# Patient Record
Sex: FEMALE | Race: WHITE | Employment: UNEMPLOYED | URBAN - METROPOLITAN AREA
[De-identification: names, ages, dates, MRNs, and addresses within clinical notes are randomized per-mention and may not be internally consistent; named-entity substitution may affect disease eponyms.]

---

## 2017-01-09 ENCOUNTER — TELEPHONE (OUTPATIENT)
Dept: PULMONOLOGY | Age: 52
End: 2017-01-09

## 2017-04-17 ENCOUNTER — HOSPITAL ENCOUNTER (OUTPATIENT)
Dept: OTHER | Age: 52
Discharge: HOME | End: 2017-04-17
Attending: FAMILY MEDICINE | Admitting: FAMILY MEDICINE

## 2017-04-17 DIAGNOSIS — R07.1 PAINFUL BREATHING: ICD-10-CM

## 2017-08-14 ENCOUNTER — HOSPITAL ENCOUNTER (OUTPATIENT)
Dept: MAMMOGRAPHY | Age: 52
Discharge: HOME | End: 2017-08-14
Attending: NURSE PRACTITIONER | Admitting: NURSE PRACTITIONER

## 2017-08-14 DIAGNOSIS — Z12.31 VISIT FOR SCREENING MAMMOGRAM: ICD-10-CM

## 2017-09-29 ENCOUNTER — OFFICE VISIT (OUTPATIENT)
Dept: PULMONOLOGY | Age: 52
End: 2017-09-29

## 2017-09-29 ENCOUNTER — TELEPHONE (OUTPATIENT)
Dept: PULMONOLOGY | Age: 52
End: 2017-09-29

## 2017-09-29 VITALS
HEIGHT: 62 IN | SYSTOLIC BLOOD PRESSURE: 154 MMHG | TEMPERATURE: 97.9 F | WEIGHT: 177 LBS | HEART RATE: 88 BPM | DIASTOLIC BLOOD PRESSURE: 97 MMHG | OXYGEN SATURATION: 96 % | BODY MASS INDEX: 32.57 KG/M2

## 2017-09-29 DIAGNOSIS — R06.02 SOB (SHORTNESS OF BREATH): Primary | ICD-10-CM

## 2017-09-29 DIAGNOSIS — R94.2 ABNORMAL DIFFUSION CAPACITY DETERMINED BY PULMONARY FUNCTION TEST: ICD-10-CM

## 2017-09-29 PROCEDURE — 99214 OFFICE O/P EST MOD 30 MIN: CPT | Performed by: INTERNAL MEDICINE

## 2017-09-29 RX ORDER — ALBUTEROL SULFATE 90 UG/1
2 AEROSOL, METERED RESPIRATORY (INHALATION) EVERY 6 HOURS PRN
Qty: 1 INHALER | Refills: 2 | Status: ON HOLD | OUTPATIENT
Start: 2017-09-29 | End: 2018-06-17

## 2017-09-29 RX ORDER — BUPROPION HYDROCHLORIDE 150 MG/1
TABLET, EXTENDED RELEASE ORAL
Qty: 60 TABLET | Refills: 3 | Status: SHIPPED | OUTPATIENT
Start: 2017-09-29 | End: 2018-06-05

## 2017-09-29 NOTE — TELEPHONE ENCOUNTER
PA submitted through [employer] for Spiriva Handihaler. Awaiting determination. Last OV 9/29/17           CC: shortness of breath   HPI  Interval history: recent emergency department visit for facial numbness with abnormal imaging, recommended urgent neurosurgical evaluation, inpatient evaluation, but patient declined     Shortness of breath with exertion including ambulation and vacuuming; better with Spiriva. Using Albuterol twice weekly on average.      Tobacco abuse - using patches to cut down, now at 5 cigarettes daily     PRIOR HX: COPD exacerbation with chest pressure, dyspnea and cough -- given nitroglycerine; Medical management of Coronary Artery Disease - 3 vessel, non critical; Had chemical stress test in 2011 that showed normal response     Review of Systems  Objective:   Physical Exam  Blood pressure (!) 154/97, pulse 88, temperature 97.9 °F (36.6 °C), temperature source Oral, height 5' 1.5\" (1.562 m), weight 177 lb (80.3 kg), SpO2 96 %, not currently breastfeeding. Constitutional:  No acute distress. HENT:  Oropharynx is clear and moist. Mallampati 1  Eyes: Pupils equal, round, and reactive to light. No scleral icterus. Neck: No tracheal deviation present. Ca 15 inches  Cardiovascular: Normal heart sounds. No lower extremity edema. Pulmonary/Chest: No wheezes. No rhonchi. No rales. No decreased breath sounds. No accessory muscle usage or stridor. Musculoskeletal: No cyanosis. No clubbing. Skin: Skin is warm and dry. Psychiatric: Normal mood and affect. Neurologic: speech fluent, alert and oriented, strength symmetric       CTA Neck 9/26/17  SOFT TISSUES: The lung apices are clear.  No cervical or superior mediastinal   lymphadenopathy.  The visualized portion of the larynx and pharynx appear   unremarkable.       CT CHEST Feb 2013  The airways are patent. No pleural fluid is present. There is no   evidence of interstitial lung disease on HRCT imaging.  Mild   dependent atelectasis is observed in the posterior lungs   bilaterally. No additional airspace abnormalities, and no evidence   of pulmonary nodule. Soft tissues at the base of the neck are normal. Heart and   mediastinum are unremarkable. No lymph node enlargement throughout   the chest. Adrenal glands are normal. The patient is status post   cholecystectomy. There are no skeletal abnormalities throughout. Impression-   1. No evidence of interstitial lung disease. 2. There is mild dependent atelectasis in the lungs, but no   evidence of acute airspace disease.      CXR 9-2-15 without airspace disease      PFT May 10, 2011                FEV1 2.39 L 102%, ratio . 76            TLC 77% with low ERV                    DLCO 83%  PFT Feb 8, 2012                   FEV1 2.35 L 101%                                      TLC 77%                                                               DLCO 70%  PFT Sept 2012                      FEV1 2.26 L 88%                                        TLC 77%                                                               DLCO 61%  PFT Feb 2013                                 FEv1 2.33 L 91%                                        TLC 3.13 65%                                                       DLCO 14.11 63%  PFT 9-22-15                                    FEV1 2.43 L 97%                                        TLC 3.97 L 83%                                          DLCO 14.73 66%  6MWT 1-19-16 no desaturation 1260 feet     Myoscan June 2011 normal     Alpha 1 - 144     Assessment:      1. Chronic Bronchitis  2. Abnormal diffusing capacity  3. Coronary Artery Disease now with recurrent chest pressure  4. Tobacco abuse, 1 ppd  5. Hypoxemia, resolved  6. Excessive daytime sleepiness - epworth is 10  7. Recent emergency department visit (9/26/17) with physician concern for vertebrobasilar insufficiency; patient left AMA and was to f/u with Mr. Anderson Button:      1.   No further imaging is required  2. Tobacco cessation is advised, working on cutting down, down from 2 to 1 ppd  3. Spiriva q day, albuterol PRN (give inhaler)   4. On propranolol (no difference with shortness of breath while off)   5. Does not require daytime oxygen, ONPO on room air  6. See me in about 3 months with full PFT, evaluate shortness of breath and abnormal diffusing capacity  7. Wellbutrin 150 mg bid. I discussed with this patient today the risks and benefits of various tobacco cessation strategies, including, but not limited to \"cold turkey,\" nicotine replacement, Chantix and wellbutrin. We specifically discussed the risks of Chantix and Wellbutrin, with focus on side effects to include nausea, intense dreams, seizures and spent considerable time focusing on the black box warning regarding depression. The patient specifically denies suicidal ideation/homicidal ideation and was counseled to stop the product and immediately seek medical assistance for any worsening depression/mood disturbance, agitation, hostility or changes in behavior or thinking. Specific risk of completed suicide was discussed. The patient is encouraged to read enclosed literature for any prescribed medications.

## 2017-10-09 ENCOUNTER — OFFICE VISIT (OUTPATIENT)
Dept: CARDIOLOGY CLINIC | Age: 52
End: 2017-10-09

## 2017-10-09 VITALS
BODY MASS INDEX: 32.65 KG/M2 | OXYGEN SATURATION: 94 % | DIASTOLIC BLOOD PRESSURE: 80 MMHG | HEIGHT: 62 IN | WEIGHT: 177.4 LBS | HEART RATE: 91 BPM | SYSTOLIC BLOOD PRESSURE: 128 MMHG

## 2017-10-09 DIAGNOSIS — I10 ESSENTIAL HYPERTENSION: ICD-10-CM

## 2017-10-09 DIAGNOSIS — I25.119 CORONARY ARTERY DISEASE INVOLVING NATIVE CORONARY ARTERY OF NATIVE HEART WITH ANGINA PECTORIS (HCC): Primary | ICD-10-CM

## 2017-10-09 DIAGNOSIS — E78.2 MIXED HYPERLIPIDEMIA: ICD-10-CM

## 2017-10-09 PROCEDURE — 99215 OFFICE O/P EST HI 40 MIN: CPT | Performed by: INTERNAL MEDICINE

## 2017-10-09 RX ORDER — RANOLAZINE 500 MG/1
500 TABLET, EXTENDED RELEASE ORAL 2 TIMES DAILY
Qty: 14 TABLET | Refills: 3 | Status: SHIPPED | OUTPATIENT
Start: 2017-10-09 | End: 2017-10-09 | Stop reason: DRUGHIGH

## 2017-10-09 RX ORDER — NITROGLYCERIN 0.4 MG/1
0.4 TABLET SUBLINGUAL EVERY 5 MIN PRN
Qty: 25 TABLET | Refills: 3 | Status: ON HOLD | OUTPATIENT
Start: 2017-10-09 | End: 2018-06-17 | Stop reason: HOSPADM

## 2017-10-09 RX ORDER — LISINOPRIL 2.5 MG/1
2.5 TABLET ORAL DAILY
COMMUNITY
End: 2017-10-09 | Stop reason: SDUPTHER

## 2017-10-09 RX ORDER — LISINOPRIL 2.5 MG/1
2.5 TABLET ORAL DAILY
Qty: 30 TABLET | Refills: 11 | Status: ON HOLD | OUTPATIENT
Start: 2017-10-09 | End: 2018-05-18

## 2017-10-09 RX ORDER — RANOLAZINE 1000 MG/1
1000 TABLET, EXTENDED RELEASE ORAL 2 TIMES DAILY
Qty: 30 TABLET | Refills: 11 | Status: ON HOLD | OUTPATIENT
Start: 2017-10-09 | End: 2018-06-17 | Stop reason: HOSPADM

## 2017-10-09 NOTE — PROGRESS NOTES
[address]   Cardiac follow up    Referring Provider:  Lennox Shown, NP     Chief Complaint   Patient presents with    Follow-Up from [facility]     9/26/2017    Coronary Artery Disease    Hypertension    Hyperlipidemia    Discuss Labs     cmp and troponin 9/26/2017    Results     ekg 9/26/2017    Back Pain     started Saturday    Dizziness     at times    Shortness of Breath     with activity    Palpitations     flutters at times.  Fatigue     started 9/25/2017        History of Present Illness:   Mrs. Galina Avila is a 51yo female here today for recent ER f/u. My last OV with her was April 2016. She has PMH of HTN, COPD, DM, and non-obstructive CAD. Cardiac catheterization August 2012 showed diffuse nonobstructive CAD which was described as about 30% to 40% stenosis. Note stress test June 2011 was normal with EF 72%. Her most recent ECHO 5/1/13 showed normal LVEF=55%,mild LVH, and no intracardiac mass. EKG 5/19/13 SR, mild T wave inversion consider ischemia. Note CATH on 6/6/13 which showed essentially normal epicardial coronary arteries  Circumflex <20%, LAD <20%. Normal LV systolic function GX=58%. No significant valvular disease. Normal-appearing aorta. Normal LVEDP. She was seen by Dr. Wei Reyna for exertional chest pain and GXT without myoview (insurance would not cover nuclear imaging) ordered 3/18/16 which revealed  normal low level treadmill exam. She did not reach 85% target heart rate. She was able to reach 81%. The patient did experience exertional symptoms   In March 2016 she continued to complain of exertional midsternal CP. I felt we needed to recheck her coronary arteries. She underwent subsequent most recent CATH by Dr. Keo Browne on 4/1/16 LM-normal LAD-30%; mid Septal  #1--70%; ostial CCx-20%; prox OM1- normal RCA-normal, dominant RPDA- normal LVEF- 60%  EKG 4/1/16 showed NSR with nonspecific ST changes.   No need for PCI and med mgt only recommended with addition of smokeless tobacco. She reports that she does not drink alcohol or use drugs. Family History:  family history includes Cancer in her brother and another family member; Diabetes in an other family member; Emphysema in her mother; Heart Disease in her father and mother; Heart Failure in her father, maternal aunt, maternal uncle, mother, and another family member; High Blood Pressure in her brother, father, maternal grandfather, maternal grandmother, mother, paternal grandfather, paternal grandmother, sister, sister, sister, and sister; High Cholesterol in her father and mother; Hypertension in her brother, father, maternal aunt, maternal grandfather, maternal grandmother, maternal uncle, mother, paternal aunt, paternal grandfather, paternal grandmother, paternal uncle, and sister; Osteoarthritis in an other family member; Stroke in her mother. Home Medications:  Prior to Admission medications    Medication Sig Start Date End Date Taking? Authorizing Provider   glimepiride (AMARYL) 4 MG tablet Take 4 mg by mouth 2 times daily. Yes Historical Provider, MD   NONFORMZACHARY Home oxygen 2L per NC   Yes Historical Provider, MD   tiotropium (SPIRIVA HANDIHALER) 18 MCG inhalation capsule Inhale 1 capsule into the lungs daily. 2/27/13  Yes Opal Mckeno MD   amLODIPine (NORVASC) 5 MG tablet Take 5 mg by mouth 2 times daily. Yes Historical Provider, MD   albuterol (PROVENTIL HFA) 108 (90 BASE) MCG/ACT inhaler Inhale 2 puffs into the lungs every 6 hours as needed for Shortness of Breath for 10 days. IF YOU FEEL AS THO YOU ARE STILL NEEDING THIS MEDICATION AFTER 10 DAYS, CALL YOUR DR TO DISCUSS FURTHER TREATMENT 1/22/13  Yes Edvin Han MD   propranolol (INDERAL) 40 MG tablet Take 40 mg by mouth 2 times daily. Yes Historical Provider, MD   SUMAtriptan (IMITREX) 50 MG tablet Take 50 mg by mouth once as needed.      Yes Historical Provider, MD   nitroGLYCERIN (NITROSTAT) 0.4 MG SL tablet Place 0.4 mg under the tongue every 5 minutes as needed. Yes Historical Provider, MD   pravastatin (PRAVACHOL) 40 MG tablet Take 1 tablet by mouth daily. 8/1/12  Yes Marquis Felipe MD   insulin glargine (LANTUS) 100 UNIT/ML injection Inject 25 Units into the skin daily. Yes Historical Provider, MD   aspirin 81 MG EC tablet Take 81 mg by mouth daily. Yes Historical Provider, MD   omeprazole (PRILOSEC) 20 MG capsule Take 20 mg by mouth 2 times daily. Yes Historical Provider, MD   enalapril (VASOTEC) 20 MG tablet Take 20 mg by mouth 2 times daily. Yes Historical Provider, MD        Allergies:  Penicillins     Review of Systems:   · Constitutional: there has been no unanticipated weight loss. There's been no change in energy level, sleep pattern, or activity level. · Eyes: No visual changes or diplopia. No scleral icterus. · ENT: No Headaches, hearing loss or vertigo. No mouth sores or sore throat. · Cardiovascular: Reviewed in HPI  · Respiratory: No cough or wheezing, no sputum production. No hematemesis. · Gastrointestinal: No abdominal pain, appetite loss, blood in stools. No change in bowel or bladder habits. · Genitourinary: No dysuria, trouble voiding, or hematuria. · Musculoskeletal:  No gait disturbance, weakness or joint complaints. · Integumentary: No rash or pruritis. · Neurological: No headache, diplopia, change in muscle strength, numbness or tingling. No change in gait, balance, coordination, mood, affect, memory, mentation, behavior. · Psychiatric: No anxiety, no depression. · Endocrine: No malaise, fatigue or temperature intolerance. No excessive thirst, fluid intake, or urination. No tremor. · Hematologic/Lymphatic: No abnormal bruising or bleeding, blood clots or swollen lymph nodes. · Allergic/Immunologic: No nasal congestion or hives.     Physical Examination:    Vitals:    10/09/17 0931   BP: 128/80   Pulse: 91   SpO2: 94%        Constitutional and General Appearance: NAD Respiratory:  · Normal excursion and expansion without use of accessory muscles  · Resp Auscultation: Normal soft breath sounds without dullness  Cardiovascular:  · The apical impulses not displaced  · Heart tones are crisp and normal  · Cervical veins are not engorged  · The carotid upstroke is normal in amplitude and contour without delay or bruit  · Normal S1S2, No S3, No Murmur  · Peripheral pulses are symmetrical and full  · There is no clubbing, cyanosis of the extremities. · No edema  · Femoral Arteries: 2+ and equal  · Pedal Pulses: 2+ and equal   Abdomen:  · No masses or tenderness  · Liver/Spleen: No Abnormalities Noted  Neurological/Psychiatric:  · Alert and oriented in all spheres  · Moves all extremities well  · Exhibits normal gait balance and coordination  · No abnormalities of mood, affect, memory, mentation, or behavior are noted    Lab Results   Component Value Date    CHOL 161 04/01/2016    CHOL 174 03/12/2016    CHOL 206 (H) 06/06/2013     Lab Results   Component Value Date    TRIG 139 04/01/2016    TRIG 112 03/12/2016    TRIG 215 (H) 06/06/2013     Lab Results   Component Value Date    HDL 40 04/01/2016    HDL 38 (L) 03/12/2016    HDL 36 (L) 06/06/2013     Lab Results   Component Value Date    LDLCALC 93 04/01/2016    LDLCALC 114 (H) 03/12/2016    LDLCALC 127 (H) 06/06/2013     Lab Results   Component Value Date    LABVLDL 28 04/01/2016    LABVLDL 22 03/12/2016    LABVLDL 43 06/06/2013     No results found for: CHOLHDLRATIO    Assessment:     1. CAD:  Most recent CATH by Dr. Lamond Sacks on 4/1/16 LM-normal LAD-30%; mid Septal  #1--70%; ostial CCx-20%; prox OM1- normal RCA-normal, dominant RPDA- normal LVEF- 60%  EKG 4/1/16 showed NSR with nonspecific ST changes. No need for PCI and med mgt only recommended with addition of ranexa at the time. Now with c/o chest pain with typical and atypical features.  Most recent EKG 9/26/17 showed mild T wave inversion (new T wave change from April 2016 study). Given worsening CP and known hx of CAD with new changes on EKG c/w possible ischemia she merits nuclear stress testing to assess myocardial perfusion. 2. Hyperlipidemia LIPIDS  4/1/16:  cholesterol, Total 161  Triglycerides 139  HDL 40  LDL Calculated 93    She remains on  pravastatin 40mg qhs for mild coronary disease prior and LDL >100. No testing in >1year and need lab check now. Plan:  1. Lexiscan Myoview to risk stratify. 2. Increase Ranxea to 1000 mg twice daily for possible angina. 3. Continue all other medications as prescribed. 4. Check LFT and Lipids the same day you have your tests done. 5. Follow up with me in 3 months. Thank you for allowing me to participate in the care of this individual.    Cost of prescription medications and patient compliance have been reviewed with patient. All questions answered. Josephine Cartwright.  Marta Hardy M.D., [facility] [place]

## 2017-10-09 NOTE — LETTER
[address] [place] [address] Cardiology - [address] [place] [id number]  Phone: [phone]  Fax: [phone]    Bib Oglesby MD        October 9, 2017     Lennox Shown, NP  [address]    Patient: Lara Marquis  MR Number: [id number]  YOB: 1965  Date of Visit: 10/9/2017    Dear Dr. Lennox Shown: Thank you for the request for consultation for Sweta Lopez to me for the evaluation. Below are the relevant portions of my assessment and plan of care. [address]   Cardiac follow up    Referring Provider:  Lennox Shown, NP     Chief Complaint   Patient presents with    Follow-Up from [facility]     9/26/2017    Coronary Artery Disease    Hypertension    Hyperlipidemia    Discuss Labs     cmp and troponin 9/26/2017    Results     ekg 9/26/2017    Back Pain     started Saturday    Dizziness     at times    Shortness of Breath     with activity    Palpitations     flutters at times.  Fatigue     started 9/25/2017        History of Present Illness:   Mrs. Galina Avila is a 49yo female here today for recent ER f/u. My last OV with her was April 2016. She has PMH of HTN, COPD, DM, and non-obstructive CAD. Cardiac catheterization August 2012 showed diffuse nonobstructive CAD which was described as about 30% to 40% stenosis. Note stress test June 2011 was normal with EF 72%. Her most recent ECHO 5/1/13 showed normal LVEF=55%,mild LVH, and no intracardiac mass. EKG 5/19/13 SR, mild T wave inversion consider ischemia. Note CATH on 6/6/13 which showed essentially normal epicardial coronary arteries  Circumflex <20%, LAD <20%. Normal LV systolic function CU=68%. No significant valvular disease. Normal-appearing aorta. Normal LVEDP.    She was seen by Dr. Wei Reyna for exertional chest pain and GXT without myoview (insurance would not cover nuclear imaging) ordered 3/18/16 which revealed  normal low level treadmill exam. She did not reach 85% target heart rate. She was able to reach 81%. The patient did experience exertional symptoms   In March 2016 she continued to complain of exertional midsternal CP. I felt we needed to recheck her coronary arteries. She underwent subsequent most recent CATH by Dr. Carlos Guardado on 4/1/16 LM-normal LAD-30%; mid Septal  #1--70%; ostial CCx-20%; prox OM1- normal RCA-normal, dominant RPDA- normal LVEF- 60%  EKG 4/1/16 showed NSR with nonspecific ST changes. No need for PCI and med mgt only recommended with addition of ranexa. She presented to the ER on 9/26/17 for chest mike, right sided facial numbness and tingling. She was advised be admitted for neurological for a clinic impression of a TIA. She signed out AMA. Her CT of her head showed no acute abnormality. Her CTA of her head showed high grade stenosis of right vertebral artery and 2 small 1-2mm outpouchings of LICA. Her Brain MRI showed no acute intracranial abnormality. Chronic small vessel ischemic changes. She currently follows up with Dr. Francisco Dao for her known aneurysms. She is currently taking Wellbutrin to attempt to quit smoking. She continues to occasionally smoke. Most recent EKG 9/26/17 showed mild T wave inversion (new T wave change from April 2016 study). She does complain of center back pain. She describes this as an ache with no specific aggravating factors. She also complains of mid sternal chest pain. This occurs both at rest and with exertion. She describes this as a sharp pain that comes and goes, lasting a few second to minutes and associated with palpiations. She currently denies syncope, palpitations, dizziness, shortness of breath or edema. Past Medical History:   has a past medical history of Asthma; Chest pain; COPD (chronic obstructive pulmonary disease) ([address]  Coronary artery disease; Depression; Diabetes mellitus ([address] Enlarged heart; Fatigue; Generalized headaches; Hyperlipidemia; Hypertension; Joint pain; Joint swelling; Migraine; Morning stiffness of joints; Muscle tenderness; Muscle weakness; Numbness or tingling; OA (osteoarthritis) of knee; Osteoporosis; Pneumonia; Pulmonary nodule; Shortness of breath; and Weakness. Surgical History:   has a past surgical history that includes Cholecystectomy; Hysterectomy; Diagnostic Cardiac Cath Lab Procedure (6/6/2013); and Upper gastrointestinal endoscopy. Social History:   reports that she has been smoking Cigarettes. She has a 10.00 pack-year smoking history. She has never used smokeless tobacco. She reports that she does not drink alcohol or use drugs. Family History:  family history includes Cancer in her brother and another family member; Diabetes in an other family member; Emphysema in her mother; Heart Disease in her father and mother; Heart Failure in her father, maternal aunt, maternal uncle, mother, and another family member; High Blood Pressure in her brother, father, maternal grandfather, maternal grandmother, mother, paternal grandfather, paternal grandmother, sister, sister, sister, and sister; High Cholesterol in her father and mother; Hypertension in her brother, father, maternal aunt, maternal grandfather, maternal grandmother, maternal uncle, mother, paternal aunt, paternal grandfather, paternal grandmother, paternal uncle, and sister; Osteoarthritis in an other family member; Stroke in her mother. Home Medications:  Prior to Admission medications    Medication Sig Start Date End Date Taking? Authorizing Provider   glimepiride (AMARYL) 4 MG tablet Take 4 mg by mouth 2 times daily. Yes Historical Provider, MD   NONFORMULARY Home oxygen 2L per [place]   Yes Historical Provider, MD   tiotropium (SPIRIVA HANDIHALER) 18 MCG inhalation capsule Inhale 1 capsule into the lungs daily.  2/27/13  Yes Soniya Wolf MD amLODIPine (NORVASC) 5 MG tablet Take 5 mg by mouth 2 times daily. Yes Historical Provider, MD   albuterol (PROVENTIL HFA) 108 (90 BASE) MCG/ACT inhaler Inhale 2 puffs into the lungs every 6 hours as needed for Shortness of Breath for 10 days. IF YOU FEEL AS THO YOU ARE STILL NEEDING THIS MEDICATION AFTER 10 DAYS, CALL YOUR DR TO DISCUSS FURTHER TREATMENT 1/22/13  Yes Jeffrey Dennison MD   propranolol (INDERAL) 40 MG tablet Take 40 mg by mouth 2 times daily. Yes Historical Provider, MD   SUMAtriptan (IMITREX) 50 MG tablet Take 50 mg by mouth once as needed. Yes Historical Provider, MD   nitroGLYCERIN (NITROSTAT) 0.4 MG SL tablet Place 0.4 mg under the tongue every 5 minutes as needed. Yes Historical Provider, MD   pravastatin (PRAVACHOL) 40 MG tablet Take 1 tablet by mouth daily. 8/1/12  Yes Yoselyn Hamilton MD   insulin glargine (LANTUS) 100 UNIT/ML injection Inject 25 Units into the skin daily. Yes Historical Provider, MD   aspirin 81 MG EC tablet Take 81 mg by mouth daily. Yes Historical Provider, MD   omeprazole (PRILOSEC) 20 MG capsule Take 20 mg by mouth 2 times daily. Yes Historical Provider, MD   enalapril (VASOTEC) 20 MG tablet Take 20 mg by mouth 2 times daily. Yes Historical Provider, MD        Allergies:  Penicillins     Review of Systems:   · Constitutional: there has been no unanticipated weight loss. There's been no change in energy level, sleep pattern, or activity level. · Eyes: No visual changes or diplopia. No scleral icterus. · ENT: No Headaches, hearing loss or vertigo. No mouth sores or sore throat. · Cardiovascular: Reviewed in HPI  · Respiratory: No cough or wheezing, no sputum production. No hematemesis. · Gastrointestinal: No abdominal pain, appetite loss, blood in stools. No change in bowel or bladder habits. · Genitourinary: No dysuria, trouble voiding, or hematuria. · Musculoskeletal:  No gait disturbance, weakness or joint complaints. · Integumentary: No rash or pruritis. · Neurological: No headache, diplopia, change in muscle strength, numbness or tingling. No change in gait, balance, coordination, mood, affect, memory, mentation, behavior. · Psychiatric: No anxiety, no depression. · Endocrine: No malaise, fatigue or temperature intolerance. No excessive thirst, fluid intake, or urination. No tremor. · Hematologic/Lymphatic: No abnormal bruising or bleeding, blood clots or swollen lymph nodes. · Allergic/Immunologic: No nasal congestion or hives. Physical Examination:    Vitals:    10/09/17 0931   BP: 128/80   Pulse: 91   SpO2: 94%        Constitutional and General Appearance: NAD   Respiratory:  · Normal excursion and expansion without use of accessory muscles  · Resp Auscultation: Normal soft breath sounds without dullness  Cardiovascular:  · The apical impulses not displaced  · Heart tones are crisp and normal  · Cervical veins are not engorged  · The carotid upstroke is normal in amplitude and contour without delay or bruit  · Normal S1S2, No S3, No Murmur  · Peripheral pulses are symmetrical and full  · There is no clubbing, cyanosis of the extremities.   · No edema  · Femoral Arteries: 2+ and equal  · Pedal Pulses: 2+ and equal   Abdomen:  · No masses or tenderness  · Liver/Spleen: No Abnormalities Noted  Neurological/Psychiatric:  · Alert and oriented in all spheres  · Moves all extremities well  · Exhibits normal gait balance and coordination  · No abnormalities of mood, affect, memory, mentation, or behavior are noted    Lab Results   Component Value Date    CHOL 161 04/01/2016    CHOL 174 03/12/2016    CHOL 206 (H) 06/06/2013     Lab Results   Component Value Date    TRIG 139 04/01/2016    TRIG 112 03/12/2016    TRIG 215 (H) 06/06/2013     Lab Results   Component Value Date    HDL 40 04/01/2016    HDL 38 (L) 03/12/2016    HDL 36 (L) 06/06/2013     Lab Results   Component Value Date    LDLCALC 93 04/01/2016 LDLCALC 114 (H) 03/12/2016    LDLCALC 127 (H) 06/06/2013     Lab Results   Component Value Date    LABVLDL 28 04/01/2016    LABVLDL 22 03/12/2016    LABVLDL 43 06/06/2013     No results found for: CHOLHDLRATIO    Assessment:     1. CAD:  Most recent CATH by Dr. Xi Enriquez on 4/1/16 LM-normal LAD-30%; mid Septal  #1--70%; ostial CCx-20%; prox OM1- normal RCA-normal, dominant RPDA- normal LVEF- 60%  EKG 4/1/16 showed NSR with nonspecific ST changes. No need for PCI and med mgt only recommended with addition of ranexa at the time. Now with c/o chest pain with typical and atypical features. Most recent EKG 9/26/17 showed mild T wave inversion (new T wave change from April 2016 study). Given worsening CP and known hx of CAD with new changes on EKG c/w possible ischemia she merits nuclear stress testing to assess myocardial perfusion. 2. Hyperlipidemia LIPIDS  4/1/16:  cholesterol, Total 161  Triglycerides 139  HDL 40  LDL Calculated 93    She remains on  pravastatin 40mg qhs for mild coronary disease prior and LDL >100. No testing in >1year and need lab check now. Plan:  1. Lexiscan Myoview to risk stratify. 2. Increase Ranxea to 1000 mg twice daily for possible angina. 3. Continue all other medications as prescribed. 4. Check LFT and Lipids the same day you have your tests done. 5. Follow up with me in 3 months. Thank you for allowing me to participate in the care of this individual.    Cost of prescription medications and patient compliance have been reviewed with patient. All questions answered. Josephine Cartwright. Marta Hardy M.D., [facility] [place]          If you have questions, please do not hesitate to call me. I look forward to following Yobani Dewitt along with you.     Sincerely,        Sabra Oppenheim, MD

## 2017-10-09 NOTE — PATIENT INSTRUCTIONS
1. Lexiscan Myoview to risk stratify. 2. Increase Ranxea to 1000 mg twice daily. 3. Continue all other medications as prescribed. 4. Check LFT and Lipids the same day you have your tests done. 5. Follow up with me in 3 months.

## 2017-10-09 NOTE — COMMUNICATION BODY
[address]   Cardiac follow up    Referring Provider:  Kailee Spears NP     Chief Complaint   Patient presents with    Follow-Up from [facility]     9/26/2017    Coronary Artery Disease    Hypertension    Hyperlipidemia    Discuss Labs     cmp and troponin 9/26/2017    Results     ekg 9/26/2017    Back Pain     started Saturday    Dizziness     at times    Shortness of Breath     with activity    Palpitations     flutters at times.  Fatigue     started 9/25/2017        History of Present Illness:   Mrs. Ester Swann is a 51yo female here today for recent ER f/u. My last OV with her was April 2016. She has PMH of HTN, COPD, DM, and non-obstructive CAD. Cardiac catheterization August 2012 showed diffuse nonobstructive CAD which was described as about 30% to 40% stenosis. Note stress test June 2011 was normal with EF 72%. Her most recent ECHO 5/1/13 showed normal LVEF=55%,mild LVH, and no intracardiac mass. EKG 5/19/13 SR, mild T wave inversion consider ischemia. Note CATH on 6/6/13 which showed essentially normal epicardial coronary arteries  Circumflex <20%, LAD <20%. Normal LV systolic function ZD=82%. No significant valvular disease. Normal-appearing aorta. Normal LVEDP. She was seen by Dr. Paula Acuna for exertional chest pain and GXT without myoview (insurance would not cover nuclear imaging) ordered 3/18/16 which revealed  normal low level treadmill exam. She did not reach 85% target heart rate. She was able to reach 81%. The patient did experience exertional symptoms   In March 2016 she continued to complain of exertional midsternal CP. I felt we needed to recheck her coronary arteries. She underwent subsequent most recent CATH by Dr. Abhijit Barton on 4/1/16 LM-normal LAD-30%; mid Septal  #1--70%; ostial CCx-20%; prox OM1- normal RCA-normal, dominant RPDA- normal LVEF- 60%  EKG 4/1/16 showed NSR with nonspecific ST changes.   No need for PCI and med mgt only recommended with addition of ranexa. She presented to the ER on 9/26/17 for chest mike, right sided facial numbness and tingling. She was advised be admitted for neurological for a clinic impression of a TIA. She signed out AMA. Her CT of her head showed no acute abnormality. Her CTA of her head showed high grade stenosis of right vertebral artery and 2 small 1-2mm outpouchings of LICA. Her Brain MRI showed no acute intracranial abnormality. Chronic small vessel ischemic changes. She currently follows up with Dr. Nayely Merida for her known aneurysms. She is currently taking Wellbutrin to attempt to quit smoking. She continues to occasionally smoke. Most recent EKG 9/26/17 showed mild T wave inversion (new T wave change from April 2016 study). She does complain of center back pain. She describes this as an ache with no specific aggravating factors. She also complains of mid sternal chest pain. This occurs both at rest and with exertion. She describes this as a sharp pain that comes and goes, lasting a few second to minutes and associated with palpiations. She currently denies syncope, palpitations, dizziness, shortness of breath or edema. Past Medical History:   has a past medical history of Asthma; Chest pain; COPD (chronic obstructive pulmonary disease) ([facility] [address] Coronary artery disease; Depression; Diabetes mellitus ([address] Enlarged heart; Fatigue; Generalized headaches; Hyperlipidemia; Hypertension; Joint pain; Joint swelling; Migraine; Morning stiffness of joints; Muscle tenderness; Muscle weakness; Numbness or tingling; OA (osteoarthritis) of knee; Osteoporosis; Pneumonia; Pulmonary nodule; Shortness of breath; and Weakness. Surgical History:   has a past surgical history that includes Cholecystectomy; Hysterectomy; Diagnostic Cardiac Cath Lab Procedure (6/6/2013); and Upper gastrointestinal endoscopy. Social History:   reports that she has been smoking Cigarettes. She has a 10.00 pack-year smoking history.  She has never used

## 2017-10-10 ENCOUNTER — INITIAL CONSULT (OUTPATIENT)
Dept: NEUROLOGY | Age: 52
End: 2017-10-10

## 2017-10-10 VITALS
BODY MASS INDEX: 33.23 KG/M2 | DIASTOLIC BLOOD PRESSURE: 97 MMHG | OXYGEN SATURATION: 94 % | HEIGHT: 61 IN | SYSTOLIC BLOOD PRESSURE: 145 MMHG | HEART RATE: 80 BPM | WEIGHT: 176 LBS

## 2017-10-10 DIAGNOSIS — G45.1 TIA INVOLVING LEFT INTERNAL CAROTID ARTERY: Primary | ICD-10-CM

## 2017-10-10 DIAGNOSIS — F17.200 SMOKING: ICD-10-CM

## 2017-10-10 DIAGNOSIS — I10 HTN (HYPERTENSION), BENIGN: ICD-10-CM

## 2017-10-10 DIAGNOSIS — I67.1 CEREBRAL ANEURYSM, NONRUPTURED: ICD-10-CM

## 2017-10-10 DIAGNOSIS — E78.5 DYSLIPIDEMIA: ICD-10-CM

## 2017-10-10 PROCEDURE — 99204 OFFICE O/P NEW MOD 45 MIN: CPT | Performed by: PSYCHIATRY & NEUROLOGY

## 2017-10-10 ASSESSMENT — ENCOUNTER SYMPTOMS
EYES NEGATIVE: 1
GASTROINTESTINAL NEGATIVE: 1
RESPIRATORY NEGATIVE: 1

## 2017-10-10 NOTE — LETTER
Natalie Epley, MD    [facility]  [address] [place], [address] [place] [address]. [address] [place] [address]    [phone] (Phone)  [phone] (Fax)    Dear Dr Chad Galvan NP    I had the pleasure of seeing your patient Hanna FRITZ.O.B. 1965 today. I have attached a detailed summary below:    The patient is a 47y.o. years old female who  was referred by Chad Galvan NP  for consultation regarding recent TIA. HPI:  The patient was seen last month at  [facility] ED with acute Right-sided numbness, tingling and paresthesia affecting her right face, arm and leg. Symptoms started suddenly and  were severe. Duration was  minutes to an hour after which she was back to her baseline. No speech or swallowing issues or significant weakness. She few work up in the ED. This workup included MRI of the brain, CTA head and neck and CT of the head. I reviewed these results with the patient and her family today. MRI of the brain showed no acute stroke and nonspecific small vessel disease. She had a CTA of the head and neck which showed possible left small ICA aneurysm and  right severe vertebral stenosis. The patient was started on the Plavix in addition to aspirin. .    She is diabetic and her sugar has been poorly controlled. Last A1c was 12. She does smoke. Denies use of drugs. She hasn't been taking care of her diet or medications at home. Blood pressure has been waxing and waning. She is on statin. She denies any sleep disturbance, insomnia or symptoms sleep apnea. She has been feeling some headaches and confused over the last few weeks. Was very concerned over her test results. Patient was seen by neurosurgery regarding her small aneurysm and recommended follow-up CT in six month period she has been seen by cardiology and she will be scheduled for an echocardiogram.  No history of recurrent DVT or PE or family history of blood clotting.   No other new within normal limits. Sensation: normal to all modalities. Gait/Posture: steady    I personally reviewed social history, past medical history, medications, allergy, surgical history, and family history as documented in the patient's electronic health records. Labs and/or neuroimaging and test results: reviewed and discussed with the patient. Assessment:  Recent left hemispheric TIA. Could be thromboembolic versus cardioembolic. Resolved. Diabetes, poorly controlled  Hypertension, poorly controlled  Hyperlipidemia  Smoking  Poor compliance  Small possible incidental left ICA aneurysm        Plan:  I had a long discussion with the patient and her family regarding secondary stroke prevention, test results and MRI results. I reviewed the MRI and CTA with the patient and her family. Continue with Plavix  No need for aspirin  Statin      Follow and monitor BS  Diet restrictions and adjustment  Continue the same diabetic medication for now. Secondary neurological complication from diabetes was discussed  Follow A1c    Monitor BP and continue with the same BP medications. Hold BP medications if BP below 120/80  Echo    Smoking cessation and counseling  Risk of metabolic syndrome was discussed with the patient  Weight reduction program  Repeat CTA in six month for her small aneurysm. RTC PRN For now.   --------------------------------    Patient's instructions:  Secondary stroke prevention was discussed with the patient including: Blood pressure and sugar monitor and control, lifestyle adjustments and modification, use of AP therapy, Statin and possible side effect, dietary restriction, risk of recurrence and the importance of frequent walking and exercise. Please do not hesitate to contact me, should you have any questions or concerns regarding the care of Annie Whalen     Sincerely,    Sofia Amanda MD    This dictation was generated by voice recognition computer software. Although all attempts are made to edit the dictation for accuracy, there may be errors in the transcription that are not intended.

## 2017-10-10 NOTE — PROGRESS NOTES
 Hypertension     Joint pain     Joint swelling     Migraine     Morning stiffness of joints     Muscle tenderness     Muscle weakness     Numbness or tingling hands and feet    OA (osteoarthritis) of knee 1/27/2015    Osteoporosis     Pneumonia     Pulmonary nodule     Shortness of breath     Weakness      Prior to Visit Medications    Medication Sig Taking? Authorizing Provider   lisinopril (PRINIVIL;ZESTRIL) 2.5 MG tablet Take 1 tablet by mouth daily Yes Keira Mccallum MD   nitroGLYCERIN (NITROSTAT) 0.4 MG SL tablet Place 1 tablet under the tongue every 5 minutes as needed for Chest pain Yes Keira Mccallum MD   ranolazine (RANEXA) 1000 MG extended release tablet Take 1 tablet by mouth 2 times daily Yes Keira Mccallum MD   Umeclidinium Bromide (INCRUSE ELLIPTA) 62.5 MCG/INH AEPB Inhale 1 puff into the lungs daily Yes Carlos Canada MD   buPROPion [facility] SR) 150 MG extended release tablet I tab PO daily for 3 days then 1 Tab PO BID Yes Carlos Canada MD   albuterol sulfate HFA (PROVENTIL HFA) 108 (90 Base) MCG/ACT inhaler Inhale 2 puffs into the lungs every 6 hours as needed for Wheezing or Shortness of Breath Yes Carlos Canada MD   clopidogrel (PLAVIX) 75 MG tablet Take 1 tablet by mouth daily Yes Yesenia Cartwright MD   metFORMIN (GLUCOPHAGE) 1000 MG tablet Take 1,000 mg by mouth 2 times daily (with meals) Yes Historical Provider, MD   pravastatin (PRAVACHOL) 40 MG tablet Take 1 tablet by mouth daily Yes Keira Mccallum MD   omeprazole (PRILOSEC) 20 MG capsule as needed (pt takes prn)  Yes Historical Provider, MD   aspirin 81 MG tablet Take 81 mg by mouth daily.  Yes Historical Provider, MD   pravastatin (PRAVACHOL) 40 MG tablet Take 1 tablet by mouth daily  Yesenia Cartwright MD   ibuprofen (ADVIL;MOTRIN) 600 MG tablet Take 1 tablet by mouth every 6 hours as needed for Pain  Darren Seo MD     Allergies   Allergen Reactions    Penicillins Itching, Swelling and Rash     Social History   Substance Use Topics    Smoking status: Current Some Day Smoker     Packs/day: 0.50     Years: 20.00     Types: Cigarettes    Smokeless tobacco: Never Used    Alcohol use No     Family History   Problem Relation Age of Onset    Emphysema Mother     Heart Failure Mother     Hypertension Mother     Heart Disease Mother     High Blood Pressure Mother     High Cholesterol Mother     Stroke Mother     Heart Failure Father     Hypertension Father     Heart Disease Father     High Blood Pressure Father     High Cholesterol Father     Hypertension Sister     High Blood Pressure Sister     Hypertension Brother     High Blood Pressure Brother     Cancer Brother     Hypertension Maternal Grandmother     High Blood Pressure Maternal Grandmother     Hypertension Maternal Grandfather     High Blood Pressure Maternal Grandfather     Hypertension Paternal Grandmother     High Blood Pressure Paternal Grandmother     Hypertension Paternal Grandfather     High Blood Pressure Paternal Grandfather     High Blood Pressure Sister     High Blood Pressure Sister     High Blood Pressure Sister     Heart Failure Maternal Aunt     Hypertension Maternal Aunt     Heart Failure Maternal Uncle     Hypertension Maternal Uncle     Cancer Other      nephew-colon, liver, lung    Heart Failure Other     Diabetes Other     Osteoarthritis Other     Hypertension Paternal Aunt     Hypertension Paternal Uncle     Asthma Neg Hx      Past Surgical History:   Procedure Laterality Date    CHOLECYSTECTOMY      DIAGNOSTIC CARDIAC CATH LAB PROCEDURE  6/6/2013    Non Obs CAD    HYSTERECTOMY      UPPER GASTROINTESTINAL ENDOSCOPY           Review of Systems   Constitutional: Negative for chills, fever and weight loss. HENT: Negative. Eyes: Negative. Respiratory: Negative. Cardiovascular: Negative. Gastrointestinal: Negative. Genitourinary: Negative. Musculoskeletal: Negative.     Skin: Negative. Neurological: Positive for dizziness, sensory change, focal weakness and headaches. Negative for seizures and loss of consciousness. Endo/Heme/Allergies: Negative. Psychiatric/Behavioral: Positive for depression. Negative for memory loss and suicidal ideas. The patient is nervous/anxious and has insomnia. Exam:   Constitutional:   Vitals:    10/10/17 0951   BP: (!) 155/94   Pulse: 80   SpO2: 94%   Weight: 176 lb (79.8 kg)   Height: 5' 1\" (1.549 m)       General appearance: well-nourished. Eye: No icterus. No blurring of optic disc. Neck: supple  Cardiovascular: No carotid bruit. No lower leg edema with good pulsation. Mental Status: Oriented to person, place, problem, and time. Fluent speech. Good fund of knowledge. Normal attention span and concentration. Cranial Nerves:   II: Visual fields: Full to confrontation  III: Pupils: equal, round, reactive to light  III,IV,VI: Extra Ocular Movements are intact. No nystagmus  V: Facial sensation is intact to pin prick and light touch  VII: Facial strength and movements: intact and symmetric smile,cheek puffing and eyebrow elevation  VIII: Hearing: Intact to finger rub bilaterally  IX: Palate elevation is symmetric  XI: Shoulder shrug is intact  XII: Tongue movements are normal  Musculoskeletal: 5/5 in all 4 extremities. Normal tone. Reflexes: Bilateral biceps 2/4, triceps 2/4, brachial radialis 2/4, knee 2/4 and ankle 2/4. Planters: flexor bilaterally. Coordination: no pronator drift, no dysmetria. Finger nose finger testing within normal limits. Sensation: normal to all modalities. Gait/Posture: steady    I personally reviewed social history, past medical history, medications, allergy, surgical history, and family history as documented in the patient's electronic health records. Labs and/or neuroimaging and test results: reviewed and discussed with the patient. Assessment:  Recent left hemispheric TIA.   Could be thromboembolic versus cardioembolic. Resolved. Diabetes, poorly controlled  Hypertension, poorly controlled  Hyperlipidemia  Smoking  Poor compliance  Small possible incidental left ICA aneurysm        Plan:  I had a long discussion with the patient and her family regarding secondary stroke prevention, test results and MRI results. I reviewed the MRI and CTA with the patient and her family. Continue with Plavix  No need for aspirin  Statin      Follow and monitor BS  Diet restrictions and adjustment  Continue the same diabetic medication for now. Secondary neurological complication from diabetes was discussed  Follow A1c    Monitor BP and continue with the same BP medications. Hold BP medications if BP below 120/80  Echo    Smoking cessation and counseling  Risk of metabolic syndrome was discussed with the patient  Weight reduction program  Repeat CTA in six month for her small aneurysm. RTC PRN For now.   --------------------------------    Patient's instructions:  Secondary stroke prevention was discussed with the patient including: Blood pressure and sugar monitor and control, lifestyle adjustments and modification, use of AP therapy, Statin and possible side effect, dietary restriction, risk of recurrence and the importance of frequent walking and exercise.

## 2017-10-16 ENCOUNTER — TELEPHONE (OUTPATIENT)
Dept: PULMONOLOGY | Age: 52
End: 2017-10-16

## 2017-10-16 NOTE — TELEPHONE ENCOUNTER
I called and d/w patient including possible need for f/u imaging.   Please add to reason for f/u visit \"abnormal CT CHEST\"    Close encounter
read enclosed literature for any prescribed medications.

## 2017-10-30 ENCOUNTER — TELEPHONE (OUTPATIENT)
Dept: NEUROLOGY | Age: 52
End: 2017-10-30

## 2017-10-31 NOTE — TELEPHONE ENCOUNTER
Patient called yesterday stating she was seen in the ED and was advised they wanted to transfer her to  for a increase in the size of her aneurysm. Patient states she did not want to do that and per [address], patient left ED AMA. Patient asking to have Dr. Malachi Mota look at her CT scan and give his opinion as to what she should do. Please advise. Last seen 10.10.17    Assessment:  Recent left hemispheric TIA. Could be thromboembolic versus cardioembolic. Resolved. Diabetes, poorly controlled  Hypertension, poorly controlled  Hyperlipidemia  Smoking  Poor compliance  Small possible incidental left ICA aneurysm           Plan:  I had a long discussion with the patient and her family regarding secondary stroke prevention, test results and MRI results. I reviewed the MRI and CTA with the patient and her family. Continue with Plavix  No need for aspirin  Statin       Follow and monitor BS  Diet restrictions and adjustment  Continue the same diabetic medication for now. Secondary neurological complication from diabetes was discussed  Follow A1c     Monitor BP and continue with the same BP medications. Hold BP medications if BP below 120/80  Echo     Smoking cessation and counseling  Risk of metabolic syndrome was discussed with the patient  Weight reduction program  Repeat CTA in six month for her small aneurysm.     RTC PRN For now.

## 2017-10-31 NOTE — TELEPHONE ENCOUNTER
No major differences in her small aneurysm. It will be better for the patient to be seen at [address] [place] [address] in outpatient visit to review her  CTA and MRI results for assurance.   Please schedule the patient for [facility] consultation

## 2017-11-01 NOTE — TELEPHONE ENCOUNTER
Called home number and lmom for patient to call us back. I also sent patient a [employer] message. Referral faxed to [facility].

## 2017-11-02 ENCOUNTER — HOSPITAL ENCOUNTER (OUTPATIENT)
Dept: CARDIOLOGY | Facility: CLINIC | Age: 52
Discharge: HOME | End: 2017-11-02
Attending: INTERNAL MEDICINE | Admitting: INTERNAL MEDICINE

## 2017-11-02 LAB
LV EF: 69 %
LVEF MODALITY: NORMAL

## 2017-12-06 ENCOUNTER — TELEPHONE (OUTPATIENT)
Dept: CARDIOLOGY CLINIC | Age: 52
End: 2017-12-06

## 2017-12-06 NOTE — TELEPHONE ENCOUNTER
Pt called requesting samples of Ranexa 1000 mg. Patient would like to pick medication up at the [facility] office.

## 2018-01-03 ENCOUNTER — TELEPHONE (OUTPATIENT)
Dept: PULMONOLOGY | Age: 53
End: 2018-01-03

## 2018-01-03 NOTE — TELEPHONE ENCOUNTER
Patient cancelled appointment on 1/5/18 with  for3 month follow and PFT same day ABNORMAL CT CHEST PER DR LUZ  . Reason: Did not give one    Patient did reschedule appointment. Appointment rescheduled for 2/14/18 and will reschedule PFT prior to visit    Last OV     Assessment:9/29/17      1. Chronic Bronchitis with shortness of breath  2. Abnormal diffusing capacity  3. Coronary Artery Disease   4. Tobacco abuse, 1 ppd  5. Hypoxemia, resolved  6. Excessive daytime sleepiness   7. Recent emergency department visit (9/26/17) with physician concern for vertebrobasilar insufficiency; patient left AMA and was to f/u with Mr. David Hunter:      1. Spiriva q day, albuterol PRN   2. Tobacco cessation is advised, working on cutting down, down from 2 to less than 1 ppd. Requesting wellbutrin. 3.  See me in about 3 months with full PFT, evaluate shortness of breath and abnormal diffusing capacity  4. On propranolol (no difference with shortness of breath while off)   5. Wellbutrin 150 mg bid. I discussed with this patient today the risks and benefits of various tobacco cessation strategies, including, but not limited to \"cold turkey,\" nicotine replacement, Chantix and wellbutrin. We specifically discussed the risks of Chantix and Wellbutrin, with focus on side effects to include nausea, intense dreams, seizures and spent considerable time focusing on the black box warning regarding depression. The patient specifically denies suicidal ideation/homicidal ideation and was counseled to stop the product and immediately seek medical assistance for any worsening depression/mood disturbance, agitation, hostility or changes in behavior or thinking. Specific risk of completed suicide was discussed.   The patient is encouraged to read enclosed literature for any prescribed medications.

## 2018-02-12 ENCOUNTER — HOSPITAL ENCOUNTER (OUTPATIENT)
Dept: PULMONOLOGY | Age: 53
Discharge: HOME | End: 2018-02-12
Attending: INTERNAL MEDICINE | Admitting: INTERNAL MEDICINE

## 2018-02-12 VITALS — OXYGEN SATURATION: 97 %

## 2018-02-12 DIAGNOSIS — R06.02 SHORTNESS OF BREATH: ICD-10-CM

## 2018-02-12 RX ORDER — ALBUTEROL SULFATE 2.5 MG/3ML
2.5 SOLUTION RESPIRATORY (INHALATION) ONCE
Status: COMPLETED | OUTPATIENT
Start: 2018-02-12 | End: 2018-02-12

## 2018-02-12 RX ADMIN — ALBUTEROL SULFATE 2.5 MG: 2.5 SOLUTION RESPIRATORY (INHALATION) at 14:12

## 2018-02-14 ENCOUNTER — TELEPHONE (OUTPATIENT)
Dept: PULMONOLOGY | Age: 53
End: 2018-02-14

## 2018-02-14 NOTE — PROCEDURES
[address]                  [address] [address]                                PULMONARY FUNCTION    PATIENT NAME: Bill Ansari                     :        1965  MED REC NO:   [id number]                          ROOM:  ACCOUNT NO:   [de-identified]                          ADMIT DATE: 2018  PROVIDER:     Dunia Marx MD    DATE OF PROCEDURE:  2018    INDICATION:  Shortness of breath. 1.  Spirometry: The FEV1 is 2.15 L, which is 87% of predicted. The  FEV1/FVC ratio is normal at 0.81. Inhaled bronchodilators are given. There is no significant improvement. 2.  Lung volumes: Total lung capacity is normal at 4.31 L or 90% of  predicted. 3.  Diffusing capacity:  DLCO is 15.72 mL/min/mmHg, which is 72% of  predicted. 4.  Flow volume loop is relatively normal.    IMPRESSION:  1. No obstructive lung defect. 2.  No restrictive lung defect. 3.  Mild reduction in diffusing capacity. 4. In comparison to the pulmonary function testing performed since the  2011 shows no significant change in the forced vital capacity or in  the diffusing capacity.         Albert Newberry MD    D: 2018 17:13:41       T: 2018 2:33:26     [email or web address]  Job#: [id number]     Doc#: [id number]    CC:

## 2018-03-13 ENCOUNTER — HOSPITAL ENCOUNTER (OUTPATIENT)
Dept: OTHER | Age: 53
Discharge: HOME | End: 2018-03-13
Attending: NURSE PRACTITIONER | Admitting: NURSE PRACTITIONER

## 2018-03-13 DIAGNOSIS — R07.89 ATYPICAL CHEST PAIN: ICD-10-CM

## 2018-03-21 ENCOUNTER — TELEPHONE (OUTPATIENT)
Dept: PULMONOLOGY | Age: 53
End: 2018-03-21

## 2018-04-18 ENCOUNTER — OFFICE VISIT (OUTPATIENT)
Dept: NEUROLOGY | Age: 53
End: 2018-04-18

## 2018-04-18 VITALS
SYSTOLIC BLOOD PRESSURE: 150 MMHG | OXYGEN SATURATION: 93 % | DIASTOLIC BLOOD PRESSURE: 95 MMHG | WEIGHT: 180 LBS | BODY MASS INDEX: 33.99 KG/M2 | HEART RATE: 87 BPM | HEIGHT: 61 IN

## 2018-04-18 DIAGNOSIS — G44.52 NEW PERSISTENT DAILY HEADACHE: Primary | ICD-10-CM

## 2018-04-18 DIAGNOSIS — G44.86 CERVICOGENIC HEADACHE: ICD-10-CM

## 2018-04-18 DIAGNOSIS — G44.221 CHRONIC TENSION-TYPE HEADACHE, INTRACTABLE: ICD-10-CM

## 2018-04-18 DIAGNOSIS — E78.5 DYSLIPIDEMIA: ICD-10-CM

## 2018-04-18 DIAGNOSIS — I10 HTN (HYPERTENSION), BENIGN: ICD-10-CM

## 2018-04-18 PROCEDURE — 99214 OFFICE O/P EST MOD 30 MIN: CPT | Performed by: PSYCHIATRY & NEUROLOGY

## 2018-04-18 RX ORDER — AMITRIPTYLINE HYDROCHLORIDE 25 MG/1
25 TABLET, FILM COATED ORAL NIGHTLY
Qty: 30 TABLET | Refills: 2 | Status: ON HOLD | OUTPATIENT
Start: 2018-04-18 | End: 2018-06-08

## 2018-04-18 RX ORDER — CYCLOBENZAPRINE HCL 10 MG
10 TABLET ORAL 3 TIMES DAILY PRN
Qty: 30 TABLET | Refills: 0 | Status: SHIPPED | OUTPATIENT
Start: 2018-04-18 | End: 2018-04-28

## 2018-04-27 ENCOUNTER — TELEPHONE (OUTPATIENT)
Dept: NEUROLOGY | Age: 53
End: 2018-04-27

## 2018-04-27 DIAGNOSIS — G45.9 TRANSIENT CEREBRAL ISCHEMIA, UNSPECIFIED TYPE: Primary | ICD-10-CM

## 2018-04-30 ENCOUNTER — TELEPHONE (OUTPATIENT)
Dept: CARDIOLOGY CLINIC | Age: 53
End: 2018-04-30

## 2018-05-14 PROBLEM — J96.01 ACUTE RESPIRATORY FAILURE WITH HYPOXEMIA (HCC): Status: ACTIVE | Noted: 2018-05-14

## 2018-05-14 PROBLEM — I21.3 STEMI (ST ELEVATION MYOCARDIAL INFARCTION) (HCC): Status: ACTIVE | Noted: 2018-05-14

## 2018-05-14 PROBLEM — I21.19 ACUTE MI, INFEROPOSTERIOR WALL (HCC): Status: ACTIVE | Noted: 2018-05-14

## 2018-05-21 ENCOUNTER — TELEPHONE (OUTPATIENT)
Dept: NEUROLOGY | Age: 53
End: 2018-05-21

## 2018-05-22 ENCOUNTER — TELEPHONE (OUTPATIENT)
Dept: CARDIOLOGY CLINIC | Age: 53
End: 2018-05-22

## 2018-05-30 ENCOUNTER — OFFICE VISIT (OUTPATIENT)
Dept: CARDIOLOGY CLINIC | Age: 53
End: 2018-05-30

## 2018-05-30 VITALS
WEIGHT: 170 LBS | BODY MASS INDEX: 32.1 KG/M2 | OXYGEN SATURATION: 95 % | HEIGHT: 61 IN | SYSTOLIC BLOOD PRESSURE: 124 MMHG | HEART RATE: 82 BPM | DIASTOLIC BLOOD PRESSURE: 78 MMHG

## 2018-05-30 DIAGNOSIS — I25.10 CORONARY ARTERY DISEASE INVOLVING NATIVE CORONARY ARTERY OF NATIVE HEART WITHOUT ANGINA PECTORIS: Primary | ICD-10-CM

## 2018-05-30 DIAGNOSIS — I10 ESSENTIAL HYPERTENSION: ICD-10-CM

## 2018-05-30 DIAGNOSIS — E78.5 DYSLIPIDEMIA: ICD-10-CM

## 2018-05-30 DIAGNOSIS — I21.29: ICD-10-CM

## 2018-05-30 PROCEDURE — 99214 OFFICE O/P EST MOD 30 MIN: CPT | Performed by: NURSE PRACTITIONER

## 2018-06-01 ENCOUNTER — HOSPITAL ENCOUNTER (OUTPATIENT)
Dept: OTHER | Age: 53
Discharge: HOME | End: 2018-06-01
Attending: INTERNAL MEDICINE | Admitting: INTERNAL MEDICINE

## 2018-06-01 ENCOUNTER — TELEPHONE (OUTPATIENT)
Dept: CARDIOLOGY CLINIC | Age: 53
End: 2018-06-01

## 2018-06-01 ENCOUNTER — OFFICE VISIT (OUTPATIENT)
Dept: CARDIOLOGY CLINIC | Age: 53
End: 2018-06-01

## 2018-06-01 VITALS
WEIGHT: 169 LBS | OXYGEN SATURATION: 96 % | SYSTOLIC BLOOD PRESSURE: 130 MMHG | HEART RATE: 82 BPM | DIASTOLIC BLOOD PRESSURE: 82 MMHG | HEIGHT: 61 IN | BODY MASS INDEX: 31.91 KG/M2

## 2018-06-01 DIAGNOSIS — I10 HTN (HYPERTENSION), BENIGN: ICD-10-CM

## 2018-06-01 DIAGNOSIS — I25.10 CORONARY ARTERY DISEASE INVOLVING NATIVE CORONARY ARTERY OF NATIVE HEART WITHOUT ANGINA PECTORIS: ICD-10-CM

## 2018-06-01 DIAGNOSIS — Z98.890 S/P CARDIAC CATH: ICD-10-CM

## 2018-06-01 DIAGNOSIS — I21.29: ICD-10-CM

## 2018-06-01 DIAGNOSIS — E78.2 MIXED HYPERLIPIDEMIA: ICD-10-CM

## 2018-06-01 DIAGNOSIS — R00.2 PALPITATION: Primary | ICD-10-CM

## 2018-06-01 PROCEDURE — 99214 OFFICE O/P EST MOD 30 MIN: CPT | Performed by: INTERNAL MEDICINE

## 2018-06-01 PROCEDURE — 93000 ELECTROCARDIOGRAM COMPLETE: CPT | Performed by: INTERNAL MEDICINE

## 2018-06-05 ENCOUNTER — OFFICE VISIT (OUTPATIENT)
Dept: CARDIOTHORACIC SURGERY | Age: 53
End: 2018-06-05

## 2018-06-05 VITALS
WEIGHT: 170 LBS | TEMPERATURE: 97.8 F | DIASTOLIC BLOOD PRESSURE: 74 MMHG | BODY MASS INDEX: 32.1 KG/M2 | HEART RATE: 81 BPM | HEIGHT: 61 IN | SYSTOLIC BLOOD PRESSURE: 130 MMHG | OXYGEN SATURATION: 96 %

## 2018-06-05 DIAGNOSIS — Z87.891 HISTORY OF TOBACCO USE: ICD-10-CM

## 2018-06-05 DIAGNOSIS — I10 ESSENTIAL HYPERTENSION: ICD-10-CM

## 2018-06-05 DIAGNOSIS — E78.5 DYSLIPIDEMIA: ICD-10-CM

## 2018-06-05 DIAGNOSIS — E11.59 TYPE 2 DIABETES MELLITUS WITH OTHER CIRCULATORY COMPLICATION, WITHOUT LONG-TERM CURRENT USE OF INSULIN (HCC): ICD-10-CM

## 2018-06-05 DIAGNOSIS — I25.110 CORONARY ARTERY DISEASE INVOLVING NATIVE CORONARY ARTERY OF NATIVE HEART WITH UNSTABLE ANGINA PECTORIS (HCC): Primary | ICD-10-CM

## 2018-06-05 PROCEDURE — 99212 OFFICE O/P EST SF 10 MIN: CPT | Performed by: THORACIC SURGERY (CARDIOTHORACIC VASCULAR SURGERY)

## 2018-06-06 ENCOUNTER — TELEPHONE (OUTPATIENT)
Dept: CARDIOLOGY CLINIC | Age: 53
End: 2018-06-06

## 2018-06-06 ENCOUNTER — TELEPHONE (OUTPATIENT)
Dept: CARDIOTHORACIC SURGERY | Age: 53
End: 2018-06-06

## 2018-06-06 LAB
ACQUISITION DURATION: NORMAL S
AVERAGE HEART RATE: 78 BPM
EKG DIAGNOSIS: NORMAL
HOLTER MAX HEART RATE: 124 BPM
HOOKUP DATE: NORMAL
HOOKUP TIME: NORMAL
Lab: NORMAL
MAX HEART RATE TIME/DATE: NORMAL
MIN HEART RATE TIME/DATE: NORMAL
MIN HEART RATE: 60 BPM
NUMBER OF QRS COMPLEXES: NORMAL
NUMBER OF SUPRAVENTRICULAR BEATS IN RUNS: 0
NUMBER OF SUPRAVENTRICULAR COUPLETS: 0
NUMBER OF SUPRAVENTRICULAR ECTOPICS: 3
NUMBER OF SUPRAVENTRICULAR ISOLATED BEATS: 3
NUMBER OF SUPRAVENTRICULAR RUNS: 0
NUMBER OF VENTRICULAR BEATS IN RUNS: 0
NUMBER OF VENTRICULAR BIGEMINAL CYCLES: 173
NUMBER OF VENTRICULAR COUPLETS: 1
NUMBER OF VENTRICULAR ECTOPICS: 644
NUMBER OF VENTRICULAR ISOLATED BEATS: 641
NUMBER OF VENTRICULAR RUNS: 0

## 2018-06-07 ENCOUNTER — TELEPHONE (OUTPATIENT)
Dept: CARDIOLOGY CLINIC | Age: 53
End: 2018-06-07

## 2018-06-08 PROBLEM — K21.9 GASTRO-ESOPHAGEAL REFLUX DISEASE WITHOUT ESOPHAGITIS: Status: ACTIVE | Noted: 2018-06-08

## 2018-06-08 PROBLEM — Z86.73 PERSONAL HISTORY OF TRANSIENT ISCHEMIC ATTACK (TIA), AND CEREBRAL INFARCTION WITHOUT RESIDUAL DEFICITS: Status: ACTIVE | Noted: 2018-06-08

## 2018-06-08 PROBLEM — F32.89 OTHER DEPRESSIVE DISORDER: Status: ACTIVE | Noted: 2018-06-08

## 2018-06-08 PROBLEM — I25.10 CAD, MULTIPLE VESSEL: Status: ACTIVE | Noted: 2018-06-08

## 2018-06-13 PROBLEM — E11.9 DIABETES MELLITUS (HCC): Status: ACTIVE | Noted: 2017-10-10

## 2018-06-13 PROBLEM — Z72.0 TOBACCO ABUSE DISORDER: Status: ACTIVE | Noted: 2017-10-10

## 2018-06-18 ENCOUNTER — TELEPHONE (OUTPATIENT)
Dept: CARDIOTHORACIC SURGERY | Age: 53
End: 2018-06-18

## 2018-06-18 DIAGNOSIS — Z98.890 POST-OPERATIVE NAUSEA AND VOMITING: Primary | ICD-10-CM

## 2018-06-18 DIAGNOSIS — R11.2 POST-OPERATIVE NAUSEA AND VOMITING: Primary | ICD-10-CM

## 2018-06-18 RX ORDER — ONDANSETRON 4 MG/1
4 TABLET, FILM COATED ORAL EVERY 8 HOURS PRN
Qty: 30 TABLET | Refills: 0 | Status: SHIPPED | OUTPATIENT
Start: 2018-06-18 | End: 2018-07-24 | Stop reason: ALTCHOICE

## 2018-06-19 ENCOUNTER — TELEPHONE (OUTPATIENT)
Dept: CARDIOLOGY CLINIC | Age: 53
End: 2018-06-19

## 2018-06-19 ENCOUNTER — TELEPHONE (OUTPATIENT)
Dept: CARDIOTHORACIC SURGERY | Age: 53
End: 2018-06-19

## 2018-06-19 DIAGNOSIS — E83.42 HYPOMAGNESEMIA: Primary | ICD-10-CM

## 2018-06-19 RX ORDER — CALCIUM CARBONATE/VITAMIN D3 500-10/5ML
1 LIQUID (ML) ORAL DAILY
Qty: 10 CAPSULE | Refills: 0 | Status: SHIPPED | OUTPATIENT
Start: 2018-06-19 | End: 2018-07-13 | Stop reason: ALTCHOICE

## 2018-06-20 ENCOUNTER — TELEPHONE (OUTPATIENT)
Dept: CARDIOTHORACIC SURGERY | Age: 53
End: 2018-06-20

## 2018-06-22 ENCOUNTER — TELEPHONE (OUTPATIENT)
Dept: CARDIOTHORACIC SURGERY | Age: 53
End: 2018-06-22

## 2018-06-26 ENCOUNTER — OFFICE VISIT (OUTPATIENT)
Dept: CARDIOTHORACIC SURGERY | Age: 53
End: 2018-06-26

## 2018-06-26 VITALS
OXYGEN SATURATION: 99 % | SYSTOLIC BLOOD PRESSURE: 110 MMHG | HEIGHT: 61 IN | WEIGHT: 162 LBS | TEMPERATURE: 98.2 F | BODY MASS INDEX: 30.58 KG/M2 | HEART RATE: 92 BPM | DIASTOLIC BLOOD PRESSURE: 70 MMHG

## 2018-06-26 DIAGNOSIS — E78.5 DYSLIPIDEMIA: ICD-10-CM

## 2018-06-26 DIAGNOSIS — Z95.1 S/P CABG X 3: ICD-10-CM

## 2018-06-26 DIAGNOSIS — I25.10 CORONARY ARTERY DISEASE INVOLVING NATIVE CORONARY ARTERY OF NATIVE HEART WITHOUT ANGINA PECTORIS: ICD-10-CM

## 2018-06-26 DIAGNOSIS — Z48.812 AFTERCARE FOLLOWING SURGERY OF THE CIRCULATORY SYSTEM: Primary | ICD-10-CM

## 2018-06-26 DIAGNOSIS — E11.59 TYPE 2 DIABETES MELLITUS WITH OTHER CIRCULATORY COMPLICATION, WITHOUT LONG-TERM CURRENT USE OF INSULIN (HCC): ICD-10-CM

## 2018-06-26 DIAGNOSIS — Z87.891 HISTORY OF TOBACCO USE: ICD-10-CM

## 2018-06-26 DIAGNOSIS — I10 ESSENTIAL HYPERTENSION: ICD-10-CM

## 2018-06-26 PROCEDURE — 99024 POSTOP FOLLOW-UP VISIT: CPT | Performed by: THORACIC SURGERY (CARDIOTHORACIC VASCULAR SURGERY)

## 2018-06-29 ENCOUNTER — TELEPHONE (OUTPATIENT)
Dept: CARDIOTHORACIC SURGERY | Age: 53
End: 2018-06-29

## 2018-07-13 ENCOUNTER — OFFICE VISIT (OUTPATIENT)
Dept: CARDIOLOGY CLINIC | Age: 53
End: 2018-07-13

## 2018-07-13 VITALS
DIASTOLIC BLOOD PRESSURE: 66 MMHG | WEIGHT: 161 LBS | OXYGEN SATURATION: 94 % | HEART RATE: 94 BPM | SYSTOLIC BLOOD PRESSURE: 118 MMHG | BODY MASS INDEX: 30.4 KG/M2 | HEIGHT: 61 IN

## 2018-07-13 DIAGNOSIS — I10 ESSENTIAL HYPERTENSION: ICD-10-CM

## 2018-07-13 DIAGNOSIS — E78.2 MIXED HYPERLIPIDEMIA: ICD-10-CM

## 2018-07-13 DIAGNOSIS — Z98.890 S/P CARDIAC CATH: Primary | ICD-10-CM

## 2018-07-13 DIAGNOSIS — I25.10 CORONARY ARTERY DISEASE INVOLVING NATIVE CORONARY ARTERY OF NATIVE HEART WITHOUT ANGINA PECTORIS: ICD-10-CM

## 2018-07-13 DIAGNOSIS — I21.29: ICD-10-CM

## 2018-07-13 PROCEDURE — 99214 OFFICE O/P EST MOD 30 MIN: CPT | Performed by: INTERNAL MEDICINE

## 2018-07-13 RX ORDER — ATORVASTATIN CALCIUM 40 MG/1
40 TABLET, FILM COATED ORAL NIGHTLY
Qty: 30 TABLET | Refills: 11 | Status: SHIPPED
Start: 2018-07-13 | End: 2018-09-24 | Stop reason: SDUPTHER

## 2018-07-13 RX ORDER — LISINOPRIL 2.5 MG/1
2.5 TABLET ORAL DAILY
Qty: 30 TABLET | Refills: 11 | Status: SHIPPED | OUTPATIENT
Start: 2018-07-13 | End: 2019-08-14 | Stop reason: SDUPTHER

## 2018-07-13 NOTE — LETTER
[address] Card[facility] - [place] [address] [address] [facility] [address]  Phone: [phone]  Fax: [phone]    Amarilys Ospina MD        July 16, 2018     GERMAN Cooper CNP  [address] [facility] [address] [facility]    Patient: Ethan Zabala  MR Number: [id number]  YOB: 1965  Date of Visit: 7/13/2018    Dear Dr. Madisyn Elias: Thank you for the request for consultation for Ethan Zabala to me for the evaluation. Below are the relevant portions of my assessment and plan of care. [address]   Cardiac follow up    Referring Provider:  GERMAN Cooper CNP     Chief Complaint   Patient presents with    1 Month Follow-Up    Hypertension    Discuss Labs     cbc, bmp 6/17/2018    Coronary Artery Disease    Results     ekg 6/16/18    Palpitations     when she lays down she can feel her heart pound    Fatigue     since he triple bypass she has been tired      Subjective: Ms Roula Edward presents for f/u s/p recent CABG; PMH of CAD s/p stent placement, STEMI, HTN; c/o fatigue, nausea,     History of Present Illness:   Mrs. Roula Edward is a 51yo female here today for f/u CABG visit. My last OV with her was 6/1/18. She has PMH of HTN, COPD, DM, and CAD. Note LHC by Dr. Carloz Baptiste on 4/1/16 LM-normal LAD-30%; mid Septal  #1--70%; ostial CCx-20%; prox OM1- normal RCA-normal, dominant RPDA- normal LVEF- 60% (no significant change from 2013 [facility]). No need for PCI and med mgt only. Note 9/17 brain MRI showed no acute intracranial abnormality. Chronic small vessel ischemic changes. She follows with Dr. Ciaran Pinzon for her known aneurysms. Most recent Christinaliclaudia Moser 11/2/17 was normal, no sign of ischemia. Hospitalized mid-May 2018 for STEMI. She underwent LHC 5/13/18 with severe multi-vessel CAD. Underwent PCI mid CCx MARCOS and POBA LAD, high grade mid RCA & ostial R. PDA, required IABP support.   Hospitalization was tobacco. She reports that she does not drink alcohol or use drugs. Family History:  family history includes Cancer in her brother and another family member; Diabetes in an other family member; Emphysema in her mother; Heart Disease in her father and mother; Heart Failure in her father, maternal aunt, maternal uncle, mother, and another family member; High Blood Pressure in her brother, father, maternal grandfather, maternal grandmother, mother, paternal grandfather, paternal grandmother, sister, sister, sister, and sister; High Cholesterol in her father and mother; Hypertension in her brother, father, maternal aunt, maternal grandfather, maternal grandmother, maternal uncle, mother, paternal aunt, paternal grandfather, paternal grandmother, paternal uncle, and sister; Osteoarthritis in an other family member; Stroke in her mother. Home Medications:  Prior to Admission medications    Medication Sig Start Date End Date Taking? Authorizing Provider   glimepiride (AMARYL) 4 MG tablet Take 4 mg by mouth 2 times daily. Yes Historical Provider, MD MUHAMMAD Home oxygen 2L per [place]   Yes Historical Provider, MD   tiotropium (SPIRIVA HANDIHALER) 18 MCG inhalation capsule Inhale 1 capsule into the lungs daily. 2/27/13  Yes Keshawn Gabrile MD   amLODIPine (NORVASC) 5 MG tablet Take 5 mg by mouth 2 times daily. Yes Historical Provider, MD   albuterol (PROVENTIL HFA) 108 (90 BASE) MCG/ACT inhaler Inhale 2 puffs into the lungs every 6 hours as needed for Shortness of Breath for 10 days. IF YOU FEEL AS THO YOU ARE STILL NEEDING THIS MEDICATION AFTER 10 DAYS, CALL YOUR DR TO DISCUSS FURTHER TREATMENT 1/22/13  Yes Ainsley Jacob MD   propranolol (INDERAL) 40 MG tablet Take 40 mg by mouth 2 times daily. Yes Historical Provider, MD   SUMAtriptan (IMITREX) 50 MG tablet Take 50 mg by mouth once as needed.      Yes Historical Provider, MD   nitroGLYCERIN (NITROSTAT) 0.4 MG SL tablet Place 0.4 mg under the tongue every 5 minutes as needed. Yes Historical Provider, MD   pravastatin (PRAVACHOL) 40 MG tablet Take 1 tablet by mouth daily. 8/1/12  Yes Mike Gamez MD   insulin glargine (LANTUS) 100 UNIT/ML injection Inject 25 Units into the skin daily. Yes Historical Provider, MD   aspirin 81 MG EC tablet Take 81 mg by mouth daily. Yes Historical Provider, MD   omeprazole (PRILOSEC) 20 MG capsule Take 20 mg by mouth 2 times daily. Yes Historical Provider, MD   enalapril (VASOTEC) 20 MG tablet Take 20 mg by mouth 2 times daily. Yes Historical Provider, MD      Allergies:  Penicillins and Cephalexin     Review of Systems:   · Constitutional: there has been no unanticipated weight loss. There's been no change in energy level, sleep pattern, or activity level. · Eyes: No visual changes or diplopia. No scleral icterus. · ENT: No Headaches, hearing loss or vertigo. No mouth sores or sore throat. · Cardiovascular: Reviewed in HPI  · Respiratory: No cough or wheezing, no sputum production. No hematemesis. · Gastrointestinal: No abdominal pain, appetite loss, blood in stools. No change in bowel or bladder habits. · Genitourinary: No dysuria, trouble voiding, or hematuria. · Musculoskeletal:  No gait disturbance, weakness or joint complaints. · Integumentary: No rash or pruritis. · Neurological: No headache, diplopia, change in muscle strength, numbness or tingling. No change in gait, balance, coordination, mood, affect, memory, mentation, behavior. · Psychiatric: No anxiety, no depression. · Endocrine: No malaise, fatigue or temperature intolerance. No excessive thirst, fluid intake, or urination. No tremor. · Hematologic/Lymphatic: No abnormal bruising or bleeding, blood clots or swollen lymph nodes. · Allergic/Immunologic: No nasal congestion or hives.     Physical Examination:    Vitals:    07/13/18 1508   BP: 118/66   Pulse: 94   SpO2: 94%        Constitutional and General Appearance: NAD \"skips beats. \" No CP like she had prior to MI. Most recent EKG 6/1/18 showed SR with inferior infarct, low voltage. 2. Hyperlipidemia I personally reviewed most recent lipids from 6/13/18:   cholesterol, Total 106  [id number]  HDL 45  LDL Calculated 44. Switched from pravastatin 40mg qhs to lipitor 80mg qd. Higher dose lipitor could be reason for nausea and anorexia. I will d/c statin for 2 weeks and then restart 1/2 dose 40mg lipitor to see if tolerates. 3.  Palpitations: No complaints today. Most recent 24 hour holter 6/5/18 showed SR, daily HR 78 (). Rare PAC's, frequent PVC's. Plan:  1. Continue current medications  (Plavix and Asprin for 1 year given STEMI)  2. Stop your lipitor. After 2 weeks if you are feeling better then you will start back on 40 mg nightly. Call our office to let us know if your nausea improves. 3. Restart your Lisinopril 2.5 mg daily for LV dysfunction. Taken off metoprolol by CT surgeon due to marked bradycardia. 4. Labs BMP in 7 to 10 days  5. Referral to Cardiac Rehab  6. Follow up with me in 3 months    Thank you for allowing me to participate in the care of this individual.    Cost of prescription medications and patient compliance have been reviewed with patient. All questions answered. Saul Lim. Disha Gunn M.D., [facility] [place]             If you have questions, please do not hesitate to call me. I look forward to following Catracho Saxena along with you.     Sincerely,        Chau Duggan MD

## 2018-07-13 NOTE — PATIENT INSTRUCTIONS
Plan:  1. Continue current medications  (Plavix and Asprin for 1 year)  2. Stop your lipitor. After 2 weeks if you are feeling better then you will start back on 40 mg nightly. Call our office to let us know if your nausea improves. 3. Restart your Lisinopril 2.5 mg daily  4. Labs BMP in 7 to 10 days  5. Referral to Cardiac Rehab  6.  Follow up with me in 3 months

## 2018-07-16 NOTE — COMMUNICATION BODY
Systems:   · Constitutional: there has been no unanticipated weight loss. There's been no change in energy level, sleep pattern, or activity level. · Eyes: No visual changes or diplopia. No scleral icterus. · ENT: No Headaches, hearing loss or vertigo. No mouth sores or sore throat. · Cardiovascular: Reviewed in HPI  · Respiratory: No cough or wheezing, no sputum production. No hematemesis. · Gastrointestinal: No abdominal pain, appetite loss, blood in stools. No change in bowel or bladder habits. · Genitourinary: No dysuria, trouble voiding, or hematuria. · Musculoskeletal:  No gait disturbance, weakness or joint complaints. · Integumentary: No rash or pruritis. · Neurological: No headache, diplopia, change in muscle strength, numbness or tingling. No change in gait, balance, coordination, mood, affect, memory, mentation, behavior. · Psychiatric: No anxiety, no depression. · Endocrine: No malaise, fatigue or temperature intolerance. No excessive thirst, fluid intake, or urination. No tremor. · Hematologic/Lymphatic: No abnormal bruising or bleeding, blood clots or swollen lymph nodes. · Allergic/Immunologic: No nasal congestion or hives. Physical Examination:    Vitals:    07/13/18 1508   BP: 118/66   Pulse: 94   SpO2: 94%        Constitutional and General Appearance: NAD   Respiratory:  · Normal excursion and expansion without use of accessory muscles  · Resp Auscultation: Normal soft breath sounds at the bases without dullness  Cardiovascular:  · The apical impulses not displaced  · Heart tones are crisp and normal  · Cervical veins are not engorged  · The carotid upstroke is normal in amplitude and contour without delay or bruit  · Normal S1S2, No S3, No Murmur  · Peripheral pulses are symmetrical and full  · There is no clubbing, cyanosis of the extremities.   · No edema  · Femoral Arteries: 2+ and equal  · Pedal Pulses: 2+ and equal   Abdomen:  · No masses or tenderness  · Liver/Spleen: No STEMI)  2. Stop your lipitor. After 2 weeks if you are feeling better then you will start back on 40 mg nightly. Call our office to let us know if your nausea improves. 3. Restart your Lisinopril 2.5 mg daily for LV dysfunction. Taken off metoprolol by CT surgeon due to marked bradycardia. 4. Labs BMP in 7 to 10 days  5. Referral to Cardiac Rehab  6. Follow up with me in 3 months    Thank you for allowing me to participate in the care of this individual.    Cost of prescription medications and patient compliance have been reviewed with patient. All questions answered. Jaycee Vega.  Christiana Root M.D., [facility] [place]

## 2018-07-24 ENCOUNTER — OFFICE VISIT (OUTPATIENT)
Dept: CARDIOTHORACIC SURGERY | Age: 53
End: 2018-07-24

## 2018-07-24 VITALS
DIASTOLIC BLOOD PRESSURE: 84 MMHG | HEART RATE: 94 BPM | HEIGHT: 61 IN | WEIGHT: 165 LBS | SYSTOLIC BLOOD PRESSURE: 138 MMHG | OXYGEN SATURATION: 98 % | TEMPERATURE: 98 F | BODY MASS INDEX: 31.15 KG/M2

## 2018-07-24 DIAGNOSIS — E11.59 TYPE 2 DIABETES MELLITUS WITH OTHER CIRCULATORY COMPLICATION, WITHOUT LONG-TERM CURRENT USE OF INSULIN (HCC): ICD-10-CM

## 2018-07-24 DIAGNOSIS — E78.5 DYSLIPIDEMIA: ICD-10-CM

## 2018-07-24 DIAGNOSIS — Z48.812 AFTERCARE FOLLOWING SURGERY OF THE CIRCULATORY SYSTEM: Primary | ICD-10-CM

## 2018-07-24 DIAGNOSIS — I10 ESSENTIAL HYPERTENSION: ICD-10-CM

## 2018-07-24 DIAGNOSIS — Z95.1 S/P CABG X 3: ICD-10-CM

## 2018-07-24 DIAGNOSIS — I25.10 CORONARY ARTERY DISEASE INVOLVING NATIVE CORONARY ARTERY OF NATIVE HEART WITHOUT ANGINA PECTORIS: ICD-10-CM

## 2018-07-24 DIAGNOSIS — Z87.891 HISTORY OF TOBACCO USE: ICD-10-CM

## 2018-07-24 PROCEDURE — 99024 POSTOP FOLLOW-UP VISIT: CPT | Performed by: THORACIC SURGERY (CARDIOTHORACIC VASCULAR SURGERY)

## 2018-07-24 NOTE — LETTER
07/24/18      Zeb Gaspar M.D., [facility] [place], [address] Cardiovascular and Thoracic Surgeons  [address] [address]        RE:    Emilio Pace,   1965    Dear Jackie Sheehan MD  [address] [place] [address], [address] [place] [address],    Emilio Pace was seen today for routine follow-up after her recent CABG surgery. Attached is the postop note.         Sincerely,     Moise Jones MD    CC: Octavio Anne, APRN - CNP

## 2018-07-24 NOTE — LETTER
[address]  [address] [address]  Phone: [phone]  Fax: [phone]    Alejandra Gomez MD        July 24, 2018     Patient: Nancy Goldsmith   YOB: 1965   Date of Visit: 7/24/2018       To Whom It May Concern:    Nancy Goldsmith was evaluated in my office today. It is my medical opinion that Nancy Goldsmith may return to work on August 20, 2018 with no restrictions. If you have any questions or concerns, please don't hesitate to call.       Sincerely,            Alejandra Gomez MD

## 2018-07-24 NOTE — PROGRESS NOTES
Progress Note    CC:  Postoperative follow-up    S/P  CABG surgery. Subjective:  She still tires easily. She has no shortness of breath or dyspnea on exertion. No chest pain. She tells me she has not smoked since her heart attack. Her eating and sleeping habits are slowly improving though she has had a fair amount of nausea that seems to be improving since her Lipitor was stopped per Dr. Luis Montgomery. Vital Signs:                                                 /84 (Site: Left Arm, Position: Sitting, Cuff Size: Medium Adult)   Pulse 94   Temp 98 °F (36.7 °C) (Oral)   Ht 5' 1\" (1.549 m)   Wt 165 lb (74.8 kg)   SpO2 98%   BMI 31.18 kg/m²        CV:   Regular rate and rhythm with no rubs or murmurs. Pulm: Clear lung fields with no rales or rhonchi. Incisions:    Sternal incision is clean, dry and intact. Sternum is stable. Abd:  Soft  Ext: Leg incision is clean, dry and intact. No peripheral edema. Assessment/Plan:  Overall doing very well post CABG surgery. We discussed secondary risk prevention for cardiovascular disease. I gave the patient a copy of our protocol for the secondary risk prevention of cardiovascular disease. The patient may increase activities as she feels comfortable doing so. Follow-up with Dr. Luis Montgomery and her PCP as prescribed. Follow-up with me as needed.     Carisa Cruz MD  7/24/2018  10:10 AM

## 2018-08-20 ENCOUNTER — TELEPHONE (OUTPATIENT)
Dept: CARDIOLOGY CLINIC | Age: 53
End: 2018-08-20

## 2018-08-20 DIAGNOSIS — I25.10 CORONARY ARTERY DISEASE INVOLVING NATIVE CORONARY ARTERY OF NATIVE HEART WITHOUT ANGINA PECTORIS: ICD-10-CM

## 2018-08-20 DIAGNOSIS — E78.2 MIXED HYPERLIPIDEMIA: Primary | ICD-10-CM

## 2018-08-20 NOTE — TELEPHONE ENCOUNTER
Per pt since she stopped the lipitor, she has taste back. She she start back on a lower dose? Last seen 7.13.18      Assessment:      1. CAD: Hospitalized 5/13-5/18/18 for STEMI. She underwent LHC 5/13/18 with severe multi-vessel CAD. Underwent PCI mid CCx MARCOS and POBA LAD, high grade mid RCA & ostial Rt PDA, required IABP support. She will require CABG with Dr. Kimi Longo. She has appt with him on 6/5/17. Most recent Echo 5/15/18 showed EF 40-45%, akinetic anterolateral and inferolateral wall. Today she complains of palpitations making her feel fatigued. Says her \"skips beats. \" No CP like she had prior to MI. Most recent EKG 6/1/18 showed SR with inferior infarct, low voltage.      2. Hyperlipidemia I personally reviewed most recent lipids from 6/13/18:   cholesterol, Total 106  [id number]  HDL 45  LDL Calculated 44. Switched from pravastatin 40mg qhs to lipitor 80mg qd. Higher dose lipitor could be reason for nausea and anorexia. I will d/c statin for 2 weeks and then restart 1/2 dose 40mg lipitor to see if tolerates.      3.  Palpitations: No complaints today. Most recent 24 hour holter 6/5/18 showed SR, daily HR 78 (). Rare PAC's, frequent PVC's.      Plan:  1. Continue current medications  (Plavix and Asprin for 1 year given STEMI)  2. Stop your lipitor. After 2 weeks if you are feeling better then you will start back on 40 mg nightly. Call our office to let us know if your nausea improves. 3. Restart your Lisinopril 2.5 mg daily for LV dysfunction. Taken off metoprolol by CT surgeon due to marked bradycardia. 4. Labs BMP in 7 to 10 days  5. Referral to Cardiac Rehab  6.  Follow up with me in 3 months

## 2018-08-30 ENCOUNTER — HOSPITAL ENCOUNTER (OUTPATIENT)
Age: 53
Discharge: HOME | End: 2018-08-30
Payer: COMMERCIAL

## 2018-08-30 LAB
A/G RATIO: 1.3 (ref 1.1–2.2)
ALBUMIN SERPL-MCNC: 4.1 G/DL (ref 3.4–5)
ALP BLD-CCNC: 90 U/L (ref 40–129)
ALT SERPL-CCNC: 14 U/L (ref 10–40)
ANION GAP SERPL CALCULATED.3IONS-SCNC: 16 MMOL/L (ref 3–16)
AST SERPL-CCNC: 14 U/L (ref 15–37)
BILIRUB SERPL-MCNC: 0.3 MG/DL (ref 0–1)
BUN BLDV-MCNC: 9 MG/DL (ref 7–20)
CALCIUM SERPL-MCNC: 9.5 MG/DL (ref 8.3–10.6)
CHLORIDE BLD-SCNC: 98 MMOL/L (ref 99–110)
CO2: 27 MMOL/L (ref 21–32)
CREAT SERPL-MCNC: <0.5 MG/DL (ref 0.6–1.1)
GFR AFRICAN AMERICAN: >60
GFR NON-AFRICAN AMERICAN: >60
GLOBULIN: 3.1 G/DL
GLUCOSE BLD-MCNC: 159 MG/DL (ref 70–99)
POTASSIUM SERPL-SCNC: 4.3 MMOL/L (ref 3.5–5.1)
SODIUM BLD-SCNC: 141 MMOL/L (ref 136–145)
TOTAL PROTEIN: 7.2 G/DL (ref 6.4–8.2)

## 2018-08-30 PROCEDURE — 80053 COMPREHEN METABOLIC PANEL: CPT

## 2018-08-30 PROCEDURE — 36415 COLL VENOUS BLD VENIPUNCTURE: CPT

## 2018-08-30 PROCEDURE — 83036 HEMOGLOBIN GLYCOSYLATED A1C: CPT

## 2018-08-31 LAB
ESTIMATED AVERAGE GLUCOSE: 148.5 MG/DL
HBA1C MFR BLD: 6.8 %

## 2018-09-09 ENCOUNTER — APPOINTMENT (OUTPATIENT)
Dept: GENERAL RADIOLOGY | Age: 53
End: 2018-09-09
Payer: COMMERCIAL

## 2018-09-09 ENCOUNTER — HOSPITAL ENCOUNTER (EMERGENCY)
Age: 53
Discharge: LEFT BEFORE DISCHARGE | End: 2018-09-10
Attending: EMERGENCY MEDICINE
Payer: COMMERCIAL

## 2018-09-09 DIAGNOSIS — R07.9 CHEST PAIN, UNSPECIFIED TYPE: Primary | ICD-10-CM

## 2018-09-09 LAB
A/G RATIO: 1.2 (ref 1.1–2.2)
ALBUMIN SERPL-MCNC: 4.1 G/DL (ref 3.4–5)
ALP BLD-CCNC: 89 U/L (ref 40–129)
ALT SERPL-CCNC: 13 U/L (ref 10–40)
ANION GAP SERPL CALCULATED.3IONS-SCNC: 12 MMOL/L (ref 3–16)
AST SERPL-CCNC: 14 U/L (ref 15–37)
BASOPHILS ABSOLUTE: 0.1 K/UL (ref 0–0.2)
BASOPHILS RELATIVE PERCENT: 0.6 %
BILIRUB SERPL-MCNC: 0.4 MG/DL (ref 0–1)
BUN BLDV-MCNC: 12 MG/DL (ref 7–20)
CALCIUM SERPL-MCNC: 9.7 MG/DL (ref 8.3–10.6)
CHLORIDE BLD-SCNC: 100 MMOL/L (ref 99–110)
CO2: 28 MMOL/L (ref 21–32)
CREAT SERPL-MCNC: 1 MG/DL (ref 0.6–1.1)
EOSINOPHILS ABSOLUTE: 0.1 K/UL (ref 0–0.6)
EOSINOPHILS RELATIVE PERCENT: 0.7 %
GFR AFRICAN AMERICAN: >60
GFR NON-AFRICAN AMERICAN: 58
GLOBULIN: 3.3 G/DL
GLUCOSE BLD-MCNC: 186 MG/DL (ref 70–99)
HCT VFR BLD CALC: 42.4 % (ref 36–48)
HEMOGLOBIN: 14.3 G/DL (ref 12–16)
LYMPHOCYTES ABSOLUTE: 1.8 K/UL (ref 1–5.1)
LYMPHOCYTES RELATIVE PERCENT: 18.6 %
MCH RBC QN AUTO: 29.7 PG (ref 26–34)
MCHC RBC AUTO-ENTMCNC: 33.6 G/DL (ref 31–36)
MCV RBC AUTO: 88.4 FL (ref 80–100)
MONOCYTES ABSOLUTE: 0.5 K/UL (ref 0–1.3)
MONOCYTES RELATIVE PERCENT: 5.2 %
NEUTROPHILS ABSOLUTE: 7.4 K/UL (ref 1.7–7.7)
NEUTROPHILS RELATIVE PERCENT: 74.9 %
PDW BLD-RTO: 13.3 % (ref 12.4–15.4)
PLATELET # BLD: 188 K/UL (ref 135–450)
PMV BLD AUTO: 9.1 FL (ref 5–10.5)
POTASSIUM SERPL-SCNC: 4.1 MMOL/L (ref 3.5–5.1)
RBC # BLD: 4.8 M/UL (ref 4–5.2)
SODIUM BLD-SCNC: 140 MMOL/L (ref 136–145)
TOTAL PROTEIN: 7.4 G/DL (ref 6.4–8.2)
TROPONIN: <0.01 NG/ML
WBC # BLD: 9.9 K/UL (ref 4–11)

## 2018-09-09 PROCEDURE — 96374 THER/PROPH/DIAG INJ IV PUSH: CPT

## 2018-09-09 PROCEDURE — 6370000000 HC RX 637 (ALT 250 FOR IP): Performed by: EMERGENCY MEDICINE

## 2018-09-09 PROCEDURE — 85025 COMPLETE CBC W/AUTO DIFF WBC: CPT

## 2018-09-09 PROCEDURE — 84484 ASSAY OF TROPONIN QUANT: CPT

## 2018-09-09 PROCEDURE — 36415 COLL VENOUS BLD VENIPUNCTURE: CPT

## 2018-09-09 PROCEDURE — 71045 X-RAY EXAM CHEST 1 VIEW: CPT

## 2018-09-09 PROCEDURE — 80053 COMPREHEN METABOLIC PANEL: CPT

## 2018-09-09 PROCEDURE — 99285 EMERGENCY DEPT VISIT HI MDM: CPT

## 2018-09-09 PROCEDURE — 93005 ELECTROCARDIOGRAM TRACING: CPT | Performed by: EMERGENCY MEDICINE

## 2018-09-09 PROCEDURE — 6360000002 HC RX W HCPCS: Performed by: EMERGENCY MEDICINE

## 2018-09-09 RX ADMIN — NITROGLYCERIN 1 INCH: 20 OINTMENT TOPICAL at 22:13

## 2018-09-09 RX ADMIN — HYDROMORPHONE HYDROCHLORIDE 0.5 MG: 1 INJECTION, SOLUTION INTRAMUSCULAR; INTRAVENOUS; SUBCUTANEOUS at 22:17

## 2018-09-09 ASSESSMENT — PAIN SCALES - GENERAL: PAINLEVEL_OUTOF10: 8

## 2018-09-09 ASSESSMENT — PAIN DESCRIPTION - LOCATION: LOCATION: CHEST

## 2018-09-09 ASSESSMENT — PAIN DESCRIPTION - PAIN TYPE: TYPE: ACUTE PAIN

## 2018-09-09 ASSESSMENT — PAIN DESCRIPTION - DESCRIPTORS: DESCRIPTORS: SQUEEZING;STABBING

## 2018-09-09 ASSESSMENT — HEART SCORE: ECG: 1

## 2018-09-10 ENCOUNTER — TELEPHONE (OUTPATIENT)
Dept: CARDIOLOGY CLINIC | Age: 53
End: 2018-09-10

## 2018-09-10 VITALS
BODY MASS INDEX: 31.28 KG/M2 | TEMPERATURE: 98.6 F | SYSTOLIC BLOOD PRESSURE: 130 MMHG | HEIGHT: 62 IN | OXYGEN SATURATION: 94 % | WEIGHT: 170 LBS | HEART RATE: 88 BPM | RESPIRATION RATE: 16 BRPM | DIASTOLIC BLOOD PRESSURE: 83 MMHG

## 2018-09-10 PROBLEM — N17.9 AKI (ACUTE KIDNEY INJURY) (HCC): Status: ACTIVE | Noted: 2018-09-10

## 2018-09-10 PROBLEM — E78.5 DYSLIPIDEMIA: Status: RESOLVED | Noted: 2017-10-10 | Resolved: 2018-09-10

## 2018-09-10 PROBLEM — G44.229 CHRONIC TENSION TYPE HEADACHE: Chronic | Status: ACTIVE | Noted: 2018-04-18

## 2018-09-10 PROBLEM — I21.3 STEMI (ST ELEVATION MYOCARDIAL INFARCTION) (HCC): Status: RESOLVED | Noted: 2018-05-14 | Resolved: 2018-09-10

## 2018-09-10 PROBLEM — E11.9 DM2 (DIABETES MELLITUS, TYPE 2) (HCC): Chronic | Status: ACTIVE | Noted: 2017-10-10

## 2018-09-10 PROBLEM — I67.1 CEREBRAL ANEURYSM, NONRUPTURED: Chronic | Status: ACTIVE | Noted: 2017-10-10

## 2018-09-10 PROBLEM — R07.9 CHEST PAIN: Status: ACTIVE | Noted: 2018-09-10

## 2018-09-10 PROBLEM — I50.42 CHRONIC COMBINED SYSTOLIC AND DIASTOLIC CHF (CONGESTIVE HEART FAILURE) (HCC): Chronic | Status: ACTIVE | Noted: 2018-09-10

## 2018-09-10 PROBLEM — G45.1 TIA INVOLVING LEFT INTERNAL CAROTID ARTERY: Chronic | Status: ACTIVE | Noted: 2017-10-10

## 2018-09-10 PROBLEM — Z86.73 PERSONAL HISTORY OF TRANSIENT ISCHEMIC ATTACK (TIA), AND CEREBRAL INFARCTION WITHOUT RESIDUAL DEFICITS: Status: RESOLVED | Noted: 2018-06-08 | Resolved: 2018-09-10

## 2018-09-10 PROBLEM — F32.89 OTHER DEPRESSIVE DISORDER: Status: RESOLVED | Noted: 2018-06-08 | Resolved: 2018-09-10

## 2018-09-10 PROBLEM — I21.19 ACUTE MI, INFEROPOSTERIOR WALL (HCC): Status: RESOLVED | Noted: 2018-05-14 | Resolved: 2018-09-10

## 2018-09-10 PROBLEM — G44.52 NEW PERSISTENT DAILY HEADACHE: Status: RESOLVED | Noted: 2018-04-18 | Resolved: 2018-09-10

## 2018-09-10 PROBLEM — K21.9 GERD (GASTROESOPHAGEAL REFLUX DISEASE): Chronic | Status: ACTIVE | Noted: 2018-06-08

## 2018-09-10 PROBLEM — I25.10 CAD, MULTIPLE VESSEL: Status: RESOLVED | Noted: 2018-06-08 | Resolved: 2018-09-10

## 2018-09-10 PROBLEM — J96.01 ACUTE RESPIRATORY FAILURE WITH HYPOXEMIA (HCC): Status: RESOLVED | Noted: 2018-05-14 | Resolved: 2018-09-10

## 2018-09-10 PROBLEM — R09.89 PULMONARY VASCULAR CONGESTION: Status: ACTIVE | Noted: 2018-09-10

## 2018-09-10 PROBLEM — G44.86 CERVICOGENIC HEADACHE: Status: RESOLVED | Noted: 2018-04-18 | Resolved: 2018-09-10

## 2018-09-10 PROBLEM — Z87.891 FORMER SMOKER: Chronic | Status: ACTIVE | Noted: 2017-10-10

## 2018-09-10 PROBLEM — I50.42 CHRONIC COMBINED SYSTOLIC AND DIASTOLIC CHF (CONGESTIVE HEART FAILURE) (HCC): Status: ACTIVE | Noted: 2018-09-10

## 2018-09-10 PROCEDURE — 93010 ELECTROCARDIOGRAM REPORT: CPT | Performed by: INTERNAL MEDICINE

## 2018-09-10 PROCEDURE — 6360000002 HC RX W HCPCS

## 2018-09-10 PROCEDURE — 96376 TX/PRO/DX INJ SAME DRUG ADON: CPT

## 2018-09-10 PROCEDURE — 96375 TX/PRO/DX INJ NEW DRUG ADDON: CPT

## 2018-09-10 RX ORDER — METOCLOPRAMIDE HYDROCHLORIDE 5 MG/ML
INJECTION INTRAMUSCULAR; INTRAVENOUS
Status: COMPLETED
Start: 2018-09-10 | End: 2018-09-10

## 2018-09-10 RX ORDER — METOCLOPRAMIDE HYDROCHLORIDE 5 MG/ML
10 INJECTION INTRAMUSCULAR; INTRAVENOUS ONCE
Status: COMPLETED | OUTPATIENT
Start: 2018-09-10 | End: 2018-09-10

## 2018-09-10 RX ADMIN — METOCLOPRAMIDE HYDROCHLORIDE 10 MG: 5 INJECTION INTRAMUSCULAR; INTRAVENOUS at 01:05

## 2018-09-10 RX ADMIN — METOCLOPRAMIDE 10 MG: 5 INJECTION, SOLUTION INTRAMUSCULAR; INTRAVENOUS at 01:05

## 2018-09-10 RX ADMIN — HYDROMORPHONE HYDROCHLORIDE 0.5 MG: 1 INJECTION, SOLUTION INTRAMUSCULAR; INTRAVENOUS; SUBCUTANEOUS at 01:05

## 2018-09-10 RX ADMIN — Medication 0.5 MG: at 01:05

## 2018-09-10 NOTE — ED PROVIDER NOTES
Triage Chief Complaint:   Chest Pain (started at 1700 radiates to left arm and left jaw. Pain comes and goes. Was given 3 nitro per EMS and 4 baby asa. states MI on monthers day with stents placed and triple bypass june 13th)      [place]:  Lucy Hallman is a 46 y.o. female that presents with substernal chest pain that radiated into her arm and made her short of breath. Patient also had pain in her jaw. She has a history of coronary artery disease and had bypass graft done in May of this year. Patient's pain is much like that of her acute MI in May. Patient has used nitroglycerin several times today with initial relief of pain. She is diabetic and hypertensive and hypercholesterolemic and has COPD. She was in moderate distress still having pain after nitroglycerin in the emergency department. ROS:  Review of systems was reviewed for 10 systems and is otherwise negative except as in the [address]    Past Medical History:   Diagnosis Date    Acute blood loss anemia     Asthma     Chest pain     COPD (chronic obstructive pulmonary disease) (HCC)     Coronary artery disease 9/4/2012    Depression     Diabetes mellitus (HCC)     Enlarged heart     Fatigue     Generalized headaches     Hyperlipidemia     Hypertension     Migraine     Morning stiffness of joints     Numbness or tingling hands and feet    OA (osteoarthritis) of knee 1/27/2015    Obesity, Class I, BMI 30.0-34.9 (see actual BMI)     Osteoporosis     Pulmonary nodule     Shortness of breath      Past Surgical History:   Procedure Laterality Date    CARDIAC CATHETERIZATION  05/13/2018    Dr. Lona Walter - w/placement of IABP    CARDIAC CATHETERIZATION  04/01/2016    Dr. Jesse Salamanca. Gladis Zelaya CARDIAC CATHETERIZATION  08/01/2012    Dr. Carlita Pandey GRAFT  06/13/2018     Elective CABG X 3-Dr. Ty Her DIAGNOSTIC CARDIAC CATH LAB PROCEDURE  06/06/2013    Dr. Jessee Mejía - Non Obs CAD    HYSTERECTOMY      UPPER GASTROINTESTINAL ENDOSCOPY       Family History   Problem Relation Age of Onset    Emphysema Mother     Heart Failure Mother     Hypertension Mother     Heart Disease Mother     High Blood Pressure Mother     High Cholesterol Mother     Stroke Mother     Heart Failure Father     Hypertension Father     Heart Disease Father     High Blood Pressure Father     High Cholesterol Father     Hypertension Sister     High Blood Pressure Sister     Hypertension Brother     High Blood Pressure Brother     Cancer Brother     Hypertension Maternal Grandmother     High Blood Pressure Maternal Grandmother     Hypertension Maternal Grandfather     High Blood Pressure Maternal Grandfather     Hypertension Paternal Grandmother     High Blood Pressure Paternal Grandmother     Hypertension Paternal Grandfather     High Blood Pressure Paternal Grandfather     High Blood Pressure Sister     High Blood Pressure Sister     High Blood Pressure Sister     Heart Failure Maternal Aunt     Hypertension Maternal Aunt     Heart Failure Maternal Uncle     Hypertension Maternal Uncle     Cancer Other         nephew-colon, liver, lung    Heart Failure Other     Diabetes Other     Osteoarthritis Other     Hypertension Paternal Aunt     Hypertension Paternal Uncle     Asthma Neg Hx      Social History     Social History    Marital status:      Spouse name: N/A    Number of children: N/A    Years of education: N/A     Occupational History    computer work      Social History Main Topics    Smoking status: Former Smoker     Packs/day: 1.00     Years: 20.00     Types: Cigarettes    Smokeless tobacco: Never Used      Comment: 17 days     Alcohol use No    Drug use: No    Sexual activity: No     Other Topics Concern    Not on file     Social History Narrative    No narrative on file     No current facility-administered medications for this encounter.       Current Outpatient Prescriptions   Medication Sig Dispense Refill    aspirin 81 MG chewable tablet Take 1 tablet by mouth daily 30 tablet 11    albuterol sulfate HFA (PROVENTIL HFA) 108 (90 Base) MCG/ACT inhaler Inhale 2 puffs into the lungs every 6 hours as needed for Wheezing or Shortness of Breath 1 Inhaler 2    tiotropium (SPIRIVA) 18 MCG inhalation capsule Inhale 1 capsule into the lungs daily 30 capsule 3    metFORMIN (GLUCOPHAGE) 1000 MG tablet Take 1 tablet by mouth 2 times daily (with meals) 60 tablet 0    atorvastatin (LIPITOR) 40 MG tablet Take 1 tablet by mouth nightly 30 tablet 11    lisinopril (PRINIVIL;ZESTRIL) 2.5 MG tablet Take 1 tablet by mouth daily 30 tablet 11    clopidogrel (PLAVIX) 75 MG tablet Take 1 tablet by mouth daily 30 tablet 5     Allergies   Allergen Reactions    Penicillins Itching, Swelling and Rash    Cephalexin Itching and Rash     Nursing Notes Reviewed    Physical Exam:  ED Triage Vitals [09/09/18 2148]   Enc Vitals Group      BP (!) 127/113      Pulse 85      Resp 16      Temp 98.6 °F (37 °C)      Temp src       SpO2 95 %      Weight 170 lb (77.1 kg)      Height 5' 1.5\" (1.562 m)      Head Circumference       Peak Flow       Pain Score       Pain Loc       Pain Edu? Excl. in [address]? GENERAL APPEARANCE: A well-developed well-nourished pleasant very uncomfortable 65-year-old female in moderate distress  HEAD: Normocephalic, atraumatic  EYES: Sclera anicteric.no conjunctival injection,  ENT: Mucous membranes moist,  NECK: Supple, no meningismus, no adenopathy, trachea midline, thyroid normal  HEART: RRR without rubs murmurs or gallops  PULSES: 2+ and equal carotid, radial, dorsalis pedis pulses  CHEST: No chest wall pain to palpation  LUNGS:  Clear good air movement no wheezing no retraction or accessory muscle use,  ABDOMEN: Soft, non-tender to palpation, no guarding or rebound. , no mass or distention and no hepatosplenomegaly.  Clearly no evidence of an acute abdomen or peritoneal Atrial Rate 81 BPM    P-R Interval 150 ms    QRS Duration 92 ms    Q-T Interval 386 ms    QTc Calculation (Bazett) 448 ms    P Axis 49 degrees    R Axis -63 degrees    T Axis 47 degrees    Diagnosis       Normal sinus rhythmLeft anterior fascicular blockPossible Lateral infarct , age undeterminedInferior-posterior infarct (cited on or before 13-MAY-2018)Abnormal ECGWhen compared with ECG of 16-JUN-2018 08:08,Borderline criteria for Lateral infarct are now PresentT wave inversion no longer evident in Anterior leads        Radiographs (if obtained):  [] Radiologist's Report Reviewed:  XR CHEST PORTABLE   Final Result   Stable mild cardiomegaly with vascular congestion and borderline edema. [] Discussed with Radiologist:     [] The following radiograph was interpreted by myself in the absence of a radiologist:     EKG (if obtained): (All EKG's are interpreted by myself in the absence of a cardiologist)  EKG demonstrates a normal sinus rhythm with a 2 year fascicular block and nonspecific ST-T wave changes with inferior Q waves consistent with her previous MI. Rate of 81 and much like her previous EKG    MDM:   Patient with chest pain presents to the ER for evaluation. Her labs were not diagnostically abnormal her EKG did not show any acute changes and her pain improved in the emergency department with medications. I will arrange to get her admitted for further evaluation. Her pain improved in the emergency department after nitroglycerin and she has had aspirin as well. Later: Patient decided to leave against medical advice stating that she is no longer having chest pain and she will follow-up with her family physician. I had a long discussion with her about the importance of unstable angina and the potential for death or severe disability in going home.   She is in control of her faculties and understands the risks of going home and the benefits of staying in the hospital that being evaluation by

## 2018-09-10 NOTE — ED NOTES
Patient states she does not want to stay. Wants nitropaste off chest at this time. Nitropaste removed. MD made aware. Explained to patient need to wait at least one hour d/t dilaudid given. States understanding. Calling boyfriend at this time.       Javad Hernandez RN  09/10/18 [id number]

## 2018-09-10 NOTE — ED NOTES
Patient states palpations started a few weeks ago and she was sitting on the couch watching TV and chest pain started.       Michael Gomez RN  09/09/18 3538

## 2018-09-11 ENCOUNTER — TELEPHONE (OUTPATIENT)
Dept: CARDIOLOGY CLINIC | Age: 53
End: 2018-09-11

## 2018-09-11 ENCOUNTER — APPOINTMENT (OUTPATIENT)
Dept: GENERAL RADIOLOGY | Age: 53
End: 2018-09-11
Payer: COMMERCIAL

## 2018-09-11 ENCOUNTER — HOSPITAL ENCOUNTER (OUTPATIENT)
Age: 53
Setting detail: OBSERVATION
Discharge: HOME | End: 2018-09-12
Attending: EMERGENCY MEDICINE | Admitting: INTERNAL MEDICINE
Payer: COMMERCIAL

## 2018-09-11 DIAGNOSIS — R07.9 ACUTE CHEST PAIN: Primary | ICD-10-CM

## 2018-09-11 LAB
ANION GAP SERPL CALCULATED.3IONS-SCNC: 12 MMOL/L (ref 3–16)
BASOPHILS ABSOLUTE: 0.1 K/UL (ref 0–0.2)
BASOPHILS RELATIVE PERCENT: 0.6 %
BUN BLDV-MCNC: 8 MG/DL (ref 7–20)
CALCIUM SERPL-MCNC: 9.5 MG/DL (ref 8.3–10.6)
CHLORIDE BLD-SCNC: 94 MMOL/L (ref 99–110)
CHOLESTEROL, TOTAL: 122 MG/DL (ref 0–199)
CO2: 27 MMOL/L (ref 21–32)
CREAT SERPL-MCNC: 0.6 MG/DL (ref 0.6–1.1)
EKG ATRIAL RATE: 81 BPM
EKG DIAGNOSIS: NORMAL
EKG P AXIS: 49 DEGREES
EKG P-R INTERVAL: 150 MS
EKG Q-T INTERVAL: 386 MS
EKG QRS DURATION: 92 MS
EKG QTC CALCULATION (BAZETT): 448 MS
EKG R AXIS: -63 DEGREES
EKG T AXIS: 47 DEGREES
EKG VENTRICULAR RATE: 81 BPM
EOSINOPHILS ABSOLUTE: 0 K/UL (ref 0–0.6)
EOSINOPHILS RELATIVE PERCENT: 0.4 %
GFR AFRICAN AMERICAN: >60
GFR NON-AFRICAN AMERICAN: >60
GLUCOSE BLD-MCNC: 139 MG/DL (ref 70–99)
GLUCOSE BLD-MCNC: 213 MG/DL (ref 70–99)
GLUCOSE BLD-MCNC: 224 MG/DL (ref 70–99)
HCT VFR BLD CALC: 43.9 % (ref 36–48)
HDLC SERPL-MCNC: 53 MG/DL (ref 40–60)
HEMOGLOBIN: 14.8 G/DL (ref 12–16)
LDL CHOLESTEROL CALCULATED: 51 MG/DL
LYMPHOCYTES ABSOLUTE: 1.1 K/UL (ref 1–5.1)
LYMPHOCYTES RELATIVE PERCENT: 11.7 %
MCH RBC QN AUTO: 29.2 PG (ref 26–34)
MCHC RBC AUTO-ENTMCNC: 33.6 G/DL (ref 31–36)
MCV RBC AUTO: 86.9 FL (ref 80–100)
MONOCYTES ABSOLUTE: 0.7 K/UL (ref 0–1.3)
MONOCYTES RELATIVE PERCENT: 7.3 %
NEUTROPHILS ABSOLUTE: 7.6 K/UL (ref 1.7–7.7)
NEUTROPHILS RELATIVE PERCENT: 80 %
PDW BLD-RTO: 13.6 % (ref 12.4–15.4)
PERFORMED ON: ABNORMAL
PERFORMED ON: ABNORMAL
PLATELET # BLD: 183 K/UL (ref 135–450)
PMV BLD AUTO: 8.7 FL (ref 5–10.5)
POTASSIUM REFLEX MAGNESIUM: 3.6 MMOL/L (ref 3.5–5.1)
RBC # BLD: 5.06 M/UL (ref 4–5.2)
SODIUM BLD-SCNC: 133 MMOL/L (ref 136–145)
TRIGL SERPL-MCNC: 90 MG/DL (ref 0–150)
TROPONIN: <0.01 NG/ML
VLDLC SERPL CALC-MCNC: 18 MG/DL
WBC # BLD: 9.6 K/UL (ref 4–11)

## 2018-09-11 PROCEDURE — 6370000000 HC RX 637 (ALT 250 FOR IP): Performed by: INTERNAL MEDICINE

## 2018-09-11 PROCEDURE — 6360000002 HC RX W HCPCS: Performed by: INTERNAL MEDICINE

## 2018-09-11 PROCEDURE — 93010 ELECTROCARDIOGRAM REPORT: CPT | Performed by: INTERNAL MEDICINE

## 2018-09-11 PROCEDURE — 85025 COMPLETE CBC W/AUTO DIFF WBC: CPT

## 2018-09-11 PROCEDURE — 6370000000 HC RX 637 (ALT 250 FOR IP)

## 2018-09-11 PROCEDURE — 2580000003 HC RX 258: Performed by: INTERNAL MEDICINE

## 2018-09-11 PROCEDURE — G0378 HOSPITAL OBSERVATION PER HR: HCPCS

## 2018-09-11 PROCEDURE — 93005 ELECTROCARDIOGRAM TRACING: CPT | Performed by: EMERGENCY MEDICINE

## 2018-09-11 PROCEDURE — 36415 COLL VENOUS BLD VENIPUNCTURE: CPT

## 2018-09-11 PROCEDURE — 80061 LIPID PANEL: CPT

## 2018-09-11 PROCEDURE — 96374 THER/PROPH/DIAG INJ IV PUSH: CPT

## 2018-09-11 PROCEDURE — 80048 BASIC METABOLIC PNL TOTAL CA: CPT

## 2018-09-11 PROCEDURE — 84484 ASSAY OF TROPONIN QUANT: CPT

## 2018-09-11 PROCEDURE — 99285 EMERGENCY DEPT VISIT HI MDM: CPT

## 2018-09-11 PROCEDURE — 71046 X-RAY EXAM CHEST 2 VIEWS: CPT

## 2018-09-11 PROCEDURE — 83036 HEMOGLOBIN GLYCOSYLATED A1C: CPT

## 2018-09-11 RX ORDER — IBUPROFEN 400 MG/1
600 TABLET ORAL EVERY 6 HOURS PRN
Status: DISCONTINUED | OUTPATIENT
Start: 2018-09-11 | End: 2018-09-12 | Stop reason: HOSPADM

## 2018-09-11 RX ORDER — DEXTROSE MONOHYDRATE 25 G/50ML
12.5 INJECTION, SOLUTION INTRAVENOUS PRN
Status: DISCONTINUED | OUTPATIENT
Start: 2018-09-11 | End: 2018-09-12 | Stop reason: HOSPADM

## 2018-09-11 RX ORDER — ASPIRIN 81 MG/1
TABLET, CHEWABLE ORAL
Status: COMPLETED
Start: 2018-09-11 | End: 2018-09-11

## 2018-09-11 RX ORDER — ASPIRIN 81 MG/1
324 TABLET, CHEWABLE ORAL ONCE
Status: COMPLETED | OUTPATIENT
Start: 2018-09-11 | End: 2018-09-11

## 2018-09-11 RX ORDER — FAMOTIDINE 20 MG/1
20 TABLET, FILM COATED ORAL 2 TIMES DAILY
Status: DISCONTINUED | OUTPATIENT
Start: 2018-09-11 | End: 2018-09-12 | Stop reason: HOSPADM

## 2018-09-11 RX ORDER — SODIUM CHLORIDE 0.9 % (FLUSH) 0.9 %
10 SYRINGE (ML) INJECTION PRN
Status: DISCONTINUED | OUTPATIENT
Start: 2018-09-11 | End: 2018-09-12 | Stop reason: HOSPADM

## 2018-09-11 RX ORDER — NICOTINE POLACRILEX 4 MG
15 LOZENGE BUCCAL PRN
Status: DISCONTINUED | OUTPATIENT
Start: 2018-09-11 | End: 2018-09-12 | Stop reason: HOSPADM

## 2018-09-11 RX ORDER — CLOPIDOGREL BISULFATE 75 MG/1
75 TABLET ORAL DAILY
Status: DISCONTINUED | OUTPATIENT
Start: 2018-09-11 | End: 2018-09-12 | Stop reason: HOSPADM

## 2018-09-11 RX ORDER — ACETAMINOPHEN 325 MG/1
650 TABLET ORAL EVERY 4 HOURS PRN
Status: DISCONTINUED | OUTPATIENT
Start: 2018-09-11 | End: 2018-09-12 | Stop reason: HOSPADM

## 2018-09-11 RX ORDER — SODIUM CHLORIDE 0.9 % (FLUSH) 0.9 %
10 SYRINGE (ML) INJECTION EVERY 12 HOURS SCHEDULED
Status: DISCONTINUED | OUTPATIENT
Start: 2018-09-11 | End: 2018-09-12 | Stop reason: HOSPADM

## 2018-09-11 RX ORDER — OXYCODONE HYDROCHLORIDE 5 MG/1
10 TABLET ORAL EVERY 4 HOURS PRN
Status: DISCONTINUED | OUTPATIENT
Start: 2018-09-11 | End: 2018-09-12 | Stop reason: HOSPADM

## 2018-09-11 RX ORDER — LISINOPRIL 2.5 MG/1
2.5 TABLET ORAL DAILY
Status: DISCONTINUED | OUTPATIENT
Start: 2018-09-11 | End: 2018-09-12 | Stop reason: HOSPADM

## 2018-09-11 RX ORDER — ATORVASTATIN CALCIUM 40 MG/1
40 TABLET, FILM COATED ORAL NIGHTLY
Status: DISCONTINUED | OUTPATIENT
Start: 2018-09-11 | End: 2018-09-11

## 2018-09-11 RX ORDER — DEXTROSE MONOHYDRATE 50 MG/ML
100 INJECTION, SOLUTION INTRAVENOUS PRN
Status: DISCONTINUED | OUTPATIENT
Start: 2018-09-11 | End: 2018-09-12 | Stop reason: HOSPADM

## 2018-09-11 RX ORDER — OXYCODONE HYDROCHLORIDE 5 MG/1
5 TABLET ORAL EVERY 4 HOURS PRN
Status: DISCONTINUED | OUTPATIENT
Start: 2018-09-11 | End: 2018-09-12 | Stop reason: HOSPADM

## 2018-09-11 RX ORDER — ASPIRIN 81 MG/1
81 TABLET, CHEWABLE ORAL DAILY
Status: DISCONTINUED | OUTPATIENT
Start: 2018-09-12 | End: 2018-09-12 | Stop reason: HOSPADM

## 2018-09-11 RX ORDER — ONDANSETRON 2 MG/ML
4 INJECTION INTRAMUSCULAR; INTRAVENOUS EVERY 6 HOURS PRN
Status: DISCONTINUED | OUTPATIENT
Start: 2018-09-11 | End: 2018-09-12 | Stop reason: HOSPADM

## 2018-09-11 RX ORDER — ATORVASTATIN CALCIUM 40 MG/1
40 TABLET, FILM COATED ORAL DAILY
Status: DISCONTINUED | OUTPATIENT
Start: 2018-09-12 | End: 2018-09-12 | Stop reason: HOSPADM

## 2018-09-11 RX ORDER — ALBUTEROL SULFATE 90 UG/1
2 AEROSOL, METERED RESPIRATORY (INHALATION) EVERY 6 HOURS PRN
Status: DISCONTINUED | OUTPATIENT
Start: 2018-09-11 | End: 2018-09-12 | Stop reason: HOSPADM

## 2018-09-11 RX ORDER — NITROGLYCERIN 0.4 MG/1
0.4 TABLET SUBLINGUAL EVERY 5 MIN PRN
Status: DISCONTINUED | OUTPATIENT
Start: 2018-09-11 | End: 2018-09-12 | Stop reason: HOSPADM

## 2018-09-11 RX ADMIN — OXYCODONE HYDROCHLORIDE 10 MG: 5 TABLET ORAL at 18:34

## 2018-09-11 RX ADMIN — INSULIN LISPRO 2 UNITS: 100 INJECTION, SOLUTION INTRAVENOUS; SUBCUTANEOUS at 21:16

## 2018-09-11 RX ADMIN — FAMOTIDINE 20 MG: 20 TABLET ORAL at 21:00

## 2018-09-11 RX ADMIN — SODIUM CHLORIDE, PRESERVATIVE FREE 10 ML: 5 INJECTION INTRAVENOUS at 21:01

## 2018-09-11 RX ADMIN — OXYCODONE HYDROCHLORIDE 10 MG: 5 TABLET ORAL at 14:12

## 2018-09-11 RX ADMIN — ASPIRIN 81 MG 324 MG: 81 TABLET ORAL at 12:57

## 2018-09-11 RX ADMIN — ASPIRIN 324 MG: 81 TABLET, CHEWABLE ORAL at 12:57

## 2018-09-11 RX ADMIN — ONDANSETRON 4 MG: 2 INJECTION INTRAMUSCULAR; INTRAVENOUS at 13:08

## 2018-09-11 RX ADMIN — NITROGLYCERIN 0.4 MG: 0.4 TABLET, ORALLY DISINTEGRATING SUBLINGUAL at 13:08

## 2018-09-11 ASSESSMENT — PAIN DESCRIPTION - PAIN TYPE: TYPE: ACUTE PAIN

## 2018-09-11 ASSESSMENT — PAIN DESCRIPTION - LOCATION
LOCATION: CHEST
LOCATION: CHEST

## 2018-09-11 ASSESSMENT — PAIN SCALES - GENERAL
PAINLEVEL_OUTOF10: 8
PAINLEVEL_OUTOF10: 8
PAINLEVEL_OUTOF10: 2
PAINLEVEL_OUTOF10: 8
PAINLEVEL_OUTOF10: 8

## 2018-09-11 ASSESSMENT — PAIN DESCRIPTION - DIRECTION: RADIATING_TOWARDS: NECK AND LEFT SHOULDER

## 2018-09-11 NOTE — H&P
Hospital Medicine History & Physical      PCP: Libia Uribe APRN - CNP    Date of Admission: 9/11/2018    Date of Service: Pt seen/examined on 09/11/18 placed in observation. Chief Complaint:  Chest pain      History Of Present Illness:     46 y.o. female who presented to [facility] with chest pain. Patient has had chest pain on and off for the past week. No clear connection with exertion or meals. Yesterday went to [facility] emergency room with chest pain. Admission was offered and patient initially agreed, but later sign out AMA. Started having more and severe chest pain earlier today and came to the emergency room at [facility]. The pain is central to the chest, radiating to the neck and the left arm. Patient reports symptoms similar to angina except that, previously she had sweating and now she does not. Patient also feels somewhat short of breath, not related to exertion  At the time of the interview, patient was feeling comfortable with no chest pain. Chest pain resolved in response to pain medication in the emergency room. She is not sure if nitroglycerin made a difference. Past Medical History:          Diagnosis Date    Acute blood loss anemia     Asthma     Chest pain     COPD (chronic obstructive pulmonary disease) (HCC)     Coronary artery disease 9/4/2012    Depression     Diabetes mellitus (HCC)     Enlarged heart     Fatigue     Generalized headaches     Hyperlipidemia     Hypertension     Migraine     Morning stiffness of joints     Myocardial infarction ([address] 05/13/2018    Numbness or tingling hands and feet    OA (osteoarthritis) of knee 1/27/2015    Obesity, Class I, BMI 30.0-34.9 (see actual BMI)     Osteoporosis     Pulmonary nodule     Shortness of breath        Past Surgical History:          Procedure Laterality Date    CARDIAC CATHETERIZATION  04/01/2016    Dr. Radha Moser. [address]  08/01/2012    Dr. Ryan Inman

## 2018-09-11 NOTE — ED PROVIDER NOTES
tobacco: Never Used      Comment: 17 days     Alcohol use No    Drug use: No    Sexual activity: No     Other Topics Concern    None     Social History Narrative    None       SCREENINGS             PHYSICAL EXAM    (up to 7 for level 4, 8 or more for level 5)     ED Triage Vitals [09/11/18 1122]   BP Temp Temp Source Pulse Resp SpO2 Height Weight   (!) 172/107 98.5 °F (36.9 °C) Oral 111 18 98 % 5' 1\" (1.549 m) 170 lb (77.1 kg)           PHYSICAL EXAM    VITAL SIGNS: BP (!) 160/86   Pulse 103   Temp 98.5 °F (36.9 °C) (Oral)   Resp 18   Ht 5' 1\" (1.549 m)   Wt 170 lb (77.1 kg)   SpO2 97%   BMI 32.12 kg/m²    Constitutional:  Well developed, Well nourished, No acute distress, Non-toxic appearance. HENT:  Normocephalic, Atraumatic, Bilateral external ears normal, Oropharynx moist, No oral exudates, Nose normal.   Neck: Normal range of motion, No tenderness, Supple, No stridor. Eyes:   Conjunctiva normal, No discharge. Respiratory:  Normal breath sounds, No respiratory distress, No wheezing, No chest tenderness. Cardiovascular:  Normal heart rate, Normal rhythm, No murmurs, No rubs, No gallops. GI:  Bowel sounds normal, Soft, No tenderness, No masses, No pulsatile masses. Musculoskeletal:  Intact distal pulses, No edema, No tenderness, No cyanosis, No clubbing. Good range of motion in all major joints. No tenderness to palpation or major deformities noted. Back: No tenderness. Integument:  Warm, Dry, No erythema, No rash. Lymphatic:  No lymphadenopathy noted. Neurologic:  Alert & oriented x 3, Normal motor function, Normal sensory function, No focal deficits noted.    Psychiatric:  Affect normal, Judgment normal, Mood normal.       DIAGNOSTIC RESULTS   LABS:    Results for orders placed or performed during the hospital encounter of 09/11/18   CBC auto differential   Result Value Ref Range    WBC 9.6 4.0 - 11.0 K/uL    RBC 5.06 4.00 - 5.20 M/uL    Hemoglobin 14.8 12.0 - 16.0 g/dL Hematocrit 43.9 36.0 - 48.0 %    MCV 86.9 80.0 - 100.0 fL    MCH 29.2 26.0 - 34.0 pg    MCHC 33.6 31.0 - 36.0 g/dL    RDW 13.6 12.4 - 15.4 %    Platelets 117 782 - 283 K/uL    MPV 8.7 5.0 - 10.5 fL    Neutrophils % 80.0 %    Lymphocytes % 11.7 %    Monocytes % 7.3 %    Eosinophils % 0.4 %    Basophils % 0.6 %    Neutrophils # 7.6 1.7 - 7.7 K/uL    Lymphocytes # 1.1 1.0 - 5.1 K/uL    Monocytes # 0.7 0.0 - 1.3 K/uL    Eosinophils # 0.0 0.0 - 0.6 K/uL    Basophils # 0.1 0.0 - 0.2 K/uL   Basic Metabolic Panel w/ Reflex to MG   Result Value Ref Range    Sodium 133 (L) 136 - 145 mmol/L    Potassium reflex Magnesium 3.6 3.5 - 5.1 mmol/L    Chloride 94 (L) 99 - 110 mmol/L    CO2 27 21 - 32 mmol/L    Anion Gap 12 3 - 16    Glucose 224 (H) 70 - 99 mg/dL    BUN 8 7 - 20 mg/dL    CREATININE 0.6 0.6 - 1.1 mg/dL    GFR Non-African American >60 >60    GFR African American >60 >60    Calcium 9.5 8.3 - 10.6 mg/dL   Troponin   Result Value Ref Range    Troponin <0.01 <0.01 ng/mL   EKG 12 lead   Result Value Ref Range    Ventricular Rate 105 BPM    Atrial Rate 105 BPM    P-R Interval 140 ms    QRS Duration 94 ms    Q-T Interval 360 ms    QTc Calculation (Bazett) 475 ms    P Axis 43 degrees    R Axis -78 degrees    T Axis 29 degrees    Diagnosis       Sinus tachycardiaLeft axis deviationPossible Lateral infarct (cited on or before 13-MAY-2018)Inferior-posterior infarctNonspecific ST abnormalityAbnormal ECGWhen compared with ECG of 09-SEP-2018 21:53,No significant change was foundConfirmed by ABBIE FRANCIS MD ([id number]) on 9/11/2018 12:13:26 PM       All other labs were within normal range or not returned as of this dictation. EKG:  All EKG's are interpreted by the Emergency Department Physician who either signs or Co-signs this chart in the absence of a cardiologist.    My interpretation: Normal sinus rhythm rate 105, left axis deviation, ST depressions noted in V1 and V2, no significant concomitant ST elevations appreciated despite nitro and aspirin in the emergency department. Patient had improvement in symptoms. Given the patient's history, patient warrants admission for further evaluation. Hospitalist service was contacted and accepted patient for admission and continued care. Patient was given the following medications:  Medications   ondansetron (ZOFRAN) injection 4 mg (4 mg Intravenous Given 9/11/18 1308)   nitroGLYCERIN (NITROSTAT) SL tablet 0.4 mg (0.4 mg Sublingual Given 9/11/18 1308)   aspirin chewable tablet 324 mg (324 mg Oral Given 9/11/18 1257)       The patient tolerated their visit well. The patient and / or the family were informed of the results of any tests, a time was given to answer questions. FINAL IMPRESSION      1.  Acute chest pain          DISPOSITION/PLAN   DISPOSITION Admitted 09/11/2018 12:45:39 PM           (Please note that portions of this note were completed with a voice recognition program.  Efforts were made to edit the dictations but occasionally words are mis-transcribed.)    Edwina Camarena MD (electronically signed)              Shaquille Lopez MD  09/11/18 4540

## 2018-09-11 NOTE — ED NOTES
Pt Presents to ER with CP non radiating and sternal. s1 s2 noted. ekg obtained and iv established. Dr. Dwayne Cam at bedside assessing pt at this time.       Amy Oviedo RN  09/11/18 0152

## 2018-09-12 ENCOUNTER — APPOINTMENT (OUTPATIENT)
Dept: NUCLEAR MEDICINE | Age: 53
End: 2018-09-12
Payer: COMMERCIAL

## 2018-09-12 VITALS
HEIGHT: 61 IN | OXYGEN SATURATION: 94 % | DIASTOLIC BLOOD PRESSURE: 90 MMHG | HEART RATE: 90 BPM | TEMPERATURE: 98.3 F | BODY MASS INDEX: 31.08 KG/M2 | SYSTOLIC BLOOD PRESSURE: 113 MMHG | WEIGHT: 164.6 LBS | RESPIRATION RATE: 16 BRPM

## 2018-09-12 LAB
BASOPHILS ABSOLUTE: 0 K/UL (ref 0–0.2)
BASOPHILS RELATIVE PERCENT: 0.4 %
EOSINOPHILS ABSOLUTE: 0 K/UL (ref 0–0.6)
EOSINOPHILS RELATIVE PERCENT: 0.5 %
ESTIMATED AVERAGE GLUCOSE: 157.1 MG/DL
GLUCOSE BLD-MCNC: 165 MG/DL (ref 70–99)
GLUCOSE BLD-MCNC: 228 MG/DL (ref 70–99)
HBA1C MFR BLD: 7.1 %
HCT VFR BLD CALC: 41.2 % (ref 36–48)
HEMOGLOBIN: 14.1 G/DL (ref 12–16)
LV EF: 55 %
LV EF: 62 %
LVEF MODALITY: NORMAL
LVEF MODALITY: NORMAL
LYMPHOCYTES ABSOLUTE: 1.1 K/UL (ref 1–5.1)
LYMPHOCYTES RELATIVE PERCENT: 12.1 %
MCH RBC QN AUTO: 29.6 PG (ref 26–34)
MCHC RBC AUTO-ENTMCNC: 34.3 G/DL (ref 31–36)
MCV RBC AUTO: 86.3 FL (ref 80–100)
MONOCYTES ABSOLUTE: 0.6 K/UL (ref 0–1.3)
MONOCYTES RELATIVE PERCENT: 7.3 %
NEUTROPHILS ABSOLUTE: 7 K/UL (ref 1.7–7.7)
NEUTROPHILS RELATIVE PERCENT: 79.7 %
PDW BLD-RTO: 13.3 % (ref 12.4–15.4)
PERFORMED ON: ABNORMAL
PERFORMED ON: ABNORMAL
PLATELET # BLD: 178 K/UL (ref 135–450)
PMV BLD AUTO: 8.7 FL (ref 5–10.5)
RBC # BLD: 4.77 M/UL (ref 4–5.2)
WBC # BLD: 8.8 K/UL (ref 4–11)

## 2018-09-12 PROCEDURE — A9502 TC99M TETROFOSMIN: HCPCS | Performed by: INTERNAL MEDICINE

## 2018-09-12 PROCEDURE — G0378 HOSPITAL OBSERVATION PER HR: HCPCS

## 2018-09-12 PROCEDURE — 6370000000 HC RX 637 (ALT 250 FOR IP): Performed by: INTERNAL MEDICINE

## 2018-09-12 PROCEDURE — 3430000000 HC RX DIAGNOSTIC RADIOPHARMACEUTICAL: Performed by: INTERNAL MEDICINE

## 2018-09-12 PROCEDURE — 93017 CV STRESS TEST TRACING ONLY: CPT

## 2018-09-12 PROCEDURE — 96376 TX/PRO/DX INJ SAME DRUG ADON: CPT

## 2018-09-12 PROCEDURE — 6360000002 HC RX W HCPCS: Performed by: INTERNAL MEDICINE

## 2018-09-12 PROCEDURE — 99214 OFFICE O/P EST MOD 30 MIN: CPT | Performed by: INTERNAL MEDICINE

## 2018-09-12 PROCEDURE — 85025 COMPLETE CBC W/AUTO DIFF WBC: CPT

## 2018-09-12 PROCEDURE — 2580000003 HC RX 258: Performed by: INTERNAL MEDICINE

## 2018-09-12 PROCEDURE — 78452 HT MUSCLE IMAGE SPECT MULT: CPT

## 2018-09-12 PROCEDURE — 93306 TTE W/DOPPLER COMPLETE: CPT

## 2018-09-12 PROCEDURE — 36415 COLL VENOUS BLD VENIPUNCTURE: CPT

## 2018-09-12 RX ORDER — ISOSORBIDE MONONITRATE 30 MG/1
30 TABLET, EXTENDED RELEASE ORAL DAILY
Status: DISCONTINUED | OUTPATIENT
Start: 2018-09-12 | End: 2018-09-12 | Stop reason: HOSPADM

## 2018-09-12 RX ORDER — ISOSORBIDE MONONITRATE 30 MG/1
30 TABLET, EXTENDED RELEASE ORAL DAILY
Qty: 30 TABLET | Refills: 3 | Status: SHIPPED | OUTPATIENT
Start: 2018-09-12 | End: 2018-09-14 | Stop reason: SINTOL

## 2018-09-12 RX ORDER — ONDANSETRON 4 MG/1
4 TABLET, ORALLY DISINTEGRATING ORAL EVERY 8 HOURS PRN
Qty: 20 TABLET | Refills: 1 | Status: SHIPPED | OUTPATIENT
Start: 2018-09-12 | End: 2020-10-26 | Stop reason: SDUPTHER

## 2018-09-12 RX ORDER — NITROGLYCERIN 0.4 MG/1
TABLET SUBLINGUAL
Qty: 25 TABLET | Refills: 3 | Status: SHIPPED | OUTPATIENT
Start: 2018-09-12 | End: 2020-04-14 | Stop reason: SDUPTHER

## 2018-09-12 RX ADMIN — FAMOTIDINE 20 MG: 20 TABLET ORAL at 15:26

## 2018-09-12 RX ADMIN — CLOPIDOGREL BISULFATE 75 MG: 75 TABLET ORAL at 15:26

## 2018-09-12 RX ADMIN — ISOSORBIDE MONONITRATE 30 MG: 30 TABLET, EXTENDED RELEASE ORAL at 16:01

## 2018-09-12 RX ADMIN — ACETAMINOPHEN 650 MG: 325 TABLET ORAL at 15:19

## 2018-09-12 RX ADMIN — ASPIRIN 81 MG 81 MG: 81 TABLET ORAL at 15:26

## 2018-09-12 RX ADMIN — ATORVASTATIN CALCIUM 40 MG: 40 TABLET, FILM COATED ORAL at 15:26

## 2018-09-12 RX ADMIN — TETROFOSMIN 11.1 MILLICURIE: 0.23 INJECTION, POWDER, LYOPHILIZED, FOR SOLUTION INTRAVENOUS at 10:20

## 2018-09-12 RX ADMIN — ONDANSETRON 4 MG: 2 INJECTION INTRAMUSCULAR; INTRAVENOUS at 09:50

## 2018-09-12 RX ADMIN — TETROFOSMIN 31.3 MILLICURIE: 0.23 INJECTION, POWDER, LYOPHILIZED, FOR SOLUTION INTRAVENOUS at 12:24

## 2018-09-12 RX ADMIN — ONDANSETRON 4 MG: 2 INJECTION INTRAMUSCULAR; INTRAVENOUS at 15:18

## 2018-09-12 RX ADMIN — SODIUM CHLORIDE, PRESERVATIVE FREE 10 ML: 5 INJECTION INTRAVENOUS at 09:47

## 2018-09-12 RX ADMIN — LISINOPRIL 2.5 MG: 2.5 TABLET ORAL at 15:26

## 2018-09-12 RX ADMIN — REGADENOSON 0.4 MG: 0.08 INJECTION, SOLUTION INTRAVENOUS at 12:25

## 2018-09-12 RX ADMIN — INSULIN LISPRO 4 UNITS: 100 INJECTION, SOLUTION INTRAVENOUS; SUBCUTANEOUS at 15:27

## 2018-09-12 ASSESSMENT — PAIN SCALES - GENERAL
PAINLEVEL_OUTOF10: 0
PAINLEVEL_OUTOF10: 3
PAINLEVEL_OUTOF10: 6

## 2018-09-12 NOTE — PROGRESS NOTES
[phone] called back
Consult called to [facility] at [phone] on 9/11.  Irvin Stiles RN
Patient admitted to room 362 from the ED. Patient oriented to room, call light, bed rails, phone, lights and bathroom. Patient instructed about the schedule of the day including: vital sign frequency, lab draws, possible tests, frequency of MD and staff rounds, including RN/MD rounding together at bedside, daily weights, and I &O's. Patient instructed about prescribed diet, how to use 8MENU, and television. Telemetry box  in place, patient aware of placement and reason. Bed locked, in lowest position, side rails up 2/4, call light within reach. Will continue to monitor.
Patient to nuclear medicine for stress test at this time.
OM2, SVG to [facility]    DIAGNOSTIC CARDIAC CATH LAB PROCEDURE  06/06/2013    Dr. Cam Prakash - Non Obs CAD    HYSTERECTOMY      UPPER GASTROINTESTINAL ENDOSCOPY         Level of Consciousness: {LEVEL OF CONSCIOUSNESS:20078}    Level of Activity: {LEVEL OF ACTIVITY:20079}    Respiratory Pattern: {RESPIRATORY PATTERN:20081}    Breath Sounds: {BREATH SOUNDS:20082}    Sputum   ,  ,    Cough: {COUGH:20083}    Vital Signs   BP (!) 160/86   Pulse 103   Temp 98.5 °F (36.9 °C) (Oral)   Resp 18   Ht 5' 1\" (1.549 m)   Wt 170 lb (77.1 kg)   SpO2 97%   BMI 32.12 kg/m²   SPO2 (COPD values may differ): {SPO2:20084}    Peak Flow (asthma only): {PEAK FLOW:20085}    RSI: {RSI:20086}        Plan       Goals: {Blank multiple:32195::\"medication delivery\",\"mobilize retained secretions\",\"volume expansion\",\"improve oxygenation\"}    Patient/caregiver was educated on the proper method of use for Respiratory Care Devices:  {yes no:974729::\"Yes\"}      Level of patient/caregiver understanding able to:   [] Verbalize understanding   [] Demonstrate understanding       [] Teach back        [] Needs reinforcement       []  No available caregiver               []  Other:     Response to education:  {Responses; Excellent - Poor:154253320}     Is patient being placed on Home Treatment Regimen? {YES/NO:19732}     Does the patient have everything they need prior to discharge? {YES/NO:19732}     Comments: ***    Plan of Care: ***    Electronically signed by Jacques Willingham RCP on 9/11/2018 at 1:51 PM    Respiratory Protocol Guidelines     1. Assessment and treatment by Respiratory Therapy will be initiated for medication and therapeutic interventions upon initiation of aerosolized medication. 2. Physician will be contacted for respiratory rate (RR) greater than 35 breaths per minute. Therapy will be held for heart rate (HR) greater than 140 beats per minute, pending direction from physician.   3. Bronchodilators will be administered via

## 2018-09-12 NOTE — CONSULTS
to left shoulder/jaw with assoc. SOB and nausea. Reports SL NTG helped. Also c/o \"fluttering\" occasionally. Admit EKG showed sinus tach 105bpm; inferoposterior and lateral infarct; nonspecific ST change (no change from prior). Note Abdelrahman negative x 3, LDL=51, K+ 3.6, and CXR negative. Note BP elevated 170-200mmHg at home and 172/107mmHg in ER. Patient with no complaints of  SOB, dizziness, edema, or orthopnea/PND. I have been asked to provide consultation regarding further management and testing. Past Medical History:   has a past medical history of Acute blood loss anemia; Asthma; Chest pain; COPD (chronic obstructive pulmonary disease) ([address] Coronary artery disease; Depression; Diabetes mellitus ([address] Enlarged heart; Fatigue; Generalized headaches; Hyperlipidemia; Hypertension; Migraine; Morning stiffness of joints; Myocardial infarction ([address] Numbness or tingling; OA (osteoarthritis) of knee; Obesity, Class I, BMI 30.0-34.9 (see actual BMI); Osteoporosis; Pulmonary nodule; and Shortness of breath. Surgical History:   has a past surgical history that includes Cholecystectomy; Hysterectomy; Diagnostic Cardiac Cath Lab Procedure (06/06/2013); Upper gastrointestinal endoscopy; Cardiac catheterization (04/01/2016); Cardiac catheterization (08/01/2012); Coronary artery bypass graft (06/13/2018); and Coronary angioplasty with stent (05/13/2018). Social History:   reports that she has quit smoking. Her smoking use included Cigarettes. She has a 20.00 pack-year smoking history. She has never used smokeless tobacco. She reports that she does not drink alcohol or use drugs.      Family History:  family history includes Cancer in her brother and another family member; Diabetes in an other family member; Emphysema in her mother; Heart Disease in her father and mother; Heart Failure in her father, maternal aunt, maternal uncle, mother, and another family member; High Blood Pressure in her brother, father, maternal

## 2018-09-12 NOTE — DISCHARGE SUMMARY
Hospital Medicine Discharge Summary    Patient ID: Eric Hall      Patient's PCP: Keith Bowling, APRN - CNP    Admit Date: 9/11/2018     Discharge Date:   09/12/18     Admitting Physician: Sarbjit Casillas MD     Discharge Physician: Daniel Barrios MD     Discharge Diagnoses: Active Hospital Problems    Diagnosis Date Noted    Acute chest pain [R07.9] 09/10/2018    GERD (gastroesophageal reflux disease) [K21.9] 06/08/2018    Obesity (BMI 30-39. 9) [E66.9]     DM2 (diabetes mellitus, type 2) ([facility] [address] [E11.9] 10/10/2017    Essential hypertension [I10] 03/22/2016    Mixed hyperlipidemia [E78.2] 03/22/2016       The patient was seen and examined on day of discharge and this discharge summary is in conjunction with any daily progress note from day of discharge. Hospital Course:     Admitted with chest pain  Cardiac stress test showed a fixed defect consistent with a previously known MI, but no new reversible ischemia. Was seen by cardiology. Imdur 30 mg was added to medication regimen  Patient will be discharged home with a prescription for Imdur. Should follow up with PCP and cardiology      Physical Exam Performed:     BP (!) 113/90   Pulse 90   Temp 98.3 °F (36.8 °C) (Oral)   Resp 16   Ht 5' 1\" (1.549 m)   Wt 164 lb 9.6 oz (74.7 kg)   SpO2 94%   BMI 31.10 kg/m²       General appearance:  No apparent distress, appears stated age and cooperative. HEENT:  Normal cephalic, atraumatic without obvious deformity. Pupils equal, round, and reactive to light. Extra ocular muscles intact. Conjunctivae/corneas clear. Neck: Supple, with full range of motion. No jugular venous distention. Trachea midline. Respiratory:  Normal respiratory effort. Clear to auscultation, bilaterally without Rales/Wheezes/Rhonchi. Cardiovascular:  Regular rate and rhythm with normal S1/S2 without murmurs, rubs or gallops. Abdomen: Soft, non-tender, non-distended with normal bowel sounds.   Musculoskeletal:  No

## 2018-09-13 LAB
EKG ATRIAL RATE: 105 BPM
EKG DIAGNOSIS: NORMAL
EKG P AXIS: 43 DEGREES
EKG P-R INTERVAL: 140 MS
EKG Q-T INTERVAL: 360 MS
EKG QRS DURATION: 94 MS
EKG QTC CALCULATION (BAZETT): 475 MS
EKG R AXIS: -78 DEGREES
EKG T AXIS: 29 DEGREES
EKG VENTRICULAR RATE: 105 BPM

## 2018-09-14 ENCOUNTER — TELEPHONE (OUTPATIENT)
Dept: CARDIOLOGY CLINIC | Age: 53
End: 2018-09-14

## 2018-09-14 RX ORDER — RANOLAZINE 500 MG/1
500 TABLET, EXTENDED RELEASE ORAL 2 TIMES DAILY
Qty: 60 TABLET | Refills: 3
Start: 2018-09-14 | End: 2019-02-19 | Stop reason: SDUPTHER

## 2018-09-14 NOTE — TELEPHONE ENCOUNTER
Spoke with patient. She is agreeable. She asked if we have samples she may . States she used to get samples of this medication from [place] office. Explained I would send message to that office and ask someone to call her back. Voiced understanding. Jes Kaplan

## 2018-09-24 ENCOUNTER — TELEPHONE (OUTPATIENT)
Dept: CARDIOLOGY CLINIC | Age: 53
End: 2018-09-24

## 2018-09-25 RX ORDER — ATORVASTATIN CALCIUM 40 MG/1
40 TABLET, FILM COATED ORAL NIGHTLY
Qty: 30 TABLET | Refills: 0 | Status: SHIPPED | OUTPATIENT
Start: 2018-09-25 | End: 2018-11-13 | Stop reason: SDUPTHER

## 2018-09-25 NOTE — TELEPHONE ENCOUNTER
You need to order the med the usual way and I will sign electronically. I do NOT need a separate message sent to me stating a patient needs a script filled. Please go ahead and do it the way it is usually done and then it comes into my in basket to sign. Please clarify this message at it states I \"increased\" her dose of lipitor from 80 to 40mg. Does not make sense? Thanks.

## 2018-10-05 ENCOUNTER — OFFICE VISIT (OUTPATIENT)
Dept: CARDIOLOGY CLINIC | Age: 53
End: 2018-10-05
Payer: COMMERCIAL

## 2018-10-05 VITALS
DIASTOLIC BLOOD PRESSURE: 68 MMHG | HEIGHT: 61 IN | BODY MASS INDEX: 30.78 KG/M2 | OXYGEN SATURATION: 96 % | SYSTOLIC BLOOD PRESSURE: 116 MMHG | WEIGHT: 163 LBS | HEART RATE: 116 BPM

## 2018-10-05 DIAGNOSIS — E78.2 MIXED HYPERLIPIDEMIA: ICD-10-CM

## 2018-10-05 DIAGNOSIS — I10 ESSENTIAL HYPERTENSION: ICD-10-CM

## 2018-10-05 DIAGNOSIS — I25.10 CORONARY ARTERY DISEASE INVOLVING NATIVE CORONARY ARTERY OF NATIVE HEART WITHOUT ANGINA PECTORIS: Primary | ICD-10-CM

## 2018-10-05 DIAGNOSIS — R00.2 PALPITATION: ICD-10-CM

## 2018-10-05 PROCEDURE — 99214 OFFICE O/P EST MOD 30 MIN: CPT | Performed by: INTERNAL MEDICINE

## 2018-10-05 RX ORDER — METOPROLOL SUCCINATE 25 MG/1
25 TABLET, EXTENDED RELEASE ORAL DAILY
Qty: 30 TABLET | Refills: 11 | Status: ON HOLD | OUTPATIENT
Start: 2018-10-05 | End: 2019-07-30 | Stop reason: HOSPADM

## 2018-10-05 NOTE — PROGRESS NOTES
to lipitor 80mg qd. Well controlled and will continue current medical regimen. Higher dose lipitor caused nausea and anorexia and now taking 40mg dose and tolerating well. 3.  Palpitations: She c/o palpitations which I believe are due to sinus tachycardia. Note EKG today shows sinus tachycardia; 100bpm; nonspecific ST change; infero-posterior and lateral infarct pattern (no major change from 9/11/18 EKG). Note she was taken off metoprolol by Dr. Bonnie Oneill for bradycardia HR 30's shortly after d/c home. Will restart beta blocker and see how she does. Plan:  1. Start Toprol XL 25 mg in the evening daily. 2. See EKG today #3 above  3. Continue other medications   4. Follow up with me in 3 months     Thank you for allowing me to participate in the care of this individual.    Cost of prescription medications and patient compliance have been reviewed with patient. All questions answered. Sol Boys.  Denson Callas, M.D., [facility]

## 2018-10-08 NOTE — COMMUNICATION BODY
[address]   Cardiac follow up    Referring Provider:  GERMAN Styles - CNP     Chief Complaint   Patient presents with    3 Month Follow-Up     ekg 09/11/2018    Coronary Artery Disease    Results     cris & echo 09/12/2018    Discuss Labs     09/11/2018    Chest Pain     same    Shortness of Breath     same    Dizziness     same    Fatigue     tired all the time      Subjective: Ms Dereje Corbett presents for hospital f/u chest pain. History of CABG; CAD s/p stent placement, STEMI, HTN; c/o palpitations     History of Present Illness:   Zaida Mccrary is a 46 y.o. patient  here for recent hospital f/u. She has PMH of HTN, COPD, DM, CAD s/p STEMI and in March 2016 and May 2018. Note CATH by Dr. Lauren Smart on 4/1/16 LM-normal LAD-30%; mid Septal  #1--70%; ostial CCx-20%; prox OM1- normal RCA-normal, dominant RPDA- normal LVEF- 60%. No need for PCI and med mgt only recommended with addition of ranexa. She presented to the ER on 9/26/17 for chest pain, right sided facial numbness and tingling. Note brain MRI showed no acute intracranial abnormality. Chronic small vessel ischemic changes. She currently follows up with Dr. Michelle Pierce for her known aneurysms. Most recent Fay Numbers 11/2/17 was normal, no sign of ischemia. In mid-May 2018 suffered STEMI. She underwent LHC 5/13/18 with severe multi-vessel CAD. Underwent PCI mid CCx MARCOS and POBA LAD, high grade mid RCA & ostial Rt PDA, required IABP support. Hospitalization was complicated by hypotension. Note ECHO 5/15/18 showed EF 40-45% (55% May 2013), akinetic anterolateral and inferolateral wall. She underwent 3V CABG with pedicled LIMA to LAD, SVG to OM 2, separate SVG to PDA of RCA on 6/13/18 by Dr. Brenton Anand. Most recent 24 hour holter 6/5/18 showed SR, daily HR 78 (). Rare PAC's, frequent PVC's. Note taken off metoprolol by Dr. Brenton Anand for bradycardia HR 30's shortly after d/c home.     Admitted for unspecified chest pain impulses not displaced  · Heart tones are crisp and normal tachycardia and irregular rhythm   · Cervical veins are not engorged  · The carotid upstroke is normal in amplitude and contour without delay or bruit  · Normal S1S2, No S3, No Murmur  · Peripheral pulses are symmetrical and full  · There is no clubbing, cyanosis of the extremities. · No edema  · Femoral Arteries: 2+ and equal  · Pedal Pulses: 2+ and equal   Abdomen:  · No masses or tenderness  · Liver/Spleen: No Abnormalities Noted  Neurological/Psychiatric:  · Alert and oriented in all spheres  · Moves all extremities well  · Exhibits normal gait balance and coordination  · No abnormalities of mood, affect, memory, mentation, or behavior are noted    Lab Results   Component Value Date    CHOL 122 09/11/2018    CHOL 106 06/13/2018    CHOL 197 05/14/2018     Lab Results   Component Value Date    TRIG 90 09/11/2018    TRIG 83 06/13/2018    TRIG 271 (H) 05/14/2018     Lab Results   Component Value Date    HDL 53 09/11/2018    HDL 45 06/13/2018    HDL 34 (L) 05/14/2018     Lab Results   Component Value Date    LDLCALC 51 09/11/2018    LDLCALC 44 06/13/2018    LDLCALC 109 (H) 05/14/2018     Lab Results   Component Value Date    LABVLDL 18 09/11/2018    LABVLDL 17 06/13/2018    LABVLDL 54 05/14/2018     Assessment:     1. CAD: Underwent 3V CABG with pedicled LIMA to LAD, SVG to OM 2, separate SVG to PDA of RCA on 6/13/18 by Dr. Ronaldo Maxwell. Recent admit for unspecified chest pain and r/o'd for MI. Most recent [address] from 9/12/18 showed a moderate sized inferolateral and lateral fixed defects c/w infarction in the territory of the mid and distal LCx and/or RCA; LVEF 62%. Most recent ECHO on 9/12/18 showed an EF of 55%. There are no concerning symptoms for angina currently. 2. Hyperlipidemia I personally reviewed most recent lipids from 9/11/18:  cholesterol, Total 106  [id number]  HDL 45  LDL Calculated 44.  Switched from pravastatin 40mg qhs

## 2018-11-13 RX ORDER — ATORVASTATIN CALCIUM 40 MG/1
40 TABLET, FILM COATED ORAL NIGHTLY
Qty: 30 TABLET | Refills: 5 | Status: SHIPPED | OUTPATIENT
Start: 2018-11-13 | End: 2019-08-29 | Stop reason: SDUPTHER

## 2019-01-03 ENCOUNTER — TELEPHONE (OUTPATIENT)
Dept: CARDIOLOGY CLINIC | Age: 54
End: 2019-01-03

## 2019-02-01 ENCOUNTER — OFFICE VISIT (OUTPATIENT)
Dept: CARDIOLOGY CLINIC | Age: 54
End: 2019-02-01
Payer: MEDICAID

## 2019-02-01 VITALS
OXYGEN SATURATION: 95 % | HEART RATE: 82 BPM | WEIGHT: 178 LBS | SYSTOLIC BLOOD PRESSURE: 114 MMHG | BODY MASS INDEX: 33.61 KG/M2 | DIASTOLIC BLOOD PRESSURE: 70 MMHG | HEIGHT: 61 IN

## 2019-02-01 DIAGNOSIS — R07.9 CHEST PAIN, UNSPECIFIED TYPE: ICD-10-CM

## 2019-02-01 DIAGNOSIS — R00.2 PALPITATIONS: ICD-10-CM

## 2019-02-01 DIAGNOSIS — E78.2 MIXED HYPERLIPIDEMIA: Chronic | ICD-10-CM

## 2019-02-01 DIAGNOSIS — I25.119 CORONARY ARTERY DISEASE INVOLVING NATIVE CORONARY ARTERY OF NATIVE HEART WITH ANGINA PECTORIS (HCC): Primary | ICD-10-CM

## 2019-02-01 PROCEDURE — G8427 DOCREV CUR MEDS BY ELIG CLIN: HCPCS | Performed by: INTERNAL MEDICINE

## 2019-02-01 PROCEDURE — G8484 FLU IMMUNIZE NO ADMIN: HCPCS | Performed by: INTERNAL MEDICINE

## 2019-02-01 PROCEDURE — 99215 OFFICE O/P EST HI 40 MIN: CPT | Performed by: INTERNAL MEDICINE

## 2019-02-01 PROCEDURE — G8598 ASA/ANTIPLAT THER USED: HCPCS | Performed by: INTERNAL MEDICINE

## 2019-02-01 PROCEDURE — 3017F COLORECTAL CA SCREEN DOC REV: CPT | Performed by: INTERNAL MEDICINE

## 2019-02-01 PROCEDURE — 1036F TOBACCO NON-USER: CPT | Performed by: INTERNAL MEDICINE

## 2019-02-01 PROCEDURE — 93000 ELECTROCARDIOGRAM COMPLETE: CPT | Performed by: INTERNAL MEDICINE

## 2019-02-01 PROCEDURE — G8417 CALC BMI ABV UP PARAM F/U: HCPCS | Performed by: INTERNAL MEDICINE

## 2019-02-01 RX ORDER — CLOPIDOGREL BISULFATE 75 MG/1
75 TABLET ORAL DAILY
Qty: 30 TABLET | Refills: 11 | Status: SHIPPED | OUTPATIENT
Start: 2019-02-01 | End: 2019-08-29 | Stop reason: SDUPTHER

## 2019-02-06 ENCOUNTER — HOSPITAL ENCOUNTER (OUTPATIENT)
Dept: CARDIAC CATH/INVASIVE PROCEDURES | Age: 54
Discharge: HOME | End: 2019-02-07
Attending: INTERNAL MEDICINE | Admitting: INTERNAL MEDICINE
Payer: MEDICAID

## 2019-02-06 PROBLEM — I25.10 CAD IN NATIVE ARTERY: Status: ACTIVE | Noted: 2019-02-06

## 2019-02-06 PROBLEM — Z98.61 S/P PTCA (PERCUTANEOUS TRANSLUMINAL CORONARY ANGIOPLASTY): Status: ACTIVE | Noted: 2019-02-06

## 2019-02-06 LAB
A/G RATIO: 1.4 (ref 1.1–2.2)
ALBUMIN SERPL-MCNC: 4.3 G/DL (ref 3.4–5)
ALP BLD-CCNC: 74 U/L (ref 40–129)
ALT SERPL-CCNC: 15 U/L (ref 10–40)
ANION GAP SERPL CALCULATED.3IONS-SCNC: 12 MMOL/L (ref 3–16)
AST SERPL-CCNC: 12 U/L (ref 15–37)
BILIRUB SERPL-MCNC: 1 MG/DL (ref 0–1)
BUN BLDV-MCNC: 16 MG/DL (ref 7–20)
CALCIUM SERPL-MCNC: 9.4 MG/DL (ref 8.3–10.6)
CHLORIDE BLD-SCNC: 101 MMOL/L (ref 99–110)
CHOLESTEROL, TOTAL: 123 MG/DL (ref 0–199)
CO2: 29 MMOL/L (ref 21–32)
CREAT SERPL-MCNC: 0.7 MG/DL (ref 0.6–1.1)
EKG ATRIAL RATE: 60 BPM
EKG ATRIAL RATE: 61 BPM
EKG DIAGNOSIS: NORMAL
EKG DIAGNOSIS: NORMAL
EKG P AXIS: 50 DEGREES
EKG P AXIS: 56 DEGREES
EKG P-R INTERVAL: 140 MS
EKG P-R INTERVAL: 140 MS
EKG Q-T INTERVAL: 440 MS
EKG Q-T INTERVAL: 458 MS
EKG QRS DURATION: 100 MS
EKG QRS DURATION: 98 MS
EKG QTC CALCULATION (BAZETT): 442 MS
EKG QTC CALCULATION (BAZETT): 458 MS
EKG R AXIS: -32 DEGREES
EKG R AXIS: -35 DEGREES
EKG T AXIS: 54 DEGREES
EKG T AXIS: 56 DEGREES
EKG VENTRICULAR RATE: 60 BPM
EKG VENTRICULAR RATE: 61 BPM
GFR AFRICAN AMERICAN: >60
GFR NON-AFRICAN AMERICAN: >60
GLOBULIN: 3 G/DL
GLUCOSE BLD-MCNC: 225 MG/DL (ref 70–99)
HCT VFR BLD CALC: 45 % (ref 36–48)
HDLC SERPL-MCNC: 40 MG/DL (ref 40–60)
HEMOGLOBIN: 15.3 G/DL (ref 12–16)
INR BLD: 1.05 (ref 0.86–1.14)
LDL CHOLESTEROL CALCULATED: 61 MG/DL
MCH RBC QN AUTO: 30.1 PG (ref 26–34)
MCHC RBC AUTO-ENTMCNC: 34.1 G/DL (ref 31–36)
MCV RBC AUTO: 88.4 FL (ref 80–100)
PDW BLD-RTO: 13.4 % (ref 12.4–15.4)
PLATELET # BLD: 189 K/UL (ref 135–450)
PMV BLD AUTO: 8.5 FL (ref 5–10.5)
POC ACT LR: 155 SEC
POC ACT LR: 311 SEC
POTASSIUM SERPL-SCNC: 4.8 MMOL/L (ref 3.5–5.1)
PROTHROMBIN TIME: 12 SEC (ref 9.8–13)
RBC # BLD: 5.09 M/UL (ref 4–5.2)
SODIUM BLD-SCNC: 142 MMOL/L (ref 136–145)
TOTAL PROTEIN: 7.3 G/DL (ref 6.4–8.2)
TRIGL SERPL-MCNC: 110 MG/DL (ref 0–150)
VLDLC SERPL CALC-MCNC: 22 MG/DL
WBC # BLD: 6.2 K/UL (ref 4–11)

## 2019-02-06 PROCEDURE — 99153 MOD SED SAME PHYS/QHP EA: CPT | Performed by: INTERNAL MEDICINE

## 2019-02-06 PROCEDURE — 92928 PRQ TCAT PLMT NTRAC ST 1 LES: CPT | Performed by: INTERNAL MEDICINE

## 2019-02-06 PROCEDURE — 93005 ELECTROCARDIOGRAM TRACING: CPT | Performed by: INTERNAL MEDICINE

## 2019-02-06 PROCEDURE — 85027 COMPLETE CBC AUTOMATED: CPT

## 2019-02-06 PROCEDURE — 80061 LIPID PANEL: CPT

## 2019-02-06 PROCEDURE — 2500000003 HC RX 250 WO HCPCS

## 2019-02-06 PROCEDURE — C1894 INTRO/SHEATH, NON-LASER: HCPCS

## 2019-02-06 PROCEDURE — C1874 STENT, COATED/COV W/DEL SYS: HCPCS

## 2019-02-06 PROCEDURE — 6370000000 HC RX 637 (ALT 250 FOR IP)

## 2019-02-06 PROCEDURE — 85610 PROTHROMBIN TIME: CPT

## 2019-02-06 PROCEDURE — 85347 COAGULATION TIME ACTIVATED: CPT

## 2019-02-06 PROCEDURE — 6360000004 HC RX CONTRAST MEDICATION: Performed by: INTERNAL MEDICINE

## 2019-02-06 PROCEDURE — 93571 IV DOP VEL&/PRESS C FLO 1ST: CPT | Performed by: INTERNAL MEDICINE

## 2019-02-06 PROCEDURE — 2060000000 HC ICU INTERMEDIATE R&B

## 2019-02-06 PROCEDURE — 2580000003 HC RX 258

## 2019-02-06 PROCEDURE — 93459 L HRT ART/GRFT ANGIO: CPT | Performed by: INTERNAL MEDICINE

## 2019-02-06 PROCEDURE — C1725 CATH, TRANSLUMIN NON-LASER: HCPCS

## 2019-02-06 PROCEDURE — 6370000000 HC RX 637 (ALT 250 FOR IP): Performed by: INTERNAL MEDICINE

## 2019-02-06 PROCEDURE — 2580000003 HC RX 258: Performed by: INTERNAL MEDICINE

## 2019-02-06 PROCEDURE — 2709999900 HC NON-CHARGEABLE SUPPLY

## 2019-02-06 PROCEDURE — 80053 COMPREHEN METABOLIC PANEL: CPT

## 2019-02-06 PROCEDURE — 99152 MOD SED SAME PHYS/QHP 5/>YRS: CPT | Performed by: INTERNAL MEDICINE

## 2019-02-06 PROCEDURE — C1887 CATHETER, GUIDING: HCPCS

## 2019-02-06 PROCEDURE — C1769 GUIDE WIRE: HCPCS

## 2019-02-06 PROCEDURE — 6360000002 HC RX W HCPCS

## 2019-02-06 RX ORDER — ONDANSETRON 4 MG/1
4 TABLET, ORALLY DISINTEGRATING ORAL EVERY 8 HOURS PRN
Status: DISCONTINUED | OUTPATIENT
Start: 2019-02-06 | End: 2019-02-07 | Stop reason: HOSPADM

## 2019-02-06 RX ORDER — ATROPINE SULFATE 0.1 MG/ML
1 INJECTION INTRAVENOUS ONCE
Status: COMPLETED | OUTPATIENT
Start: 2019-02-06 | End: 2019-02-06

## 2019-02-06 RX ORDER — SODIUM CHLORIDE 0.9 % (FLUSH) 0.9 %
10 SYRINGE (ML) INJECTION EVERY 12 HOURS SCHEDULED
Status: DISCONTINUED | OUTPATIENT
Start: 2019-02-06 | End: 2019-02-07 | Stop reason: HOSPADM

## 2019-02-06 RX ORDER — SODIUM CHLORIDE 9 MG/ML
INJECTION, SOLUTION INTRAVENOUS ONCE
Status: DISCONTINUED | OUTPATIENT
Start: 2019-02-06 | End: 2019-02-07 | Stop reason: HOSPADM

## 2019-02-06 RX ORDER — CLOPIDOGREL 300 MG/1
300 TABLET, FILM COATED ORAL ONCE
Status: COMPLETED | OUTPATIENT
Start: 2019-02-06 | End: 2019-02-06

## 2019-02-06 RX ORDER — ALBUTEROL SULFATE 90 UG/1
2 AEROSOL, METERED RESPIRATORY (INHALATION) EVERY 6 HOURS PRN
Status: DISCONTINUED | OUTPATIENT
Start: 2019-02-06 | End: 2019-02-07 | Stop reason: HOSPADM

## 2019-02-06 RX ORDER — ONDANSETRON 2 MG/ML
4 INJECTION INTRAMUSCULAR; INTRAVENOUS ONCE
Status: COMPLETED | OUTPATIENT
Start: 2019-02-06 | End: 2019-02-06

## 2019-02-06 RX ORDER — FENTANYL CITRATE 50 UG/ML
50 INJECTION, SOLUTION INTRAMUSCULAR; INTRAVENOUS ONCE
Status: COMPLETED | OUTPATIENT
Start: 2019-02-06 | End: 2019-02-06

## 2019-02-06 RX ORDER — CLOPIDOGREL BISULFATE 75 MG/1
75 TABLET ORAL DAILY
Status: DISCONTINUED | OUTPATIENT
Start: 2019-02-06 | End: 2019-02-07 | Stop reason: HOSPADM

## 2019-02-06 RX ORDER — ASPIRIN 81 MG/1
81 TABLET, CHEWABLE ORAL DAILY
Status: DISCONTINUED | OUTPATIENT
Start: 2019-02-06 | End: 2019-02-07 | Stop reason: HOSPADM

## 2019-02-06 RX ORDER — METOPROLOL SUCCINATE 25 MG/1
25 TABLET, EXTENDED RELEASE ORAL DAILY
Status: DISCONTINUED | OUTPATIENT
Start: 2019-02-06 | End: 2019-02-07 | Stop reason: HOSPADM

## 2019-02-06 RX ORDER — ATORVASTATIN CALCIUM 40 MG/1
40 TABLET, FILM COATED ORAL NIGHTLY
Status: DISCONTINUED | OUTPATIENT
Start: 2019-02-06 | End: 2019-02-07 | Stop reason: HOSPADM

## 2019-02-06 RX ORDER — SODIUM CHLORIDE 0.9 % (FLUSH) 0.9 %
10 SYRINGE (ML) INJECTION PRN
Status: DISCONTINUED | OUTPATIENT
Start: 2019-02-06 | End: 2019-02-07 | Stop reason: HOSPADM

## 2019-02-06 RX ORDER — LISINOPRIL 2.5 MG/1
2.5 TABLET ORAL DAILY
Status: DISCONTINUED | OUTPATIENT
Start: 2019-02-06 | End: 2019-02-07 | Stop reason: HOSPADM

## 2019-02-06 RX ADMIN — FENTANYL CITRATE 50 MCG: 50 INJECTION, SOLUTION INTRAMUSCULAR; INTRAVENOUS at 13:11

## 2019-02-06 RX ADMIN — ONDANSETRON 4 MG: 2 INJECTION INTRAMUSCULAR; INTRAVENOUS at 13:18

## 2019-02-06 RX ADMIN — CLOPIDOGREL 300 MG: 300 TABLET, FILM COATED ORAL at 11:11

## 2019-02-06 RX ADMIN — IOVERSOL 300 ML: 741 INJECTION INTRA-ARTERIAL; INTRAVENOUS at 11:18

## 2019-02-06 RX ADMIN — ATROPINE SULFATE 1 MG: 0.1 INJECTION INTRAVENOUS at 13:15

## 2019-02-06 RX ADMIN — Medication 10 ML: at 20:35

## 2019-02-06 ASSESSMENT — PAIN SCALES - GENERAL
PAINLEVEL_OUTOF10: 0
PAINLEVEL_OUTOF10: 6
PAINLEVEL_OUTOF10: 0

## 2019-02-07 VITALS
TEMPERATURE: 98.2 F | BODY MASS INDEX: 33.61 KG/M2 | HEIGHT: 61 IN | HEART RATE: 77 BPM | SYSTOLIC BLOOD PRESSURE: 115 MMHG | WEIGHT: 178 LBS | DIASTOLIC BLOOD PRESSURE: 83 MMHG | OXYGEN SATURATION: 96 % | RESPIRATION RATE: 18 BRPM

## 2019-02-07 LAB
ANION GAP SERPL CALCULATED.3IONS-SCNC: 8 MMOL/L (ref 3–16)
BUN BLDV-MCNC: 11 MG/DL (ref 7–20)
CALCIUM SERPL-MCNC: 8.5 MG/DL (ref 8.3–10.6)
CHLORIDE BLD-SCNC: 103 MMOL/L (ref 99–110)
CO2: 26 MMOL/L (ref 21–32)
CREAT SERPL-MCNC: <0.5 MG/DL (ref 0.6–1.1)
GFR AFRICAN AMERICAN: >60
GFR NON-AFRICAN AMERICAN: >60
GLUCOSE BLD-MCNC: 158 MG/DL (ref 70–99)
HCT VFR BLD CALC: 40 % (ref 36–48)
HEMOGLOBIN: 13.6 G/DL (ref 12–16)
MCH RBC QN AUTO: 30.3 PG (ref 26–34)
MCHC RBC AUTO-ENTMCNC: 34 G/DL (ref 31–36)
MCV RBC AUTO: 89.1 FL (ref 80–100)
PDW BLD-RTO: 13.3 % (ref 12.4–15.4)
PLATELET # BLD: 157 K/UL (ref 135–450)
PMV BLD AUTO: 8.3 FL (ref 5–10.5)
POTASSIUM SERPL-SCNC: 4.4 MMOL/L (ref 3.5–5.1)
RBC # BLD: 4.49 M/UL (ref 4–5.2)
SODIUM BLD-SCNC: 137 MMOL/L (ref 136–145)
WBC # BLD: 5.5 K/UL (ref 4–11)

## 2019-02-07 PROCEDURE — 80048 BASIC METABOLIC PNL TOTAL CA: CPT

## 2019-02-07 PROCEDURE — 99217 PR OBSERVATION CARE DISCHARGE MANAGEMENT: CPT | Performed by: NURSE PRACTITIONER

## 2019-02-07 PROCEDURE — 36415 COLL VENOUS BLD VENIPUNCTURE: CPT

## 2019-02-07 PROCEDURE — 6370000000 HC RX 637 (ALT 250 FOR IP): Performed by: INTERNAL MEDICINE

## 2019-02-07 PROCEDURE — 85027 COMPLETE CBC AUTOMATED: CPT

## 2019-02-07 RX ADMIN — ASPIRIN 81 MG 81 MG: 81 TABLET ORAL at 11:29

## 2019-02-07 RX ADMIN — METOPROLOL SUCCINATE 25 MG: 25 TABLET, EXTENDED RELEASE ORAL at 11:30

## 2019-02-07 RX ADMIN — LISINOPRIL 2.5 MG: 2.5 TABLET ORAL at 11:29

## 2019-02-07 RX ADMIN — CLOPIDOGREL BISULFATE 75 MG: 75 TABLET, FILM COATED ORAL at 11:30

## 2019-02-07 ASSESSMENT — PAIN SCALES - GENERAL: PAINLEVEL_OUTOF10: 0

## 2019-02-13 ENCOUNTER — TELEPHONE (OUTPATIENT)
Dept: CARDIOLOGY CLINIC | Age: 54
End: 2019-02-13

## 2019-02-14 ENCOUNTER — INITIAL CONSULT (OUTPATIENT)
Dept: SURGERY | Age: 54
End: 2019-02-14
Payer: MEDICAID

## 2019-02-14 VITALS
WEIGHT: 175 LBS | BODY MASS INDEX: 33.04 KG/M2 | HEIGHT: 61 IN | SYSTOLIC BLOOD PRESSURE: 118 MMHG | DIASTOLIC BLOOD PRESSURE: 62 MMHG

## 2019-02-14 DIAGNOSIS — M79.89 SOFT TISSUE MASS: Primary | ICD-10-CM

## 2019-02-14 PROCEDURE — 3017F COLORECTAL CA SCREEN DOC REV: CPT | Performed by: SURGERY

## 2019-02-14 PROCEDURE — 99242 OFF/OP CONSLTJ NEW/EST SF 20: CPT | Performed by: SURGERY

## 2019-02-14 PROCEDURE — G8427 DOCREV CUR MEDS BY ELIG CLIN: HCPCS | Performed by: SURGERY

## 2019-02-14 PROCEDURE — G8484 FLU IMMUNIZE NO ADMIN: HCPCS | Performed by: SURGERY

## 2019-02-14 PROCEDURE — G8417 CALC BMI ABV UP PARAM F/U: HCPCS | Performed by: SURGERY

## 2019-02-19 ENCOUNTER — OFFICE VISIT (OUTPATIENT)
Dept: CARDIOLOGY CLINIC | Age: 54
End: 2019-02-19
Payer: MEDICAID

## 2019-02-19 VITALS
DIASTOLIC BLOOD PRESSURE: 72 MMHG | HEIGHT: 61 IN | BODY MASS INDEX: 32.85 KG/M2 | SYSTOLIC BLOOD PRESSURE: 114 MMHG | OXYGEN SATURATION: 97 % | HEART RATE: 59 BPM | WEIGHT: 174 LBS

## 2019-02-19 DIAGNOSIS — I10 ESSENTIAL HYPERTENSION: Chronic | ICD-10-CM

## 2019-02-19 DIAGNOSIS — E78.2 MIXED HYPERLIPIDEMIA: Chronic | ICD-10-CM

## 2019-02-19 DIAGNOSIS — Z95.1 S/P CABG X 3: Primary | Chronic | ICD-10-CM

## 2019-02-19 PROCEDURE — G8598 ASA/ANTIPLAT THER USED: HCPCS | Performed by: INTERNAL MEDICINE

## 2019-02-19 PROCEDURE — 99214 OFFICE O/P EST MOD 30 MIN: CPT | Performed by: INTERNAL MEDICINE

## 2019-02-19 PROCEDURE — G8427 DOCREV CUR MEDS BY ELIG CLIN: HCPCS | Performed by: INTERNAL MEDICINE

## 2019-02-19 PROCEDURE — G8417 CALC BMI ABV UP PARAM F/U: HCPCS | Performed by: INTERNAL MEDICINE

## 2019-02-19 PROCEDURE — 3017F COLORECTAL CA SCREEN DOC REV: CPT | Performed by: INTERNAL MEDICINE

## 2019-02-19 PROCEDURE — G8484 FLU IMMUNIZE NO ADMIN: HCPCS | Performed by: INTERNAL MEDICINE

## 2019-02-19 PROCEDURE — 1036F TOBACCO NON-USER: CPT | Performed by: INTERNAL MEDICINE

## 2019-02-19 RX ORDER — RANOLAZINE 1000 MG/1
1000 TABLET, EXTENDED RELEASE ORAL 2 TIMES DAILY
Qty: 60 TABLET | Refills: 6 | Status: ON HOLD | OUTPATIENT
Start: 2019-02-19 | End: 2019-04-09

## 2019-02-26 ENCOUNTER — TELEPHONE (OUTPATIENT)
Dept: CARDIOLOGY CLINIC | Age: 54
End: 2019-02-26

## 2019-03-04 DIAGNOSIS — R07.2 PRECORDIAL PAIN: Primary | ICD-10-CM

## 2019-03-05 ENCOUNTER — TELEPHONE (OUTPATIENT)
Dept: CARDIOLOGY CLINIC | Age: 54
End: 2019-03-05

## 2019-03-18 ENCOUNTER — HOSPITAL ENCOUNTER (OUTPATIENT)
Dept: NON INVASIVE DIAGNOSTICS | Age: 54
Discharge: HOME | End: 2019-03-18
Payer: MEDICAID

## 2019-03-18 ENCOUNTER — HOSPITAL ENCOUNTER (OUTPATIENT)
Dept: NUCLEAR MEDICINE | Age: 54
Discharge: HOME | End: 2019-03-18
Payer: MEDICAID

## 2019-03-18 DIAGNOSIS — R07.2 PRECORDIAL PAIN: ICD-10-CM

## 2019-03-18 LAB
LV EF: 65 %
LVEF MODALITY: NORMAL

## 2019-03-18 PROCEDURE — A9502 TC99M TETROFOSMIN: HCPCS | Performed by: INTERNAL MEDICINE

## 2019-03-18 PROCEDURE — 6360000002 HC RX W HCPCS: Performed by: INTERNAL MEDICINE

## 2019-03-18 PROCEDURE — 93017 CV STRESS TEST TRACING ONLY: CPT

## 2019-03-18 PROCEDURE — 78452 HT MUSCLE IMAGE SPECT MULT: CPT

## 2019-03-18 PROCEDURE — 3430000000 HC RX DIAGNOSTIC RADIOPHARMACEUTICAL: Performed by: INTERNAL MEDICINE

## 2019-03-18 RX ADMIN — TETROFOSMIN 11.2 MILLICURIE: 0.23 INJECTION, POWDER, LYOPHILIZED, FOR SOLUTION INTRAVENOUS at 07:53

## 2019-03-18 RX ADMIN — TETROFOSMIN 32.8 MILLICURIE: 0.23 INJECTION, POWDER, LYOPHILIZED, FOR SOLUTION INTRAVENOUS at 08:39

## 2019-03-18 RX ADMIN — REGADENOSON 0.4 MG: 0.08 INJECTION, SOLUTION INTRAVENOUS at 08:39

## 2019-03-18 ASSESSMENT — PAIN - FUNCTIONAL ASSESSMENT: PAIN_FUNCTIONAL_ASSESSMENT: 0-10

## 2019-03-20 ENCOUNTER — TELEPHONE (OUTPATIENT)
Dept: CARDIOLOGY CLINIC | Age: 54
End: 2019-03-20

## 2019-03-20 NOTE — TELEPHONE ENCOUNTER
Please let patient know that Dr. Quinton Sanderson will have to review and Dr. Michaela Dillard and he will discuss and then let her know plan.

## 2019-03-20 NOTE — TELEPHONE ENCOUNTER
I called pt and went over stress and ct. Pt tired fatigued. And frustrated that is not getting better. I understand this    Essentially pt with LIMA that has closed and native LAD with severe diffuse disease, may be able to stent prox/mid LAD but worry about severe distal disease. Also d/w pt that LCx has a small branch that is not fixable, OM1 branch with moderate stenosis. This area is fed by a SVG graft but that graft is small and fills OM but may be ischemic causing. PCI to this branch is certainly doable but it would like lead to closure of the SVG. For the office:  1. Please call patient and schedule f/u with me in 2 wks  2. D/c ranexa  3. Please refer pt to EECP- with us if we still do it. If not then refer to [facility] program out of [place] as want started now

## 2019-03-21 NOTE — TELEPHONE ENCOUNTER
Karen Barriga- do we still do these? If so please schedule the patient, if not where would I send the patient?

## 2019-03-21 NOTE — TELEPHONE ENCOUNTER
Appt scheduled for 2 wks with Dr. Rupesh Durant. Informed the patient that another staff member will reach out to her regarding the EECP procedure.

## 2019-03-25 NOTE — TELEPHONE ENCOUNTER
Call Yana Mata at [phone].  She will send a paper with info she needs and the referral that needs signed by the provider

## 2019-04-02 NOTE — PROGRESS NOTES
Hyperlipidemia, Hypertension, Migraine, Morning stiffness of joints, Myocardial infarction (HCC), Numbness or tingling, OA (osteoarthritis) of knee, Obesity, Class I, BMI 30.0-34.9 (see actual BMI), Osteoporosis, Pulmonary nodule, and Shortness of breath. Surgical History:   has a past surgical history that includes Cholecystectomy; Hysterectomy; Diagnostic Cardiac Cath Lab Procedure (06/06/2013); Upper gastrointestinal endoscopy; Cardiac catheterization (04/01/2016); Cardiac catheterization (08/01/2012); Coronary artery bypass graft (06/13/2018); Coronary angioplasty with stent (05/13/2018); and Percutaneous Transluminal Coronary Angio. Social History:   reports that she has quit smoking. Her smoking use included cigarettes. She has a 20.00 pack-year smoking history. She has never used smokeless tobacco. She reports that she does not drink alcohol or use drugs. Family History:  No evidence for sudden cardiac death or premature CAD    Home Medications:  Reviewed and are listed in nursing record. and/or listed below  Current Outpatient Medications   Medication Sig Dispense Refill    clopidogrel (PLAVIX) 75 MG tablet Take 1 tablet by mouth daily 30 tablet 11    atorvastatin (LIPITOR) 40 MG tablet Take 1 tablet by mouth nightly 30 tablet 5    metoprolol succinate (TOPROL XL) 25 MG extended release tablet Take 1 tablet by mouth daily 30 tablet 11    nitroGLYCERIN (NITROSTAT) 0.4 MG SL tablet up to max of 3 total doses.  If no relief after 1 dose, call 911. 25 tablet 3    lisinopril (PRINIVIL;ZESTRIL) 2.5 MG tablet Take 1 tablet by mouth daily 30 tablet 11    aspirin 81 MG chewable tablet Take 1 tablet by mouth daily 30 tablet 11    albuterol sulfate HFA (PROVENTIL HFA) 108 (90 Base) MCG/ACT inhaler Inhale 2 puffs into the lungs every 6 hours as needed for Wheezing or Shortness of Breath 1 Inhaler 2    tiotropium (SPIRIVA) 18 MCG inhalation capsule Inhale 1 capsule into the lungs daily 30 capsule 3    metFORMIN (GLUCOPHAGE) 1000 MG tablet Take 1 tablet by mouth 2 times daily (with meals) 60 tablet 0    ranolazine (RANEXA) 1000 MG extended release tablet Take 1 tablet by mouth 2 times daily 60 tablet 6    ondansetron (ZOFRAN-ODT) 4 MG disintegrating tablet Take 1 tablet by mouth every 8 hours as needed for Nausea or Vomiting 20 tablet 1     No current facility-administered medications for this visit. Allergies:  Penicillins; Imdur [isosorbide dinitrate]; and Cephalexin     Review of Systems:   A 14 point review of symptoms completed. Pertinent positives identified in the HPI, all other review of symptoms negative as below.     Objective:   PHYSICAL EXAM:    Vitals:    04/03/19 1505   BP: 108/62   Pulse: 73   SpO2: 98%    Weight: 176 lb (79.8 kg)     Wt Readings from Last 3 Encounters:   04/03/19 176 lb (79.8 kg)   02/19/19 174 lb (78.9 kg)   02/14/19 175 lb (79.4 kg)         General Appearance:  Alert, cooperative, no distress, appears stated age   Head:  Normocephalic, atraumatic   Eyes:  PERRL, conjunctiva/corneas clear   Nose: Nares normal, no drainage or sinus tenderness   Throat: Lips, mucosa, and tongue normal   Neck: Supple, symmetrical, trachea midline, NL thyroid no carotid bruit or JVD   Lungs:   CTAB, respirations unlabored   Chest Wall:  No tenderness or deformity   Heart:  Regular rhythm and normal rate; S1, S2 are normal;   no murmur noted; no rub or gallop   Abdomen:   Soft, non-tender, +BS x 4, no masses, no organomegaly   Extremities: Extremities normal, atraumatic, no cyanosis or edema   Pulses: 2+ and symmetric   Skin: Skin color, texture, turgor normal, no rashes or lesions   Pysch: Normal mood and affect   Neurologic: Normal gross motor and sensory exam.         LABS   CBC:      Lab Results   Component Value Date    WBC 5.5 02/07/2019    RBC 4.49 02/07/2019    HGB 13.6 02/07/2019    HCT 40.0 02/07/2019    MCV 89.1 02/07/2019    RDW 13.3 02/07/2019     02/07/2019     CMP:  Lab Results   Component Value Date     2019    K 4.4 2019    K 3.6 2018     2019    CO2 26 2019    BUN 11 2019    CREATININE <0.5 2019    GFRAA >60 2019    GFRAA >60 2013    AGRATIO 1.4 2019    LABGLOM >60 2019    GLUCOSE 158 2019    PROT 7.3 2019    PROT 7.0 2013    CALCIUM 8.5 2019    BILITOT 1.0 2019    ALKPHOS 74 2019    AST 12 2019    ALT 15 2019     PT/INR:   No results found for: PTINR  Liver:  No components found for: CHLPL  Lab Results   Component Value Date    ALT 15 2019    AST 12 (L) 2019    ALKPHOS 74 2019    BILITOT 1.0 2019     Lab Results   Component Value Date    LABA1C 7.1 2018     Lipids:         Lab Results   Component Value Date    TRIG 110 2019    TRIG 90 2018    TRIG 83 2018            Lab Results   Component Value Date    HDL 40 2019    HDL 53 2018    HDL 45 2018            Lab Results   Component Value Date    LDLCALC 61 2019    LDLCALC 51 2018    LDLCALC 44 2018            Lab Results   Component Value Date    LABVLDL 22 2019    LABVLDL 18 2018    LABVLDL 17 2018         CARDIAC DATA   EK2019 NSR with NSST changes, anterior asymmetric TWI, LAD, inferior infarct    ECHO/MUGA: 0/75/80  LV systolic function is normal with EF estimated at 55%. Mild hypokinesis of the inferior and lateral walls. No regional wall motion abnormalities. Normal left ventricular diastolic filling pressure. Trivial tricuspid regurgitation. Systolic pulmonary artery pressure (SPAP) is normal estimated at 24mmHg   (Right atrial pressure of 3mmHg). STRESS TEST: 3/18/19  Summary  Small-moderate sized inferolateral partial reversibility defect consistent  with ischemia and infarction in the territory of the mid and distal LCx  and/or RCA.  Fixed defects in the mid and basal lateral walls c/w infarct. Hyperdynamic LV systolic function with TT>38% with uniform wall motion. Intermediate risk abnormal study. Cardiac cath 5/13/18 [facility]   PCI mid CCx MARCOS and POBA LAD, high grade mid RCA & ostial Rt PDA, required IABP support        CARDIAC CATH: 2/6/19  LM: luminals  LAD: mid 60%, mid/distal 60% and distal small vessel. NO significant competitive flow seen  LCX: luminals, patent mid LCx stent extending into OM1,                 OM1- superior branch with 30% long lesion and +flow up SVG graft, inferior branch small with 70% lesions  RCA: dominant,  Moderate-severe diffuse disease, long lesion of 50% in [id number]. SVG-PDA graft seen with retrograde flow     LIMA-LAD: atretic, no significantflow to native LAD  SVG-OM2: patent, small vein graft (prx and mid graft is small in size, plumps up distally, + valve seen. Good antegrade flow  SVG-RT PDA: patent  LVEDP: 25  LVEF: 45% with lateral wall hypokinesis  FFR: mid LAD 0.93-->0.83  FFR of mid and distal lesion 0.77. Unable to get post FFR as FFR machine went down  PCI of lesion 1: mid/distal LAD  [place] 2 x 26mm, post dilated to 2 mm          VASCULAR/OTHER IMAGING:      Assessment and Plan   Moni Saavedra is a 46 y.o. female who presents today for the following problems:      1. Chest pain: CCS-IV  2. CAD   - 6/13/2018 CABG (SVG-OM2, SVG-RCA/PDA, LIMA-LAD)  3. HTN: controlled  4. HLD: controlled  Hx of TIA  DM2- Controlled with A1c of 7.1      PLAN  1. Pt continues to have severe life limiting angina.   - LIMA atretic and strongly suspect LAD ischemic despite negative stress   - plan to return to Cath lab for PCI to mid LAD using CSI   - pt lateral wall with ischemia but fed by a bypass graft, ? Supply of bypass graft as SVG appers small, may need post anastamosis FFR to assess  2. Cont ASA 81mg poqday for life  3. Plavix 75mg poqday   4.  Cont lisinopril, lipitor  Case d/w Dr. Jericho Brennan         Patient Active Problem List Diagnosis    Coronary artery disease involving native heart with angina pectoris ([facility] [address]    Essential hypertension    Mixed hyperlipidemia    Cerebral aneurysm, nonruptured    DM2 (diabetes mellitus, type 2) ([facility] [address]    Hx TIA involving L-ICA    Former smoker    Chronic tension type headache    GERD (gastroesophageal reflux disease)    Obesity (BMI 30-39. 9)    CAD s/p CABGx3 (June 2018) & stents (May 2018)    Acute chest pain    TATI (acute kidney injury) ([facility] [address]    Pulmonary vascular congestion on CXR    Hx of combined systolic (EF 77-02%) & diastolic (grade 1 LVDD) CHF    Palpitations    S/P PTCA (percutaneous transluminal coronary angioplasty)    CAD in native artery         QUALITY MEASURES  1. Tobacco Cessation Counseling: NA  2. Retake of BP if >140/90:   NA  3. Documentation to PCP/referring for new patient:  Sent to PCP at close of office visit  4. CAD patient on anti-platelet: Yes  5. CAD patient on STATIN therapy:  Yes  6. Patient with CHF and aFib on anticoagulation:  NA     Patient Plan:  1. Plan for left heart cath. (staged PCI to LAD with CSI)  Hold Metformin 2 days prior to the procedure. You can take your other medications with a small sip of water the morning of the procedure   2. Follow up with me after the procedure     This note was scribed in the presence of Dr. Noa Costa M.D. By Allie Baires RN        It is a pleasure to assist in the care of [facility]. Please call with any questions. The scribes documentation has been prepared under my direction and personally reviewed by me in its entirety. I confirm that the note above accurately reflects all work, treatment, procedures, and medical decision making performed by me. I, Dr. Noa Costa MD, personally performed the services described in this documentation as scribed by lu in my presence, and it is both accurate and complete to the best of our ability.            Noa Costa MD, [facility]  Interventional Cardiologist  [address]  [phone] [facility]  [phone] [address] [place] [address]  4/3/2019  3:23 PM

## 2019-04-03 ENCOUNTER — OFFICE VISIT (OUTPATIENT)
Dept: CARDIOLOGY CLINIC | Age: 54
End: 2019-04-03
Payer: MEDICARE

## 2019-04-03 VITALS
WEIGHT: 176 LBS | HEIGHT: 61 IN | DIASTOLIC BLOOD PRESSURE: 62 MMHG | HEART RATE: 73 BPM | SYSTOLIC BLOOD PRESSURE: 108 MMHG | BODY MASS INDEX: 33.23 KG/M2 | OXYGEN SATURATION: 98 %

## 2019-04-03 DIAGNOSIS — I10 ESSENTIAL HYPERTENSION: Chronic | ICD-10-CM

## 2019-04-03 DIAGNOSIS — E78.2 MIXED HYPERLIPIDEMIA: Chronic | ICD-10-CM

## 2019-04-03 DIAGNOSIS — Z95.1 S/P CABG X 3: Primary | Chronic | ICD-10-CM

## 2019-04-03 DIAGNOSIS — R07.2 PRECORDIAL PAIN: ICD-10-CM

## 2019-04-03 DIAGNOSIS — I20.0 UNSTABLE ANGINA PECTORIS (HCC): ICD-10-CM

## 2019-04-03 PROCEDURE — 99215 OFFICE O/P EST HI 40 MIN: CPT | Performed by: INTERNAL MEDICINE

## 2019-04-03 RX ORDER — PROMETHAZINE HYDROCHLORIDE 25 MG/1
25 TABLET ORAL 4 TIMES DAILY PRN
Qty: 15 TABLET | Refills: 0 | Status: SHIPPED | OUTPATIENT
Start: 2019-04-03 | End: 2019-04-10

## 2019-04-03 NOTE — LETTER
[facility]   Cardiovascular Evaluation    PATIENT: Álvaro Hubbard  DATE: 4/3/2019  MRN: [id number]  CSN: [id number]  : 1965      Primary Care Doctor: GERMAN Cardozo CNP  Reason for evaluation:   Follow-up; Chest Pain (tightness); Nausea; and Fatigue      Subjective:   History of present illness on initial date of evaluation:   Álvaro Hubbard is a 46 y.o. patient who presents for hospital follow up, S/P cardiac cath 19, patent mid LCx stent, interval closure of LIMA graft. 2 lesions in LAD were FFR'd and distal lesion ischemic and stented using Agustin drug stent 2 x 26mm with HERO-3 flow. Today she states she is still having chest pain that has been occurring more frequently. The pain has woke her from sleep. She does not have any energy and does not want to celia out of bed. She is tolerating her medications. Patient currently denies any weight gain, edema, palpitations, dizziness, and syncope. Patient Active Problem List   Diagnosis    Coronary artery disease involving native heart with angina pectoris ([address]    Essential hypertension    Mixed hyperlipidemia    Cerebral aneurysm, nonruptured    DM2 (diabetes mellitus, type 2) ([address]    Hx TIA involving L-ICA    Former smoker    Chronic tension type headache    GERD (gastroesophageal reflux disease)    Obesity (BMI 30-39. 9)    CAD s/p CABGx3 (2018) & stents (May 2018)    Acute chest pain    TATI (acute kidney injury) ([address]    Pulmonary vascular congestion on CXR    Hx of combined systolic (EF 41-13%) & diastolic (grade 1 LVDD) CHF    Palpitations    S/P PTCA (percutaneous transluminal coronary angioplasty)    CAD in native artery         Past Medical History:   has a past medical history of Acute blood loss anemia, Asthma, Chest pain, COPD (chronic obstructive pulmonary disease) ([address] Coronary artery disease, Depression, Diabetes mellitus ([address] Enlarged heart, Fatigue, Generalized headaches, Hyperlipidemia, Hypertension, Migraine, Morning stiffness of joints, Myocardial infarction (HCC), Numbness or tingling, OA (osteoarthritis) of knee, Obesity, Class I, BMI 30.0-34.9 (see actual BMI), Osteoporosis, Pulmonary nodule, and Shortness of breath. Surgical History:   has a past surgical history that includes Cholecystectomy; Hysterectomy; Diagnostic Cardiac Cath Lab Procedure (06/06/2013); Upper gastrointestinal endoscopy; Cardiac catheterization (04/01/2016); Cardiac catheterization (08/01/2012); Coronary artery bypass graft (06/13/2018); Coronary angioplasty with stent (05/13/2018); and Percutaneous Transluminal Coronary Angio. Social History:   reports that she has quit smoking. Her smoking use included cigarettes. She has a 20.00 pack-year smoking history. She has never used smokeless tobacco. She reports that she does not drink alcohol or use drugs. Family History:  No evidence for sudden cardiac death or premature CAD    Home Medications:  Reviewed and are listed in nursing record. and/or listed below  Current Outpatient Medications   Medication Sig Dispense Refill    clopidogrel (PLAVIX) 75 MG tablet Take 1 tablet by mouth daily 30 tablet 11    atorvastatin (LIPITOR) 40 MG tablet Take 1 tablet by mouth nightly 30 tablet 5    metoprolol succinate (TOPROL XL) 25 MG extended release tablet Take 1 tablet by mouth daily 30 tablet 11    nitroGLYCERIN (NITROSTAT) 0.4 MG SL tablet up to max of 3 total doses.  If no relief after 1 dose, call 911. 25 tablet 3    lisinopril (PRINIVIL;ZESTRIL) 2.5 MG tablet Take 1 tablet by mouth daily 30 tablet 11    aspirin 81 MG chewable tablet Take 1 tablet by mouth daily 30 tablet 11    albuterol sulfate HFA (PROVENTIL HFA) 108 (90 Base) MCG/ACT inhaler Inhale 2 puffs into the lungs every 6 hours as needed for Wheezing or Shortness of Breath 1 Inhaler 2    tiotropium (SPIRIVA) 18 MCG inhalation capsule Inhale 1 capsule into the lungs daily 30 capsule 3    metFORMIN (GLUCOPHAGE) 1000 MG tablet Take 1 tablet by mouth 2 times daily (with meals) 60 tablet 0    ranolazine (RANEXA) 1000 MG extended release tablet Take 1 tablet by mouth 2 times daily 60 tablet 6    ondansetron (ZOFRAN-ODT) 4 MG disintegrating tablet Take 1 tablet by mouth every 8 hours as needed for Nausea or Vomiting 20 tablet 1     No current facility-administered medications for this visit. Allergies:  Penicillins; Imdur [isosorbide dinitrate]; and Cephalexin     Review of Systems:   A 14 point review of symptoms completed. Pertinent positives identified in the HPI, all other review of symptoms negative as below.     Objective:   PHYSICAL EXAM:    Vitals:    04/03/19 1505   BP: 108/62   Pulse: 73   SpO2: 98%    Weight: 176 lb (79.8 kg)     Wt Readings from Last 3 Encounters:   04/03/19 176 lb (79.8 kg)   02/19/19 174 lb (78.9 kg)   02/14/19 175 lb (79.4 kg)         General Appearance:  Alert, cooperative, no distress, appears stated age   Head:  Normocephalic, atraumatic   Eyes:  PERRL, conjunctiva/corneas clear   Nose: Nares normal, no drainage or sinus tenderness   Throat: Lips, mucosa, and tongue normal   Neck: Supple, symmetrical, trachea midline, NL thyroid no carotid bruit or JVD   Lungs:   CTAB, respirations unlabored   Chest Wall:  No tenderness or deformity   Heart:  Regular rhythm and normal rate; S1, S2 are normal;   no murmur noted; no rub or gallop   Abdomen:   Soft, non-tender, +BS x 4, no masses, no organomegaly   Extremities: Extremities normal, atraumatic, no cyanosis or edema   Pulses: 2+ and symmetric   Skin: Skin color, texture, turgor normal, no rashes or lesions   Pysch: Normal mood and affect   Neurologic: Normal gross motor and sensory exam.         LABS   CBC:      Lab Results   Component Value Date    WBC 5.5 02/07/2019    RBC 4.49 02/07/2019    HGB 13.6 02/07/2019    HCT 40.0 02/07/2019    MCV 89.1 02/07/2019    RDW 13.3 02/07/2019  2019     CMP:  Lab Results   Component Value Date     2019    K 4.4 2019    K 3.6 2018     2019    CO2 26 2019    BUN 11 2019    CREATININE <0.5 2019    GFRAA >60 2019    GFRAA >60 2013    AGRATIO 1.4 2019    LABGLOM >60 2019    GLUCOSE 158 2019    PROT 7.3 2019    PROT 7.0 2013    CALCIUM 8.5 2019    BILITOT 1.0 2019    ALKPHOS 74 2019    AST 12 2019    ALT 15 2019     PT/INR:   No results found for: PTINR  Liver:  No components found for: CHLPL  Lab Results   Component Value Date    ALT 15 2019    AST 12 (L) 2019    ALKPHOS 74 2019    BILITOT 1.0 2019     Lab Results   Component Value Date    LABA1C 7.1 2018     Lipids:         Lab Results   Component Value Date    TRIG 110 2019    TRIG 90 2018    TRIG 83 2018            Lab Results   Component Value Date    HDL 40 2019    HDL 53 2018    HDL 45 2018            Lab Results   Component Value Date    LDLCALC 61 2019    LDLCALC 51 2018    LDLCALC 44 2018            Lab Results   Component Value Date    LABVLDL 22 2019    LABVLDL 18 2018    LABVLDL 17 2018         CARDIAC DATA   EK2019 NSR with NSST changes, anterior asymmetric TWI, LAD, inferior infarct    ECHO/MUGA: 46  LV systolic function is normal with EF estimated at 55%. Mild hypokinesis of the inferior and lateral walls. No regional wall motion abnormalities. Normal left ventricular diastolic filling pressure. Trivial tricuspid regurgitation. Systolic pulmonary artery pressure (SPAP) is normal estimated at 24mmHg   (Right atrial pressure of 3mmHg).        STRESS TEST: 3/18/19  Summary  Small-moderate sized inferolateral partial reversibility defect consistent  with ischemia and infarction in the territory of the mid and Case d/w Dr. Silvina Liu         Patient Active Problem List   Diagnosis    Coronary artery disease involving native heart with angina pectoris [facility]    Essential hypertension    Mixed hyperlipidemia    Cerebral aneurysm, nonruptured    DM2 (diabetes mellitus, type 2) ([facility]    Hx TIA involving L-ICA    Former smoker    Chronic tension type headache    GERD (gastroesophageal reflux disease)    Obesity (BMI 30-39. 9)    CAD s/p CABGx3 (June 2018) & stents (May 2018)    Acute chest pain    TATI (acute kidney injury) ([facility]    Pulmonary vascular congestion on CXR    Hx of combined systolic (EF 48-00%) & diastolic (grade 1 LVDD) CHF    Palpitations    S/P PTCA (percutaneous transluminal coronary angioplasty)    CAD in native artery         QUALITY MEASURES  1. Tobacco Cessation Counseling: NA  2. Retake of BP if >140/90:   NA  3. Documentation to PCP/referring for new patient:  Sent to PCP at close of office visit  4. CAD patient on anti-platelet: Yes  5. CAD patient on STATIN therapy:  Yes  6. Patient with CHF and aFib on anticoagulation:  NA     Patient Plan:  1. Plan for left heart cath. (staged PCI to LAD with CSI)  Hold Metformin 2 days prior to the procedure. You can take your other medications with a small sip of water the morning of the procedure   2. Follow up with me after the procedure     This note was scribed in the presence of Dr. Rashaun Dominguez M.D. By Susan Fritz RN        It is a pleasure to assist in the care of Amalia Blake. Please call with any questions. The scribes documentation has been prepared under my direction and personally reviewed by me in its entirety. I confirm that the note above accurately reflects all work, treatment, procedures, and medical decision making performed by me. I, Dr. Rashaun Dominguez MD, personally performed the services described in this documentation as scribed by lu in my presence, and it is both accurate and complete to the best of our ability. Sen Claire MD, [facility] Cardiologist  [address]  [phone] [facility]  [phone] [address] [place]  4/3/2019  3:23 PM

## 2019-04-04 ENCOUNTER — TELEPHONE (OUTPATIENT)
Dept: CARDIOLOGY CLINIC | Age: 54
End: 2019-04-04

## 2019-04-04 NOTE — TELEPHONE ENCOUNTER
Patient was seen in office yesterday. Patient is scheduled with Dr. Beau Xavier for PCI LAD w CSI on 4/9/19 at [facility], arrival time of 7:30am to the Cath Lab. Please have patient arrive to the main entrance of [facility] at 7:15am and check in with the registration desk. Medications reviewed in office with BELEN Dumont. Remind patient to be NPO after midnight (8 hours prior). Do not apply lotions/creams on skin the day of procedure.

## 2019-04-08 ENCOUNTER — TELEPHONE (OUTPATIENT)
Dept: CARDIOTHORACIC SURGERY | Age: 54
End: 2019-04-08

## 2019-04-08 NOTE — TELEPHONE ENCOUNTER
Called Prieto Monroy with [employer] back [phone] and gave him the  Number for [phone].

## 2019-04-09 ENCOUNTER — HOSPITAL ENCOUNTER (OUTPATIENT)
Dept: CARDIAC CATH/INVASIVE PROCEDURES | Age: 54
Discharge: HOME | End: 2019-04-10
Attending: INTERNAL MEDICINE | Admitting: INTERNAL MEDICINE
Payer: MEDICAID

## 2019-04-09 DIAGNOSIS — R07.2 PRECORDIAL PAIN: ICD-10-CM

## 2019-04-09 DIAGNOSIS — Z95.1 S/P CABG X 3: Chronic | ICD-10-CM

## 2019-04-09 LAB
ANION GAP SERPL CALCULATED.3IONS-SCNC: 10 MMOL/L (ref 3–16)
BUN BLDV-MCNC: 11 MG/DL (ref 7–20)
CALCIUM SERPL-MCNC: 9.2 MG/DL (ref 8.3–10.6)
CHLORIDE BLD-SCNC: 101 MMOL/L (ref 99–110)
CHOLESTEROL, TOTAL: 116 MG/DL (ref 0–199)
CO2: 30 MMOL/L (ref 21–32)
CREAT SERPL-MCNC: 0.7 MG/DL (ref 0.6–1.1)
EKG ATRIAL RATE: 55 BPM
EKG ATRIAL RATE: 69 BPM
EKG DIAGNOSIS: NORMAL
EKG DIAGNOSIS: NORMAL
EKG P AXIS: 51 DEGREES
EKG P AXIS: 56 DEGREES
EKG P-R INTERVAL: 152 MS
EKG P-R INTERVAL: 152 MS
EKG Q-T INTERVAL: 450 MS
EKG Q-T INTERVAL: 454 MS
EKG QRS DURATION: 96 MS
EKG QRS DURATION: 98 MS
EKG QTC CALCULATION (BAZETT): 434 MS
EKG QTC CALCULATION (BAZETT): 482 MS
EKG R AXIS: -32 DEGREES
EKG R AXIS: -42 DEGREES
EKG T AXIS: 40 DEGREES
EKG T AXIS: 65 DEGREES
EKG VENTRICULAR RATE: 55 BPM
EKG VENTRICULAR RATE: 69 BPM
GFR AFRICAN AMERICAN: >60
GFR NON-AFRICAN AMERICAN: >60
GLUCOSE BLD-MCNC: 184 MG/DL (ref 70–99)
HCT VFR BLD CALC: 41.9 % (ref 36–48)
HDLC SERPL-MCNC: 44 MG/DL (ref 40–60)
HEMOGLOBIN: 14.5 G/DL (ref 12–16)
INR BLD: 0.99 (ref 0.86–1.14)
LDL CHOLESTEROL CALCULATED: 50 MG/DL
MCH RBC QN AUTO: 30.7 PG (ref 26–34)
MCHC RBC AUTO-ENTMCNC: 34.7 G/DL (ref 31–36)
MCV RBC AUTO: 88.4 FL (ref 80–100)
PDW BLD-RTO: 13.8 % (ref 12.4–15.4)
PLATELET # BLD: 172 K/UL (ref 135–450)
PMV BLD AUTO: 8.9 FL (ref 5–10.5)
POC ACT LR: 122 SEC
POC ACT LR: 182 SEC
POC ACT LR: 184 SEC
POC ACT LR: 207 SEC
POC ACT LR: 337 SEC
POC ACT LR: >400 SEC
POTASSIUM SERPL-SCNC: 4.4 MMOL/L (ref 3.5–5.1)
PROTHROMBIN TIME: 11.3 SEC (ref 9.8–13)
RBC # BLD: 4.74 M/UL (ref 4–5.2)
SODIUM BLD-SCNC: 141 MMOL/L (ref 136–145)
TRIGL SERPL-MCNC: 111 MG/DL (ref 0–150)
VLDLC SERPL CALC-MCNC: 22 MG/DL
WBC # BLD: 5.1 K/UL (ref 4–11)

## 2019-04-09 PROCEDURE — 80048 BASIC METABOLIC PNL TOTAL CA: CPT

## 2019-04-09 PROCEDURE — 93005 ELECTROCARDIOGRAM TRACING: CPT | Performed by: INTERNAL MEDICINE

## 2019-04-09 PROCEDURE — 6360000002 HC RX W HCPCS: Performed by: INTERNAL MEDICINE

## 2019-04-09 PROCEDURE — C1769 GUIDE WIRE: HCPCS

## 2019-04-09 PROCEDURE — 2709999900 HC NON-CHARGEABLE SUPPLY

## 2019-04-09 PROCEDURE — 2580000003 HC RX 258

## 2019-04-09 PROCEDURE — 92933 PRQ TRLML C ATHRC ST ANGIOP1: CPT | Performed by: INTERNAL MEDICINE

## 2019-04-09 PROCEDURE — 6360000004 HC RX CONTRAST MEDICATION

## 2019-04-09 PROCEDURE — 92978 ENDOLUMINL IVUS OCT C 1ST: CPT

## 2019-04-09 PROCEDURE — C1887 CATHETER, GUIDING: HCPCS

## 2019-04-09 PROCEDURE — C1725 CATH, TRANSLUMIN NON-LASER: HCPCS

## 2019-04-09 PROCEDURE — 6360000002 HC RX W HCPCS

## 2019-04-09 PROCEDURE — 99153 MOD SED SAME PHYS/QHP EA: CPT

## 2019-04-09 PROCEDURE — 85610 PROTHROMBIN TIME: CPT

## 2019-04-09 PROCEDURE — 80061 LIPID PANEL: CPT

## 2019-04-09 PROCEDURE — 99152 MOD SED SAME PHYS/QHP 5/>YRS: CPT

## 2019-04-09 PROCEDURE — 92978 ENDOLUMINL IVUS OCT C 1ST: CPT | Performed by: INTERNAL MEDICINE

## 2019-04-09 PROCEDURE — 93458 L HRT ARTERY/VENTRICLE ANGIO: CPT | Performed by: INTERNAL MEDICINE

## 2019-04-09 PROCEDURE — 2500000003 HC RX 250 WO HCPCS

## 2019-04-09 PROCEDURE — 99152 MOD SED SAME PHYS/QHP 5/>YRS: CPT | Performed by: INTERNAL MEDICINE

## 2019-04-09 PROCEDURE — 85027 COMPLETE CBC AUTOMATED: CPT

## 2019-04-09 PROCEDURE — 99024 POSTOP FOLLOW-UP VISIT: CPT | Performed by: INTERNAL MEDICINE

## 2019-04-09 PROCEDURE — C1874 STENT, COATED/COV W/DEL SYS: HCPCS

## 2019-04-09 PROCEDURE — 6370000000 HC RX 637 (ALT 250 FOR IP): Performed by: INTERNAL MEDICINE

## 2019-04-09 PROCEDURE — 6370000000 HC RX 637 (ALT 250 FOR IP)

## 2019-04-09 PROCEDURE — C1894 INTRO/SHEATH, NON-LASER: HCPCS

## 2019-04-09 PROCEDURE — 2580000003 HC RX 258: Performed by: INTERNAL MEDICINE

## 2019-04-09 PROCEDURE — 85347 COAGULATION TIME ACTIVATED: CPT

## 2019-04-09 PROCEDURE — 93452 LEFT HRT CATH W/VENTRCLGRPHY: CPT

## 2019-04-09 PROCEDURE — C1724 CATH, TRANS ATHEREC,ROTATION: HCPCS

## 2019-04-09 PROCEDURE — 2720000010 HC SURG SUPPLY STERILE

## 2019-04-09 PROCEDURE — 92933 PRQ TRLML C ATHRC ST ANGIOP1: CPT

## 2019-04-09 RX ORDER — HEPARIN SODIUM 1000 [USP'U]/ML
INJECTION, SOLUTION INTRAVENOUS; SUBCUTANEOUS
Status: COMPLETED | OUTPATIENT
Start: 2019-04-09 | End: 2019-04-09

## 2019-04-09 RX ORDER — ASPIRIN 81 MG/1
81 TABLET, CHEWABLE ORAL DAILY
Status: DISCONTINUED | OUTPATIENT
Start: 2019-04-09 | End: 2019-04-10 | Stop reason: HOSPADM

## 2019-04-09 RX ORDER — MIDAZOLAM HYDROCHLORIDE 5 MG/ML
INJECTION INTRAMUSCULAR; INTRAVENOUS
Status: COMPLETED | OUTPATIENT
Start: 2019-04-09 | End: 2019-04-09

## 2019-04-09 RX ORDER — FENTANYL CITRATE 50 UG/ML
INJECTION, SOLUTION INTRAMUSCULAR; INTRAVENOUS
Status: COMPLETED | OUTPATIENT
Start: 2019-04-09 | End: 2019-04-09

## 2019-04-09 RX ORDER — PROMETHAZINE HYDROCHLORIDE 25 MG/1
25 TABLET ORAL 4 TIMES DAILY PRN
Status: DISCONTINUED | OUTPATIENT
Start: 2019-04-09 | End: 2019-04-10 | Stop reason: HOSPADM

## 2019-04-09 RX ORDER — ONDANSETRON 2 MG/ML
4 INJECTION INTRAMUSCULAR; INTRAVENOUS ONCE
Status: COMPLETED | OUTPATIENT
Start: 2019-04-09 | End: 2019-04-09

## 2019-04-09 RX ORDER — METOPROLOL SUCCINATE 25 MG/1
25 TABLET, EXTENDED RELEASE ORAL DAILY
Status: DISCONTINUED | OUTPATIENT
Start: 2019-04-09 | End: 2019-04-10 | Stop reason: HOSPADM

## 2019-04-09 RX ORDER — MORPHINE SULFATE 2 MG/ML
2 INJECTION, SOLUTION INTRAMUSCULAR; INTRAVENOUS ONCE
Status: COMPLETED | OUTPATIENT
Start: 2019-04-09 | End: 2019-04-09

## 2019-04-09 RX ORDER — CLOPIDOGREL 300 MG/1
TABLET, FILM COATED ORAL
Status: COMPLETED | OUTPATIENT
Start: 2019-04-09 | End: 2019-04-09

## 2019-04-09 RX ORDER — NITROGLYCERIN 20 MG/100ML
15 INJECTION INTRAVENOUS CONTINUOUS
Status: DISCONTINUED | OUTPATIENT
Start: 2019-04-09 | End: 2019-04-10 | Stop reason: HOSPADM

## 2019-04-09 RX ORDER — SODIUM CHLORIDE 9 MG/ML
INJECTION, SOLUTION INTRAVENOUS CONTINUOUS
Status: DISCONTINUED | OUTPATIENT
Start: 2019-04-09 | End: 2019-04-09

## 2019-04-09 RX ORDER — SODIUM CHLORIDE 9 MG/ML
INJECTION, SOLUTION INTRAVENOUS CONTINUOUS
Status: DISPENSED | OUTPATIENT
Start: 2019-04-09 | End: 2019-04-09

## 2019-04-09 RX ORDER — CLOPIDOGREL BISULFATE 75 MG/1
75 TABLET ORAL DAILY
Status: DISCONTINUED | OUTPATIENT
Start: 2019-04-09 | End: 2019-04-10 | Stop reason: HOSPADM

## 2019-04-09 RX ORDER — EPTIFIBATIDE 0.75 MG/ML
2 INJECTION, SOLUTION INTRAVENOUS CONTINUOUS
Status: DISPENSED | OUTPATIENT
Start: 2019-04-09 | End: 2019-04-09

## 2019-04-09 RX ORDER — NITROGLYCERIN 0.4 MG/1
0.4 TABLET SUBLINGUAL EVERY 5 MIN PRN
Status: DISCONTINUED | OUTPATIENT
Start: 2019-04-09 | End: 2019-04-10 | Stop reason: HOSPADM

## 2019-04-09 RX ORDER — EPTIFIBATIDE 0.75 MG/ML
INJECTION, SOLUTION INTRAVENOUS CONTINUOUS PRN
Status: COMPLETED | OUTPATIENT
Start: 2019-04-09 | End: 2019-04-09

## 2019-04-09 RX ORDER — MORPHINE SULFATE 2 MG/ML
INJECTION, SOLUTION INTRAMUSCULAR; INTRAVENOUS
Status: DISPENSED
Start: 2019-04-09 | End: 2019-04-10

## 2019-04-09 RX ORDER — ONDANSETRON 4 MG/1
4 TABLET, ORALLY DISINTEGRATING ORAL EVERY 8 HOURS PRN
Status: DISCONTINUED | OUTPATIENT
Start: 2019-04-09 | End: 2019-04-10 | Stop reason: HOSPADM

## 2019-04-09 RX ORDER — LISINOPRIL 2.5 MG/1
2.5 TABLET ORAL DAILY
Status: DISCONTINUED | OUTPATIENT
Start: 2019-04-09 | End: 2019-04-10 | Stop reason: HOSPADM

## 2019-04-09 RX ORDER — ATORVASTATIN CALCIUM 40 MG/1
40 TABLET, FILM COATED ORAL NIGHTLY
Status: DISCONTINUED | OUTPATIENT
Start: 2019-04-09 | End: 2019-04-10 | Stop reason: HOSPADM

## 2019-04-09 RX ORDER — MORPHINE SULFATE 2 MG/ML
2 INJECTION, SOLUTION INTRAMUSCULAR; INTRAVENOUS EVERY 4 HOURS PRN
Status: DISCONTINUED | OUTPATIENT
Start: 2019-04-09 | End: 2019-04-10 | Stop reason: HOSPADM

## 2019-04-09 RX ADMIN — FENTANYL CITRATE 50 MCG: 50 INJECTION, SOLUTION INTRAMUSCULAR; INTRAVENOUS at 11:00

## 2019-04-09 RX ADMIN — ONDANSETRON 4 MG: 2 INJECTION INTRAMUSCULAR; INTRAVENOUS at 08:34

## 2019-04-09 RX ADMIN — MIDAZOLAM HYDROCHLORIDE 2 MG: 5 INJECTION INTRAMUSCULAR; INTRAVENOUS at 10:09

## 2019-04-09 RX ADMIN — FENTANYL CITRATE 50 MCG: 50 INJECTION, SOLUTION INTRAMUSCULAR; INTRAVENOUS at 10:36

## 2019-04-09 RX ADMIN — FENTANYL CITRATE 25 MCG: 50 INJECTION, SOLUTION INTRAMUSCULAR; INTRAVENOUS at 10:09

## 2019-04-09 RX ADMIN — SODIUM CHLORIDE: 9 INJECTION, SOLUTION INTRAVENOUS at 13:36

## 2019-04-09 RX ADMIN — FENTANYL CITRATE 25 MCG: 50 INJECTION, SOLUTION INTRAMUSCULAR; INTRAVENOUS at 09:50

## 2019-04-09 RX ADMIN — HEPARIN SODIUM 5600 UNITS: 1000 INJECTION, SOLUTION INTRAVENOUS; SUBCUTANEOUS at 09:57

## 2019-04-09 RX ADMIN — SODIUM CHLORIDE: 9 INJECTION, SOLUTION INTRAVENOUS at 08:34

## 2019-04-09 RX ADMIN — EPTIFIBATIDE 2 MCG/KG/MIN: 0.75 INJECTION, SOLUTION INTRAVENOUS at 13:34

## 2019-04-09 RX ADMIN — MORPHINE SULFATE 2 MG: 2 INJECTION, SOLUTION INTRAMUSCULAR; INTRAVENOUS at 15:25

## 2019-04-09 RX ADMIN — ONDANSETRON 4 MG: 4 TABLET, ORALLY DISINTEGRATING ORAL at 15:21

## 2019-04-09 RX ADMIN — EPTIFIBATIDE 2 MCG/KG/MIN: 0.75 INJECTION, SOLUTION INTRAVENOUS at 10:38

## 2019-04-09 RX ADMIN — MIDAZOLAM HYDROCHLORIDE 2 MG: 5 INJECTION INTRAMUSCULAR; INTRAVENOUS at 10:36

## 2019-04-09 RX ADMIN — FENTANYL CITRATE 50 MCG: 50 INJECTION, SOLUTION INTRAMUSCULAR; INTRAVENOUS at 10:31

## 2019-04-09 RX ADMIN — ONDANSETRON 4 MG: 2 INJECTION INTRAMUSCULAR; INTRAVENOUS at 11:30

## 2019-04-09 RX ADMIN — MIDAZOLAM HYDROCHLORIDE 1 MG: 5 INJECTION INTRAMUSCULAR; INTRAVENOUS at 09:55

## 2019-04-09 RX ADMIN — FENTANYL CITRATE 25 MCG: 50 INJECTION, SOLUTION INTRAMUSCULAR; INTRAVENOUS at 09:55

## 2019-04-09 RX ADMIN — MORPHINE SULFATE 2 MG: 2 INJECTION, SOLUTION INTRAMUSCULAR; INTRAVENOUS at 19:26

## 2019-04-09 RX ADMIN — MIDAZOLAM HYDROCHLORIDE 2 MG: 5 INJECTION INTRAMUSCULAR; INTRAVENOUS at 09:51

## 2019-04-09 RX ADMIN — CLOPIDOGREL 300 MG: 300 TABLET, FILM COATED ORAL at 10:55

## 2019-04-09 RX ADMIN — MIDAZOLAM HYDROCHLORIDE 1 MG: 5 INJECTION INTRAMUSCULAR; INTRAVENOUS at 10:19

## 2019-04-09 RX ADMIN — FENTANYL CITRATE 50 MCG: 50 INJECTION, SOLUTION INTRAMUSCULAR; INTRAVENOUS at 10:19

## 2019-04-09 ASSESSMENT — PAIN DESCRIPTION - PAIN TYPE
TYPE: ACUTE PAIN

## 2019-04-09 ASSESSMENT — PAIN SCALES - GENERAL
PAINLEVEL_OUTOF10: 0
PAINLEVEL_OUTOF10: 10
PAINLEVEL_OUTOF10: 2
PAINLEVEL_OUTOF10: 5
PAINLEVEL_OUTOF10: 4
PAINLEVEL_OUTOF10: 7

## 2019-04-09 ASSESSMENT — PAIN DESCRIPTION - ORIENTATION: ORIENTATION: RIGHT

## 2019-04-09 ASSESSMENT — PAIN DESCRIPTION - LOCATION
LOCATION: CHEST
LOCATION: GROIN
LOCATION: CHEST

## 2019-04-09 NOTE — BRIEF OP NOTE
Brief Postoperative Note  ______________________________________________________________    Patient: Amalia Blake  YOB: 1965  MRN: [id number]    Brief Postoperative Note  Pre-operative Diagnosis: staged PCI to LAD  Post-operative Diagnosis: Same  Procedure:   Orbital arthrectomy of LAD  POBA and PCI to mid/prox LAD  POBA to distal LAD stent for restenosis  OCT of LAD  Anesthesia: Moderate Sedation  Surgeons/Assistants: Rashaun Dominguez MD, Danelle Campos  Estimated Blood Loss: less than 50   Complications: None  Specimens: Was Not Obtained    Findings:     LEFT HEART CATH  LM: short, luminals   LAD: moderate diffuse disease, mid 80%, distal stent with 50% restenosis and HERO-2 flow  LCX: mid patent stent,  50% into OM1 just until anastomosis site. (signficant improvement with iC NTG)      LVEDP: 11  LVEF: 45% with mild ant HK    PCI of prox and mid LAD  STENT: Resolute Ame 3 x 22mm followed by 3 x 38mm. prox was post dilated to 3.5mm and mid was dilated to 3.25mm. Distal stent by OCT showed more restenosis then would expect for new stent and initially poor distal flow so POBA'd this section using 2.25 x 15mm NC trek        Assessment  1. Successful PCI to prox and mid LAD using CSI orbital arthrectomy and 2 ame drug stents. 0% residual and HERO-3 flow. OCT showed heavy calcific and fibroatheroma burdon and good stent expansion,   2. POBA of distal LAD stent for restenosis and HERO-2 flow. 0% residual and HERO-3 flow   3. Cont ASA 81mg po qday for life  4. Plavix 75mg poqday for life as tolerated  5.   Integrillin for 6 hrs              Tonny Rivera MD  Date: 4/9/2019  Time: 11:12 AM

## 2019-04-09 NOTE — PROGRESS NOTES
Pt arrived to room 430 from Cath lab. T 97.8, P 58, RR 14, BP 83/54, O2 95% on room air. Pt rates chest pain 7/10. On Nitro gtt at 20 mcg/min. 7 [place] sheath still in place. Call placed to Ranjana Terrell regarding pt's BP and current pain level. Ranjana Terrell will have Dr. Fidelia Morse call writer back. Nitro gtt decreased due to low BP. Will continue to monitor.

## 2019-04-09 NOTE — H&P
breath          Surgical History:  Past Surgical History:   Procedure Laterality Date    CARDIAC CATHETERIZATION  04/01/2016    Dr. Chris Olivares  08/01/2012    Dr. Mariann Coleman  05/13/2018    Dr. Shary Moritz - w/placement of IABP, Xience 3.25 x 18 mid Circ, POBA 2.5 x 12 to prox LAD    CORONARY ARTERY BYPASS GRAFT  06/13/2018    LIMA, SVG to OM2, SVG to rPDA    DIAGNOSTIC CARDIAC CATH LAB PROCEDURE  06/06/2013    Dr. Willard Reilly - Non Obs CAD    HYSTERECTOMY      PTCA      UPPER GASTROINTESTINAL ENDOSCOPY           Medications:  Current Facility-Administered Medications   Medication Dose Route Frequency Provider Last Rate Last Dose    0.9 % sodium chloride infusion   Intravenous Continuous Re Ruffin MD               Pre-Sedation:    Pre-Sedation Documentation and Exam:  I have assessed the patient and agree with the H&P present on the chart. Prior History of Anesthesia Complications:   none    Modified Mallampati:  II (soft palate, uvula, fauces visible)    ASA Classification:  Class 3 - A patient with severe systemic disease that limits activity but is not incapacitating    Kathrine Scale: Activity:  2 - Able to move 4 extremities voluntarily on command  Respiration:  2 - Able to breathe deeply and cough freely  Circulation:  2 - BP+/- 20mmHg of normal  Consciousness:  2 - Fully awake  Oxygen Saturation (color):  2 - Able to maintain oxygen saturation >92% on room air    Sedation/Anesthesia Plan:  Guard the patient's safety and welfare. Minimize physical discomfort and pain. Minimize negative psychological responses to treatment by providing sedation and analgesia and maximize the potential amnesia. Patient to meet pre-procedure discharge plan.     Medication Planned:  midazolam intravenously, fentanyl intravenously    Patient is an appropriate candidate for plan of sedation: yes      Electronically signed by Mikey Gamble MD on 4/9/2019 at 8:00 AM

## 2019-04-09 NOTE — PROGRESS NOTES
Right groin sheath pulled at 1505. Vitals remained stable through sheath pull. Pt c/o nausea and severe site pain while occlusive pressure was held. Pt received a one time dose of PO Zofran and IV Morphine. Pt's symptoms subsided. Site currently remains soft with no sign of hematoma noted. 2+ right pedal pulse is felt. Vitals remain stable. Will continue to monitor pt status.

## 2019-04-09 NOTE — H&P
2      Hospital Medicine History & Physical      PCP: GERMAN Mcallister - CNP    Date of Admission: 4/9/2019    Date of Service: Pt seen/examined on 4/9/2019 and placed in outpatient in a bed status. Chief Complaint:  Chest pain    History Of Present Illness:   46 y.o. female who [facility] with chest pain. PMHx significant for CAD/CABG, recent incomplete PCI. Returns for elective PCI with Dr. Gabriel Medina. Procedure successful after PCI. However, after the procedure, she developed increased chest pressure. EKG stable. Thought to be 2/2 PCI. Started on nitro drip with improvement. Currently chest pain free. No SOB. Past Medical History:          Diagnosis Date    Acute blood loss anemia     Asthma     Chest pain     COPD (chronic obstructive pulmonary disease) (HCC)     Coronary artery disease 9/4/2012    Depression     Diabetes mellitus (HCC)     Enlarged heart     Fatigue     Generalized headaches     Hyperlipidemia     Hypertension     Migraine     Morning stiffness of joints     Myocardial infarction ([address] 05/13/2018    Numbness or tingling hands and feet    OA (osteoarthritis) of knee 1/27/2015    Obesity, Class I, BMI 30.0-34.9 (see actual BMI)     Osteoporosis     Pulmonary nodule     Shortness of breath        Past Surgical History:          Procedure Laterality Date    CARDIAC CATHETERIZATION  04/01/2016    Dr. Shlomo Fournier. Zenia Prado  08/01/2012    Dr. Rosalba Trinh  05/13/2018    Dr. Bettina Vaz - w/placement of IABP, Xience 3.25 x 18 mid Circ, POBA 2.5 x 12 to prox LAD    CORONARY ARTERY BYPASS GRAFT  06/13/2018    LIMA, SVG to OM2, SVG to [address] CATH LAB PROCEDURE  06/06/2013    Dr. Nicolas Sanchez - Non Obs CAD    HYSTERECTOMY      PTCA      UPPER GASTROINTESTINAL ENDOSCOPY         Medications Prior to Admission:      Prior to Admission medications    Medication Sig Start Date End Date Taking? Authorizing Provider   promethazine (PHENERGAN) 25 MG tablet Take 1 tablet by mouth 4 times daily as needed for Nausea 4/3/19 4/10/19 Yes Taran Florez MD   clopidogrel (PLAVIX) 75 MG tablet Take 1 tablet by mouth daily 2/1/19  Yes Breann Soto MD   atorvastatin (LIPITOR) 40 MG tablet Take 1 tablet by mouth nightly 11/13/18  Yes Abhinav Cuevas MD   metoprolol succinate (TOPROL XL) 25 MG extended release tablet Take 1 tablet by mouth daily 10/5/18  Yes Breann Soto MD   nitroGLYCERIN (NITROSTAT) 0.4 MG SL tablet up to max of 3 total doses. If no relief after 1 dose, call 911. 9/12/18  Yes Korey Thomas MD   ondansetron (ZOFRAN-ODT) 4 MG disintegrating tablet Take 1 tablet by mouth every 8 hours as needed for Nausea or Vomiting 9/12/18  Yes Korey Thomas MD   lisinopril (PRINIVIL;ZESTRIL) 2.5 MG tablet Take 1 tablet by mouth daily 7/13/18  Yes Brenan Soto MD   aspirin 81 MG chewable tablet Take 1 tablet by mouth daily 6/17/18  Yes Essie Reyez MD   metFORMIN (GLUCOPHAGE) 1000 MG tablet Take 1 tablet by mouth 2 times daily (with meals) 6/17/18 4/9/19 Yes Essie Reyez MD   albuterol sulfate HFA (PROVENTIL HFA) 108 (90 Base) MCG/ACT inhaler Inhale 2 puffs into the lungs every 6 hours as needed for Wheezing or Shortness of Breath 6/17/18   Essie Reyez MD   tiotropium (SPIRIVA) 18 MCG inhalation capsule Inhale 1 capsule into the lungs daily  Patient taking differently: Inhale 18 mcg into the lungs daily as needed  6/17/18   Essie Reyez MD       Allergies:  Penicillins; Imdur [isosorbide dinitrate]; and Cephalexin    Social History:    TOBACCO:   reports that she has quit smoking. Her smoking use included cigarettes. She has a 20.00 pack-year smoking history. She has never used smokeless tobacco.  ETOH:   reports that she does not drink alcohol.       Family History:          Problem Relation Age of Onset    Emphysema Mother     Heart Failure [facility]   Ordering Physician Zandra Nina MD   The procedure was explained in detail to the patient. Risks,  complications and alternative treatments were reviewed. Written consent  was obtained. Procedure Procedure Type:   Nuclear Stress Test:Pharmacological, NM MYOCARDIAL SPECT REST EXERCISE OR  RX   Study location: [facility] - Nuclear Medicine   Indications: Chest pain. Hospital Status: Outpatient. Risk Factors   The patient risk factors include:prior PCI on 02/06/2019;prior CABG on  06/13/2018;physical activity, Current/Recent(w/in 1 year) tobacco use,  treated hypercholesterolemia, treated hypertension, family history of  premature CAD appeared at age 64, orally-treated diabetes mellitus, chronic  lung disease, prior MI and (pack years: 21). Conclusions   Summary  Small-moderate sized inferolateral partial reversibility defect consistent  with ischemia and infarction in the territory of the mid and distal LCx  and/or RCA. Fixed defects in the mid and basal lateral walls c/w infarct. Hyperdynamic LV systolic function with FH>18% with uniform wall motion. Intermediate risk abnormal study. Stress Protocols   Resting ECG  Sinus bradycardia; nonspecific ST change;  RSR' c/w RV conduction delay; low voltage  precordial leads; inferior infarct   Resting HR:58 bpm      Resting BP:122/69 mmHg   Pre-stress physical exam: lcta, heart regular  Stress Protocol:Pharmacologic - Lexiscan's  Peak HR:91 bpm                   HR response: Normal  Peak BP:154/86 mmHg              BP response: Normal  Predicted HR: 166 bpm            HR/BP product:75316  % of predicted HR: 55  Test duration: 1 min  Reason for termination:Completed   ECG Findings  <0.5mm ST segment depression. Arrhythmias  No significant arrhythmia noted during study.    Symptoms  Developed symptoms likely related to lexiscan. (sob)   Complications  Procedure complication was s.O.B..   Stress Interpretation Nondiagnostic secondary to inadequate THR. Imaging Protocols   - One Day   Rest                          Stress   Isotope:Myoview/Tetrofosmin   Isotope: Myoview/Tetrofosmin  Isotope dose:11.2 mCi         Isotope dose:32.8 mCi  Administration Route:I.V. Administration Route:I.V.  Date:03/18/2019 07:40         Date:03/18/2019 08:35                                 Technique:      Gated  Imaging Results    Stress ejection    Ejection fraction:70 %    EDV :80 ml    ESV :24 ml    Stroke volume :56 ml    LV mass :108 gr  Medical History   Additional Medical History   CAD  GERD  CABG vessel x3   Signatures   ------------------------------------------------------------------  Electronically signed by Randi Stoll MD, [facility]  (Interpreting physician) on 03/18/2019 at 11:20  ------------------------------------------------------------------        ASSESSMENT:    Active Problems:    CAD in native artery    Precordial pain  Resolved Problems:    * No resolved hospital problems. *      PLAN:    CAD s/p PCI: Continue DAPT, Statin, BB. Nitro drip as needed for post-PCI pain. Monitor overnight. Anticipate DC home in AM.     Diabetes Mellitus, Type 2: Controlled. Hold Metformin. Add SSI if needed. Monitor. DVT Prophylaxis: post-PCI  Diet: DIET CARDIAC;  Code Status: Full Code    PT/OT Eval Status: NA    Dispo - Home in AM       Helene Mccrary MD    Thank you GERMAN Forbes - CINDY for the opportunity to be involved in this patient's care. If you have any questions or concerns please feel free to contact me at [phone].

## 2019-04-10 VITALS
HEIGHT: 61 IN | RESPIRATION RATE: 16 BRPM | WEIGHT: 178.5 LBS | SYSTOLIC BLOOD PRESSURE: 100 MMHG | OXYGEN SATURATION: 94 % | BODY MASS INDEX: 33.7 KG/M2 | DIASTOLIC BLOOD PRESSURE: 67 MMHG | TEMPERATURE: 97.5 F | HEART RATE: 66 BPM

## 2019-04-10 LAB
ANION GAP SERPL CALCULATED.3IONS-SCNC: 8 MMOL/L (ref 3–16)
BUN BLDV-MCNC: 9 MG/DL (ref 7–20)
CALCIUM SERPL-MCNC: 7.8 MG/DL (ref 8.3–10.6)
CHLORIDE BLD-SCNC: 106 MMOL/L (ref 99–110)
CO2: 26 MMOL/L (ref 21–32)
CREAT SERPL-MCNC: 0.6 MG/DL (ref 0.6–1.1)
GFR AFRICAN AMERICAN: >60
GFR NON-AFRICAN AMERICAN: >60
GLUCOSE BLD-MCNC: 205 MG/DL (ref 70–99)
HCT VFR BLD CALC: 37.1 % (ref 36–48)
HEMOGLOBIN: 12.9 G/DL (ref 12–16)
MCH RBC QN AUTO: 30.7 PG (ref 26–34)
MCHC RBC AUTO-ENTMCNC: 34.7 G/DL (ref 31–36)
MCV RBC AUTO: 88.3 FL (ref 80–100)
PDW BLD-RTO: 13.7 % (ref 12.4–15.4)
PLATELET # BLD: 149 K/UL (ref 135–450)
PMV BLD AUTO: 8.8 FL (ref 5–10.5)
POTASSIUM SERPL-SCNC: 4.4 MMOL/L (ref 3.5–5.1)
RBC # BLD: 4.2 M/UL (ref 4–5.2)
SODIUM BLD-SCNC: 140 MMOL/L (ref 136–145)
WBC # BLD: 5.2 K/UL (ref 4–11)

## 2019-04-10 PROCEDURE — 6370000000 HC RX 637 (ALT 250 FOR IP): Performed by: INTERNAL MEDICINE

## 2019-04-10 PROCEDURE — 36415 COLL VENOUS BLD VENIPUNCTURE: CPT

## 2019-04-10 PROCEDURE — 85027 COMPLETE CBC AUTOMATED: CPT

## 2019-04-10 PROCEDURE — 6370000000 HC RX 637 (ALT 250 FOR IP): Performed by: NURSE PRACTITIONER

## 2019-04-10 PROCEDURE — 99225 PR SBSQ OBSERVATION CARE/DAY 25 MINUTES: CPT | Performed by: NURSE PRACTITIONER

## 2019-04-10 PROCEDURE — 80048 BASIC METABOLIC PNL TOTAL CA: CPT

## 2019-04-10 RX ORDER — ACETAMINOPHEN 325 MG/1
650 TABLET ORAL EVERY 4 HOURS PRN
Status: DISCONTINUED | OUTPATIENT
Start: 2019-04-10 | End: 2019-04-10 | Stop reason: HOSPADM

## 2019-04-10 RX ADMIN — ACETAMINOPHEN 650 MG: 325 TABLET ORAL at 01:46

## 2019-04-10 RX ADMIN — CLOPIDOGREL BISULFATE 75 MG: 75 TABLET ORAL at 09:08

## 2019-04-10 RX ADMIN — ASPIRIN 81 MG 81 MG: 81 TABLET ORAL at 09:08

## 2019-04-10 ASSESSMENT — PAIN SCALES - GENERAL
PAINLEVEL_OUTOF10: 2
PAINLEVEL_OUTOF10: 3

## 2019-04-10 ASSESSMENT — ENCOUNTER SYMPTOMS
GASTROINTESTINAL NEGATIVE: 1
RESPIRATORY NEGATIVE: 1

## 2019-04-10 NOTE — FLOWSHEET NOTE
04/10/19 0905   Assessment   Charting Type Shift assessment   Neurological   Neuro (WDL) WDL   Level of Consciousness 0   [place] Coma Scale   Eye Opening 4   Best Verbal Response 5   Best Motor Response 6   [place]an Coma Scale Score 15   NIH/MNHISS Stroke Scale   NIH/MNIHSS Stroke Scale Assessed No   HEENT   HEENT (WDL) X   Right Eye Impaired vision   Left Eye Impaired vision   Teeth Missing teeth   Respiratory   Respiratory (WDL) WDL   Respiratory Pattern Regular   Respiratory Depth Normal   Respiratory Quality/Effort Unlabored   Chest Assessment Chest expansion symmetrical;Trachea midline   L Breath Sounds Clear   R Breath Sounds Clear   Breath Sounds   Right Upper Lobe Clear   Right Middle Lobe Clear   Right Lower Lobe Clear   Left Upper Lobe Clear   Left Lower Lobe Clear   Cardiac   Cardiac (WDL) WDL   Cardiac Regularity Regular   Heart Sounds S1, S2   Cardiac Rhythm NSR   Rhythm Interpretation   Pulse 66   Cardiac Monitor   Telemetry Monitor On Yes   Telemetry Audible Yes   Telemetry Alarms Set Yes   Gastrointestinal   Abdominal (WDL) WDL   RUQ Bowel Sounds Active   LUQ Bowel Sounds Active   RLQ Bowel Sounds Active   LLQ Bowel Sounds Active   Peripheral Vascular   Peripheral Vascular (WDL) WDL   Edema None   Puncture Site Assessment 1   Dressing Applied Transparent occlusive dressing   Location Femoral - right   Site Assessment No redness, drainage, swelling or hematoma   Hemostasis Intervention Thrombin Product (D-Stat)   Skin Color/Condition   Skin Color/Condition (WDL) WDL   Skin Integrity   Skin Integrity (WDL) WDL   Musculoskeletal   Musculoskeletal (WDL) WDL   Genitourinary   Genitourinary (WDL) WDL   Flank Tenderness No   Suprapubic Tenderness No   Dysuria No   Urine Assessment   Urine Color Alexandra   Urine Appearance Clear   Incontinence No   Anus/Rectum   Anus/Rectum (WDL) WDL   Psychosocial   Psychosocial (WDL) WDL

## 2019-04-10 NOTE — PLAN OF CARE
Patient encouraged to request assistance when up to bathroom. Cath site soft, non swollen, scant bruising. BP running low. Will continue to monitor patient.

## 2019-04-10 NOTE — PROGRESS NOTES
Cardiac Rehab referral completed. Has written information on diet, exercise, medications, risk factors, PCI site precautions, when to call the doctor, etc. (given to her after previous PCI).   Thank you for this referral.

## 2019-04-10 NOTE — PROGRESS NOTES
[facility]  Cardiology  Progress Note    Admission date:  2019    Reason for follow up visit: CAD post PCI    HPI/CC: Jose Archer is a 46 y.o. female presented as an outpatient 2019 for staged PCI in the setting of angina refractory to medical therapy. Underwent PCI LAD with orbital atherectomy, MARCOS x2, and POBA pre existing distal stent. No post procedure complications, rhythm overnight has been SR 60s. Subjective: Denies chest pain, shortness of breath, palpitations and dizziness. Vitals:  Blood pressure 100/67, pulse 66, temperature 97.5 °F (36.4 °C), temperature source Oral, resp. rate 16, height 5' 1\" (1.549 m), weight 178 lb 8 oz (81 kg), SpO2 94 %, not currently breastfeeding.   Temp  Av °F (36.7 °C)  Min: 97.5 °F (36.4 °C)  Max: 98.4 °F (36.9 °C)  Pulse  Av.4  Min: 54  Max: 75  BP  Min: 81/57  Max: 148/80  SpO2  Av.1 %  Min: 94 %  Max: 99 %    24 hour I/O    Intake/Output Summary (Last 24 hours) at 4/10/2019 0933  Last data filed at 2019 2231  Gross per 24 hour   Intake 600 ml   Output 1425 ml   Net -825 ml     Current Facility-Administered Medications   Medication Dose Route Frequency Provider Last Rate Last Dose    acetaminophen (TYLENOL) tablet 650 mg  650 mg Oral Q4H PRN GERMAN Helms - CNP   650 mg at 04/10/19 0146    nitroGLYCERIN 50 mg in dextrose 5% 250 mL infusion  15 mcg/min Intravenous Continuous Sandi Kimball MD   Stopped at 19 1540    aspirin chewable tablet 81 mg  81 mg Oral Daily Sandi Kimabll MD   81 mg at 04/10/19 0908    atorvastatin (LIPITOR) tablet 40 mg  40 mg Oral Nightly Sandi Kimball MD        clopidogrel (PLAVIX) tablet 75 mg  75 mg Oral Daily Sandi Kimball MD   75 mg at 04/10/19 0908    lisinopril (PRINIVIL;ZESTRIL) tablet 2.5 mg  2.5 mg Oral Daily Sandi Kimball MD        metoprolol succinate (TOPROL XL) extended release tablet 25 mg  25 mg Oral Daily Sandi Kimball MD        nitroGLYCERIN (NITROSTAT) SL good stent expansion,   2. POBA of distal LAD stent for restenosis and HERO-2 flow. 0% residual and HERO-3 flow   3. Cont ASA 81mg po qday for life  4. Plavix 75mg poqday for life as tolerated  5. Integrillin for 6 hrs    Coronary angiogram 2/6/2019:  LM: luminals  LAD: mid 60%, mid/distal 60% and distal small vessel. NO significant competitive flow seen  LCX: luminals, patent mid LCx stent extending into OM1,                 OM1- superior branch with 30% long lesion and +flow up SVG graft, inferior branch small with 70% lesions  RCA: dominant,  Moderate-severe diffuse disease, long lesion of 50% in mtuy-byd-aqgzct. SVG-PDA graft seen with retrograde flow  LIMA-LAD: atretic, no significantflow to native LAD  SVG-OM2: patent, small vein graft (prx and mid graft is small in size, plumps up distally, + valve seen. Good antegrade flow  SVG-RT PDA: patent  LVEDP: 25  LVEF: 45% with lateral wall hypokinesis  FFR: mid LAD 0.93-->0.83  FFR of mid and distal lesion 0.77. Unable to get post FFR as FFR machine went down  PCI of lesion 1: mid/distal LAD  Resolute Agustin 2 x 26mm, post dilated to 2 mm  1. Patent mid LCx stent, interval closure of LIMA graft. 2 lesions in LAD were FFR'd and distal lesion ischemic and stented using [place] drug stent 2 x 26mm with HERO-3 flow  2. Watch mid LAD lesion for ischemic progression  3. ASA 81mg poqday for life  4. Plavix 75mg poqday for 1 yr w/o inturruption  5. BB, statin. Stress test 3/18/2019:  Small-moderate sized inferolateral partial reversibility defect consistent    with ischemia and infarction in the territory of the mid and distal LCx    and/or RCA. Fixed defects in the mid and basal lateral walls c/w infarct.    Hyperdynamic LV systolic function with VB>19% with uniform wall motion.    Intermediate risk abnormal study. Echo 4/84/3006:  LV systolic function is normal with EF estimated at 55%. Mild hypokinesis of the inferior and lateral walls.    No regional wall motion abnormalities. Normal left ventricular diastolic filling pressure. Trivial tricuspid regurgitation. Systolic pulmonary artery pressure (SPAP) is normal estimated at 24mmHg   (Right atrial pressure of 3mmHg). CABG 6/13/2018:  Elective CABG X 3, with pedicled LIMA to LAD, single Greater Saphenous VG to OM 2, separate single Greater SVG to PDA of RCA, SGC, CPB, EVH Right Greater Saphenous vein, EDUARDO, Epiaortic ultrasound, Doppler verification of grafts, Bilateral 5 level intercostal nerve block(Exparel), Platelet gel application.     Lab Reviewed:     Renal Profile:  Lab Results   Component Value Date    CREATININE 0.6 04/10/2019    BUN 9 04/10/2019     04/10/2019    K 4.4 04/10/2019    K 3.6 09/11/2018     04/10/2019    CO2 26 04/10/2019     CBC:    Lab Results   Component Value Date    WBC 5.2 04/10/2019    RBC 4.20 04/10/2019    HGB 12.9 04/10/2019    HCT 37.1 04/10/2019    MCV 88.3 04/10/2019    RDW 13.7 04/10/2019     04/10/2019     BNP:    Lab Results   Component Value Date    PROBNP 1,848 05/14/2018    PROBNP 82 10/12/2017     Fasting Lipid Panel:    Lab Results   Component Value Date    CHOL 116 04/09/2019    HDL 44 04/09/2019    HDL 34 04/21/2010    TRIG 111 04/09/2019     Cardiac Enzymes:  CK/MbTroponin  Lab Results   Component Value Date    CKTOTAL 37 07/31/2012    TROPONINI <0.01 09/11/2018     PT/ INR   Lab Results   Component Value Date    INR 0.99 04/09/2019    INR 1.05 02/06/2019    INR 1.20 06/14/2018    PROTIME 11.3 04/09/2019    PROTIME 12.0 02/06/2019    PROTIME 13.7 06/14/2018     PTT No results found for: PTT   Lab Results   Component Value Date    MG 2.10 06/14/2018    No results found for: TSH    All labs and imaging reviewed today    Assessment:  Unstable angina: improved  CAD: stable, s/p CSI, MARCOS x2 LAD and POBA pre existing dLAD stent 4/9/19    - s/p MARCOS dLAD 2/6/19    - s/p CABG x3 6/2018    - s/p MARCOS LCx and POBA LAD, IABP in the setting of STEMI 5/2018   - s/p nonobstructive angiogram 4/2016  Ischemic cardiomyopathy: stable, EF 55% improved from 40-45% 5/2018  HTN: controlled  HLD: stable, on statin, labs reviewed  DM: hgb a1c 7.1  COPD    Plan:   1. Activity restrictions reviewed  2. Check BP at home  3. Continue aspirin, lipitor, plavix, lisinopril, toprol  4.  Ok to discharge from cardiology perspective, follow up on 5/1/2019    GERMAN Schmid-CNP  [facility]  [phone]

## 2019-04-10 NOTE — PROGRESS NOTES
Post Cath Lab follow up completed by Cath Lab staff. Access site right Femoral Artery site assessed and WNL. Post Cath restrictions reviewed, no further questions.

## 2019-04-11 NOTE — TELEPHONE ENCOUNTER
Denton Schroeder is out of the office today, spoke with a gentleman who took my information as well as the patients. States that he will give this information to Denton bo.

## 2019-04-13 NOTE — DISCHARGE SUMMARY
Hospital Medicine Discharge Summary    Patient: Elvira Allen     Age: 46 y.o. Gender: female  : 1965   MRN: [id number]  Code Status: Full     Admit Date: 2019   Discharge Date: 4/10/2019    Disposition:  Home     Condition at Discharge: Stable    Primary Care Provider: GERMAN Esparza CNP    Admitting Physician: Linda Talley MD  Discharge Physician: Linda Talley MD       Discharge Diagnoses: Active Hospital Problems    Diagnosis Date Noted    Precordial pain [R07.2]     CAD in native artery [I25.10] 2019       Hospital Course:       46 y.o. female who presented to [facility] with chest pain. PMHx significant for CAD/CABG, recent incomplete PCI. Returns for elective PCI with Dr. Dulce Morrissey. Procedure successful after PCI. However, after the procedure, she developed increased chest pressure. EKG stable. Thought to be 2/2 PCI. Started on nitro drip with improvement. Currently chest pain free. No SOB. Assessment/Plan:    CAD s/p PCI: Continue DAPT, Statin, BB. Diabetes Mellitus, Type 2: Controlled. Resume home regimen. Exam:   /67   Pulse 66   Temp 97.5 °F (36.4 °C) (Oral)   Resp 16   Ht 5' 1\" (1.549 m)   Wt 178 lb 8 oz (81 kg)   SpO2 94%   BMI 33.73 kg/m²   General appearance: No apparent distress, appears stated age and cooperative. HEENT:  Normal cephalic, atraumatic without obvious deformity. Pupils equal, round, and reactive to light. Extra ocular muscles intact. Conjunctivae/corneas clear. Neck: Supple, no jugular venous distention. Trachea midline with full range of motion. Respiratory:  Normal respiratory effort. Clear to auscultation, bilaterally without Rales/Wheezes/Rhonchi. Cardiovascular: Regular rate and rhythm with normal S1/S2 without murmurs, rubs or gallops. Abdomen: Soft, non-tender, non-distended with normal bowel sounds. Musculoskelatal: No clubbing, cyanosis or edema bilaterally.   Full range of motion without deformity. Neurologic:  Neurovascularly intact without any focal sensory/motor deficits. Cranial nerves: II-XII intact, grossly non-focal.  Psychiatric: Alert and oriented, thought content appropriate, normal insight  Skin: Skin color, texture, turgor normal.  No rashes or lesions. Capillary Refill: Brisk,< 3 seconds   Peripheral Pulses: +2 palpable, equal bilaterally       Patient Discharge Instructions: Follow-up appointment on May 1st at [place] [id number] with Roshni Baires CNP. Discharge Medications:   Discharge Medication List as of 4/10/2019 12:01 PM        Discharge Medication List as of 4/10/2019 12:01 PM        Discharge Medication List as of 4/10/2019 12:01 PM      CONTINUE these medications which have NOT CHANGED    Details   promethazine (PHENERGAN) 25 MG tablet Take 1 tablet by mouth 4 times daily as needed for Nausea, Disp-15 tablet, R-0Normal      clopidogrel (PLAVIX) 75 MG tablet Take 1 tablet by mouth daily, Disp-30 tablet, R-11Normal      atorvastatin (LIPITOR) 40 MG tablet Take 1 tablet by mouth nightly, Disp-30 tablet, R-5Normal      metoprolol succinate (TOPROL XL) 25 MG extended release tablet Take 1 tablet by mouth daily, Disp-30 tablet, R-11Normal      nitroGLYCERIN (NITROSTAT) 0.4 MG SL tablet up to max of 3 total doses.  If no relief after 1 dose, call 911., Disp-25 tablet, R-3Normal      ondansetron (ZOFRAN-ODT) 4 MG disintegrating tablet Take 1 tablet by mouth every 8 hours as needed for Nausea or Vomiting, Disp-20 tablet, R-1Normal      lisinopril (PRINIVIL;ZESTRIL) 2.5 MG tablet Take 1 tablet by mouth daily, Disp-30 tablet, R-11Normal      aspirin 81 MG chewable tablet Take 1 tablet by mouth daily, Disp-30 tablet, R-11Print      metFORMIN (GLUCOPHAGE) 1000 MG tablet Take 1 tablet by mouth 2 times daily (with meals), Disp-60 tablet, R-0Print      albuterol sulfate HFA (PROVENTIL HFA) 108 (90 Base) MCG/ACT inhaler Inhale 2 puffs into the lungs every 6 hours as needed for Wheezing or Shortness

## 2019-04-17 ENCOUNTER — TELEPHONE (OUTPATIENT)
Dept: CARDIOLOGY CLINIC | Age: 54
End: 2019-04-17

## 2019-04-17 NOTE — TELEPHONE ENCOUNTER
Patient called stating she does not want to proceed with [facility] and would like to speak with Joanne Hernandez

## 2019-04-17 NOTE — TELEPHONE ENCOUNTER
I left message for patient informing her you were OOT and that you would see this message on Monday.

## 2019-04-22 NOTE — TELEPHONE ENCOUNTER
Pt informed she said she is feeling better. She is doing yard work and other things. She said she will d/c EECP.

## 2019-05-01 ENCOUNTER — OFFICE VISIT (OUTPATIENT)
Dept: CARDIOLOGY CLINIC | Age: 54
End: 2019-05-01
Payer: MEDICARE

## 2019-05-01 VITALS
DIASTOLIC BLOOD PRESSURE: 72 MMHG | BODY MASS INDEX: 32.85 KG/M2 | SYSTOLIC BLOOD PRESSURE: 128 MMHG | WEIGHT: 174 LBS | HEIGHT: 61 IN | HEART RATE: 68 BPM | OXYGEN SATURATION: 94 %

## 2019-05-01 DIAGNOSIS — I25.5 ISCHEMIC CARDIOMYOPATHY: ICD-10-CM

## 2019-05-01 DIAGNOSIS — I25.119 CORONARY ARTERY DISEASE INVOLVING NATIVE CORONARY ARTERY OF NATIVE HEART WITH ANGINA PECTORIS (HCC): Primary | ICD-10-CM

## 2019-05-01 DIAGNOSIS — I20.8 CHRONIC STABLE ANGINA (HCC): ICD-10-CM

## 2019-05-01 DIAGNOSIS — E78.5 DYSLIPIDEMIA: ICD-10-CM

## 2019-05-01 DIAGNOSIS — I10 ESSENTIAL HYPERTENSION: ICD-10-CM

## 2019-05-01 DIAGNOSIS — Z98.61 S/P PTCA (PERCUTANEOUS TRANSLUMINAL CORONARY ANGIOPLASTY): ICD-10-CM

## 2019-05-01 PROCEDURE — 99214 OFFICE O/P EST MOD 30 MIN: CPT | Performed by: NURSE PRACTITIONER

## 2019-05-01 ASSESSMENT — ENCOUNTER SYMPTOMS
RESPIRATORY NEGATIVE: 1
GASTROINTESTINAL NEGATIVE: 1

## 2019-05-01 NOTE — PROGRESS NOTES
[facility]   Cardiology Note              Date:  May 1, 2019  Patientname: Lotus De  YOB: 1965    Primary Care physician: GERMAN Pappas CNP    HISTORY OF PRESENT ILLNESS: Lotus De is a 46 y.o. female with a history of CAD/STEMI with multiple PCIs, HTN, COPD, DM. She had an angiogram in 2013 and 2016 that showed moderate CAD, medical management. In 5/2018, she had a posterior MI and had MARCOS to mLCx and POBA mLAD, IABP placement. Decision for CABG and underwent CABG x3 6/13/18. She was recommended for angiogram 2/2019 due to increasing angina. Findings include patent grafts but atretic LIMA and dLAD disease, treated with MARCOS. Continued to have angina. She presented as an outpatient 4/9/2019 for staged PCI in the setting of angina refractory to medical therapy. Underwent PCI LAD with orbital atherectomy, MARCOS x2, and POBA pre existing distal stent. No post procedure complications, discharged 4/10/19. Today she presents for hospital follow up for CAD post PCI. She feels a lot better. She has chronic angina that she experiences but it is improved and not as frequent. She feels she has more energy. No shortness of breath, dizziness or syncope. She has occasional palpitations that are unchanged and not bothersome. No bleeding issues. She has been doing yard work and tolerating well. She has some groin site tenderness but it is improving. BP at home usually 120s-130s/80s.     Past Medical History:   has a past medical history of Acute blood loss anemia, Asthma, Chest pain, COPD (chronic obstructive pulmonary disease) ([address] Coronary artery disease, Depression, Diabetes mellitus ([address] Enlarged heart, Fatigue, Generalized headaches, Hyperlipidemia, Hypertension, Migraine, Morning stiffness of joints, Myocardial infarction ([address] Numbness or tingling, OA (osteoarthritis) of knee, Obesity, Class I, BMI 30.0-34.9 (see actual BMI), Osteoporosis, Pulmonary nodule, and Shortness of breath. Past Surgical History:   has a past surgical history that includes Cholecystectomy; Hysterectomy; Diagnostic Cardiac Cath Lab Procedure (06/06/2013); Upper gastrointestinal endoscopy; Cardiac catheterization (04/01/2016); Cardiac catheterization (08/01/2012); Coronary artery bypass graft (06/13/2018); Coronary angioplasty with stent (05/13/2018); and Percutaneous Transluminal Coronary Angio. Home Medications:    Prior to Admission medications    Medication Sig Start Date End Date Taking? Authorizing Provider   clopidogrel (PLAVIX) 75 MG tablet Take 1 tablet by mouth daily 2/1/19   Rachael Ballard MD   atorvastatin (LIPITOR) 40 MG tablet Take 1 tablet by mouth nightly 11/13/18   Jade Lindsay MD   metoprolol succinate (TOPROL XL) 25 MG extended release tablet Take 1 tablet by mouth daily 10/5/18   Rachael Ballard MD   nitroGLYCERIN (NITROSTAT) 0.4 MG SL tablet up to max of 3 total doses. If no relief after 1 dose, call 911. 9/12/18   Dhurv Ariza MD   ondansetron (ZOFRAN-ODT) 4 MG disintegrating tablet Take 1 tablet by mouth every 8 hours as needed for Nausea or Vomiting 9/12/18   Dhruv Ariza MD   lisinopril (PRINIVIL;ZESTRIL) 2.5 MG tablet Take 1 tablet by mouth daily 7/13/18   Rachael Ballard MD   aspirin 81 MG chewable tablet Take 1 tablet by mouth daily 6/17/18   Daniella Hamilton MD   albuterol sulfate HFA (PROVENTIL HFA) 108 (90 Base) MCG/ACT inhaler Inhale 2 puffs into the lungs every 6 hours as needed for Wheezing or Shortness of Breath 6/17/18   Daniella Hamilton MD   tiotropium (SPIRIVA) 18 MCG inhalation capsule Inhale 1 capsule into the lungs daily  Patient taking differently: Inhale 18 mcg into the lungs daily as needed  6/17/18   Daniella Hamilton MD   metFORMIN (GLUCOPHAGE) 1000 MG tablet Take 1 tablet by mouth 2 times daily (with meals) 6/17/18 4/9/19  Daniella Hamilton MD     Allergies:  Penicillins;  Imdur [isosorbide dinitrate]; and Cephalexin    Social History: Value Date    PROBNP 1,848 05/14/2018    PROBNP 82 10/12/2017     Reviewed all labs and imaging today    Assessment:   Chronic angina: improved  CAD: stable, s/p CSI, MARCOS x2 LAD and POBA pre existing dLAD stent 4/9/19               - s/p MARCOS dLAD 2/6/19               - s/p CABG x3 6/2018               - s/p MARCOS LCx and POBA LAD, IABP in the setting of STEMI 5/2018              - s/p nonobstructive angiogram 4/2016 and 2013  Ischemic cardiomyopathy: stable, EF 55% improved from 40-45% 5/2018  HTN: controlled  HLD: stable, on statin, labs reviewed  DM: hgb a1c 7.1  COPD     Plan:   1. Cardiac rehab referral  2. Continue aspirin, lipitor, plavix, lisinopril, toprol  3. Encouraged diet, exercise, DM management  4. Follow up as scheduled with Dr. Kristy Chilel. Ok to move appointment out 3 months but patient prefers to keep original appointment.     Meena Márquez, APRN-CNP  [address]  [phone]

## 2019-05-28 NOTE — PROGRESS NOTES
c/w ischemia and infarction in the territory of the mid and distal LCx  and/or RCA. Fixed defects in the mid and basal lateral walls c/w infarct; EF>65% with uniform wall motion. Dr. Micah Tatum decided to fix LAD and performed most recent [facility] 4/9/19 with 2 Agustin MARCOS to prox/mid LAD and POBA of distal LAD. Today she reports feeling fatigued and anxious. The chest pressure is better post [facility] 4/9/19. She felt \"great\" until 1-2 weeks ago but is anxious and doesn't feel like doing anything. She denies suicidal or homicidal ideation. She denies edema, dizziness, palpitations and syncope. She reports she has seen a psychiatrist for recent mood and condition. She reports possible mood disorder/PTSD from current health issues/procedures. Past Medical History:   has a past medical history of Acute blood loss anemia, Asthma, Chest pain, COPD (chronic obstructive pulmonary disease) ([address] Coronary artery disease, Depression, Diabetes mellitus ([address] Enlarged heart, Fatigue, Generalized headaches, Hyperlipidemia, Hypertension, Migraine, Morning stiffness of joints, Myocardial infarction ([address] Numbness or tingling, OA (osteoarthritis) of knee, Obesity, Class I, BMI 30.0-34.9 (see actual BMI), Osteoporosis, Pulmonary nodule, and Shortness of breath. Surgical History:   has a past surgical history that includes Cholecystectomy; Hysterectomy; Diagnostic Cardiac Cath Lab Procedure (06/06/2013); Upper gastrointestinal endoscopy; Cardiac catheterization (04/01/2016); Cardiac catheterization (08/01/2012); Coronary artery bypass graft (06/13/2018); Coronary angioplasty with stent (05/13/2018); and Percutaneous Transluminal Coronary Angio. Social History:   reports that she has quit smoking. Her smoking use included cigarettes. She has a 20.00 pack-year smoking history. She has never used smokeless tobacco. She reports that she does not drink alcohol or use drugs.      Family History:  family history includes Cancer in her brother and another family member; Diabetes in an other family member; Emphysema in her mother; Heart Disease in her father and mother; Heart Failure in her father, maternal aunt, maternal uncle, mother, and another family member; High Blood Pressure in her brother, father, maternal grandfather, maternal grandmother, mother, paternal grandfather, paternal grandmother, sister, sister, sister, and sister; High Cholesterol in her father and mother; Hypertension in her brother, father, maternal aunt, maternal grandfather, maternal grandmother, maternal uncle, mother, paternal aunt, paternal grandfather, paternal grandmother, paternal uncle, and sister; Osteoarthritis in an other family member; Stroke in her mother. Home Medications:  Prior to Admission medications    Medication Sig Start Date End Date Taking? Authorizing Provider   glimepiride (AMARYL) 4 MG tablet Take 4 mg by mouth 2 times daily. Yes Historical Provider, MD MUHAMMAD Home oxygen 2L per [place]   Yes Historical Provider, MD   tiotropium (SPIRIVA HANDIHALER) 18 MCG inhalation capsule Inhale 1 capsule into the lungs daily. 2/27/13  Yes Jl Clark MD   amLODIPine (NORVASC) 5 MG tablet Take 5 mg by mouth 2 times daily. Yes Historical Provider, MD   albuterol (PROVENTIL HFA) 108 (90 BASE) MCG/ACT inhaler Inhale 2 puffs into the lungs every 6 hours as needed for Shortness of Breath for 10 days. IF YOU FEEL AS THO YOU ARE STILL NEEDING THIS MEDICATION AFTER 10 DAYS, CALL YOUR DR TO DISCUSS FURTHER TREATMENT 1/22/13  Yes Marco Benz MD   propranolol (INDERAL) 40 MG tablet Take 40 mg by mouth 2 times daily. Yes Historical Provider, MD   SUMAtriptan (IMITREX) 50 MG tablet Take 50 mg by mouth once as needed. Yes Historical Provider, MD   nitroGLYCERIN (NITROSTAT) 0.4 MG SL tablet Place 0.4 mg under the tongue every 5 minutes as needed. Yes Historical Provider, MD   pravastatin (PRAVACHOL) 40 MG tablet Take 1 tablet by mouth daily. 8/1/12  Yes Collette Hicks MD   insulin glargine (LANTUS) 100 UNIT/ML injection Inject 25 Units into the skin daily. Yes Historical Provider, MD   aspirin 81 MG EC tablet Take 81 mg by mouth daily. Yes Historical Provider, MD   omeprazole (PRILOSEC) 20 MG capsule Take 20 mg by mouth 2 times daily. Yes Historical Provider, MD   enalapril (VASOTEC) 20 MG tablet Take 20 mg by mouth 2 times daily. Yes Historical Provider, MD      Allergies:  Penicillins; Imdur [isosorbide dinitrate]; and Cephalexin     Review of Systems:   · Constitutional: there has been no unanticipated weight loss. There's been no change in energy level, sleep pattern, or activity level. · Eyes: No visual changes or diplopia. No scleral icterus. · ENT: No Headaches, hearing loss or vertigo. No mouth sores or sore throat. · Cardiovascular: Reviewed in HPI  · Respiratory: No cough or wheezing, no sputum production. No hematemesis. · Gastrointestinal: No abdominal pain, appetite loss, blood in stools. No change in bowel or bladder habits. · Genitourinary: No dysuria, trouble voiding, or hematuria. · Musculoskeletal:  No gait disturbance, weakness or joint complaints. · Integumentary: No rash or pruritis. · Neurological: No headache, diplopia, change in muscle strength, numbness or tingling. No change in gait, balance, coordination, mood, affect, memory, mentation, behavior. · Psychiatric: No anxiety, no depression. · Endocrine: No malaise, fatigue or temperature intolerance. No excessive thirst, fluid intake, or urination. No tremor. · Hematologic/Lymphatic: No abnormal bruising or bleeding, blood clots or swollen lymph nodes. · Allergic/Immunologic: No nasal congestion or hives. Physical Examination:    There were no vitals filed for this visit.      Constitutional and General Appearance: NAD   Respiratory:  · Normal excursion and expansion without use of accessory muscles  · Resp Auscultation: Normal soft breath sounds at the bases without dullness  Cardiovascular:  · The apical impulses not displaced  · Heart tones are crisp and normal tachycardia and irregular rhythm   · Cervical veins are not engorged  · The carotid upstroke is normal in amplitude and contour without delay or bruit  · Normal S1S2, No S3, No Murmur  · Peripheral pulses are symmetrical and full  · There is no clubbing, cyanosis of the extremities. · No edema  · Femoral Arteries: 2+ and equal  · Pedal Pulses: 2+ and equal   Abdomen:  · No masses or tenderness  · Liver/Spleen: No Abnormalities Noted  Neurological/Psychiatric:  · Alert and oriented in all spheres  · Moves all extremities well  · Exhibits normal gait balance and coordination  · No abnormalities of mood, affect, memory, mentation, or behavior are noted    Lab Results   Component Value Date    CHOL 116 04/09/2019    CHOL 123 02/06/2019    CHOL 122 09/11/2018     Lab Results   Component Value Date    TRIG 111 04/09/2019    TRIG 110 02/06/2019    TRIG 90 09/11/2018     Lab Results   Component Value Date    HDL 44 04/09/2019    HDL 40 02/06/2019    HDL 53 09/11/2018     Lab Results   Component Value Date    LDLCALC 50 04/09/2019    LDLCALC 61 02/06/2019    LDLCALC 51 09/11/2018     Lab Results   Component Value Date    LABVLDL 22 04/09/2019    LABVLDL 22 02/06/2019    LABVLDL 18 09/11/2018     Assessment:     1. CAD: Underwent 3V CABG with pedicled LIMA to LAD, SVG to OM 2, separate SVG to PDA of RCA on 6/13/18 by Dr. Elray Saint. She has battled recurrent CP and graft failure. Due to CP she underwent most recent lexiscan myoview 3/18/19 Small-moderate sized inferolateral partial reversibility defect c/w ischemia and infarction in the territory of the mid and distal LCx  and/or RCA. Fixed defects in the mid and basal lateral walls c/w infarct; EF>65% with uniform wall motion. Dr. Dulce Morrissey decided to fix LAD and performed most recent [facility] 4/9/19 with 2 [place] MARCOS to prox/mid LAD and POBA of distal LAD. Note CP is improved but I believe main issue now is her anxiety and possible depression. She also feels this is an issue. She needs mental health professional help. 2. Hyperlipidemia I personally reviewed most recent lipids from 4/9/16. Switched from pravastatin 40mg qhs to lipitor 40mg daily. Well controlled and will continue current medical regimen. Higher dose lipitor caused nausea and anorexia and now taking 40mg dose and tolerating well. 3.  Palpitations: Improved but has them associated with chest pain now. Continue metoprolol. Plan:  1. Meds reviewed. No refills today. 2. No cardiac testing at this time   3. Check TSH in view of fatigue just to make sure not hypothyroid. 4. Follow up with me in 3 months   5. Talk to Yanely Mcfadden about moods/anxiety/depression. Possible mental health professional     This note was scribed in the presence of Armida Adamson MD by Minna Jean RN    I, Dr. Staci Cao, personally performed the services described in this documentation, as scribed by the above signed scribe in my presence. It is both accurate and complete to my knowledge. I agree with the details independently gathered by the clinical support staff, while the remaining scribed note accurately describes my personal service to the patient. Thank you for allowing me to participate in the care of this individual.    Cost of prescription medications and patient compliance have been reviewed with patient. All questions answered. Elva Hansen.  Jericho Brennan M.D., [facility]

## 2019-05-30 ENCOUNTER — OFFICE VISIT (OUTPATIENT)
Dept: CARDIOLOGY CLINIC | Age: 54
End: 2019-05-30
Payer: MEDICAID

## 2019-05-30 ENCOUNTER — HOSPITAL ENCOUNTER (OUTPATIENT)
Age: 54
Discharge: HOME | End: 2019-05-30
Payer: MEDICARE

## 2019-05-30 VITALS
SYSTOLIC BLOOD PRESSURE: 122 MMHG | OXYGEN SATURATION: 96 % | WEIGHT: 174 LBS | DIASTOLIC BLOOD PRESSURE: 70 MMHG | HEART RATE: 59 BPM | HEIGHT: 61 IN | BODY MASS INDEX: 32.85 KG/M2

## 2019-05-30 DIAGNOSIS — E78.2 MIXED HYPERLIPIDEMIA: Chronic | ICD-10-CM

## 2019-05-30 DIAGNOSIS — R53.83 FATIGUE, UNSPECIFIED TYPE: ICD-10-CM

## 2019-05-30 DIAGNOSIS — I25.119 CORONARY ARTERY DISEASE INVOLVING NATIVE CORONARY ARTERY OF NATIVE HEART WITH ANGINA PECTORIS (HCC): Primary | ICD-10-CM

## 2019-05-30 LAB — TSH REFLEX: 0.61 UIU/ML (ref 0.27–4.2)

## 2019-05-30 PROCEDURE — 36415 COLL VENOUS BLD VENIPUNCTURE: CPT

## 2019-05-30 PROCEDURE — 84443 ASSAY THYROID STIM HORMONE: CPT

## 2019-05-30 PROCEDURE — 99214 OFFICE O/P EST MOD 30 MIN: CPT | Performed by: INTERNAL MEDICINE

## 2019-05-30 NOTE — PATIENT INSTRUCTIONS
c/w ischemia and infarction in the territory of the mid and distal LCx  and/or RCA. Fixed defects in the mid and basal lateral walls c/w infarct; EF>65% with uniform wall motion. Dr. Milagros Santana decided to fix LAD and performed most recent [facility] 4/9/19 with 2 [place] MARCOS to prox/mid LAD and POBA of distal LAD. Today she reports feeling fatigued and anxious. The chest pressure is better post [facility] 4/9/19. She felt \"great\" until 1-2 weeks ago but is anxious and doesn't feel like doing anything. She denies suicidal or homicidal ideation. She denies edema, dizziness, palpitations and syncope. She reports she has seen a psychiatrist for recent mood and condition. She reports possible mood disorder/PTSD from current health issues/procedures. Past Medical History:   has a past medical history of Acute blood loss anemia, Asthma, Chest pain, COPD (chronic obstructive pulmonary disease) ([address] Coronary artery disease, Depression, Diabetes mellitus ([address] Enlarged heart, Fatigue, Generalized headaches, Hyperlipidemia, Hypertension, Migraine, Morning stiffness of joints, Myocardial infarction ([address] Numbness or tingling, OA (osteoarthritis) of knee, Obesity, Class I, BMI 30.0-34.9 (see actual BMI), Osteoporosis, Pulmonary nodule, and Shortness of breath. Surgical History:   has a past surgical history that includes Cholecystectomy; Hysterectomy; Diagnostic Cardiac Cath Lab Procedure (06/06/2013); Upper gastrointestinal endoscopy; Cardiac catheterization (04/01/2016); Cardiac catheterization (08/01/2012); Coronary artery bypass graft (06/13/2018); Coronary angioplasty with stent (05/13/2018); and Percutaneous Transluminal Coronary Angio. Social History:   reports that she has quit smoking. Her smoking use included cigarettes. She has a 20.00 pack-year smoking history. She has never used smokeless tobacco. She reports that she does not drink alcohol or use drugs.      Family History:  family history includes Cancer in her 8/1/12  Yes Marvin Garcia MD   insulin glargine (LANTUS) 100 UNIT/ML injection Inject 25 Units into the skin daily. Yes Historical Provider, MD   aspirin 81 MG EC tablet Take 81 mg by mouth daily. Yes Historical Provider, MD   omeprazole (PRILOSEC) 20 MG capsule Take 20 mg by mouth 2 times daily. Yes Historical Provider, MD   enalapril (VASOTEC) 20 MG tablet Take 20 mg by mouth 2 times daily. Yes Historical Provider, MD      Allergies:  Penicillins; Imdur [isosorbide dinitrate]; and Cephalexin     Review of Systems:   · Constitutional: there has been no unanticipated weight loss. There's been no change in energy level, sleep pattern, or activity level. · Eyes: No visual changes or diplopia. No scleral icterus. · ENT: No Headaches, hearing loss or vertigo. No mouth sores or sore throat. · Cardiovascular: Reviewed in HPI  · Respiratory: No cough or wheezing, no sputum production. No hematemesis. · Gastrointestinal: No abdominal pain, appetite loss, blood in stools. No change in bowel or bladder habits. · Genitourinary: No dysuria, trouble voiding, or hematuria. · Musculoskeletal:  No gait disturbance, weakness or joint complaints. · Integumentary: No rash or pruritis. · Neurological: No headache, diplopia, change in muscle strength, numbness or tingling. No change in gait, balance, coordination, mood, affect, memory, mentation, behavior. · Psychiatric: No anxiety, no depression. · Endocrine: No malaise, fatigue or temperature intolerance. No excessive thirst, fluid intake, or urination. No tremor. · Hematologic/Lymphatic: No abnormal bruising or bleeding, blood clots or swollen lymph nodes. · Allergic/Immunologic: No nasal congestion or hives. Physical Examination:    There were no vitals filed for this visit.      Constitutional and General Appearance: NAD   Respiratory:  · Normal excursion and expansion without use of accessory muscles  · Resp Auscultation: Normal soft breath

## 2019-05-31 ENCOUNTER — TELEPHONE (OUTPATIENT)
Dept: CARDIOLOGY CLINIC | Age: 54
End: 2019-05-31

## 2019-05-31 NOTE — TELEPHONE ENCOUNTER
----- Message from Mona Pederson MD sent at 5/31/2019  8:02 AM EDT -----  Thyroid normal function. Please inform her. Thanks.

## 2019-07-29 ENCOUNTER — HOSPITAL ENCOUNTER (INPATIENT)
Age: 54
LOS: 1 days | Discharge: HOME | DRG: 198 | End: 2019-07-30
Attending: INTERNAL MEDICINE | Admitting: INTERNAL MEDICINE
Payer: MEDICAID

## 2019-07-29 ENCOUNTER — APPOINTMENT (OUTPATIENT)
Dept: GENERAL RADIOLOGY | Age: 54
End: 2019-07-29
Payer: MEDICAID

## 2019-07-29 ENCOUNTER — HOSPITAL ENCOUNTER (EMERGENCY)
Age: 54
Discharge: OTHER INSTITUTION | End: 2019-07-29
Attending: EMERGENCY MEDICINE
Payer: MEDICAID

## 2019-07-29 VITALS
HEART RATE: 66 BPM | SYSTOLIC BLOOD PRESSURE: 140 MMHG | HEIGHT: 61 IN | DIASTOLIC BLOOD PRESSURE: 68 MMHG | WEIGHT: 173 LBS | TEMPERATURE: 98.2 F | RESPIRATION RATE: 16 BRPM | BODY MASS INDEX: 32.66 KG/M2 | OXYGEN SATURATION: 96 %

## 2019-07-29 DIAGNOSIS — R07.9 CHEST PAIN, UNSPECIFIED TYPE: Primary | ICD-10-CM

## 2019-07-29 LAB
A/G RATIO: 1.6 (ref 1.1–2.2)
ALBUMIN SERPL-MCNC: 4.5 G/DL (ref 3.4–5)
ALP BLD-CCNC: 77 U/L (ref 40–129)
ALT SERPL-CCNC: 14 U/L (ref 10–40)
ANION GAP SERPL CALCULATED.3IONS-SCNC: 9 MMOL/L (ref 3–16)
APTT: 29.3 SEC (ref 26–36)
AST SERPL-CCNC: 16 U/L (ref 15–37)
BASOPHILS ABSOLUTE: 0 K/UL (ref 0–0.2)
BASOPHILS RELATIVE PERCENT: 0.6 %
BILIRUB SERPL-MCNC: 0.8 MG/DL (ref 0–1)
BUN BLDV-MCNC: 13 MG/DL (ref 7–20)
CALCIUM SERPL-MCNC: 10.4 MG/DL (ref 8.3–10.6)
CHLORIDE BLD-SCNC: 103 MMOL/L (ref 99–110)
CO2: 29 MMOL/L (ref 21–32)
CREAT SERPL-MCNC: 0.7 MG/DL (ref 0.6–1.1)
EKG ATRIAL RATE: 61 BPM
EKG DIAGNOSIS: NORMAL
EKG P AXIS: 51 DEGREES
EKG P-R INTERVAL: 148 MS
EKG Q-T INTERVAL: 416 MS
EKG QRS DURATION: 98 MS
EKG QTC CALCULATION (BAZETT): 418 MS
EKG R AXIS: -21 DEGREES
EKG T AXIS: 55 DEGREES
EKG VENTRICULAR RATE: 61 BPM
EOSINOPHILS ABSOLUTE: 0.1 K/UL (ref 0–0.6)
EOSINOPHILS RELATIVE PERCENT: 1.2 %
GFR AFRICAN AMERICAN: >60
GFR NON-AFRICAN AMERICAN: >60
GLOBULIN: 2.8 G/DL
GLUCOSE BLD-MCNC: 175 MG/DL (ref 70–99)
GLUCOSE BLD-MCNC: 271 MG/DL (ref 70–99)
HCT VFR BLD CALC: 43.3 % (ref 36–48)
HEMOGLOBIN: 14.5 G/DL (ref 12–16)
INR BLD: 1.02 (ref 0.86–1.14)
LYMPHOCYTES ABSOLUTE: 1.7 K/UL (ref 1–5.1)
LYMPHOCYTES RELATIVE PERCENT: 25.7 %
MCH RBC QN AUTO: 30.1 PG (ref 26–34)
MCHC RBC AUTO-ENTMCNC: 33.5 G/DL (ref 31–36)
MCV RBC AUTO: 89.6 FL (ref 80–100)
MONOCYTES ABSOLUTE: 0.4 K/UL (ref 0–1.3)
MONOCYTES RELATIVE PERCENT: 6.1 %
NEUTROPHILS ABSOLUTE: 4.4 K/UL (ref 1.7–7.7)
NEUTROPHILS RELATIVE PERCENT: 66.4 %
PDW BLD-RTO: 13.2 % (ref 12.4–15.4)
PERFORMED ON: ABNORMAL
PLATELET # BLD: 162 K/UL (ref 135–450)
PMV BLD AUTO: 9.1 FL (ref 5–10.5)
POTASSIUM SERPL-SCNC: 4.6 MMOL/L (ref 3.5–5.1)
PROTHROMBIN TIME: 11.6 SEC (ref 9.8–13)
RBC # BLD: 4.83 M/UL (ref 4–5.2)
SODIUM BLD-SCNC: 141 MMOL/L (ref 136–145)
TOTAL PROTEIN: 7.3 G/DL (ref 6.4–8.2)
TROPONIN: <0.01 NG/ML
WBC # BLD: 6.6 K/UL (ref 4–11)

## 2019-07-29 PROCEDURE — 36415 COLL VENOUS BLD VENIPUNCTURE: CPT

## 2019-07-29 PROCEDURE — 96374 THER/PROPH/DIAG INJ IV PUSH: CPT

## 2019-07-29 PROCEDURE — 6370000000 HC RX 637 (ALT 250 FOR IP): Performed by: NURSE PRACTITIONER

## 2019-07-29 PROCEDURE — 71045 X-RAY EXAM CHEST 1 VIEW: CPT

## 2019-07-29 PROCEDURE — 85730 THROMBOPLASTIN TIME PARTIAL: CPT

## 2019-07-29 PROCEDURE — 84484 ASSAY OF TROPONIN QUANT: CPT

## 2019-07-29 PROCEDURE — 96375 TX/PRO/DX INJ NEW DRUG ADDON: CPT

## 2019-07-29 PROCEDURE — 85025 COMPLETE CBC W/AUTO DIFF WBC: CPT

## 2019-07-29 PROCEDURE — 6360000002 HC RX W HCPCS: Performed by: NURSE PRACTITIONER

## 2019-07-29 PROCEDURE — 6370000000 HC RX 637 (ALT 250 FOR IP): Performed by: EMERGENCY MEDICINE

## 2019-07-29 PROCEDURE — 93010 ELECTROCARDIOGRAM REPORT: CPT | Performed by: INTERNAL MEDICINE

## 2019-07-29 PROCEDURE — 93005 ELECTROCARDIOGRAM TRACING: CPT | Performed by: EMERGENCY MEDICINE

## 2019-07-29 PROCEDURE — G0379 DIRECT REFER HOSPITAL OBSERV: HCPCS

## 2019-07-29 PROCEDURE — 96376 TX/PRO/DX INJ SAME DRUG ADON: CPT

## 2019-07-29 PROCEDURE — 6360000002 HC RX W HCPCS: Performed by: INTERNAL MEDICINE

## 2019-07-29 PROCEDURE — 2580000003 HC RX 258: Performed by: NURSE PRACTITIONER

## 2019-07-29 PROCEDURE — 99285 EMERGENCY DEPT VISIT HI MDM: CPT

## 2019-07-29 PROCEDURE — G0378 HOSPITAL OBSERVATION PER HR: HCPCS

## 2019-07-29 PROCEDURE — 6360000002 HC RX W HCPCS: Performed by: EMERGENCY MEDICINE

## 2019-07-29 PROCEDURE — 1200000000 HC SEMI PRIVATE

## 2019-07-29 PROCEDURE — 80053 COMPREHEN METABOLIC PANEL: CPT

## 2019-07-29 PROCEDURE — 85610 PROTHROMBIN TIME: CPT

## 2019-07-29 RX ORDER — HEPARIN SODIUM 1000 [USP'U]/ML
3600 INJECTION, SOLUTION INTRAVENOUS; SUBCUTANEOUS PRN
Status: DISCONTINUED | OUTPATIENT
Start: 2019-07-29 | End: 2019-07-29 | Stop reason: HOSPADM

## 2019-07-29 RX ORDER — HYDROMORPHONE HCL 110MG/55ML
1 PATIENT CONTROLLED ANALGESIA SYRINGE INTRAVENOUS EVERY 4 HOURS PRN
Status: DISCONTINUED | OUTPATIENT
Start: 2019-07-29 | End: 2019-07-30 | Stop reason: HOSPADM

## 2019-07-29 RX ORDER — ONDANSETRON 2 MG/ML
4 INJECTION INTRAMUSCULAR; INTRAVENOUS EVERY 6 HOURS PRN
Status: DISCONTINUED | OUTPATIENT
Start: 2019-07-29 | End: 2019-07-30 | Stop reason: HOSPADM

## 2019-07-29 RX ORDER — ASPIRIN 81 MG/1
162 TABLET, CHEWABLE ORAL ONCE
Status: COMPLETED | OUTPATIENT
Start: 2019-07-29 | End: 2019-07-29

## 2019-07-29 RX ORDER — HYDROMORPHONE HCL 110MG/55ML
0.5 PATIENT CONTROLLED ANALGESIA SYRINGE INTRAVENOUS EVERY 4 HOURS PRN
Status: DISCONTINUED | OUTPATIENT
Start: 2019-07-29 | End: 2019-07-30 | Stop reason: HOSPADM

## 2019-07-29 RX ORDER — SODIUM CHLORIDE 0.9 % (FLUSH) 0.9 %
10 SYRINGE (ML) INJECTION PRN
Status: DISCONTINUED | OUTPATIENT
Start: 2019-07-29 | End: 2019-07-30 | Stop reason: HOSPADM

## 2019-07-29 RX ORDER — PANTOPRAZOLE SODIUM 40 MG/1
40 TABLET, DELAYED RELEASE ORAL
Status: DISCONTINUED | OUTPATIENT
Start: 2019-07-30 | End: 2019-07-30 | Stop reason: HOSPADM

## 2019-07-29 RX ORDER — CLOPIDOGREL BISULFATE 75 MG/1
75 TABLET ORAL DAILY
Status: DISCONTINUED | OUTPATIENT
Start: 2019-07-30 | End: 2019-07-30 | Stop reason: HOSPADM

## 2019-07-29 RX ORDER — SODIUM CHLORIDE 0.9 % (FLUSH) 0.9 %
10 SYRINGE (ML) INJECTION EVERY 12 HOURS SCHEDULED
Status: DISCONTINUED | OUTPATIENT
Start: 2019-07-29 | End: 2019-07-30 | Stop reason: HOSPADM

## 2019-07-29 RX ORDER — ONDANSETRON 2 MG/ML
4 INJECTION INTRAMUSCULAR; INTRAVENOUS ONCE
Status: COMPLETED | OUTPATIENT
Start: 2019-07-29 | End: 2019-07-29

## 2019-07-29 RX ORDER — PANTOPRAZOLE SODIUM 40 MG/1
40 TABLET, DELAYED RELEASE ORAL 2 TIMES DAILY
COMMUNITY
Start: 2019-07-23 | End: 2021-06-17 | Stop reason: SDUPTHER

## 2019-07-29 RX ORDER — HEPARIN SODIUM 10000 [USP'U]/100ML
8.4 INJECTION, SOLUTION INTRAVENOUS CONTINUOUS
Status: DISCONTINUED | OUTPATIENT
Start: 2019-07-29 | End: 2019-07-30

## 2019-07-29 RX ORDER — NITROGLYCERIN 0.4 MG/1
0.4 TABLET SUBLINGUAL EVERY 5 MIN PRN
Status: DISCONTINUED | OUTPATIENT
Start: 2019-07-29 | End: 2019-07-30 | Stop reason: HOSPADM

## 2019-07-29 RX ORDER — NICOTINE POLACRILEX 4 MG
15 LOZENGE BUCCAL PRN
Status: DISCONTINUED | OUTPATIENT
Start: 2019-07-29 | End: 2019-07-30 | Stop reason: HOSPADM

## 2019-07-29 RX ORDER — ASPIRIN 81 MG/1
81 TABLET, CHEWABLE ORAL DAILY
Status: DISCONTINUED | OUTPATIENT
Start: 2019-07-30 | End: 2019-07-30 | Stop reason: HOSPADM

## 2019-07-29 RX ORDER — DEXTROSE MONOHYDRATE 50 MG/ML
100 INJECTION, SOLUTION INTRAVENOUS PRN
Status: DISCONTINUED | OUTPATIENT
Start: 2019-07-29 | End: 2019-07-30 | Stop reason: HOSPADM

## 2019-07-29 RX ORDER — HEPARIN SODIUM 1000 [USP'U]/ML
1800 INJECTION, SOLUTION INTRAVENOUS; SUBCUTANEOUS PRN
Status: DISCONTINUED | OUTPATIENT
Start: 2019-07-29 | End: 2019-07-29 | Stop reason: HOSPADM

## 2019-07-29 RX ORDER — HEPARIN SODIUM 1000 [USP'U]/ML
3600 INJECTION, SOLUTION INTRAVENOUS; SUBCUTANEOUS ONCE
Status: COMPLETED | OUTPATIENT
Start: 2019-07-29 | End: 2019-07-29

## 2019-07-29 RX ORDER — SODIUM CHLORIDE 9 MG/ML
INJECTION, SOLUTION INTRAVENOUS CONTINUOUS
Status: DISPENSED | OUTPATIENT
Start: 2019-07-29 | End: 2019-07-30

## 2019-07-29 RX ORDER — ALBUTEROL SULFATE 90 UG/1
2 AEROSOL, METERED RESPIRATORY (INHALATION) EVERY 6 HOURS PRN
Status: DISCONTINUED | OUTPATIENT
Start: 2019-07-29 | End: 2019-07-30 | Stop reason: HOSPADM

## 2019-07-29 RX ORDER — ATORVASTATIN CALCIUM 40 MG/1
40 TABLET, FILM COATED ORAL NIGHTLY
Status: DISCONTINUED | OUTPATIENT
Start: 2019-07-29 | End: 2019-07-30 | Stop reason: HOSPADM

## 2019-07-29 RX ORDER — NITROGLYCERIN 0.4 MG/1
0.4 TABLET SUBLINGUAL EVERY 5 MIN PRN
Status: DISCONTINUED | OUTPATIENT
Start: 2019-07-29 | End: 2019-07-29 | Stop reason: HOSPADM

## 2019-07-29 RX ORDER — DEXTROSE MONOHYDRATE 25 G/50ML
12.5 INJECTION, SOLUTION INTRAVENOUS PRN
Status: DISCONTINUED | OUTPATIENT
Start: 2019-07-29 | End: 2019-07-30 | Stop reason: HOSPADM

## 2019-07-29 RX ORDER — LISINOPRIL 2.5 MG/1
2.5 TABLET ORAL DAILY
Status: DISCONTINUED | OUTPATIENT
Start: 2019-07-30 | End: 2019-07-30 | Stop reason: HOSPADM

## 2019-07-29 RX ORDER — METOPROLOL SUCCINATE 25 MG/1
25 TABLET, EXTENDED RELEASE ORAL DAILY
Status: DISCONTINUED | OUTPATIENT
Start: 2019-07-30 | End: 2019-07-30

## 2019-07-29 RX ORDER — HEPARIN SODIUM 10000 [USP'U]/100ML
7.2 INJECTION, SOLUTION INTRAVENOUS CONTINUOUS
Status: DISCONTINUED | OUTPATIENT
Start: 2019-07-29 | End: 2019-07-29 | Stop reason: HOSPADM

## 2019-07-29 RX ADMIN — HEPARIN SODIUM 7.2 ML/HR: 10000 INJECTION, SOLUTION INTRAVENOUS at 23:50

## 2019-07-29 RX ADMIN — HYDROMORPHONE HYDROCHLORIDE 0.5 MG: 1 INJECTION, SOLUTION INTRAMUSCULAR; INTRAVENOUS; SUBCUTANEOUS at 13:03

## 2019-07-29 RX ADMIN — ONDANSETRON 4 MG: 2 INJECTION INTRAMUSCULAR; INTRAVENOUS at 15:25

## 2019-07-29 RX ADMIN — NITROGLYCERIN 0.4 MG: 0.4 TABLET SUBLINGUAL at 12:48

## 2019-07-29 RX ADMIN — SODIUM CHLORIDE: 9 INJECTION, SOLUTION INTRAVENOUS at 23:50

## 2019-07-29 RX ADMIN — HYDROMORPHONE HYDROCHLORIDE 1 MG: 2 INJECTION, SOLUTION INTRAMUSCULAR; INTRAVENOUS; SUBCUTANEOUS at 21:03

## 2019-07-29 RX ADMIN — HEPARIN SODIUM 3600 UNITS: 1000 INJECTION, SOLUTION INTRAVENOUS; SUBCUTANEOUS at 15:08

## 2019-07-29 RX ADMIN — ONDANSETRON 4 MG: 2 INJECTION INTRAMUSCULAR; INTRAVENOUS at 23:50

## 2019-07-29 RX ADMIN — ASPIRIN 81 MG 162 MG: 81 TABLET ORAL at 12:48

## 2019-07-29 RX ADMIN — INSULIN LISPRO 3 UNITS: 100 INJECTION, SOLUTION INTRAVENOUS; SUBCUTANEOUS at 23:50

## 2019-07-29 RX ADMIN — HEPARIN SODIUM 7.2 ML/HR: 10000 INJECTION, SOLUTION INTRAVENOUS at 15:08

## 2019-07-29 RX ADMIN — NITROGLYCERIN 1 INCH: 20 OINTMENT TOPICAL at 13:40

## 2019-07-29 RX ADMIN — HYDROMORPHONE HYDROCHLORIDE 0.5 MG: 1 INJECTION, SOLUTION INTRAMUSCULAR; INTRAVENOUS; SUBCUTANEOUS at 15:24

## 2019-07-29 RX ADMIN — ONDANSETRON 4 MG: 2 INJECTION INTRAMUSCULAR; INTRAVENOUS at 12:48

## 2019-07-29 ASSESSMENT — PAIN DESCRIPTION - PAIN TYPE
TYPE: ACUTE PAIN

## 2019-07-29 ASSESSMENT — PAIN SCALES - GENERAL
PAINLEVEL_OUTOF10: 2
PAINLEVEL_OUTOF10: 5
PAINLEVEL_OUTOF10: 6
PAINLEVEL_OUTOF10: 5
PAINLEVEL_OUTOF10: 5

## 2019-07-29 ASSESSMENT — PAIN DESCRIPTION - ORIENTATION: ORIENTATION: LEFT

## 2019-07-29 ASSESSMENT — ENCOUNTER SYMPTOMS
ABDOMINAL PAIN: 0
SORE THROAT: 0
SHORTNESS OF BREATH: 0
CHEST TIGHTNESS: 1
DIARRHEA: 0
NAUSEA: 0
VOMITING: 0

## 2019-07-29 ASSESSMENT — PAIN DESCRIPTION - LOCATION
LOCATION: CHEST

## 2019-07-29 ASSESSMENT — PAIN DESCRIPTION - FREQUENCY
FREQUENCY: INTERMITTENT
FREQUENCY: INTERMITTENT

## 2019-07-29 ASSESSMENT — PAIN DESCRIPTION - DESCRIPTORS: DESCRIPTORS: SQUEEZING

## 2019-07-29 NOTE — PROGRESS NOTES
Pharmacy Note  RE: Heparin Drip Low Dose Protocol  Initial rate = 7.2 ml/hr    Initial APTT = 29.3 (goal 54-90)  Initial Heparin   3600 unit bolus    Next APTT due @ 7/29/19 at 2300  Probable transfer to [place]  Will continue to closely monitor and adjust as necessary.   Antonio Gold  7/29/2019 3:30 PM

## 2019-07-29 NOTE — ED NOTES
Nurse called and spoke with Clinical Nel due to pt and spouse continuously asking about plans to transfer. Bed should be available within an hour.      Kishan Molina RN  07/29/19 [id number]

## 2019-07-29 NOTE — ED PROVIDER NOTES
Patient presents emergency department complaint of chest pain. Patient states that she awoke at approximately 3:00 this morning and not did not feel well has had a pressure sensation as if someone was sitting on her chest.  She states that she wants at the bathroom for a while and started to get better and eventually she fell back asleep. She states she would not take nitroglycerin unless it just was not getting any better. She states that she got up with her  and then she started to do her daily chores and as she was doing them she started to have the discomfort that begins at her elbows that radiates to her chest and her neck which she has had in the past when she has had an MI. Patient states that she was also nauseated. She has not missed any of her medications and she did take a total of 2 baby aspirin this morning and then a nitroglycerin without relief so came to the emergency department. She states that after she had her stents placed she felt so much better but she states that her symptoms are starting to return the way they have in the past where she is fatigued having daily chest pain and periodic palpitations. She has had no recent earache sore throat cough cold no bowel or bladder problems no change in vision no rashes no other complaints. PAST MEDICAL HISTORY   has a past medical history of Acute blood loss anemia, Asthma, Chest pain, COPD (chronic obstructive pulmonary disease) ([address] Coronary artery disease (9/4/2012), Depression, Diabetes mellitus ([address] Enlarged heart, Fatigue, Generalized headaches, Hyperlipidemia, Hypertension, Migraine, Morning stiffness of joints, Myocardial infarction ([address] (05/13/2018), Numbness or tingling (hands and feet), OA (osteoarthritis) of knee (1/27/2015), Obesity, Class I, BMI 30.0-34.9 (see actual BMI), Osteoporosis, Pulmonary nodule, and Shortness of breath.     PAST SURGICAL HISTORY   has a past surgical history that includes

## 2019-07-30 VITALS
RESPIRATION RATE: 17 BRPM | SYSTOLIC BLOOD PRESSURE: 105 MMHG | OXYGEN SATURATION: 94 % | TEMPERATURE: 97.9 F | BODY MASS INDEX: 33.41 KG/M2 | HEART RATE: 60 BPM | DIASTOLIC BLOOD PRESSURE: 65 MMHG | WEIGHT: 176.8 LBS

## 2019-07-30 LAB
ANION GAP SERPL CALCULATED.3IONS-SCNC: 10 MMOL/L (ref 3–16)
APTT: 47.5 SEC (ref 26–36)
BUN BLDV-MCNC: 10 MG/DL (ref 7–20)
CALCIUM SERPL-MCNC: 8.4 MG/DL (ref 8.3–10.6)
CHLORIDE BLD-SCNC: 105 MMOL/L (ref 99–110)
CHOLESTEROL, TOTAL: 95 MG/DL (ref 0–199)
CO2: 25 MMOL/L (ref 21–32)
CREAT SERPL-MCNC: 0.6 MG/DL (ref 0.6–1.1)
ESTIMATED AVERAGE GLUCOSE: 165.7 MG/DL
GFR AFRICAN AMERICAN: >60
GFR NON-AFRICAN AMERICAN: >60
GLUCOSE BLD-MCNC: 125 MG/DL (ref 70–99)
GLUCOSE BLD-MCNC: 125 MG/DL (ref 70–99)
GLUCOSE BLD-MCNC: 209 MG/DL (ref 70–99)
HBA1C MFR BLD: 7.4 %
HCT VFR BLD CALC: 39.2 % (ref 36–48)
HDLC SERPL-MCNC: 41 MG/DL (ref 40–60)
HEMOGLOBIN: 13.3 G/DL (ref 12–16)
LDL CHOLESTEROL CALCULATED: 33 MG/DL
MCH RBC QN AUTO: 30 PG (ref 26–34)
MCHC RBC AUTO-ENTMCNC: 33.8 G/DL (ref 31–36)
MCV RBC AUTO: 88.9 FL (ref 80–100)
PDW BLD-RTO: 13.1 % (ref 12.4–15.4)
PERFORMED ON: ABNORMAL
PERFORMED ON: ABNORMAL
PLATELET # BLD: 143 K/UL (ref 135–450)
PMV BLD AUTO: 9.2 FL (ref 5–10.5)
POTASSIUM REFLEX MAGNESIUM: 4.2 MMOL/L (ref 3.5–5.1)
RBC # BLD: 4.41 M/UL (ref 4–5.2)
SODIUM BLD-SCNC: 140 MMOL/L (ref 136–145)
TRIGL SERPL-MCNC: 103 MG/DL (ref 0–150)
TROPONIN: <0.01 NG/ML
VLDLC SERPL CALC-MCNC: 21 MG/DL
WBC # BLD: 5.1 K/UL (ref 4–11)

## 2019-07-30 PROCEDURE — 93926 LOWER EXTREMITY STUDY: CPT

## 2019-07-30 PROCEDURE — 99255 IP/OBS CONSLTJ NEW/EST HI 80: CPT | Performed by: INTERNAL MEDICINE

## 2019-07-30 PROCEDURE — 36415 COLL VENOUS BLD VENIPUNCTURE: CPT

## 2019-07-30 PROCEDURE — 80061 LIPID PANEL: CPT

## 2019-07-30 PROCEDURE — 2580000003 HC RX 258: Performed by: NURSE PRACTITIONER

## 2019-07-30 PROCEDURE — 6360000002 HC RX W HCPCS: Performed by: NURSE PRACTITIONER

## 2019-07-30 PROCEDURE — 96375 TX/PRO/DX INJ NEW DRUG ADDON: CPT

## 2019-07-30 PROCEDURE — 6370000000 HC RX 637 (ALT 250 FOR IP): Performed by: NURSE PRACTITIONER

## 2019-07-30 PROCEDURE — 85027 COMPLETE CBC AUTOMATED: CPT

## 2019-07-30 PROCEDURE — 80048 BASIC METABOLIC PNL TOTAL CA: CPT

## 2019-07-30 PROCEDURE — 93005 ELECTROCARDIOGRAM TRACING: CPT | Performed by: NURSE PRACTITIONER

## 2019-07-30 PROCEDURE — 96376 TX/PRO/DX INJ SAME DRUG ADON: CPT

## 2019-07-30 PROCEDURE — 6360000002 HC RX W HCPCS: Performed by: INTERNAL MEDICINE

## 2019-07-30 PROCEDURE — 6370000000 HC RX 637 (ALT 250 FOR IP): Performed by: INTERNAL MEDICINE

## 2019-07-30 PROCEDURE — G0378 HOSPITAL OBSERVATION PER HR: HCPCS

## 2019-07-30 PROCEDURE — 83036 HEMOGLOBIN GLYCOSYLATED A1C: CPT

## 2019-07-30 RX ORDER — SOY ISOFLAVONE 40 MG
500 TABLET ORAL 2 TIMES DAILY
Qty: 30 CAPSULE | Refills: 2 | Status: ON HOLD | OUTPATIENT
Start: 2019-07-30 | End: 2019-08-08 | Stop reason: HOSPADM

## 2019-07-30 RX ORDER — FLUTICASONE PROPIONATE 50 MCG
1 SPRAY, SUSPENSION (ML) NASAL DAILY
Qty: 1 BOTTLE | Refills: 2 | Status: SHIPPED | OUTPATIENT
Start: 2019-07-30 | End: 2022-03-04 | Stop reason: SDUPTHER

## 2019-07-30 RX ORDER — HEPARIN SODIUM 1000 [USP'U]/ML
1800 INJECTION, SOLUTION INTRAVENOUS; SUBCUTANEOUS ONCE
Status: COMPLETED | OUTPATIENT
Start: 2019-07-30 | End: 2019-07-30

## 2019-07-30 RX ORDER — DILTIAZEM HYDROCHLORIDE 120 MG/1
120 CAPSULE, COATED, EXTENDED RELEASE ORAL DAILY
Status: DISCONTINUED | OUTPATIENT
Start: 2019-07-30 | End: 2019-07-30 | Stop reason: HOSPADM

## 2019-07-30 RX ORDER — DILTIAZEM HYDROCHLORIDE 120 MG/1
120 CAPSULE, COATED, EXTENDED RELEASE ORAL DAILY
Qty: 30 CAPSULE | Refills: 3 | Status: SHIPPED | OUTPATIENT
Start: 2019-07-30 | End: 2019-08-16 | Stop reason: ALTCHOICE

## 2019-07-30 RX ORDER — SOY ISOFLAVONE 40 MG
500 TABLET ORAL 2 TIMES DAILY
Status: DISCONTINUED | OUTPATIENT
Start: 2019-07-30 | End: 2019-07-30 | Stop reason: HOSPADM

## 2019-07-30 RX ADMIN — HYDROMORPHONE HYDROCHLORIDE 1 MG: 2 INJECTION, SOLUTION INTRAMUSCULAR; INTRAVENOUS; SUBCUTANEOUS at 01:52

## 2019-07-30 RX ADMIN — PANTOPRAZOLE SODIUM 40 MG: 40 TABLET, DELAYED RELEASE ORAL at 09:48

## 2019-07-30 RX ADMIN — Medication 10 ML: at 09:50

## 2019-07-30 RX ADMIN — HYDROMORPHONE HYDROCHLORIDE 1 MG: 2 INJECTION, SOLUTION INTRAMUSCULAR; INTRAVENOUS; SUBCUTANEOUS at 06:18

## 2019-07-30 RX ADMIN — HEPARIN SODIUM 1800 UNITS: 1000 INJECTION INTRAVENOUS; SUBCUTANEOUS at 04:43

## 2019-07-30 RX ADMIN — Medication 500 MG: at 11:52

## 2019-07-30 RX ADMIN — ONDANSETRON 4 MG: 2 INJECTION INTRAMUSCULAR; INTRAVENOUS at 06:21

## 2019-07-30 RX ADMIN — INSULIN LISPRO 4 UNITS: 100 INJECTION, SOLUTION INTRAVENOUS; SUBCUTANEOUS at 11:52

## 2019-07-30 RX ADMIN — DILTIAZEM HYDROCHLORIDE 120 MG: 120 CAPSULE, COATED, EXTENDED RELEASE ORAL at 09:49

## 2019-07-30 RX ADMIN — CLOPIDOGREL BISULFATE 75 MG: 75 TABLET ORAL at 09:48

## 2019-07-30 RX ADMIN — ASPIRIN 81 MG 81 MG: 81 TABLET ORAL at 09:48

## 2019-07-30 RX ADMIN — HYDROMORPHONE HYDROCHLORIDE 1 MG: 2 INJECTION, SOLUTION INTRAMUSCULAR; INTRAVENOUS; SUBCUTANEOUS at 11:55

## 2019-07-30 ASSESSMENT — PAIN DESCRIPTION - PROGRESSION
CLINICAL_PROGRESSION: RESOLVED
CLINICAL_PROGRESSION: NOT CHANGED
CLINICAL_PROGRESSION: RESOLVED
CLINICAL_PROGRESSION: NOT CHANGED

## 2019-07-30 ASSESSMENT — PAIN DESCRIPTION - LOCATION
LOCATION: CHEST
LOCATION: CHEST

## 2019-07-30 ASSESSMENT — PAIN DESCRIPTION - DESCRIPTORS
DESCRIPTORS: SQUEEZING
DESCRIPTORS: SQUEEZING

## 2019-07-30 ASSESSMENT — PAIN SCALES - GENERAL
PAINLEVEL_OUTOF10: 0
PAINLEVEL_OUTOF10: 5
PAINLEVEL_OUTOF10: 7
PAINLEVEL_OUTOF10: 0
PAINLEVEL_OUTOF10: 0
PAINLEVEL_OUTOF10: 2
PAINLEVEL_OUTOF10: 7
PAINLEVEL_OUTOF10: 5

## 2019-07-30 ASSESSMENT — PAIN DESCRIPTION - ORIENTATION
ORIENTATION: LEFT
ORIENTATION: LEFT

## 2019-07-30 ASSESSMENT — PAIN DESCRIPTION - FREQUENCY
FREQUENCY: INTERMITTENT
FREQUENCY: INTERMITTENT

## 2019-07-30 ASSESSMENT — PAIN DESCRIPTION - ONSET
ONSET: ON-GOING
ONSET: SUDDEN

## 2019-07-30 ASSESSMENT — PAIN DESCRIPTION - PAIN TYPE
TYPE: ACUTE PAIN
TYPE: ACUTE PAIN

## 2019-07-30 NOTE — PROGRESS NOTES
catheterization. Doppler tests ordered for today.   If patient has pseudoaneurysm may require vascular surgery evaluation    Diabetes mellitus type 2-stable on current medications    Hypertension-stable on current medication    DVT Prophylaxis: Lovenox  Diet: DIET CARDIAC; No Caffeine  Code Status: Full Code    Dispo -discharge either later this evening or tomorrow morning depending on vascular studies    Ava Escobar MD

## 2019-07-30 NOTE — PROGRESS NOTES
Pt has remained NPO since midnight. She has had intermittent c/o CP and headache throughout the evening 5/10 with relief.

## 2019-07-30 NOTE — PROGRESS NOTES
Dose Inhaler (MDI) with spacer when the following criteria are met:  a. Alert and cooperative     b. HR < 140 bpm  c. RR < 30 bpm                d. Can demonstrate a 2-3 second inspiratory hold  4. Bronchodilators will be administered via Hand Held Nebulizer [facility]) to patients when ANY of the following criteria are met  a. Incognizant or uncooperative          b. Patients treated with HHN at Home        c. Unable to demonstrate proper use of MDI with spacer     d. RR > 30 bpm   5. Bronchodilators will be delivered via Metered Dose Inhaler (MDI), HHN, Aerogen to intubated patients on mechanical ventilation. 6. Inhalation medication orders will be delivered and/or substituted as outlined below. Aerosolized Medications Ordering and Administration Guidelines:    1. All Medications will be ordered by a physician, and their frequency and/or modality will be adjusted as defined by the patients Respiratory Severity Index (RSI) score. 2. If the patient does not have documented COPD, consider discontinuing anticholinergics when RSI is less than 9.  3. If the bronchospasm worsens (increased RSI), then the bronchodilator frequency can be increased to a maximum of every 4 hours. If greater than every 4 hours is required, the physician will be contacted. 4. If the bronchospasm improves, the frequency of the bronchodilator can be decreased, based on the patient's RSI, but not less than home treatment regimen frequency. 5. Bronchodilator(s) will be discontinued if patient has a RSI less than 9 and has received no scheduled or as needed treatment for 72  Hrs. Patients Ordered on a Mucolytic Agent:    1. Must always be administered with a bronchodilator. 2. Discontinue if patient experiences worsened bronchospasm, or secretions have lessened to the point that the patient is able to clear them with a cough. Anti-inflammatory and Combination Medications:    1.  If the patient lacks prior history of lung disease, is not using inhaled anti-inflammatory medication at home, and lacks wheezing by examination or by history for at least 24 hours, contact physician for possible discontinuation.

## 2019-07-30 NOTE — CONSULTS
Pt came from [address] dose heparin started there. - See notes below. Pharmacy Note  RE: Heparin Drip Low Dose Protocol  Initial rate = 7.2 ml/hr    Initial APTT = 29.3 (goal 54-90)  Initial Heparin   3600 unit bolus    Next APTT due @ 7/29/19 at 2300  Probable transfer to [facility]  Will continue to closely monitor and adjust as necessary. Thx  Jono OBANDO Ph 7/29/2019 3:30 PM     Ordered an aptt for 2300    7/29 2339  aptt = 47.5 sec. 1800 unit bolus and rate = 8.4 ml/hr. Next aptt 0900.   Nathan Terrell Pharm D.7/30/2019 3:14 AM
symptoms negative as below. Objective   PHYSICAL EXAM:    Vitals:    07/30/19 0405   BP: (!) 106/59   Pulse: 57   Resp: 16   Temp: 98.2 °F (36.8 °C)   SpO2: 93%    Weight: 176 lb 12.8 oz (80.2 kg)         General Appearance:  Alert, cooperative, no distress, appears stated age   Head:  Normocephalic, without obvious abnormality, atraumatic   Eyes:  PERRL, conjunctiva/corneas clear   Nose: Nares normal, no drainage or sinus tenderness   Throat: Lips, mucosa, and tongue normal   Neck: Supple, symmetrical, trachea midline, no adenopathy, thyroid: not enlarged, symmetric, no tenderness/mass/nodules, no carotid bruit or JVD   Lungs:   Clear to auscultation bilaterally, respirations unlabored   Chest Wall:  No deformity or tenderness   Heart:  Regular rate and rhythm, S1, S2 normal, no murmur, rub or gallop   Abdomen:   Soft, non-tender, bowel sounds active all four quadrants,  no masses, no organomegaly   Extremities: Extremities normal, atraumatic, no cyanosis or edema, tender rt groin.    Pulses: 2+ and symmetric   Skin: Skin color, texture, turgor normal, no rashes or lesions   Pysch: Normal mood and affect   Neurologic: Normal gross motor and sensory exam.         Labs   CBC: Lab Results   Component Value Date    WBC 6.6 07/29/2019    RBC 4.83 07/29/2019    HGB 14.5 07/29/2019    HCT 43.3 07/29/2019    MCV 89.6 07/29/2019    RDW 13.2 07/29/2019     07/29/2019     CMP:  Lab Results   Component Value Date     07/29/2019    K 4.6 07/29/2019    K 3.6 09/11/2018     07/29/2019    CO2 29 07/29/2019    BUN 13 07/29/2019    CREATININE 0.7 07/29/2019    GFRAA >60 07/29/2019    GFRAA >60 06/06/2013    AGRATIO 1.6 07/29/2019    LABGLOM >60 07/29/2019    GLUCOSE 175 07/29/2019    PROT 7.3 07/29/2019    PROT 7.0 01/22/2013    CALCIUM 10.4 07/29/2019    BILITOT 0.8 07/29/2019    ALKPHOS 77 07/29/2019    AST 16 07/29/2019    ALT 14 07/29/2019     PT/INR:  No results found for: PTINR  HgBA1c:  Lab Results

## 2019-07-30 NOTE — H&P
Blood Pressure Maternal Grandfather     Hypertension Paternal Grandmother     High Blood Pressure Paternal Grandmother     Hypertension Paternal Grandfather     High Blood Pressure Paternal Grandfather     High Blood Pressure Sister     High Blood Pressure Sister     High Blood Pressure Sister     Heart Failure Maternal Aunt     Hypertension Maternal Aunt     Heart Failure Maternal Uncle     Hypertension Maternal Uncle     Cancer Other         nephew-colon, liver, lung    Heart Failure Other     Diabetes Other     Osteoarthritis Other     Hypertension Paternal Aunt     Hypertension Paternal Uncle     Asthma Neg Hx      REVIEW OF SYSTEMS:   Pertinent positives as noted in the HPI. All other systems reviewed and negative. PHYSICAL EXAM PERFORMED:    There were no vitals taken for this visit. General appearance:  Pleasant, obese female in no apparent distress, appears stated age and cooperative. HEENT: Pupils equal, round, and reactive to light. Extra ocular muscles intact. Conjunctivae/corneas clear. Neck: Supple, with full range of motion. No jugular venous distention. Trachea midline. Respiratory:  Normal respiratory effort. Clear to auscultation, bilaterally without Rales/Wheezes/Rhonchi. Cardiovascular:  Regular rate and rhythm with normal S1/S2 without murmurs, rubs or gallops. Abdomen: Soft, obese, round,  non-tender, non-distended with normal bowel sounds. Musculoskeletal:  No clubbing, cyanosis or edema bilaterally. Full range of motion without deformity. Skin: Skin color, texture, turgor normal.  No significant rashes or lesions. Neurologic:  Neurovascularly intact.  Cranial nerves: II-XII intact, grossly non-focal.  Psychiatric:  Alert and oriented, thought content appropriate, normal insight  Capillary Refill: Brisk,< 3 seconds   Peripheral Pulses: +2 palpable, equal bilaterally     Labs:     Recent Labs     07/29/19  1230   WBC 6.6   HGB 14.5   HCT 43.3        Recent

## 2019-07-31 ENCOUNTER — TELEPHONE (OUTPATIENT)
Dept: CARDIOLOGY CLINIC | Age: 54
End: 2019-07-31

## 2019-07-31 LAB
EKG ATRIAL RATE: 58 BPM
EKG DIAGNOSIS: NORMAL
EKG P AXIS: 54 DEGREES
EKG P-R INTERVAL: 144 MS
EKG Q-T INTERVAL: 464 MS
EKG QRS DURATION: 102 MS
EKG QTC CALCULATION (BAZETT): 455 MS
EKG R AXIS: -19 DEGREES
EKG T AXIS: 65 DEGREES
EKG VENTRICULAR RATE: 58 BPM

## 2019-07-31 PROCEDURE — 93010 ELECTROCARDIOGRAM REPORT: CPT | Performed by: INTERNAL MEDICINE

## 2019-08-01 NOTE — TELEPHONE ENCOUNTER
Spoke with patient. Patient is scheduled with Dr. Daxa Orantes for Left Heart Cath on 8/6/19 at [address], arrival time of 6:30am to the Cath Lab. Please have patient arrive to the main entrance of [facility] at 6:15am and check in with the registration desk. Please call patient regarding medication instructions. Remind patient to be NPO after midnight (8 hours prior). Do not apply lotions/creams on skin the day of procedure.

## 2019-08-01 NOTE — TELEPHONE ENCOUNTER
I called and spoke with patient. She can hold her Metformin for 48 hours prior to her cath. She can take her other medications with a small sip of water the morning of. patient to be NPO after midnight (8 hours prior). Do not apply lotions/creams on skin the day of procedure.

## 2019-08-06 ENCOUNTER — HOSPITAL ENCOUNTER (INPATIENT)
Dept: CARDIAC CATH/INVASIVE PROCEDURES | Age: 54
LOS: 2 days | Discharge: HOME | DRG: 175 | End: 2019-08-08
Attending: INTERNAL MEDICINE | Admitting: INTERNAL MEDICINE
Payer: MEDICAID

## 2019-08-06 DIAGNOSIS — Z98.61 S/P PTCA (PERCUTANEOUS TRANSLUMINAL CORONARY ANGIOPLASTY): Primary | ICD-10-CM

## 2019-08-06 LAB
ANION GAP SERPL CALCULATED.3IONS-SCNC: 10 MMOL/L (ref 3–16)
BUN BLDV-MCNC: 12 MG/DL (ref 7–20)
CALCIUM SERPL-MCNC: 9.7 MG/DL (ref 8.3–10.6)
CHLORIDE BLD-SCNC: 99 MMOL/L (ref 99–110)
CO2: 31 MMOL/L (ref 21–32)
CREAT SERPL-MCNC: 0.7 MG/DL (ref 0.6–1.1)
GFR AFRICAN AMERICAN: >60
GFR NON-AFRICAN AMERICAN: >60
GLUCOSE BLD-MCNC: 169 MG/DL (ref 70–99)
GLUCOSE BLD-MCNC: 212 MG/DL (ref 70–99)
GLUCOSE BLD-MCNC: 279 MG/DL (ref 70–99)
HCT VFR BLD CALC: 41.9 % (ref 36–48)
HEMOGLOBIN: 14.2 G/DL (ref 12–16)
INR BLD: 1.04 (ref 0.86–1.14)
MCH RBC QN AUTO: 29.9 PG (ref 26–34)
MCHC RBC AUTO-ENTMCNC: 33.8 G/DL (ref 31–36)
MCV RBC AUTO: 88.5 FL (ref 80–100)
PDW BLD-RTO: 13.4 % (ref 12.4–15.4)
PERFORMED ON: ABNORMAL
PERFORMED ON: ABNORMAL
PLATELET # BLD: 172 K/UL (ref 135–450)
PMV BLD AUTO: 8.9 FL (ref 5–10.5)
POC ACT LR: 219 SEC
POC ACT LR: 222 SEC
POC ACT LR: 349 SEC
POC ACT LR: >400 SEC
POTASSIUM SERPL-SCNC: 3.8 MMOL/L (ref 3.5–5.1)
PROTHROMBIN TIME: 11.9 SEC (ref 9.8–13)
RBC # BLD: 4.74 M/UL (ref 4–5.2)
SODIUM BLD-SCNC: 140 MMOL/L (ref 136–145)
WBC # BLD: 5.1 K/UL (ref 4–11)

## 2019-08-06 PROCEDURE — 85610 PROTHROMBIN TIME: CPT

## 2019-08-06 PROCEDURE — 6370000000 HC RX 637 (ALT 250 FOR IP): Performed by: INTERNAL MEDICINE

## 2019-08-06 PROCEDURE — 2580000003 HC RX 258

## 2019-08-06 PROCEDURE — C1887 CATHETER, GUIDING: HCPCS

## 2019-08-06 PROCEDURE — 85027 COMPLETE CBC AUTOMATED: CPT

## 2019-08-06 PROCEDURE — 2500000003 HC RX 250 WO HCPCS

## 2019-08-06 PROCEDURE — 93005 ELECTROCARDIOGRAM TRACING: CPT | Performed by: INTERNAL MEDICINE

## 2019-08-06 PROCEDURE — 92920 PRQ TRLUML C ANGIOP 1ART&/BR: CPT | Performed by: INTERNAL MEDICINE

## 2019-08-06 PROCEDURE — 02703ZZ DILATION OF CORONARY ARTERY, ONE ARTERY, PERCUTANEOUS APPROACH: ICD-10-PCS | Performed by: INTERNAL MEDICINE

## 2019-08-06 PROCEDURE — B2131ZZ FLUOROSCOPY OF MULTIPLE CORONARY ARTERY BYPASS GRAFTS USING LOW OSMOLAR CONTRAST: ICD-10-PCS | Performed by: INTERNAL MEDICINE

## 2019-08-06 PROCEDURE — B41F1ZZ FLUOROSCOPY OF RIGHT LOWER EXTREMITY ARTERIES USING LOW OSMOLAR CONTRAST: ICD-10-PCS | Performed by: INTERNAL MEDICINE

## 2019-08-06 PROCEDURE — 6360000004 HC RX CONTRAST MEDICATION

## 2019-08-06 PROCEDURE — G0378 HOSPITAL OBSERVATION PER HR: HCPCS

## 2019-08-06 PROCEDURE — 6370000000 HC RX 637 (ALT 250 FOR IP)

## 2019-08-06 PROCEDURE — 6360000002 HC RX W HCPCS: Performed by: INTERNAL MEDICINE

## 2019-08-06 PROCEDURE — 2709999900 HC NON-CHARGEABLE SUPPLY

## 2019-08-06 PROCEDURE — 99153 MOD SED SAME PHYS/QHP EA: CPT

## 2019-08-06 PROCEDURE — 99152 MOD SED SAME PHYS/QHP 5/>YRS: CPT | Performed by: INTERNAL MEDICINE

## 2019-08-06 PROCEDURE — 92978 ENDOLUMINL IVUS OCT C 1ST: CPT | Performed by: INTERNAL MEDICINE

## 2019-08-06 PROCEDURE — 93459 L HRT ART/GRFT ANGIO: CPT | Performed by: INTERNAL MEDICINE

## 2019-08-06 PROCEDURE — B221Z2Z COMPUTERIZED TOMOGRAPHY (CT SCAN) OF MULTIPLE CORONARY ARTERIES USING INTRAVASCULAR OPTICAL COHERENCE: ICD-10-PCS | Performed by: INTERNAL MEDICINE

## 2019-08-06 PROCEDURE — 99152 MOD SED SAME PHYS/QHP 5/>YRS: CPT

## 2019-08-06 PROCEDURE — C1725 CATH, TRANSLUMIN NON-LASER: HCPCS

## 2019-08-06 PROCEDURE — 4A023N7 MEASUREMENT OF CARDIAC SAMPLING AND PRESSURE, LEFT HEART, PERCUTANEOUS APPROACH: ICD-10-PCS | Performed by: INTERNAL MEDICINE

## 2019-08-06 PROCEDURE — 2720000010 HC SURG SUPPLY STERILE

## 2019-08-06 PROCEDURE — 80048 BASIC METABOLIC PNL TOTAL CA: CPT

## 2019-08-06 PROCEDURE — C1769 GUIDE WIRE: HCPCS

## 2019-08-06 PROCEDURE — 6360000002 HC RX W HCPCS

## 2019-08-06 PROCEDURE — 2580000003 HC RX 258: Performed by: INTERNAL MEDICINE

## 2019-08-06 PROCEDURE — B2111ZZ FLUOROSCOPY OF MULTIPLE CORONARY ARTERIES USING LOW OSMOLAR CONTRAST: ICD-10-PCS | Performed by: INTERNAL MEDICINE

## 2019-08-06 PROCEDURE — 85347 COAGULATION TIME ACTIVATED: CPT

## 2019-08-06 PROCEDURE — 92978 ENDOLUMINL IVUS OCT C 1ST: CPT

## 2019-08-06 PROCEDURE — 92920 PRQ TRLUML C ANGIOP 1ART&/BR: CPT

## 2019-08-06 PROCEDURE — C1894 INTRO/SHEATH, NON-LASER: HCPCS

## 2019-08-06 PROCEDURE — 2060000000 HC ICU INTERMEDIATE R&B

## 2019-08-06 PROCEDURE — G0379 DIRECT REFER HOSPITAL OBSERV: HCPCS

## 2019-08-06 PROCEDURE — 93459 L HRT ART/GRFT ANGIO: CPT

## 2019-08-06 RX ORDER — ALBUTEROL SULFATE 90 UG/1
2 AEROSOL, METERED RESPIRATORY (INHALATION) EVERY 6 HOURS PRN
Status: DISCONTINUED | OUTPATIENT
Start: 2019-08-06 | End: 2019-08-08 | Stop reason: HOSPADM

## 2019-08-06 RX ORDER — DEXTROSE MONOHYDRATE 50 MG/ML
100 INJECTION, SOLUTION INTRAVENOUS PRN
Status: DISCONTINUED | OUTPATIENT
Start: 2019-08-06 | End: 2019-08-08 | Stop reason: HOSPADM

## 2019-08-06 RX ORDER — FENTANYL CITRATE 50 UG/ML
25 INJECTION, SOLUTION INTRAMUSCULAR; INTRAVENOUS ONCE
Status: COMPLETED | OUTPATIENT
Start: 2019-08-06 | End: 2019-08-06

## 2019-08-06 RX ORDER — HEPARIN SODIUM 1000 [USP'U]/ML
1500 INJECTION, SOLUTION INTRAVENOUS; SUBCUTANEOUS ONCE
Status: COMPLETED | OUTPATIENT
Start: 2019-08-06 | End: 2019-08-06

## 2019-08-06 RX ORDER — SODIUM CHLORIDE 9 MG/ML
1000 INJECTION, SOLUTION INTRAVENOUS CONTINUOUS
Status: DISCONTINUED | OUTPATIENT
Start: 2019-08-06 | End: 2019-08-06

## 2019-08-06 RX ORDER — NITROGLYCERIN 80 MG/1
1 PATCH TRANSDERMAL DAILY
Status: DISCONTINUED | OUTPATIENT
Start: 2019-08-06 | End: 2019-08-07

## 2019-08-06 RX ORDER — FENTANYL CITRATE 50 UG/ML
50 INJECTION, SOLUTION INTRAMUSCULAR; INTRAVENOUS ONCE
Status: COMPLETED | OUTPATIENT
Start: 2019-08-06 | End: 2019-08-06

## 2019-08-06 RX ORDER — DILTIAZEM HYDROCHLORIDE 120 MG/1
120 CAPSULE, COATED, EXTENDED RELEASE ORAL DAILY
Status: DISCONTINUED | OUTPATIENT
Start: 2019-08-06 | End: 2019-08-08 | Stop reason: HOSPADM

## 2019-08-06 RX ORDER — FENTANYL CITRATE 50 UG/ML
75 INJECTION, SOLUTION INTRAMUSCULAR; INTRAVENOUS ONCE
Status: COMPLETED | OUTPATIENT
Start: 2019-08-06 | End: 2019-08-06

## 2019-08-06 RX ORDER — FENTANYL CITRATE 50 UG/ML
100 INJECTION, SOLUTION INTRAMUSCULAR; INTRAVENOUS ONCE
Status: COMPLETED | OUTPATIENT
Start: 2019-08-06 | End: 2019-08-06

## 2019-08-06 RX ORDER — ONDANSETRON 2 MG/ML
4 INJECTION INTRAMUSCULAR; INTRAVENOUS EVERY 6 HOURS PRN
Status: DISCONTINUED | OUTPATIENT
Start: 2019-08-06 | End: 2019-08-08 | Stop reason: HOSPADM

## 2019-08-06 RX ORDER — ONDANSETRON 4 MG/1
4 TABLET, ORALLY DISINTEGRATING ORAL EVERY 8 HOURS PRN
Status: DISCONTINUED | OUTPATIENT
Start: 2019-08-06 | End: 2019-08-08 | Stop reason: HOSPADM

## 2019-08-06 RX ORDER — SODIUM CHLORIDE 0.9 % (FLUSH) 0.9 %
10 SYRINGE (ML) INJECTION PRN
Status: DISCONTINUED | OUTPATIENT
Start: 2019-08-06 | End: 2019-08-08 | Stop reason: HOSPADM

## 2019-08-06 RX ORDER — FLUTICASONE PROPIONATE 50 MCG
1 SPRAY, SUSPENSION (ML) NASAL DAILY
Status: DISCONTINUED | OUTPATIENT
Start: 2019-08-06 | End: 2019-08-08 | Stop reason: HOSPADM

## 2019-08-06 RX ORDER — SOY ISOFLAVONE 40 MG
1000 TABLET ORAL 2 TIMES DAILY
Status: DISCONTINUED | OUTPATIENT
Start: 2019-08-06 | End: 2019-08-08 | Stop reason: HOSPADM

## 2019-08-06 RX ORDER — PANTOPRAZOLE SODIUM 40 MG/1
40 TABLET, DELAYED RELEASE ORAL
Status: DISCONTINUED | OUTPATIENT
Start: 2019-08-07 | End: 2019-08-08 | Stop reason: HOSPADM

## 2019-08-06 RX ORDER — CLOPIDOGREL 300 MG/1
300 TABLET, FILM COATED ORAL ONCE
Status: COMPLETED | OUTPATIENT
Start: 2019-08-06 | End: 2019-08-06

## 2019-08-06 RX ORDER — LISINOPRIL 2.5 MG/1
2.5 TABLET ORAL DAILY
Status: DISCONTINUED | OUTPATIENT
Start: 2019-08-06 | End: 2019-08-08 | Stop reason: HOSPADM

## 2019-08-06 RX ORDER — HEPARIN SODIUM 1000 [USP'U]/ML
5600 INJECTION, SOLUTION INTRAVENOUS; SUBCUTANEOUS ONCE
Status: COMPLETED | OUTPATIENT
Start: 2019-08-06 | End: 2019-08-06

## 2019-08-06 RX ORDER — MIDAZOLAM HYDROCHLORIDE 1 MG/ML
2 INJECTION INTRAMUSCULAR; INTRAVENOUS ONCE
Status: COMPLETED | OUTPATIENT
Start: 2019-08-06 | End: 2019-08-06

## 2019-08-06 RX ORDER — CLOPIDOGREL BISULFATE 75 MG/1
75 TABLET ORAL DAILY
Status: DISCONTINUED | OUTPATIENT
Start: 2019-08-06 | End: 2019-08-06 | Stop reason: SDUPTHER

## 2019-08-06 RX ORDER — ASPIRIN 81 MG/1
81 TABLET, CHEWABLE ORAL DAILY
Status: DISCONTINUED | OUTPATIENT
Start: 2019-08-06 | End: 2019-08-08 | Stop reason: HOSPADM

## 2019-08-06 RX ORDER — MORPHINE SULFATE 2 MG/ML
1 INJECTION, SOLUTION INTRAMUSCULAR; INTRAVENOUS ONCE
Status: COMPLETED | OUTPATIENT
Start: 2019-08-06 | End: 2019-08-06

## 2019-08-06 RX ORDER — CLOPIDOGREL BISULFATE 75 MG/1
75 TABLET ORAL DAILY
Status: DISCONTINUED | OUTPATIENT
Start: 2019-08-07 | End: 2019-08-08 | Stop reason: HOSPADM

## 2019-08-06 RX ORDER — NITROGLYCERIN 0.4 MG/1
0.4 TABLET SUBLINGUAL EVERY 5 MIN PRN
Status: DISCONTINUED | OUTPATIENT
Start: 2019-08-06 | End: 2019-08-08 | Stop reason: HOSPADM

## 2019-08-06 RX ORDER — MIDAZOLAM HYDROCHLORIDE 1 MG/ML
1 INJECTION INTRAMUSCULAR; INTRAVENOUS ONCE
Status: COMPLETED | OUTPATIENT
Start: 2019-08-06 | End: 2019-08-06

## 2019-08-06 RX ORDER — ATORVASTATIN CALCIUM 40 MG/1
40 TABLET, FILM COATED ORAL NIGHTLY
Status: DISCONTINUED | OUTPATIENT
Start: 2019-08-06 | End: 2019-08-08 | Stop reason: HOSPADM

## 2019-08-06 RX ORDER — MIDAZOLAM HYDROCHLORIDE 1 MG/ML
4 INJECTION INTRAMUSCULAR; INTRAVENOUS ONCE
Status: COMPLETED | OUTPATIENT
Start: 2019-08-06 | End: 2019-08-06

## 2019-08-06 RX ORDER — DEXTROSE MONOHYDRATE 25 G/50ML
12.5 INJECTION, SOLUTION INTRAVENOUS PRN
Status: DISCONTINUED | OUTPATIENT
Start: 2019-08-06 | End: 2019-08-08 | Stop reason: HOSPADM

## 2019-08-06 RX ORDER — NICOTINE POLACRILEX 4 MG
15 LOZENGE BUCCAL PRN
Status: DISCONTINUED | OUTPATIENT
Start: 2019-08-06 | End: 2019-08-08 | Stop reason: HOSPADM

## 2019-08-06 RX ORDER — OXYCODONE HYDROCHLORIDE AND ACETAMINOPHEN 5; 325 MG/1; MG/1
1 TABLET ORAL EVERY 8 HOURS PRN
Status: DISCONTINUED | OUTPATIENT
Start: 2019-08-06 | End: 2019-08-08 | Stop reason: HOSPADM

## 2019-08-06 RX ORDER — SODIUM CHLORIDE 0.9 % (FLUSH) 0.9 %
10 SYRINGE (ML) INJECTION EVERY 12 HOURS SCHEDULED
Status: DISCONTINUED | OUTPATIENT
Start: 2019-08-06 | End: 2019-08-08 | Stop reason: HOSPADM

## 2019-08-06 RX ADMIN — SODIUM CHLORIDE 1000 ML: 9 INJECTION, SOLUTION INTRAVENOUS at 07:00

## 2019-08-06 RX ADMIN — SODIUM CHLORIDE, PRESERVATIVE FREE 10 ML: 5 INJECTION INTRAVENOUS at 21:50

## 2019-08-06 RX ADMIN — CLOPIDOGREL 300 MG: 300 TABLET, FILM COATED ORAL at 10:01

## 2019-08-06 RX ADMIN — OXYCODONE HYDROCHLORIDE AND ACETAMINOPHEN 1 TABLET: 5; 325 TABLET ORAL at 15:57

## 2019-08-06 RX ADMIN — MORPHINE SULFATE 1 MG: 2 INJECTION, SOLUTION INTRAMUSCULAR; INTRAVENOUS at 18:41

## 2019-08-06 RX ADMIN — FENTANYL CITRATE 25 MCG: 50 INJECTION, SOLUTION INTRAMUSCULAR; INTRAVENOUS at 09:22

## 2019-08-06 RX ADMIN — HEPARIN SODIUM 5600 UNITS: 1000 INJECTION, SOLUTION INTRAVENOUS; SUBCUTANEOUS at 09:22

## 2019-08-06 RX ADMIN — FENTANYL CITRATE 25 MCG: 50 INJECTION, SOLUTION INTRAMUSCULAR; INTRAVENOUS at 12:26

## 2019-08-06 RX ADMIN — INSULIN LISPRO 2 UNITS: 100 INJECTION, SOLUTION INTRAVENOUS; SUBCUTANEOUS at 21:49

## 2019-08-06 RX ADMIN — ASPIRIN 81 MG 81 MG: 81 TABLET ORAL at 10:46

## 2019-08-06 RX ADMIN — FENTANYL CITRATE 25 MCG: 50 INJECTION, SOLUTION INTRAMUSCULAR; INTRAVENOUS at 13:00

## 2019-08-06 RX ADMIN — FENTANYL CITRATE 75 MCG: 50 INJECTION, SOLUTION INTRAMUSCULAR; INTRAVENOUS at 09:41

## 2019-08-06 RX ADMIN — Medication 1000 MG: at 21:49

## 2019-08-06 RX ADMIN — MIDAZOLAM HYDROCHLORIDE 2 MG: 1 INJECTION INTRAMUSCULAR; INTRAVENOUS at 10:43

## 2019-08-06 RX ADMIN — MIDAZOLAM HYDROCHLORIDE 4 MG: 1 INJECTION INTRAMUSCULAR; INTRAVENOUS at 09:02

## 2019-08-06 RX ADMIN — MIDAZOLAM HYDROCHLORIDE 2 MG: 1 INJECTION INTRAMUSCULAR; INTRAVENOUS at 09:41

## 2019-08-06 RX ADMIN — FENTANYL CITRATE 100 MCG: 50 INJECTION, SOLUTION INTRAMUSCULAR; INTRAVENOUS at 10:14

## 2019-08-06 RX ADMIN — MIDAZOLAM HYDROCHLORIDE 1 MG: 1 INJECTION INTRAMUSCULAR; INTRAVENOUS at 09:21

## 2019-08-06 RX ADMIN — ATORVASTATIN CALCIUM 40 MG: 40 TABLET, FILM COATED ORAL at 21:49

## 2019-08-06 RX ADMIN — INSULIN LISPRO 2 UNITS: 100 INJECTION, SOLUTION INTRAVENOUS; SUBCUTANEOUS at 18:50

## 2019-08-06 RX ADMIN — FENTANYL CITRATE 50 MCG: 50 INJECTION, SOLUTION INTRAMUSCULAR; INTRAVENOUS at 09:02

## 2019-08-06 RX ADMIN — CLOPIDOGREL BISULFATE 75 MG: 75 TABLET ORAL at 10:15

## 2019-08-06 RX ADMIN — HEPARIN SODIUM 1500 UNITS: 1000 INJECTION, SOLUTION INTRAVENOUS; SUBCUTANEOUS at 10:01

## 2019-08-06 RX ADMIN — FENTANYL CITRATE 100 MCG: 50 INJECTION, SOLUTION INTRAMUSCULAR; INTRAVENOUS at 10:44

## 2019-08-06 ASSESSMENT — PAIN SCALES - GENERAL
PAINLEVEL_OUTOF10: 4
PAINLEVEL_OUTOF10: 6
PAINLEVEL_OUTOF10: 5
PAINLEVEL_OUTOF10: 4
PAINLEVEL_OUTOF10: 0
PAINLEVEL_OUTOF10: 5
PAINLEVEL_OUTOF10: 0
PAINLEVEL_OUTOF10: 0
PAINLEVEL_OUTOF10: 5
PAINLEVEL_OUTOF10: 0
PAINLEVEL_OUTOF10: 8
PAINLEVEL_OUTOF10: 10
PAINLEVEL_OUTOF10: 6
PAINLEVEL_OUTOF10: 0
PAINLEVEL_OUTOF10: 10

## 2019-08-06 ASSESSMENT — PAIN DESCRIPTION - PAIN TYPE: TYPE: ACUTE PAIN

## 2019-08-06 ASSESSMENT — PAIN DESCRIPTION - ORIENTATION
ORIENTATION: RIGHT

## 2019-08-06 ASSESSMENT — PAIN DESCRIPTION - LOCATION
LOCATION: GROIN
LOCATION: CHEST;LEG
LOCATION: LEG;CHEST
LOCATION: CHEST;LEG
LOCATION: CHEST;LEG

## 2019-08-06 ASSESSMENT — PAIN DESCRIPTION - PROGRESSION: CLINICAL_PROGRESSION: RESOLVED

## 2019-08-06 NOTE — H&P
Surgical History:  Past Surgical History:   Procedure Laterality Date    CARDIAC CATHETERIZATION  04/01/2016    Dr. Rosie Callahan. Emy Pike  08/01/2012    Dr. Lázaro Robertson  05/13/2018    Dr. Coreas Confer - w/placement of IABP, Xience 3.25 x 18 mid Circ, POBA 2.5 x 12 to prox LAD    CORONARY ARTERY BYPASS GRAFT  06/13/2018    LIMA, SVG to OM2, SVG to rPDA    DIAGNOSTIC CARDIAC CATH LAB PROCEDURE  06/06/2013    Dr. Romeo Zhong - Non Obs CAD    HYSTERECTOMY      PTCA      UPPER GASTROINTESTINAL ENDOSCOPY           Medications:  Current Facility-Administered Medications   Medication Dose Route Frequency Provider Last Rate Last Dose    0.9 % sodium chloride infusion  1,000 mL Intravenous Continuous Sulema Robertson MD               Pre-Sedation:    Pre-Sedation Documentation and Exam:  I have assessed the patient and agree with the H&P present on the chart. Prior History of Anesthesia Complications:   none    Modified Mallampati:  II (soft palate, uvula, fauces visible)    ASA Classification:  Class 3 - A patient with severe systemic disease that limits activity but is not incapacitating    Kathrine Scale: Activity:  2 - Able to move 4 extremities voluntarily on command  Respiration:  2 - Able to breathe deeply and cough freely  Circulation:  2 - BP+/- 20mmHg of normal  Consciousness:  2 - Fully awake  Oxygen Saturation (color):  2 - Able to maintain oxygen saturation >92% on room air    Sedation/Anesthesia Plan:  Guard the patient's safety and welfare. Minimize physical discomfort and pain. Minimize negative psychological responses to treatment by providing sedation and analgesia and maximize the potential amnesia. Patient to meet pre-procedure discharge plan.     Medication Planned:  midazolam intravenously, fentanyl intravenously    Patient is an appropriate candidate for plan of sedation: yes      Electronically signed by

## 2019-08-06 NOTE — H&P
History:      The patient currently lives at home    TOBACCO:   reports that she has quit smoking. Her smoking use included cigarettes. She has a 20.00 pack-year smoking history. She has never used smokeless tobacco.  ETOH:   reports that she does not drink alcohol. Family History:       Reviewed in detail and negative for DM, CAD, Cancer, CVA. Positive as follows:        Problem Relation Age of Onset    Emphysema Mother     Heart Failure Mother     Hypertension Mother     Heart Disease Mother     High Blood Pressure Mother     High Cholesterol Mother     Stroke Mother     Heart Failure Father     Hypertension Father     Heart Disease Father     High Blood Pressure Father     High Cholesterol Father     Hypertension Sister     High Blood Pressure Sister     Hypertension Brother     High Blood Pressure Brother     Cancer Brother     Hypertension Maternal Grandmother     High Blood Pressure Maternal Grandmother     Hypertension Maternal Grandfather     High Blood Pressure Maternal Grandfather     Hypertension Paternal Grandmother     High Blood Pressure Paternal Grandmother     Hypertension Paternal Grandfather     High Blood Pressure Paternal Grandfather     High Blood Pressure Sister     High Blood Pressure Sister     High Blood Pressure Sister     Heart Failure Maternal Aunt     Hypertension Maternal Aunt     Heart Failure Maternal Uncle     Hypertension Maternal Uncle     Cancer Other         nephew-colon, liver, lung    Heart Failure Other     Diabetes Other     Osteoarthritis Other     Hypertension Paternal Aunt     Hypertension Paternal Uncle     Asthma Neg Hx        REVIEW OF SYSTEMS:   Pertinent positives as noted in the HPI. All other systems reviewed and negative. PHYSICAL EXAM PERFORMED:    Ht 5' 1\" (1.549 m)   Wt 176 lb (79.8 kg)   BMI 33.25 kg/m²     General appearance:  No apparent distress, appears stated age and cooperative.   HEENT:  Normal cephalic, atraumatic without obvious deformity. Pupils equal, round, and reactive to light. Extra ocular muscles intact. Conjunctivae/corneas clear. Neck: Supple, with full range of motion. No jugular venous distention. Trachea midline. Respiratory:  Normal respiratory effort. Clear to auscultation, bilaterally without Rales/Wheezes/Rhonchi. Cardiovascular:  Regular rate and rhythm with normal S1/S2 without murmurs, rubs or gallops. Abdomen: Soft, non-tender, non-distended with normal bowel sounds. Musculoskeletal:  No clubbing, cyanosis or edema bilaterally. Full range of motion without deformity. Skin: Skin color, texture, turgor normal.  No rashes or lesions. Neurologic:  Neurovascularly intact without any focal sensory/motor deficits. Cranial nerves: II-XII intact, grossly non-focal.  Psychiatric:  Alert and oriented, thought content appropriate, normal insight  Capillary Refill: Brisk,< 3 seconds   Peripheral Pulses: +2 palpable, equal bilaterally       Labs:     Recent Labs     08/06/19  0649   WBC 5.1   HGB 14.2   HCT 41.9        Recent Labs     08/06/19  0649      K 3.8   CL 99   CO2 31   BUN 12   CREATININE 0.7   CALCIUM 9.7     No results for input(s): AST, ALT, BILIDIR, BILITOT, ALKPHOS in the last 72 hours. Recent Labs     08/06/19  0649   INR 1.04     No results for input(s): Ethlyn Kris in the last 72 hours.     Urinalysis:      Lab Results   Component Value Date    NITRU Negative 06/12/2018    WBCUA 3-5 06/12/2018    BACTERIA 1+ 06/12/2018    RBCUA 0-2 06/12/2018    BLOODU Negative 06/12/2018    SPECGRAV >=1.030 06/12/2018    GLUCOSEU Negative 06/12/2018    GLUCOSEU Neg 07/20/2011       Radiology:     CXR: I have reviewed the CXR with the following interpretation: na  EKG:  I have reviewed the EKG with the following interpretation: na    No orders to display       ASSESSMENT:    Active Hospital Problems    Diagnosis Date Noted    S/P PTCA (percutaneous transluminal coronary angioplasty) [Z98.61] 02/06/2019         PLAN:    Chest pain- ongoing, concerns for UA as outpt, brought in for [facility] and noted disease not amenable to stenting  -med mgmt at his time  Tele  -apprec cards mgmt  -started nitropatch as dz was amenable to nitro during procedure    CAD- see above  Continued home meds, plavix/asa/statin, L-arginine, acei  -on prn nitro    GERD- ppi continued    DM2-held metformin  Ac/hs bs  Low ssi    HTN- on dilt, acei    Hematoma- due to LHC  -mgmt per cards    COPD/asthma- stable    DVT Prophylaxis: lovenox  Diet: DIET CARDIAC;  Code Status: Full Code    PT/OT Eval Status: not needed     Dispo - per cards       Lizabeth Potter MD    Thank you Albesa Homans, APRN - CINDY for the opportunity to be involved in this patient's care. If you have any questions or concerns please feel free to contact me at [phone].

## 2019-08-07 LAB
ANION GAP SERPL CALCULATED.3IONS-SCNC: 7 MMOL/L (ref 3–16)
BASOPHILS ABSOLUTE: 0 K/UL (ref 0–0.2)
BASOPHILS RELATIVE PERCENT: 0.4 %
BUN BLDV-MCNC: 11 MG/DL (ref 7–20)
CALCIUM SERPL-MCNC: 8.5 MG/DL (ref 8.3–10.6)
CHLORIDE BLD-SCNC: 103 MMOL/L (ref 99–110)
CO2: 28 MMOL/L (ref 21–32)
CREAT SERPL-MCNC: 0.6 MG/DL (ref 0.6–1.1)
EKG ATRIAL RATE: 57 BPM
EKG DIAGNOSIS: NORMAL
EKG P AXIS: 55 DEGREES
EKG P-R INTERVAL: 152 MS
EKG Q-T INTERVAL: 456 MS
EKG QRS DURATION: 100 MS
EKG QTC CALCULATION (BAZETT): 443 MS
EKG R AXIS: -18 DEGREES
EKG T AXIS: 60 DEGREES
EKG VENTRICULAR RATE: 57 BPM
EOSINOPHILS ABSOLUTE: 0.1 K/UL (ref 0–0.6)
EOSINOPHILS RELATIVE PERCENT: 1.6 %
GFR AFRICAN AMERICAN: >60
GFR NON-AFRICAN AMERICAN: >60
GLUCOSE BLD-MCNC: 176 MG/DL (ref 70–99)
GLUCOSE BLD-MCNC: 178 MG/DL (ref 70–99)
GLUCOSE BLD-MCNC: 180 MG/DL (ref 70–99)
GLUCOSE BLD-MCNC: 181 MG/DL (ref 70–99)
GLUCOSE BLD-MCNC: 235 MG/DL (ref 70–99)
HCT VFR BLD CALC: 35.7 % (ref 36–48)
HEMOGLOBIN: 12 G/DL (ref 12–16)
LYMPHOCYTES ABSOLUTE: 1.5 K/UL (ref 1–5.1)
LYMPHOCYTES RELATIVE PERCENT: 26.2 %
MCH RBC QN AUTO: 29.9 PG (ref 26–34)
MCHC RBC AUTO-ENTMCNC: 33.6 G/DL (ref 31–36)
MCV RBC AUTO: 89 FL (ref 80–100)
MONOCYTES ABSOLUTE: 0.3 K/UL (ref 0–1.3)
MONOCYTES RELATIVE PERCENT: 5.7 %
NEUTROPHILS ABSOLUTE: 3.7 K/UL (ref 1.7–7.7)
NEUTROPHILS RELATIVE PERCENT: 66.1 %
PDW BLD-RTO: 13.2 % (ref 12.4–15.4)
PERFORMED ON: ABNORMAL
PLATELET # BLD: 148 K/UL (ref 135–450)
PMV BLD AUTO: 8.8 FL (ref 5–10.5)
POTASSIUM REFLEX MAGNESIUM: 4 MMOL/L (ref 3.5–5.1)
RBC # BLD: 4.01 M/UL (ref 4–5.2)
SODIUM BLD-SCNC: 138 MMOL/L (ref 136–145)
WBC # BLD: 5.6 K/UL (ref 4–11)

## 2019-08-07 PROCEDURE — 85025 COMPLETE CBC W/AUTO DIFF WBC: CPT

## 2019-08-07 PROCEDURE — 93010 ELECTROCARDIOGRAM REPORT: CPT | Performed by: INTERNAL MEDICINE

## 2019-08-07 PROCEDURE — 2060000000 HC ICU INTERMEDIATE R&B

## 2019-08-07 PROCEDURE — 99233 SBSQ HOSP IP/OBS HIGH 50: CPT | Performed by: INTERNAL MEDICINE

## 2019-08-07 PROCEDURE — 80048 BASIC METABOLIC PNL TOTAL CA: CPT

## 2019-08-07 PROCEDURE — 36415 COLL VENOUS BLD VENIPUNCTURE: CPT

## 2019-08-07 PROCEDURE — G0378 HOSPITAL OBSERVATION PER HR: HCPCS

## 2019-08-07 PROCEDURE — 6370000000 HC RX 637 (ALT 250 FOR IP): Performed by: INTERNAL MEDICINE

## 2019-08-07 PROCEDURE — 2580000003 HC RX 258: Performed by: INTERNAL MEDICINE

## 2019-08-07 RX ORDER — MIDAZOLAM HYDROCHLORIDE 5 MG/ML
2 INJECTION INTRAMUSCULAR; INTRAVENOUS ONCE
Status: DISCONTINUED | OUTPATIENT
Start: 2019-08-07 | End: 2019-08-07

## 2019-08-07 RX ORDER — NITROGLYCERIN 80 MG/1
1 PATCH TRANSDERMAL DAILY
Status: DISCONTINUED | OUTPATIENT
Start: 2019-08-07 | End: 2019-08-08

## 2019-08-07 RX ORDER — FENTANYL CITRATE 50 UG/ML
50 INJECTION, SOLUTION INTRAMUSCULAR; INTRAVENOUS ONCE
Status: COMPLETED | OUTPATIENT
Start: 2019-08-08 | End: 2019-08-08

## 2019-08-07 RX ORDER — ACETAMINOPHEN 325 MG/1
325 TABLET ORAL EVERY 6 HOURS PRN
Status: ON HOLD | COMMUNITY
End: 2019-11-01 | Stop reason: HOSPADM

## 2019-08-07 RX ORDER — MIDAZOLAM HYDROCHLORIDE 5 MG/ML
2 INJECTION INTRAMUSCULAR; INTRAVENOUS ONCE
Status: COMPLETED | OUTPATIENT
Start: 2019-08-08 | End: 2019-08-08

## 2019-08-07 RX ADMIN — PANTOPRAZOLE SODIUM 40 MG: 40 TABLET, DELAYED RELEASE ORAL at 06:07

## 2019-08-07 RX ADMIN — DILTIAZEM HYDROCHLORIDE 120 MG: 120 CAPSULE, COATED, EXTENDED RELEASE ORAL at 07:53

## 2019-08-07 RX ADMIN — OXYCODONE HYDROCHLORIDE AND ACETAMINOPHEN 1 TABLET: 5; 325 TABLET ORAL at 00:05

## 2019-08-07 RX ADMIN — SODIUM CHLORIDE, PRESERVATIVE FREE 10 ML: 5 INJECTION INTRAVENOUS at 21:52

## 2019-08-07 RX ADMIN — INSULIN LISPRO 1 UNITS: 100 INJECTION, SOLUTION INTRAVENOUS; SUBCUTANEOUS at 07:55

## 2019-08-07 RX ADMIN — INSULIN LISPRO 1 UNITS: 100 INJECTION, SOLUTION INTRAVENOUS; SUBCUTANEOUS at 11:55

## 2019-08-07 RX ADMIN — INSULIN LISPRO 1 UNITS: 100 INJECTION, SOLUTION INTRAVENOUS; SUBCUTANEOUS at 21:51

## 2019-08-07 RX ADMIN — Medication 1000 MG: at 21:51

## 2019-08-07 RX ADMIN — OXYCODONE HYDROCHLORIDE AND ACETAMINOPHEN 1 TABLET: 5; 325 TABLET ORAL at 09:44

## 2019-08-07 RX ADMIN — LISINOPRIL 2.5 MG: 2.5 TABLET ORAL at 07:53

## 2019-08-07 RX ADMIN — CLOPIDOGREL BISULFATE 75 MG: 75 TABLET ORAL at 07:53

## 2019-08-07 RX ADMIN — ATORVASTATIN CALCIUM 40 MG: 40 TABLET, FILM COATED ORAL at 21:51

## 2019-08-07 RX ADMIN — Medication 1000 MG: at 07:53

## 2019-08-07 RX ADMIN — OXYCODONE HYDROCHLORIDE AND ACETAMINOPHEN 1 TABLET: 5; 325 TABLET ORAL at 19:51

## 2019-08-07 RX ADMIN — SODIUM CHLORIDE, PRESERVATIVE FREE 10 ML: 5 INJECTION INTRAVENOUS at 07:53

## 2019-08-07 RX ADMIN — ASPIRIN 81 MG 81 MG: 81 TABLET ORAL at 07:53

## 2019-08-07 ASSESSMENT — PAIN DESCRIPTION - PAIN TYPE
TYPE: ACUTE PAIN

## 2019-08-07 ASSESSMENT — PAIN SCALES - GENERAL
PAINLEVEL_OUTOF10: 3
PAINLEVEL_OUTOF10: 7
PAINLEVEL_OUTOF10: 6
PAINLEVEL_OUTOF10: 5
PAINLEVEL_OUTOF10: 3
PAINLEVEL_OUTOF10: 5

## 2019-08-07 ASSESSMENT — PAIN DESCRIPTION - LOCATION
LOCATION: GROIN

## 2019-08-07 ASSESSMENT — PAIN DESCRIPTION - ORIENTATION
ORIENTATION: RIGHT

## 2019-08-07 NOTE — PLAN OF CARE
Problem: Pain:  Goal: Control of acute pain  Description  Control of acute pain  Outcome: Ongoing  Note:   Pt denies any c/o pain at this time. PRN pain medications available and will be administered as needed per MD's order if VSS. Will notify MD if current regimen is not controlling pain.

## 2019-08-08 VITALS
SYSTOLIC BLOOD PRESSURE: 96 MMHG | TEMPERATURE: 97.9 F | HEART RATE: 68 BPM | HEIGHT: 61 IN | OXYGEN SATURATION: 99 % | RESPIRATION RATE: 18 BRPM | BODY MASS INDEX: 33.04 KG/M2 | WEIGHT: 175 LBS | DIASTOLIC BLOOD PRESSURE: 61 MMHG

## 2019-08-08 LAB
EKG ATRIAL RATE: 61 BPM
EKG DIAGNOSIS: NORMAL
EKG P AXIS: 51 DEGREES
EKG P-R INTERVAL: 148 MS
EKG Q-T INTERVAL: 440 MS
EKG QRS DURATION: 98 MS
EKG QTC CALCULATION (BAZETT): 442 MS
EKG R AXIS: -24 DEGREES
EKG T AXIS: 60 DEGREES
EKG VENTRICULAR RATE: 61 BPM
GLUCOSE BLD-MCNC: 194 MG/DL (ref 70–99)
GLUCOSE BLD-MCNC: 264 MG/DL (ref 70–99)
PERFORMED ON: ABNORMAL
PERFORMED ON: ABNORMAL

## 2019-08-08 PROCEDURE — 96374 THER/PROPH/DIAG INJ IV PUSH: CPT

## 2019-08-08 PROCEDURE — 93010 ELECTROCARDIOGRAM REPORT: CPT | Performed by: INTERNAL MEDICINE

## 2019-08-08 PROCEDURE — G0378 HOSPITAL OBSERVATION PER HR: HCPCS

## 2019-08-08 PROCEDURE — 93005 ELECTROCARDIOGRAM TRACING: CPT | Performed by: INTERNAL MEDICINE

## 2019-08-08 PROCEDURE — 2580000003 HC RX 258: Performed by: INTERNAL MEDICINE

## 2019-08-08 PROCEDURE — 6370000000 HC RX 637 (ALT 250 FOR IP): Performed by: INTERNAL MEDICINE

## 2019-08-08 PROCEDURE — 96375 TX/PRO/DX INJ NEW DRUG ADDON: CPT

## 2019-08-08 PROCEDURE — 93926 LOWER EXTREMITY STUDY: CPT

## 2019-08-08 PROCEDURE — 6360000002 HC RX W HCPCS: Performed by: INTERNAL MEDICINE

## 2019-08-08 PROCEDURE — 99233 SBSQ HOSP IP/OBS HIGH 50: CPT | Performed by: INTERNAL MEDICINE

## 2019-08-08 RX ORDER — SOY ISOFLAVONE 40 MG
1000 TABLET ORAL 2 TIMES DAILY
Qty: 60 CAPSULE | Refills: 1 | Status: SHIPPED | OUTPATIENT
Start: 2019-08-08 | End: 2019-08-16 | Stop reason: ALTCHOICE

## 2019-08-08 RX ORDER — RANOLAZINE 500 MG/1
500 TABLET, EXTENDED RELEASE ORAL 2 TIMES DAILY
Qty: 60 TABLET | Refills: 1 | Status: SHIPPED | OUTPATIENT
Start: 2019-08-08 | End: 2019-08-29 | Stop reason: SDUPTHER

## 2019-08-08 RX ORDER — RANOLAZINE 500 MG/1
500 TABLET, EXTENDED RELEASE ORAL 2 TIMES DAILY
Status: DISCONTINUED | OUTPATIENT
Start: 2019-08-08 | End: 2019-08-08 | Stop reason: HOSPADM

## 2019-08-08 RX ORDER — OXYCODONE HYDROCHLORIDE AND ACETAMINOPHEN 5; 325 MG/1; MG/1
1 TABLET ORAL EVERY 8 HOURS PRN
Qty: 6 TABLET | Refills: 0 | Status: SHIPPED | OUTPATIENT
Start: 2019-08-08 | End: 2019-08-11

## 2019-08-08 RX ADMIN — Medication 1000 MG: at 09:59

## 2019-08-08 RX ADMIN — MIDAZOLAM HYDROCHLORIDE 2 MG: 5 INJECTION, SOLUTION INTRAMUSCULAR; INTRAVENOUS at 13:20

## 2019-08-08 RX ADMIN — FLUTICASONE PROPIONATE 1 SPRAY: 50 SPRAY, METERED NASAL at 11:26

## 2019-08-08 RX ADMIN — SODIUM CHLORIDE, PRESERVATIVE FREE 10 ML: 5 INJECTION INTRAVENOUS at 10:00

## 2019-08-08 RX ADMIN — INSULIN LISPRO 3 UNITS: 100 INJECTION, SOLUTION INTRAVENOUS; SUBCUTANEOUS at 11:25

## 2019-08-08 RX ADMIN — OXYCODONE HYDROCHLORIDE AND ACETAMINOPHEN 1 TABLET: 5; 325 TABLET ORAL at 07:12

## 2019-08-08 RX ADMIN — FENTANYL CITRATE 50 MCG: 50 INJECTION, SOLUTION INTRAMUSCULAR; INTRAVENOUS at 13:20

## 2019-08-08 RX ADMIN — INSULIN LISPRO 1 UNITS: 100 INJECTION, SOLUTION INTRAVENOUS; SUBCUTANEOUS at 09:07

## 2019-08-08 RX ADMIN — ONDANSETRON 4 MG: 2 INJECTION INTRAMUSCULAR; INTRAVENOUS at 11:25

## 2019-08-08 RX ADMIN — PANTOPRAZOLE SODIUM 40 MG: 40 TABLET, DELAYED RELEASE ORAL at 07:12

## 2019-08-08 RX ADMIN — CLOPIDOGREL BISULFATE 75 MG: 75 TABLET ORAL at 09:06

## 2019-08-08 RX ADMIN — LISINOPRIL 2.5 MG: 2.5 TABLET ORAL at 09:07

## 2019-08-08 RX ADMIN — DILTIAZEM HYDROCHLORIDE 120 MG: 120 CAPSULE, COATED, EXTENDED RELEASE ORAL at 09:07

## 2019-08-08 RX ADMIN — ASPIRIN 81 MG 81 MG: 81 TABLET ORAL at 09:07

## 2019-08-08 ASSESSMENT — PAIN DESCRIPTION - PAIN TYPE: TYPE: ACUTE PAIN

## 2019-08-08 ASSESSMENT — PAIN DESCRIPTION - DESCRIPTORS: DESCRIPTORS: SQUEEZING

## 2019-08-08 ASSESSMENT — PAIN SCALES - GENERAL
PAINLEVEL_OUTOF10: 5
PAINLEVEL_OUTOF10: 7
PAINLEVEL_OUTOF10: 3
PAINLEVEL_OUTOF10: 7

## 2019-08-08 ASSESSMENT — PAIN DESCRIPTION - ORIENTATION: ORIENTATION: MID

## 2019-08-08 ASSESSMENT — PAIN DESCRIPTION - FREQUENCY: FREQUENCY: INTERMITTENT

## 2019-08-08 ASSESSMENT — PAIN DESCRIPTION - LOCATION: LOCATION: CHEST

## 2019-08-08 NOTE — DISCHARGE SUMMARY
[facility]  -mgmt per cards   -us obtained, and neg for pseudoaneurysm,   -will consider CT angio if  in 1-2 weeks    COPD/asthma- stable    Physical Exam Performed:     BP 96/61   Pulse 68   Temp 97.9 °F (36.6 °C) (Oral)   Resp 18   Ht 5' 1\" (1.549 m)   Wt 175 lb (79.4 kg)   SpO2 99%   BMI 33.07 kg/m²     General appearance:  No apparent distress, appears stated age and cooperative. HEENT:  Normal cephalic, atraumatic without obvious deformity. Pupils equal, round, and reactive to light.  Extra ocular muscles intact. Conjunctivae/corneas clear. Neck: Supple, with full range of motion. No jugular venous distention. Trachea midline. Respiratory:  Normal respiratory effort. Clear to auscultation, bilaterally without Rales/Wheezes/Rhonchi. Cardiovascular:  Regular rate and rhythm with normal S1/S2 without murmurs, rubs or gallops. Abdomen: Soft, non-tender, non-distended with normal bowel sounds. Musculoskeletal:  No clubbing, cyanosis or edema bilaterally.  Full range of motion without deformity. Skin: Skin color, texture, turgor normal.  No rashes or lesions. Neurologic:  Neurovascularly intact without any focal sensory/motor deficits. Cranial nerves: II-XII intact, grossly non-focal.  Psychiatric:  Alert and oriented, thought content appropriate, normal insight  Capillary Refill: Brisk,< 3 seconds   Peripheral Pulses: +2 palpable, equal bilaterally          Labs:  For convenience and continuity at follow-up the following most recent labs are provided:      CBC:    Lab Results   Component Value Date    WBC 5.6 08/07/2019    HGB 12.0 08/07/2019    HCT 35.7 08/07/2019     08/07/2019       Renal:    Lab Results   Component Value Date     08/07/2019    K 4.0 08/07/2019     08/07/2019    CO2 28 08/07/2019    BUN 11 08/07/2019    CREATININE 0.6 08/07/2019    CALCIUM 8.5 08/07/2019    PHOS 4.8 06/13/2018         Significant Diagnostic Studies    Radiology:   VL DUP LOWER EXTREMITY

## 2019-08-08 NOTE — PROGRESS NOTES
Cardiac Rehab referral completed and patient provided with a brochure. Lives closer to [facility].   Instructed and given written information on diet, exercise, medications, risk factors, PCI site precautions, when to call the doctor, etc. Thank you for this referral.
Complains of level 7 /70 midsternal chest pain mid chest squeezing and headache and nausea and headache stat ekg ordered oxygen place on at 2 liters nasal cannula .
No smoking cessation needed patient quit smoking in 2018
Pt able to ambulate in room without complication. Hematoma unchanged. Site soft, tender.  [address]
gallops. Abdomen: Soft, non-tender, non-distended with normal bowel sounds. Musculoskeletal:  No clubbing, cyanosis or edema bilaterally. Full range of motion without deformity. Skin: Skin color, texture, turgor normal.  No rashes or lesions. Neurologic:  Neurovascularly intact without any focal sensory/motor deficits. Cranial nerves: II-XII intact, grossly non-focal.  Psychiatric:  Alert and oriented, thought content appropriate, normal insight  Capillary Refill: Brisk,< 3 seconds   Peripheral Pulses: +2 palpable, equal bilaterally          Labs:   Recent Labs     08/06/19 0649 08/07/19  0513   WBC 5.1 5.6   HGB 14.2 12.0   HCT 41.9 35.7*    148     Recent Labs     08/06/19 0649 08/07/19 0513    138   K 3.8 4.0   CL 99 103   CO2 31 28   BUN 12 11   CREATININE 0.7 0.6   CALCIUM 9.7 8.5     No results for input(s): AST, ALT, BILIDIR, BILITOT, ALKPHOS in the last 72 hours. Recent Labs     08/06/19 0649   INR 1.04     No results for input(s): Griffin Due in the last 72 hours.     Urinalysis:      Lab Results   Component Value Date    NITRU Negative 06/12/2018    WBCUA 3-5 06/12/2018    BACTERIA 1+ 06/12/2018    RBCUA 0-2 06/12/2018    BLOODU Negative 06/12/2018    SPECGRAV >=1.030 06/12/2018    GLUCOSEU Negative 06/12/2018    GLUCOSEU Neg 07/20/2011       Radiology:  No orders to display           Assessment/Plan:    Active Hospital Problems    Diagnosis    S/P PTCA (percutaneous transluminal coronary angioplasty) [Z98.61]     Chest pain- ongoing, concerns for UA as outpt, brought in for [facility] and noted disease not amenable to stenting  -med mgmt at his time  Tele  -apprec cards mgmt  -started nitropatch as dz was amenable to nitro during procedure but pt declined     CAD- see above  Continued home meds, plavix/asa/statin, L-arginine, acei  -on prn nitro     GERD- ppi continued     DM2-held metformin  Ac/hs bs  Low ssi     HTN- on dilt, acei     Hematoma- due to [address]  -mgmt per
contacted for respiratory rate (RR) greater than 35 breaths per minute. Therapy will be held for heart rate (HR) greater than 140 beats per minute, pending direction from physician. 3. Bronchodilators will be administered via Metered Dose Inhaler (MDI) with spacer when the following criteria are met:  a. Alert and cooperative     b. HR < 140 bpm  c. RR < 30 bpm                d. Can demonstrate a 2-3 second inspiratory hold  4. Bronchodilators will be administered via Hand Held Nebulizer [facility] to patients when ANY of the following criteria are met  a. Incognizant or uncooperative          b. Patients treated with HHN at Home        c. Unable to demonstrate proper use of MDI with spacer     d. RR > 30 bpm   5. Bronchodilators will be delivered via Metered Dose Inhaler (MDI), HHN, Aerogen to intubated patients on mechanical ventilation. 6. Inhalation medication orders will be delivered and/or substituted as outlined below. Aerosolized Medications Ordering and Administration Guidelines:    1. All Medications will be ordered by a physician, and their frequency and/or modality will be adjusted as defined by the patients Respiratory Severity Index (RSI) score. 2. If the patient does not have documented COPD, consider discontinuing anticholinergics when RSI is less than 9.  3. If the bronchospasm worsens (increased RSI), then the bronchodilator frequency can be increased to a maximum of every 4 hours. If greater than every 4 hours is required, the physician will be contacted. 4. If the bronchospasm improves, the frequency of the bronchodilator can be decreased, based on the patient's RSI, but not less than home treatment regimen frequency. 5. Bronchodilator(s) will be discontinued if patient has a RSI less than 9 and has received no scheduled or as needed treatment for 72  Hrs. Patients Ordered on a Mucolytic Agent:    1. Must always be administered with a bronchodilator.     2. Discontinue if patient
[address] [facility]  [phone] [facility]  [phone] [address] [place] [address]  8/8/2019 4:53 PM

## 2019-08-14 RX ORDER — LISINOPRIL 2.5 MG/1
2.5 TABLET ORAL DAILY
Qty: 30 TABLET | Refills: 0 | Status: SHIPPED | OUTPATIENT
Start: 2019-08-14 | End: 2019-08-29 | Stop reason: SDUPTHER

## 2019-08-16 ENCOUNTER — TELEPHONE (OUTPATIENT)
Dept: CARDIOLOGY CLINIC | Age: 54
End: 2019-08-16

## 2019-08-16 NOTE — TELEPHONE ENCOUNTER
Ok d/c dilt and L-arginine.  I will defer any other issues to [facility], her primary cardiologist

## 2019-08-19 ENCOUNTER — TELEPHONE (OUTPATIENT)
Dept: CARDIOLOGY CLINIC | Age: 54
End: 2019-08-19

## 2019-08-19 NOTE — TELEPHONE ENCOUNTER
Spoke with patient. States had the hematoma first.  Now there is a hard knot in the groin site area. 2 inches long. States sore to touch. Tender. Just wanting to make sure this can be a normal reaction.

## 2019-08-28 NOTE — PROGRESS NOTES
performed most recent [facility] 4/9/19 with 2 Agustin MARCOS to prox/mid LAD and POBA of distal LAD. She was admitted to the hospital 7/25/19 for CP. Dr. Carlos A Joshi consulted and discussed LHC. At that time she did not wish to under go LHC. OP symptoms progressed and so elected for OP LHC. Most recent [facility]  8/6/19 showed instent restenosis at mid LAD stent with POBA only; spasm noted of left main and LCx that improved with NTG. Most recent EKG 8/8/19 showed NSR; inferior infarct. Note RLE duplex 8/8/19 No evidence of pseudoaneurysm, AV fistula or hematoma is present. She is unable to tolerate Imdur, Nitro patch, or L-arginine due to nausea and HA's. Today she reports feeling fatigued and continues with chest pain. Occurs 2-3 x per day; brief < 1 minute; pressure; occasional radiation to left arm and neck; no associated symptoms. Does not feel as good after PCI this month as she did with April PCI. Past Medical History:   has a past medical history of Acute blood loss anemia, Asthma, Chest pain, COPD (chronic obstructive pulmonary disease) ([address] Coronary artery disease, Depression, Diabetes mellitus ([address] Enlarged heart, Fatigue, Generalized headaches, Hyperlipidemia, Hypertension, Migraine, Morning stiffness of joints, Myocardial infarction ([address] Numbness or tingling, OA (osteoarthritis) of knee, Obesity, Class I, BMI 30.0-34.9 (see actual BMI), Osteoporosis, Pulmonary nodule, and Shortness of breath. Surgical History:   has a past surgical history that includes Cholecystectomy; Hysterectomy; Diagnostic Cardiac Cath Lab Procedure (06/06/2013); Upper gastrointestinal endoscopy; Cardiac catheterization (04/01/2016); Cardiac catheterization (08/01/2012); Coronary artery bypass graft (06/13/2018); Coronary angioplasty with stent (05/13/2018); Percutaneous Transluminal Coronary Angio; and Coronary angioplasty (08/06/2019). Social History:   reports that she quit smoking about 15 months ago.  Her smoking use included CP she underwent most recent [facility]  8/6/19 showing instent reestenosis at mid LAD stent and underwent multiple POBA only. Unfortunately, still with CP but brief and will try further medical management. Limited given allergies and intolerances to meds. 2. Hyperlipidemia I personally reviewed most recent lipids from 7/30/19. Switched from pravastatin 40mg qhs to lipitor 40mg daily. Well controlled and will continue current medical regimen. Higher dose lipitor caused nausea and anorexia but tolerates pravachol. 3.  Palpitations: Resolved and denies now. 4.  Chronic chest pain:  See #1 above. Will try further medical management and increase ranexa to 1000mg BID. If not helping then will add low dose norvasc to see if helps angina and coronary artery spasm. Plan:  1. Meds reviewed. Refills as warranted   2. Take Ranexa 1000 mg 2 times daily. If this does not help chest pain will start amlodipine 2.5 mg daily. 3. Follow up with Dr. Luis Vee in 4 weeks   4. Follow up with me in  4 months     This note was scribed in the presence of Leilani Seaman MD by Marifer Garcia RN    I, Dr. David Sharp, personally performed the services described in this documentation, as scribed by the above signed scribe in my presence. It is both accurate and complete to my knowledge. I agree with the details independently gathered by the clinical support staff, while the remaining scribed note accurately describes my personal service to the patient. Thank you for allowing me to participate in the care of this individual.    Cost of prescription medications and patient compliance have been reviewed with patient. All questions answered. Flores Capps.  Goyo Faria M.D., [facility]

## 2019-08-29 ENCOUNTER — OFFICE VISIT (OUTPATIENT)
Dept: CARDIOLOGY CLINIC | Age: 54
End: 2019-08-29
Payer: MEDICAID

## 2019-08-29 VITALS
DIASTOLIC BLOOD PRESSURE: 68 MMHG | WEIGHT: 172 LBS | SYSTOLIC BLOOD PRESSURE: 124 MMHG | OXYGEN SATURATION: 98 % | HEART RATE: 74 BPM | HEIGHT: 61 IN | BODY MASS INDEX: 32.47 KG/M2

## 2019-08-29 DIAGNOSIS — I20.8 STABLE ANGINA PECTORIS (HCC): Primary | ICD-10-CM

## 2019-08-29 PROCEDURE — 1111F DSCHRG MED/CURRENT MED MERGE: CPT | Performed by: INTERNAL MEDICINE

## 2019-08-29 PROCEDURE — 99214 OFFICE O/P EST MOD 30 MIN: CPT | Performed by: INTERNAL MEDICINE

## 2019-08-29 PROCEDURE — 1036F TOBACCO NON-USER: CPT | Performed by: INTERNAL MEDICINE

## 2019-08-29 PROCEDURE — G8598 ASA/ANTIPLAT THER USED: HCPCS | Performed by: INTERNAL MEDICINE

## 2019-08-29 PROCEDURE — 3017F COLORECTAL CA SCREEN DOC REV: CPT | Performed by: INTERNAL MEDICINE

## 2019-08-29 PROCEDURE — G8427 DOCREV CUR MEDS BY ELIG CLIN: HCPCS | Performed by: INTERNAL MEDICINE

## 2019-08-29 PROCEDURE — G8417 CALC BMI ABV UP PARAM F/U: HCPCS | Performed by: INTERNAL MEDICINE

## 2019-08-29 RX ORDER — AMLODIPINE BESYLATE 2.5 MG/1
2.5 TABLET ORAL DAILY
Qty: 30 TABLET | Refills: 3 | Status: ON HOLD | OUTPATIENT
Start: 2019-08-29 | End: 2019-11-01 | Stop reason: SDUPTHER

## 2019-08-29 RX ORDER — ATORVASTATIN CALCIUM 40 MG/1
40 TABLET, FILM COATED ORAL NIGHTLY
Qty: 30 TABLET | Refills: 11 | Status: SHIPPED | OUTPATIENT
Start: 2019-08-29 | End: 2019-12-04 | Stop reason: SDUPTHER

## 2019-08-29 RX ORDER — ASPIRIN 81 MG/1
81 TABLET, CHEWABLE ORAL DAILY
Qty: 30 TABLET | Refills: 11 | Status: SHIPPED | OUTPATIENT
Start: 2019-08-29 | End: 2022-06-09 | Stop reason: SDUPTHER

## 2019-08-29 RX ORDER — RANOLAZINE 1000 MG/1
500 TABLET, EXTENDED RELEASE ORAL 2 TIMES DAILY
Qty: 60 TABLET | Refills: 3 | Status: SHIPPED | OUTPATIENT
Start: 2019-08-29 | End: 2020-10-27 | Stop reason: ALTCHOICE

## 2019-08-29 RX ORDER — CLOPIDOGREL BISULFATE 75 MG/1
75 TABLET ORAL DAILY
Qty: 30 TABLET | Refills: 11 | Status: SHIPPED | OUTPATIENT
Start: 2019-08-29 | End: 2022-03-04 | Stop reason: SDUPTHER

## 2019-08-29 RX ORDER — LISINOPRIL 2.5 MG/1
2.5 TABLET ORAL DAILY
Qty: 30 TABLET | Refills: 11 | Status: SHIPPED | OUTPATIENT
Start: 2019-08-29 | End: 2019-12-03 | Stop reason: SDUPTHER

## 2019-08-29 NOTE — PATIENT INSTRUCTIONS
performed most recent [facility] 4/9/19 with 2 Agustin MARCOS to prox/mid LAD and POBA of distal LAD. She was admitted to the hospital 7/25/19 for CP. Dr. Ruthie Mars consulted and discussed LHC. At that time she did not wish to under go LHC. OP symptoms progressed and so elected for OP LHC. Most recent [facility]  8/6/19 showed instent restenosis at mid LAD stent with POBA only; spasm noted of left main and LCx that improved with NTG. Most recent EKG 8/8/19 showed NSR; inferior infarct. Note RLE duplex 8/8/19 No evidence of pseudoaneurysm, AV fistula or hematoma is present. She is unable to tolerate Imdur, Nitro patch, or L-arginine due to nausea and HA's. Today she reports feeling fatigued and continues with chest pain. Occurs 2-3 x per day; brief < 1 minute; pressure; occasional radiation to left arm and neck; no associated symptoms. Does not feel as good after PCI this month as she did with April PCI. Past Medical History:   has a past medical history of Acute blood loss anemia, Asthma, Chest pain, COPD (chronic obstructive pulmonary disease) ([address] Coronary artery disease, Depression, Diabetes mellitus ([address] Enlarged heart, Fatigue, Generalized headaches, Hyperlipidemia, Hypertension, Migraine, Morning stiffness of joints, Myocardial infarction ([address] Numbness or tingling, OA (osteoarthritis) of knee, Obesity, Class I, BMI 30.0-34.9 (see actual BMI), Osteoporosis, Pulmonary nodule, and Shortness of breath. Surgical History:   has a past surgical history that includes Cholecystectomy; Hysterectomy; Diagnostic Cardiac Cath Lab Procedure (06/06/2013); Upper gastrointestinal endoscopy; Cardiac catheterization (04/01/2016); Cardiac catheterization (08/01/2012); Coronary artery bypass graft (06/13/2018); Coronary angioplasty with stent (05/13/2018); Percutaneous Transluminal Coronary Angio; and Coronary angioplasty (08/06/2019). Social History:   reports that she quit smoking about 15 months ago.  Her smoking use included cigarettes. She has a 20.00 pack-year smoking history. She has never used smokeless tobacco. She reports that she does not drink alcohol or use drugs. Family History:  family history includes Cancer in her brother and another family member; Diabetes in an other family member; Emphysema in her mother; Heart Disease in her father and mother; Heart Failure in her father, maternal aunt, maternal uncle, mother, and another family member; High Blood Pressure in her brother, father, maternal grandfather, maternal grandmother, mother, paternal grandfather, paternal grandmother, sister, sister, sister, and sister; High Cholesterol in her father and mother; Hypertension in her brother, father, maternal aunt, maternal grandfather, maternal grandmother, maternal uncle, mother, paternal aunt, paternal grandfather, paternal grandmother, paternal uncle, and sister; Osteoarthritis in an other family member; Stroke in her mother. Home Medications:  Prior to Admission medications    Medication Sig Start Date End Date Taking? Authorizing Provider   glimepiride (AMARYL) 4 MG tablet Take 4 mg by mouth 2 times daily. Yes Historical Provider, MD [facility] Home oxygen 2L per [place]   Yes Historical Provider, MD   tiotropium (SPIRIVA HANDIHALER) 18 MCG inhalation capsule Inhale 1 capsule into the lungs daily. 2/27/13  Yes Josee Carrasquillo MD   amLODIPine (NORVASC) 5 MG tablet Take 5 mg by mouth 2 times daily. Yes Historical Provider, MD   albuterol (PROVENTIL HFA) 108 (90 BASE) MCG/ACT inhaler Inhale 2 puffs into the lungs every 6 hours as needed for Shortness of Breath for 10 days. IF YOU FEEL AS THO YOU ARE STILL NEEDING THIS MEDICATION AFTER 10 DAYS, CALL YOUR DR TO DISCUSS FURTHER TREATMENT 1/22/13  Yes Theo Ceballos MD   propranolol (INDERAL) 40 MG tablet Take 40 mg by mouth 2 times daily. Yes Historical Provider, MD   SUMAtriptan (IMITREX) 50 MG tablet Take 50 mg by mouth once as needed.      Yes Historical Provider, MD   nitroGLYCERIN (NITROSTAT) 0.4 MG SL tablet Place 0.4 mg under the tongue every 5 minutes as needed. Yes Historical Provider, MD   pravastatin (PRAVACHOL) 40 MG tablet Take 1 tablet by mouth daily. 8/1/12  Yes Mata Chaudhary MD   insulin glargine (LANTUS) 100 UNIT/ML injection Inject 25 Units into the skin daily. Yes Historical Provider, MD   aspirin 81 MG EC tablet Take 81 mg by mouth daily. Yes Historical Provider, MD   omeprazole (PRILOSEC) 20 MG capsule Take 20 mg by mouth 2 times daily. Yes Historical Provider, MD   enalapril (VASOTEC) 20 MG tablet Take 20 mg by mouth 2 times daily. Yes Historical Provider, MD      Allergies:  Penicillins; Arginine; Imdur [isosorbide dinitrate]; Nitroglycerin; and Cephalexin     Review of Systems:   · Constitutional: there has been no unanticipated weight loss. There's been no change in energy level, sleep pattern, or activity level. · Eyes: No visual changes or diplopia. No scleral icterus. · ENT: No Headaches, hearing loss or vertigo. No mouth sores or sore throat. · Cardiovascular: Reviewed in HPI  · Respiratory: No cough or wheezing, no sputum production. No hematemesis. · Gastrointestinal: No abdominal pain, appetite loss, blood in stools. No change in bowel or bladder habits. · Genitourinary: No dysuria, trouble voiding, or hematuria. · Musculoskeletal:  No gait disturbance, weakness or joint complaints. · Integumentary: No rash or pruritis. · Neurological: No headache, diplopia, change in muscle strength, numbness or tingling. No change in gait, balance, coordination, mood, affect, memory, mentation, behavior. · Psychiatric: No anxiety, no depression. · Endocrine: No malaise, fatigue or temperature intolerance. No excessive thirst, fluid intake, or urination. No tremor. · Hematologic/Lymphatic: No abnormal bruising or bleeding, blood clots or swollen lymph nodes. · Allergic/Immunologic: No nasal congestion or hives.     Physical

## 2019-10-08 ENCOUNTER — OFFICE VISIT (OUTPATIENT)
Dept: CARDIOLOGY CLINIC | Age: 54
End: 2019-10-08
Payer: MEDICAID

## 2019-10-08 VITALS
BODY MASS INDEX: 32.81 KG/M2 | HEART RATE: 71 BPM | SYSTOLIC BLOOD PRESSURE: 144 MMHG | OXYGEN SATURATION: 98 % | WEIGHT: 173.8 LBS | HEIGHT: 61 IN | DIASTOLIC BLOOD PRESSURE: 78 MMHG

## 2019-10-08 DIAGNOSIS — I10 ESSENTIAL HYPERTENSION: Chronic | ICD-10-CM

## 2019-10-08 DIAGNOSIS — I20.1 CORONARY ARTERY SPASM (HCC): ICD-10-CM

## 2019-10-08 DIAGNOSIS — R07.9 ACUTE CHEST PAIN: ICD-10-CM

## 2019-10-08 DIAGNOSIS — I25.5 ISCHEMIC CARDIOMYOPATHY: ICD-10-CM

## 2019-10-08 DIAGNOSIS — E78.2 MIXED HYPERLIPIDEMIA: ICD-10-CM

## 2019-10-08 DIAGNOSIS — Z98.61 S/P PTCA (PERCUTANEOUS TRANSLUMINAL CORONARY ANGIOPLASTY): ICD-10-CM

## 2019-10-08 DIAGNOSIS — I25.119 CORONARY ARTERY DISEASE INVOLVING NATIVE CORONARY ARTERY OF NATIVE HEART WITH ANGINA PECTORIS (HCC): Primary | ICD-10-CM

## 2019-10-08 PROCEDURE — G8427 DOCREV CUR MEDS BY ELIG CLIN: HCPCS | Performed by: INTERNAL MEDICINE

## 2019-10-08 PROCEDURE — 1036F TOBACCO NON-USER: CPT | Performed by: INTERNAL MEDICINE

## 2019-10-08 PROCEDURE — G8484 FLU IMMUNIZE NO ADMIN: HCPCS | Performed by: INTERNAL MEDICINE

## 2019-10-08 PROCEDURE — 99214 OFFICE O/P EST MOD 30 MIN: CPT | Performed by: INTERNAL MEDICINE

## 2019-10-08 PROCEDURE — G8598 ASA/ANTIPLAT THER USED: HCPCS | Performed by: INTERNAL MEDICINE

## 2019-10-08 PROCEDURE — G8417 CALC BMI ABV UP PARAM F/U: HCPCS | Performed by: INTERNAL MEDICINE

## 2019-10-08 PROCEDURE — 3017F COLORECTAL CA SCREEN DOC REV: CPT | Performed by: INTERNAL MEDICINE

## 2019-10-08 RX ORDER — DULOXETIN HYDROCHLORIDE 60 MG/1
60 CAPSULE, DELAYED RELEASE ORAL DAILY
COMMUNITY
End: 2021-06-10 | Stop reason: ALTCHOICE

## 2019-10-08 RX ORDER — BUSPIRONE HYDROCHLORIDE 5 MG/1
10 TABLET ORAL 2 TIMES DAILY
COMMUNITY
End: 2021-06-10 | Stop reason: ALTCHOICE

## 2019-10-08 RX ORDER — DULOXETIN HYDROCHLORIDE 30 MG/1
30 CAPSULE, DELAYED RELEASE ORAL DAILY
COMMUNITY
End: 2021-06-10 | Stop reason: ALTCHOICE

## 2019-10-17 ENCOUNTER — HOSPITAL ENCOUNTER (OUTPATIENT)
Dept: NUCLEAR MEDICINE | Age: 54
Discharge: HOME | End: 2019-10-17
Payer: MEDICAID

## 2019-10-17 ENCOUNTER — HOSPITAL ENCOUNTER (OUTPATIENT)
Dept: NON INVASIVE DIAGNOSTICS | Age: 54
Discharge: HOME | End: 2019-10-17
Payer: MEDICAID

## 2019-10-17 DIAGNOSIS — R07.9 ACUTE CHEST PAIN: ICD-10-CM

## 2019-10-17 DIAGNOSIS — Z98.61 S/P PTCA (PERCUTANEOUS TRANSLUMINAL CORONARY ANGIOPLASTY): ICD-10-CM

## 2019-10-17 DIAGNOSIS — I25.119 CORONARY ARTERY DISEASE INVOLVING NATIVE CORONARY ARTERY OF NATIVE HEART WITH ANGINA PECTORIS (HCC): ICD-10-CM

## 2019-10-17 LAB
LV EF: 51 %
LVEF MODALITY: NORMAL

## 2019-10-17 PROCEDURE — A9502 TC99M TETROFOSMIN: HCPCS | Performed by: INTERNAL MEDICINE

## 2019-10-17 PROCEDURE — 93017 CV STRESS TEST TRACING ONLY: CPT

## 2019-10-17 PROCEDURE — 3430000000 HC RX DIAGNOSTIC RADIOPHARMACEUTICAL: Performed by: INTERNAL MEDICINE

## 2019-10-17 PROCEDURE — 6360000002 HC RX W HCPCS: Performed by: INTERNAL MEDICINE

## 2019-10-17 PROCEDURE — 78452 HT MUSCLE IMAGE SPECT MULT: CPT

## 2019-10-17 RX ORDER — AMINOPHYLLINE DIHYDRATE 25 MG/ML
50 INJECTION, SOLUTION INTRAVENOUS ONCE
Status: COMPLETED | OUTPATIENT
Start: 2019-10-17 | End: 2019-10-17

## 2019-10-17 RX ADMIN — REGADENOSON 0.4 MG: 0.08 INJECTION, SOLUTION INTRAVENOUS at 09:25

## 2019-10-17 RX ADMIN — TETROFOSMIN 31.8 MILLICURIE: 1.38 INJECTION, POWDER, LYOPHILIZED, FOR SOLUTION INTRAVENOUS at 09:25

## 2019-10-17 RX ADMIN — AMINOPHYLLINE: 25 INJECTION, SOLUTION INTRAVENOUS at 09:29

## 2019-10-17 RX ADMIN — TETROFOSMIN 10.7 MILLICURIE: 1.38 INJECTION, POWDER, LYOPHILIZED, FOR SOLUTION INTRAVENOUS at 07:57

## 2019-10-18 ENCOUNTER — TELEPHONE (OUTPATIENT)
Dept: CARDIOLOGY CLINIC | Age: 54
End: 2019-10-18

## 2019-11-01 ENCOUNTER — HOSPITAL ENCOUNTER (OUTPATIENT)
Dept: CARDIAC CATH/INVASIVE PROCEDURES | Age: 54
Discharge: HOME | End: 2019-11-01
Attending: INTERNAL MEDICINE | Admitting: INTERNAL MEDICINE
Payer: MEDICAID

## 2019-11-01 ENCOUNTER — TELEPHONE (OUTPATIENT)
Dept: CARDIOLOGY CLINIC | Age: 54
End: 2019-11-01

## 2019-11-01 VITALS — HEIGHT: 61 IN | WEIGHT: 173 LBS | BODY MASS INDEX: 32.66 KG/M2

## 2019-11-01 DIAGNOSIS — I42.9 CARDIOMYOPATHY, UNSPECIFIED TYPE (HCC): Primary | ICD-10-CM

## 2019-11-01 LAB
ANION GAP SERPL CALCULATED.3IONS-SCNC: 10 MMOL/L (ref 3–16)
BUN BLDV-MCNC: 12 MG/DL (ref 7–20)
CALCIUM SERPL-MCNC: 9.3 MG/DL (ref 8.3–10.6)
CHLORIDE BLD-SCNC: 101 MMOL/L (ref 99–110)
CO2: 29 MMOL/L (ref 21–32)
CREAT SERPL-MCNC: 0.7 MG/DL (ref 0.6–1.1)
EKG ATRIAL RATE: 64 BPM
EKG DIAGNOSIS: NORMAL
EKG P AXIS: 22 DEGREES
EKG P-R INTERVAL: 142 MS
EKG Q-T INTERVAL: 426 MS
EKG QRS DURATION: 92 MS
EKG QTC CALCULATION (BAZETT): 439 MS
EKG R AXIS: -34 DEGREES
EKG T AXIS: 24 DEGREES
EKG VENTRICULAR RATE: 64 BPM
GFR AFRICAN AMERICAN: >60
GFR NON-AFRICAN AMERICAN: >60
GLUCOSE BLD-MCNC: 157 MG/DL (ref 70–99)
HCT VFR BLD CALC: 44.2 % (ref 36–48)
HEMOGLOBIN: 15 G/DL (ref 12–16)
INR BLD: 0.98 (ref 0.86–1.14)
MCH RBC QN AUTO: 30.3 PG (ref 26–34)
MCHC RBC AUTO-ENTMCNC: 33.9 G/DL (ref 31–36)
MCV RBC AUTO: 89.4 FL (ref 80–100)
PDW BLD-RTO: 13.2 % (ref 12.4–15.4)
PLATELET # BLD: 184 K/UL (ref 135–450)
PMV BLD AUTO: 8.6 FL (ref 5–10.5)
POC ACT LR: 254 SEC
POC ACT LR: 326 SEC
POTASSIUM SERPL-SCNC: 4.5 MMOL/L (ref 3.5–5.1)
PROTHROMBIN TIME: 11.2 SEC (ref 9.8–13)
RBC # BLD: 4.95 M/UL (ref 4–5.2)
SODIUM BLD-SCNC: 140 MMOL/L (ref 136–145)
WBC # BLD: 4.9 K/UL (ref 4–11)

## 2019-11-01 PROCEDURE — 2500000003 HC RX 250 WO HCPCS

## 2019-11-01 PROCEDURE — 93010 ELECTROCARDIOGRAM REPORT: CPT | Performed by: INTERNAL MEDICINE

## 2019-11-01 PROCEDURE — 93459 L HRT ART/GRFT ANGIO: CPT | Performed by: INTERNAL MEDICINE

## 2019-11-01 PROCEDURE — 6370000000 HC RX 637 (ALT 250 FOR IP)

## 2019-11-01 PROCEDURE — 93571 IV DOP VEL&/PRESS C FLO 1ST: CPT

## 2019-11-01 PROCEDURE — 85027 COMPLETE CBC AUTOMATED: CPT

## 2019-11-01 PROCEDURE — 99152 MOD SED SAME PHYS/QHP 5/>YRS: CPT

## 2019-11-01 PROCEDURE — 85610 PROTHROMBIN TIME: CPT

## 2019-11-01 PROCEDURE — 99153 MOD SED SAME PHYS/QHP EA: CPT

## 2019-11-01 PROCEDURE — 93005 ELECTROCARDIOGRAM TRACING: CPT | Performed by: INTERNAL MEDICINE

## 2019-11-01 PROCEDURE — 2580000003 HC RX 258

## 2019-11-01 PROCEDURE — 93571 IV DOP VEL&/PRESS C FLO 1ST: CPT | Performed by: INTERNAL MEDICINE

## 2019-11-01 PROCEDURE — 93459 L HRT ART/GRFT ANGIO: CPT

## 2019-11-01 PROCEDURE — C1887 CATHETER, GUIDING: HCPCS

## 2019-11-01 PROCEDURE — 2709999900 HC NON-CHARGEABLE SUPPLY

## 2019-11-01 PROCEDURE — C1894 INTRO/SHEATH, NON-LASER: HCPCS

## 2019-11-01 PROCEDURE — 80048 BASIC METABOLIC PNL TOTAL CA: CPT

## 2019-11-01 PROCEDURE — 6360000002 HC RX W HCPCS

## 2019-11-01 PROCEDURE — C1769 GUIDE WIRE: HCPCS

## 2019-11-01 PROCEDURE — 85347 COAGULATION TIME ACTIVATED: CPT

## 2019-11-01 PROCEDURE — 6360000004 HC RX CONTRAST MEDICATION

## 2019-11-01 PROCEDURE — C1760 CLOSURE DEV, VASC: HCPCS

## 2019-11-01 RX ORDER — FENTANYL CITRATE 50 UG/ML
INJECTION, SOLUTION INTRAMUSCULAR; INTRAVENOUS
Status: COMPLETED | OUTPATIENT
Start: 2019-11-01 | End: 2019-11-01

## 2019-11-01 RX ORDER — MIDAZOLAM HYDROCHLORIDE 1 MG/ML
INJECTION INTRAMUSCULAR; INTRAVENOUS
Status: COMPLETED | OUTPATIENT
Start: 2019-11-01 | End: 2019-11-01

## 2019-11-01 RX ORDER — AMLODIPINE BESYLATE 2.5 MG/1
5 TABLET ORAL DAILY
Qty: 30 TABLET | Refills: 3 | Status: SHIPPED | OUTPATIENT
Start: 2019-11-01 | End: 2019-12-03 | Stop reason: SDUPTHER

## 2019-11-01 RX ORDER — ONDANSETRON 2 MG/ML
INJECTION INTRAMUSCULAR; INTRAVENOUS
Status: COMPLETED | OUTPATIENT
Start: 2019-11-01 | End: 2019-11-01

## 2019-11-01 RX ORDER — CLOPIDOGREL BISULFATE 75 MG/1
75 TABLET ORAL DAILY
Status: DISCONTINUED | OUTPATIENT
Start: 2019-11-01 | End: 2019-11-01 | Stop reason: HOSPADM

## 2019-11-01 RX ORDER — MIDAZOLAM HYDROCHLORIDE 5 MG/ML
INJECTION INTRAMUSCULAR; INTRAVENOUS
Status: COMPLETED | OUTPATIENT
Start: 2019-11-01 | End: 2019-11-01

## 2019-11-01 RX ORDER — NITROGLYCERIN 0.3 MG/1
TABLET SUBLINGUAL
Status: COMPLETED | OUTPATIENT
Start: 2019-11-01 | End: 2019-11-01

## 2019-11-01 RX ORDER — SODIUM CHLORIDE 9 MG/ML
1000 INJECTION, SOLUTION INTRAVENOUS CONTINUOUS
Status: DISCONTINUED | OUTPATIENT
Start: 2019-11-01 | End: 2019-11-01 | Stop reason: HOSPADM

## 2019-11-01 RX ORDER — ASPIRIN 81 MG/1
243 TABLET, CHEWABLE ORAL ONCE
Status: COMPLETED | OUTPATIENT
Start: 2019-11-01 | End: 2019-11-01

## 2019-11-01 RX ADMIN — MIDAZOLAM HYDROCHLORIDE 2 MG: 5 INJECTION INTRAMUSCULAR; INTRAVENOUS at 12:04

## 2019-11-01 RX ADMIN — SODIUM CHLORIDE 1000 ML: 9 INJECTION, SOLUTION INTRAVENOUS at 10:05

## 2019-11-01 RX ADMIN — FENTANYL CITRATE 25 MCG: 50 INJECTION, SOLUTION INTRAMUSCULAR; INTRAVENOUS at 12:03

## 2019-11-01 RX ADMIN — ONDANSETRON 4 MG: 2 INJECTION INTRAMUSCULAR; INTRAVENOUS at 12:32

## 2019-11-01 RX ADMIN — FENTANYL CITRATE 25 MCG: 50 INJECTION, SOLUTION INTRAMUSCULAR; INTRAVENOUS at 12:40

## 2019-11-01 RX ADMIN — NITROGLYCERIN 0.4 MG: 0.3 TABLET SUBLINGUAL at 12:37

## 2019-11-01 RX ADMIN — FENTANYL CITRATE 50 MCG: 50 INJECTION, SOLUTION INTRAMUSCULAR; INTRAVENOUS at 12:11

## 2019-11-01 RX ADMIN — ASPIRIN 81 MG: 81 TABLET, CHEWABLE ORAL at 10:05

## 2019-11-01 RX ADMIN — FENTANYL CITRATE 25 MCG: 50 INJECTION, SOLUTION INTRAMUSCULAR; INTRAVENOUS at 11:37

## 2019-11-01 RX ADMIN — MIDAZOLAM HYDROCHLORIDE 1 MG: 1 INJECTION INTRAMUSCULAR; INTRAVENOUS at 12:40

## 2019-11-01 RX ADMIN — CLOPIDOGREL BISULFATE 75 MG: 75 TABLET ORAL at 10:06

## 2019-11-01 RX ADMIN — FENTANYL CITRATE 50 MCG: 50 INJECTION, SOLUTION INTRAMUSCULAR; INTRAVENOUS at 12:56

## 2019-11-01 RX ADMIN — MIDAZOLAM HYDROCHLORIDE 2 MG: 5 INJECTION INTRAMUSCULAR; INTRAVENOUS at 11:37

## 2019-11-07 ENCOUNTER — TELEPHONE (OUTPATIENT)
Dept: CARDIOLOGY CLINIC | Age: 54
End: 2019-11-07

## 2019-11-07 DIAGNOSIS — I42.9 CARDIOMYOPATHY, UNSPECIFIED TYPE (HCC): Primary | ICD-10-CM

## 2019-11-07 DIAGNOSIS — I25.119 CORONARY ARTERY DISEASE INVOLVING NATIVE CORONARY ARTERY OF NATIVE HEART WITH ANGINA PECTORIS (HCC): ICD-10-CM

## 2019-11-07 DIAGNOSIS — E78.2 MIXED HYPERLIPIDEMIA: ICD-10-CM

## 2019-11-07 DIAGNOSIS — I10 ESSENTIAL HYPERTENSION: ICD-10-CM

## 2019-11-25 ENCOUNTER — TELEPHONE (OUTPATIENT)
Dept: CARDIOLOGY CLINIC | Age: 54
End: 2019-11-25

## 2019-11-27 ENCOUNTER — HOSPITAL ENCOUNTER (OUTPATIENT)
Dept: MRI IMAGING | Age: 54
Discharge: HOME | End: 2019-11-27
Payer: MEDICAID

## 2019-11-27 DIAGNOSIS — I42.9 CARDIOMYOPATHY, UNSPECIFIED TYPE (HCC): ICD-10-CM

## 2019-11-27 DIAGNOSIS — Z79.899 MEDICATION MANAGEMENT: Primary | ICD-10-CM

## 2019-11-27 PROCEDURE — 6360000004 HC RX CONTRAST MEDICATION: Performed by: INTERNAL MEDICINE

## 2019-11-27 PROCEDURE — A9579 GAD-BASE MR CONTRAST NOS,1ML: HCPCS | Performed by: INTERNAL MEDICINE

## 2019-11-27 PROCEDURE — 75561 CARDIAC MRI FOR MORPH W/DYE: CPT

## 2019-11-27 RX ADMIN — GADOTERIDOL 20 ML: 279.3 INJECTION, SOLUTION INTRAVENOUS at 14:00

## 2019-12-03 ENCOUNTER — OFFICE VISIT (OUTPATIENT)
Dept: CARDIOLOGY CLINIC | Age: 54
End: 2019-12-03
Payer: MEDICAID

## 2019-12-03 VITALS
OXYGEN SATURATION: 97 % | BODY MASS INDEX: 33.04 KG/M2 | WEIGHT: 175 LBS | SYSTOLIC BLOOD PRESSURE: 130 MMHG | HEIGHT: 61 IN | HEART RATE: 75 BPM | DIASTOLIC BLOOD PRESSURE: 78 MMHG

## 2019-12-03 DIAGNOSIS — E78.2 MIXED HYPERLIPIDEMIA: Chronic | ICD-10-CM

## 2019-12-03 DIAGNOSIS — I10 ESSENTIAL HYPERTENSION: Chronic | ICD-10-CM

## 2019-12-03 DIAGNOSIS — Z95.1 S/P CABG X 3: Primary | Chronic | ICD-10-CM

## 2019-12-03 PROCEDURE — 99214 OFFICE O/P EST MOD 30 MIN: CPT | Performed by: INTERNAL MEDICINE

## 2019-12-03 PROCEDURE — 1036F TOBACCO NON-USER: CPT | Performed by: INTERNAL MEDICINE

## 2019-12-03 PROCEDURE — G8484 FLU IMMUNIZE NO ADMIN: HCPCS | Performed by: INTERNAL MEDICINE

## 2019-12-03 PROCEDURE — G8598 ASA/ANTIPLAT THER USED: HCPCS | Performed by: INTERNAL MEDICINE

## 2019-12-03 PROCEDURE — 3017F COLORECTAL CA SCREEN DOC REV: CPT | Performed by: INTERNAL MEDICINE

## 2019-12-03 PROCEDURE — G8427 DOCREV CUR MEDS BY ELIG CLIN: HCPCS | Performed by: INTERNAL MEDICINE

## 2019-12-03 PROCEDURE — G8417 CALC BMI ABV UP PARAM F/U: HCPCS | Performed by: INTERNAL MEDICINE

## 2019-12-03 RX ORDER — AMLODIPINE BESYLATE 5 MG/1
5 TABLET ORAL DAILY
Qty: 30 TABLET | Refills: 11 | Status: ON HOLD | OUTPATIENT
Start: 2019-12-03 | End: 2020-01-10 | Stop reason: SDUPTHER

## 2019-12-03 RX ORDER — LISINOPRIL 5 MG/1
2.5 TABLET ORAL DAILY
Qty: 30 TABLET | Refills: 11 | Status: SHIPPED | OUTPATIENT
Start: 2019-12-03 | End: 2020-01-06 | Stop reason: DRUGHIGH

## 2019-12-03 RX ORDER — METOPROLOL SUCCINATE 25 MG/1
25 TABLET, EXTENDED RELEASE ORAL NIGHTLY
Qty: 30 TABLET | Refills: 11 | Status: SHIPPED | OUTPATIENT
Start: 2019-12-03 | End: 2019-12-10 | Stop reason: SINTOL

## 2019-12-04 RX ORDER — ATORVASTATIN CALCIUM 40 MG/1
40 TABLET, FILM COATED ORAL NIGHTLY
Qty: 30 TABLET | Refills: 11 | Status: SHIPPED | OUTPATIENT
Start: 2019-12-04 | End: 2021-02-22

## 2019-12-10 ENCOUNTER — TELEPHONE (OUTPATIENT)
Dept: CARDIOLOGY CLINIC | Age: 54
End: 2019-12-10

## 2019-12-12 ENCOUNTER — HOSPITAL ENCOUNTER (OUTPATIENT)
Age: 54
Discharge: HOME | End: 2019-12-12
Payer: MEDICAID

## 2019-12-12 DIAGNOSIS — I10 ESSENTIAL HYPERTENSION: Chronic | ICD-10-CM

## 2019-12-12 DIAGNOSIS — Z95.1 S/P CABG X 3: Chronic | ICD-10-CM

## 2019-12-12 DIAGNOSIS — E78.2 MIXED HYPERLIPIDEMIA: Chronic | ICD-10-CM

## 2019-12-12 LAB
ANION GAP SERPL CALCULATED.3IONS-SCNC: 12 MMOL/L (ref 3–16)
BUN BLDV-MCNC: 13 MG/DL (ref 7–20)
CALCIUM SERPL-MCNC: 9.2 MG/DL (ref 8.3–10.6)
CHLORIDE BLD-SCNC: 99 MMOL/L (ref 99–110)
CO2: 28 MMOL/L (ref 21–32)
CREAT SERPL-MCNC: 0.7 MG/DL (ref 0.6–1.1)
GFR AFRICAN AMERICAN: >60
GFR NON-AFRICAN AMERICAN: >60
GLUCOSE BLD-MCNC: 151 MG/DL (ref 70–99)
POTASSIUM SERPL-SCNC: 4.2 MMOL/L (ref 3.5–5.1)
SODIUM BLD-SCNC: 139 MMOL/L (ref 136–145)

## 2019-12-12 PROCEDURE — 36415 COLL VENOUS BLD VENIPUNCTURE: CPT

## 2019-12-12 PROCEDURE — 80048 BASIC METABOLIC PNL TOTAL CA: CPT

## 2020-01-06 ENCOUNTER — TELEPHONE (OUTPATIENT)
Dept: CARDIOLOGY CLINIC | Age: 55
End: 2020-01-06

## 2020-01-06 ENCOUNTER — OFFICE VISIT (OUTPATIENT)
Dept: CARDIOLOGY CLINIC | Age: 55
End: 2020-01-06
Payer: MEDICAID

## 2020-01-06 VITALS
HEART RATE: 90 BPM | DIASTOLIC BLOOD PRESSURE: 60 MMHG | SYSTOLIC BLOOD PRESSURE: 100 MMHG | HEIGHT: 61 IN | WEIGHT: 176.8 LBS | OXYGEN SATURATION: 98 % | BODY MASS INDEX: 33.38 KG/M2

## 2020-01-06 PROCEDURE — G8427 DOCREV CUR MEDS BY ELIG CLIN: HCPCS | Performed by: INTERNAL MEDICINE

## 2020-01-06 PROCEDURE — 3017F COLORECTAL CA SCREEN DOC REV: CPT | Performed by: INTERNAL MEDICINE

## 2020-01-06 PROCEDURE — G8484 FLU IMMUNIZE NO ADMIN: HCPCS | Performed by: INTERNAL MEDICINE

## 2020-01-06 PROCEDURE — 99215 OFFICE O/P EST HI 40 MIN: CPT | Performed by: INTERNAL MEDICINE

## 2020-01-06 PROCEDURE — 93000 ELECTROCARDIOGRAM COMPLETE: CPT | Performed by: INTERNAL MEDICINE

## 2020-01-06 PROCEDURE — G8417 CALC BMI ABV UP PARAM F/U: HCPCS | Performed by: INTERNAL MEDICINE

## 2020-01-06 PROCEDURE — 1036F TOBACCO NON-USER: CPT | Performed by: INTERNAL MEDICINE

## 2020-01-06 RX ORDER — LISINOPRIL 5 MG/1
5 TABLET ORAL DAILY
Qty: 30 TABLET | Refills: 11 | Status: SHIPPED
Start: 2020-01-06 | End: 2020-01-08 | Stop reason: SDUPTHER

## 2020-01-06 NOTE — PROGRESS NOTES
Moderate-large sized inferolateral and mid/basal lateral wall fixed defect c/w infarction. Most recent EKG 11/1/19 NSR; T wave abnormality, consider anterior ischemia Left axis deviation; infero-posterior infarct. Most recent [address] [facility] 11/1/19 LM: luminals LAD: long section of stent in prox/mid LAD (HERO-3 flow), mid 30-40% restenosis; mid stent with 30% restenosis. OM1- luminals Om2- Long section of disease from LCx into mid OM1- 75-80% (but OM2 is bypassed + competative flow up SVG graft RCA: Dominant,  100% mid. LIMA-LAD: 100% occluded, atretic. SVG-OM2: patent. SVG-RCA: patent; mid-LAD stent patent with negative FFR; LVEDP=5; LVEF: 30% with akinesis of the mid/apical inferior wall. Cardiac MRI 11/27/19 Mild cardiomegaly with normal thickness of left ventricular myocardium, including septum  Infarction involving the lateral wall extending into the anterolateral and inferolateral segments as well as a portion of the apex. No signs of MR/TR/AI. 12/3/19 I increased lisinopril to 5mg daily. Today she reports random intermittent episodes start as numbing sensation bilateral arms. Numbness progresses into jaw followed by SSCP with squeezing sensation. Reports associated palpitations and SOB. Episodes are more frequent over last 3 days occuring 3-4 times daily and taking more SL NTG (8 tablets recently which relieve pain). She is always fatigued. Reports no energy to perform daily tasks. Denies  edema, dizziness, palpitations and syncope.                                               Past Medical History:   has a past medical history of Acute blood loss anemia, Asthma, CAD (coronary artery disease), Chest pain, COPD (chronic obstructive pulmonary disease) ([address] Coronary artery disease, Depression, Diabetes mellitus ([address] Enlarged heart, Fatigue, Generalized headaches, Hyperlipidemia, Hypertension, Migraine, Morning stiffness of joints, Myocardial infarction ([address] Numbness or tingling, OA (osteoarthritis) of knee, Obesity, capsule into the lungs daily. 2/27/13  Yes Wilhelmina Kanner, MD   amLODIPine (NORVASC) 5 MG tablet Take 5 mg by mouth 2 times daily. Yes Historical Provider, MD   albuterol (PROVENTIL HFA) 108 (90 BASE) MCG/ACT inhaler Inhale 2 puffs into the lungs every 6 hours as needed for Shortness of Breath for 10 days. IF YOU FEEL AS THO YOU ARE STILL NEEDING THIS MEDICATION AFTER 10 DAYS, CALL YOUR DR TO DISCUSS FURTHER TREATMENT 1/22/13  Yes Manisha Guerrero MD   propranolol (INDERAL) 40 MG tablet Take 40 mg by mouth 2 times daily. Yes Historical Provider, MD   SUMAtriptan (IMITREX) 50 MG tablet Take 50 mg by mouth once as needed. Yes Historical Provider, MD   nitroGLYCERIN (NITROSTAT) 0.4 MG SL tablet Place 0.4 mg under the tongue every 5 minutes as needed. Yes Historical Provider, MD   pravastatin (PRAVACHOL) 40 MG tablet Take 1 tablet by mouth daily. 8/1/12  Yes Selena Durbin MD   insulin glargine (LANTUS) 100 UNIT/ML injection Inject 25 Units into the skin daily. Yes Historical Provider, MD   aspirin 81 MG EC tablet Take 81 mg by mouth daily. Yes Historical Provider, MD   omeprazole (PRILOSEC) 20 MG capsule Take 20 mg by mouth 2 times daily. Yes Historical Provider, MD   enalapril (VASOTEC) 20 MG tablet Take 20 mg by mouth 2 times daily. Yes Historical Provider, MD      Allergies:  Penicillins; Arginine; Imdur [isosorbide dinitrate]; Metoprolol; Nitroglycerin; and Cephalexin     Review of Systems:   · Constitutional: there has been no unanticipated weight loss. There's been no change in energy level, sleep pattern, or activity level. · Eyes: No visual changes or diplopia. No scleral icterus. · ENT: No Headaches, hearing loss or vertigo. No mouth sores or sore throat. · Cardiovascular: Reviewed in HPI  · Respiratory: No cough or wheezing, no sputum production. No hematemesis. · Gastrointestinal: No abdominal pain, appetite loss, blood in stools.  No change in bowel or bladder

## 2020-01-06 NOTE — PATIENT INSTRUCTIONS
Plan:  1. Meds reviewed. No refills today   2. Will schedule C Hold metformin 48 hours prior to procedure. May take all other meds with sips of water.

## 2020-01-06 NOTE — LETTER
ischemia and infarction. Moderate-large sized inferolateral and mid/basal lateral wall fixed defect c/w infarction. Most recent EKG 11/1/19 NSR; T wave abnormality, consider anterior ischemia Left axis deviation; infero-posterior infarct. Most recent [address] [facility] 11/1/19 LM: luminals LAD: long section of stent in prox/mid LAD (HERO-3 flow), mid 30-40% restenosis; mid stent with 30% restenosis. OM1- luminals Om2- Long section of disease from LCx into mid OM1- 75-80% (but OM2 is bypassed + competative flow up SVG graft RCA: Dominant,  100% mid. LIMA-LAD: 100% occluded, atretic. SVG-OM2: patent. SVG-RCA: patent; mid-LAD stent patent with negative FFR; LVEDP=5; LVEF: 30% with akinesis of the mid/apical inferior wall. Cardiac MRI 11/27/19 Mild cardiomegaly with normal thickness of left ventricular myocardium, including septum  Infarction involving the lateral wall extending into the anterolateral and inferolateral segments as well as a portion of the apex. No signs of MR/TR/AI. 12/3/19 I increased lisinopril to 5mg daily. Today she reports random intermittent episodes start as numbing sensation bilateral arms. Numbness progresses into jaw followed by SSCP with squeezing sensation. Reports associated palpitations and SOB. Episodes are more frequent over last 3 days occuring 3-4 times daily and taking more SL NTG (8 tablets recently which relieve pain). She is always fatigued. Reports no energy to perform daily tasks. Denies  edema, dizziness, palpitations and syncope.                                               Past Medical History:   has a past medical history of Acute blood loss anemia, Asthma, CAD (coronary artery disease), Chest pain, COPD (chronic obstructive pulmonary disease) ([facility] Coronary artery disease, Depression, Diabetes mellitus ([facility] Enlarged heart, Fatigue, Generalized headaches, Hyperlipidemia, Hypertension, Migraine, Morning stiffness of joints, Myocardial infarction tiotropium (SPIRIVA HANDIHALER) 18 MCG inhalation capsule Inhale 1 capsule into the lungs daily. 2/27/13  Yes Danielle Pelaez MD   amLODIPine (NORVASC) 5 MG tablet Take 5 mg by mouth 2 times daily. Yes Historical Provider, MD   albuterol (PROVENTIL HFA) 108 (90 BASE) MCG/ACT inhaler Inhale 2 puffs into the lungs every 6 hours as needed for Shortness of Breath for 10 days. IF YOU FEEL AS THO YOU ARE STILL NEEDING THIS MEDICATION AFTER 10 DAYS, CALL YOUR DR TO DISCUSS FURTHER TREATMENT 1/22/13  Yes Tatyana Rodriguez MD   propranolol (INDERAL) 40 MG tablet Take 40 mg by mouth 2 times daily. Yes Historical Provider, MD   SUMAtriptan (IMITREX) 50 MG tablet Take 50 mg by mouth once as needed. Yes Historical Provider, MD   nitroGLYCERIN (NITROSTAT) 0.4 MG SL tablet Place 0.4 mg under the tongue every 5 minutes as needed. Yes Historical Provider, MD   pravastatin (PRAVACHOL) 40 MG tablet Take 1 tablet by mouth daily. 8/1/12  Yes Treasa Frankel, MD   insulin glargine (LANTUS) 100 UNIT/ML injection Inject 25 Units into the skin daily. Yes Historical Provider, MD   aspirin 81 MG EC tablet Take 81 mg by mouth daily. Yes Historical Provider, MD   omeprazole (PRILOSEC) 20 MG capsule Take 20 mg by mouth 2 times daily. Yes Historical Provider, MD   enalapril (VASOTEC) 20 MG tablet Take 20 mg by mouth 2 times daily. Yes Historical Provider, MD      Allergies:  Penicillins; Arginine; Imdur [isosorbide dinitrate]; Metoprolol; Nitroglycerin; and Cephalexin     Review of Systems:   · Constitutional: there has been no unanticipated weight loss. There's been no change in energy level, sleep pattern, or activity level. · Eyes: No visual changes or diplopia. No scleral icterus. · ENT: No Headaches, hearing loss or vertigo. No mouth sores or sore throat. · Cardiovascular: Reviewed in HPI  · Respiratory: No cough or wheezing, no sputum production. No hematemesis. · Gastrointestinal: No abdominal pain, appetite loss, blood in stools. No change in bowel or bladder habits. · Genitourinary: No dysuria, trouble voiding, or hematuria. · Musculoskeletal:  No gait disturbance, weakness or joint complaints. · Integumentary: No rash or pruritis. · Neurological: No headache, diplopia, change in muscle strength, numbness or tingling. No change in gait, balance, coordination, mood, affect, memory, mentation, behavior. · Psychiatric: No anxiety, no depression. · Endocrine: No malaise, fatigue or temperature intolerance. No excessive thirst, fluid intake, or urination. No tremor. · Hematologic/Lymphatic: No abnormal bruising or bleeding, blood clots or swollen lymph nodes. · Allergic/Immunologic: No nasal congestion or hives. Physical Examination:    Vitals:    01/06/20 1524   BP: 100/60   Pulse: 90   SpO2: 98%        Constitutional and General Appearance: NAD   Respiratory:  · Normal excursion and expansion without use of accessory muscles  · Resp Auscultation: Normal soft breath sounds at the bases without dullness  Cardiovascular:  · The apical impulses not displaced  · Heart tones are crisp and normal tachycardia and irregular rhythm   · Cervical veins are not engorged  · The carotid upstroke is normal in amplitude and contour without delay or bruit  · Normal S1S2, No S3, No Murmur  · Peripheral pulses are symmetrical and full  · There is no clubbing, cyanosis of the extremities.   · No edema  · Femoral Arteries: 2+ and equal  · Pedal Pulses: 2+ and equal   Abdomen:  · No masses or tenderness  · Liver/Spleen: No Abnormalities Noted  Neurological/Psychiatric:  · Alert and oriented in all spheres  · Moves all extremities well  · Exhibits normal gait balance and coordination  · No abnormalities of mood, affect, memory, mentation, or behavior are noted    Lab Results   Component Value Date    CHOL 95 07/30/2019    CHOL 116 04/09/2019    CHOL 123 02/06/2019     Lab Results I need to reassess LVEF after 6-8 weeks of titrated ACE-I and if EF still <35% she will need referral to EP for possible ICD for SCD prophylaxis. Plan:  1. Meds reviewed. No refills today   2. Will schedule LHC. Hold metformin 48 hours prior to procedure. May take all other meds with sips of water. .  3. Note EKG today shows NSR; left axis; inferior infarct; mild T inversion anterior c/w possible ischemia (note T wave changes mildly improved compared to 11/19 EKG). This note was scribed in the presence of Timothy Murphy MD by Elva Miller RN    Thank you for allowing me to participate in the care of this individual.    Cost of prescription medications and patient compliance have been reviewed with patient. All questions answered. Santi Koroma.  Marvin Lizarraga M.D., [facility] [place]

## 2020-01-07 NOTE — TELEPHONE ENCOUNTER
This pt is not on coumadin. Called [facility]. They do not have this pt in their system. They will double check the name and date of birth.

## 2020-01-08 RX ORDER — LISINOPRIL 5 MG/1
5 TABLET ORAL DAILY
Qty: 30 TABLET | Refills: 11 | Status: SHIPPED | OUTPATIENT
Start: 2020-01-08 | End: 2021-02-22

## 2020-01-08 NOTE — TELEPHONE ENCOUNTER
Pt had an appt w/smm on Mon 1/6th, smm increased pt's rx of Lisinopril to 5mgs 1 tab per day. Pt called and said that [facility], told her that the instructions to this rx says to cut 5mgs in half and only take 1 tab of 2.5mgs per day. Pt said the pharmacy needs new script to fill this for pt.   Please send in corrected script f or the 5mg tab once daily

## 2020-01-10 ENCOUNTER — HOSPITAL ENCOUNTER (OUTPATIENT)
Dept: CARDIAC CATH/INVASIVE PROCEDURES | Age: 55
Discharge: HOME | End: 2020-01-10
Attending: INTERNAL MEDICINE | Admitting: INTERNAL MEDICINE
Payer: MEDICAID

## 2020-01-10 VITALS — HEIGHT: 61 IN | BODY MASS INDEX: 33.04 KG/M2 | WEIGHT: 175 LBS

## 2020-01-10 LAB
ANION GAP SERPL CALCULATED.3IONS-SCNC: 10 MMOL/L (ref 3–16)
BUN BLDV-MCNC: 13 MG/DL (ref 7–20)
CALCIUM SERPL-MCNC: 9.1 MG/DL (ref 8.3–10.6)
CHLORIDE BLD-SCNC: 100 MMOL/L (ref 99–110)
CO2: 27 MMOL/L (ref 21–32)
CREAT SERPL-MCNC: 0.6 MG/DL (ref 0.6–1.1)
EKG ATRIAL RATE: 62 BPM
EKG DIAGNOSIS: NORMAL
EKG P AXIS: 60 DEGREES
EKG P-R INTERVAL: 142 MS
EKG Q-T INTERVAL: 432 MS
EKG QRS DURATION: 100 MS
EKG QTC CALCULATION (BAZETT): 438 MS
EKG R AXIS: -24 DEGREES
EKG T AXIS: 65 DEGREES
EKG VENTRICULAR RATE: 62 BPM
GFR AFRICAN AMERICAN: >60
GFR NON-AFRICAN AMERICAN: >60
GLUCOSE BLD-MCNC: 237 MG/DL (ref 70–99)
HCT VFR BLD CALC: 40.2 % (ref 36–48)
HEMOGLOBIN: 13.7 G/DL (ref 12–16)
INR BLD: 1.02 (ref 0.86–1.14)
LEFT VENTRICULAR EJECTION FRACTION MODE: NORMAL
LV EF: 40 %
MCH RBC QN AUTO: 30.4 PG (ref 26–34)
MCHC RBC AUTO-ENTMCNC: 34.2 G/DL (ref 31–36)
MCV RBC AUTO: 89 FL (ref 80–100)
PDW BLD-RTO: 13.6 % (ref 12.4–15.4)
PLATELET # BLD: 201 K/UL (ref 135–450)
PMV BLD AUTO: 8.3 FL (ref 5–10.5)
POTASSIUM SERPL-SCNC: 4.1 MMOL/L (ref 3.5–5.1)
PROTHROMBIN TIME: 11.8 SEC (ref 10–13.2)
RBC # BLD: 4.51 M/UL (ref 4–5.2)
SODIUM BLD-SCNC: 137 MMOL/L (ref 136–145)
WBC # BLD: 5.3 K/UL (ref 4–11)

## 2020-01-10 PROCEDURE — 99153 MOD SED SAME PHYS/QHP EA: CPT

## 2020-01-10 PROCEDURE — 85027 COMPLETE CBC AUTOMATED: CPT

## 2020-01-10 PROCEDURE — 93005 ELECTROCARDIOGRAM TRACING: CPT | Performed by: INTERNAL MEDICINE

## 2020-01-10 PROCEDURE — 2500000003 HC RX 250 WO HCPCS

## 2020-01-10 PROCEDURE — 2709999900 HC NON-CHARGEABLE SUPPLY

## 2020-01-10 PROCEDURE — C1894 INTRO/SHEATH, NON-LASER: HCPCS

## 2020-01-10 PROCEDURE — 99152 MOD SED SAME PHYS/QHP 5/>YRS: CPT | Performed by: INTERNAL MEDICINE

## 2020-01-10 PROCEDURE — 6360000002 HC RX W HCPCS

## 2020-01-10 PROCEDURE — C1769 GUIDE WIRE: HCPCS

## 2020-01-10 PROCEDURE — 93459 L HRT ART/GRFT ANGIO: CPT

## 2020-01-10 PROCEDURE — 99152 MOD SED SAME PHYS/QHP 5/>YRS: CPT

## 2020-01-10 PROCEDURE — 85610 PROTHROMBIN TIME: CPT

## 2020-01-10 PROCEDURE — 80048 BASIC METABOLIC PNL TOTAL CA: CPT

## 2020-01-10 PROCEDURE — 93459 L HRT ART/GRFT ANGIO: CPT | Performed by: INTERNAL MEDICINE

## 2020-01-10 PROCEDURE — 6360000004 HC RX CONTRAST MEDICATION

## 2020-01-10 PROCEDURE — 2580000003 HC RX 258

## 2020-01-10 PROCEDURE — 6370000000 HC RX 637 (ALT 250 FOR IP)

## 2020-01-10 RX ORDER — FENTANYL CITRATE 50 UG/ML
INJECTION, SOLUTION INTRAMUSCULAR; INTRAVENOUS
Status: COMPLETED | OUTPATIENT
Start: 2020-01-10 | End: 2020-01-10

## 2020-01-10 RX ORDER — MIDAZOLAM HYDROCHLORIDE 5 MG/ML
INJECTION INTRAMUSCULAR; INTRAVENOUS
Status: COMPLETED | OUTPATIENT
Start: 2020-01-10 | End: 2020-01-10

## 2020-01-10 RX ORDER — CLOPIDOGREL 300 MG/1
TABLET, FILM COATED ORAL
Status: COMPLETED | OUTPATIENT
Start: 2020-01-10 | End: 2020-01-10

## 2020-01-10 RX ORDER — ASPIRIN 81 MG/1
81 TABLET, CHEWABLE ORAL ONCE
Status: COMPLETED | OUTPATIENT
Start: 2020-01-10 | End: 2020-01-10

## 2020-01-10 RX ORDER — AMLODIPINE BESYLATE 5 MG/1
10 TABLET ORAL DAILY
Qty: 30 TABLET | Refills: 11 | Status: SHIPPED | OUTPATIENT
Start: 2020-01-10 | End: 2020-10-22

## 2020-01-10 RX ORDER — SODIUM CHLORIDE 9 MG/ML
1000 INJECTION, SOLUTION INTRAVENOUS CONTINUOUS
Status: DISCONTINUED | OUTPATIENT
Start: 2020-01-10 | End: 2020-01-10 | Stop reason: HOSPADM

## 2020-01-10 RX ORDER — MIDAZOLAM HYDROCHLORIDE 1 MG/ML
INJECTION INTRAMUSCULAR; INTRAVENOUS
Status: COMPLETED | OUTPATIENT
Start: 2020-01-10 | End: 2020-01-10

## 2020-01-10 RX ORDER — ONDANSETRON 2 MG/ML
4 INJECTION INTRAMUSCULAR; INTRAVENOUS ONCE
Status: COMPLETED | OUTPATIENT
Start: 2020-01-10 | End: 2020-01-10

## 2020-01-10 RX ADMIN — SODIUM CHLORIDE 1000 ML: 9 INJECTION, SOLUTION INTRAVENOUS at 08:54

## 2020-01-10 RX ADMIN — MIDAZOLAM HYDROCHLORIDE 2 MG: 1 INJECTION INTRAMUSCULAR; INTRAVENOUS at 10:36

## 2020-01-10 RX ADMIN — MIDAZOLAM HYDROCHLORIDE 1 MG: 5 INJECTION INTRAMUSCULAR; INTRAVENOUS at 10:41

## 2020-01-10 RX ADMIN — CLOPIDOGREL 75 MG: 300 TABLET, FILM COATED ORAL at 10:36

## 2020-01-10 RX ADMIN — FENTANYL CITRATE 50 MCG: 50 INJECTION, SOLUTION INTRAMUSCULAR; INTRAVENOUS at 10:57

## 2020-01-10 RX ADMIN — FENTANYL CITRATE 25 MCG: 50 INJECTION, SOLUTION INTRAMUSCULAR; INTRAVENOUS at 10:35

## 2020-01-10 RX ADMIN — ASPIRIN 81 MG: 81 TABLET, CHEWABLE ORAL at 08:54

## 2020-01-10 RX ADMIN — ONDANSETRON 4 MG: 2 INJECTION INTRAMUSCULAR; INTRAVENOUS at 08:54

## 2020-01-10 NOTE — BRIEF OP NOTE
Brief Postoperative Note  ______________________________________________________________    Patient: Mayra Nogueira  YOB: 1965  MRN: [id number]  Brief Postoperative Note  Pre-operative Diagnosis:   Unstable angina  Post-operative Diagnosis: Same  Procedure: Moderate sedation  LHC  LVG  Cors  Anesthesia: Moderate Sedation  Surgeons/Assistants: Sol De La Cruz MD, [facility]  Estimated Blood Loss: less than 50   Complications: None  Specimens: Was Not Obtained    Findings:       LEFT HEART CATH  LM: luminals  LAD: long section of stent in prox/mid LAD (HERO-3 flow), mid 30% restenosis at acute bend in vessel (Likely LIMA insertion site)  LCX: mid stent with 20% restenosis                OM1- luminals                Om2- Long section of disease from LCx into mid OM1- 65-70% (but OM2 is bypassed) overall improved                + competative flow up SVG graft  RCA: Dominant,  100% mid     LIMA-LAD: 100% occluded, atretic  SVG-OM2: (prominent valves) patent, fills large section of LCX area   SVG-RCA: patent with good flow into PDA and retro flow into PLV     LVEDP: 5  LVEF: 40% global hypokinesis. Inferior wall improved       Assessment  1. Patent LAD stents, unchanged as before  2. Overall flow and lesions appear overall better compared to Nov cath. (due to remodeling)  3. Cardiac MRI showing infarction of anterolateral and inferolateral segments but is well vascularized by competing flow from LCX and SVG grafts  4. Continue treatment for microvascular disease and likely CP along oral-infarct regions.              Christine Alford MD  Date: 1/10/2020  Time: 10:59 AM

## 2020-01-10 NOTE — H&P
Migraine     Morning stiffness of joints     Myocardial infarction ([facility] [address] 05/13/2018    Numbness or tingling hands and feet    OA (osteoarthritis) of knee 1/27/2015    Obesity, Class I, BMI 30.0-34.9 (see actual BMI)     Osteoporosis     Pulmonary nodule     Shortness of breath          Surgical History:  Past Surgical History:   Procedure Laterality Date    CARDIAC CATHETERIZATION  04/01/2016    Dr. Brian Crow. Osmany Bristyue CARDIAC CATHETERIZATION  08/01/2012    Dr. Valery Benites  11/01/2019    Non Obs CAD, medical management    CHOLECYSTECTOMY      CORONARY ANGIOPLASTY  08/06/2019    POBA of mid LAD, NC Balloon- 3.25 x 15    CORONARY ANGIOPLASTY WITH STENT PLACEMENT  05/13/2018    Dr. Vance Pearl - w/placement of IABP, Xience 3.25 x 18 mid Circ, POBA 2.5 x 12 to prox LAD    CORONARY ARTERY BYPASS GRAFT  06/13/2018    LIMA, SVG to OM2, SVG to rPDA    DIAGNOSTIC CARDIAC CATH LAB PROCEDURE  06/06/2013    Dr. Carisa Ulrich - Non Obs CAD    HYSTERECTOMY      PTCA      UPPER GASTROINTESTINAL ENDOSCOPY           Medications:  Current Facility-Administered Medications   Medication Dose Route Frequency Provider Last Rate Last Dose    0.9 % sodium chloride infusion  1,000 mL Intravenous Continuous Kelsi Stanley MD               Pre-Sedation:    Pre-Sedation Documentation and Exam:  I have assessed the patient and agree with the H&P present on the chart. Prior History of Anesthesia Complications:   none    Modified Mallampati:  II (soft palate, uvula, fauces visible)    ASA Classification:  Class 3 - A patient with severe systemic disease that limits activity but is not incapacitating      Kathrine Scale:   Activity:  2 - Able to move 4 extremities voluntarily on command  Respiration:  2 - Able to breathe deeply and cough freely  Circulation:  2 - BP+/- 20mmHg of normal  Consciousness:  2 - Fully awake  Oxygen Saturation (color):  2 - Able to maintain oxygen saturation >92% on room air    Sedation/Anesthesia Plan:  Guard the patient's safety and welfare. Minimize physical discomfort and pain. Minimize negative psychological responses to treatment by providing sedation and analgesia and maximize the potential amnesia. Patient to meet pre-procedure discharge plan.     Medication Planned:  midazolam intravenously and fentanyl intravenously    Patient is an appropriate candidate for plan of sedation: yes      Electronically signed by Marlon Manuel MD on 1/10/2020 at 8:32 AM

## 2020-04-13 NOTE — TELEPHONE ENCOUNTER
Pt called stating her fatigue over the last 2 weeks is getting worse. She gets hot and sweaty very quickly whenever she tries to do anything. She feels palpitations that go to her shoulder. She has no active CP, but has taken nitro a few days ago. She has edema and tight feeling in hands and feet. She says that Rainer Salcedo told her to call if she started to feel this way. She states she is not comfortable going to hospital due to [place].

## 2020-04-14 RX ORDER — NITROGLYCERIN 0.4 MG/1
TABLET SUBLINGUAL
Qty: 25 TABLET | Refills: 3 | Status: SHIPPED | OUTPATIENT
Start: 2020-04-14 | End: 2021-01-12 | Stop reason: SDUPTHER

## 2020-05-26 ENCOUNTER — TELEPHONE (OUTPATIENT)
Dept: CARDIOLOGY CLINIC | Age: 55
End: 2020-05-26

## 2020-05-27 NOTE — TELEPHONE ENCOUNTER
Pt already on Ranexa so anything left would be [facility] or cardiac rehab both of which are closed for CP   For now take fresh NTG as needed. given edema. Get   1. bnp  2. Full echo  3.  BLE venous doppler to check for VEIN reflux (not so much DVT)

## 2020-06-09 ENCOUNTER — TELEPHONE (OUTPATIENT)
Dept: CARDIOLOGY CLINIC | Age: 55
End: 2020-06-09

## 2020-06-09 NOTE — TELEPHONE ENCOUNTER
Pt has a disability hearing next Wednesday and needs JJP to fill out pw. Will he do this for her? If so, she said luda will drop off or fax.

## 2020-06-10 ENCOUNTER — HOSPITAL ENCOUNTER (EMERGENCY)
Age: 55
Discharge: HOME | End: 2020-06-10
Attending: EMERGENCY MEDICINE
Payer: MEDICAID

## 2020-06-10 ENCOUNTER — APPOINTMENT (OUTPATIENT)
Dept: GENERAL RADIOLOGY | Age: 55
End: 2020-06-10
Payer: MEDICAID

## 2020-06-10 VITALS
HEART RATE: 77 BPM | WEIGHT: 173 LBS | TEMPERATURE: 98.7 F | DIASTOLIC BLOOD PRESSURE: 79 MMHG | OXYGEN SATURATION: 96 % | BODY MASS INDEX: 32.66 KG/M2 | SYSTOLIC BLOOD PRESSURE: 135 MMHG | RESPIRATION RATE: 13 BRPM | HEIGHT: 61 IN

## 2020-06-10 LAB
ANION GAP SERPL CALCULATED.3IONS-SCNC: 10 MMOL/L (ref 3–16)
BUN BLDV-MCNC: 11 MG/DL (ref 7–20)
CALCIUM SERPL-MCNC: 9.5 MG/DL (ref 8.3–10.6)
CHLORIDE BLD-SCNC: 96 MMOL/L (ref 99–110)
CO2: 26 MMOL/L (ref 21–32)
CREAT SERPL-MCNC: 0.7 MG/DL (ref 0.6–1.1)
EKG ATRIAL RATE: 85 BPM
EKG DIAGNOSIS: NORMAL
EKG P AXIS: 53 DEGREES
EKG P-R INTERVAL: 138 MS
EKG Q-T INTERVAL: 400 MS
EKG QRS DURATION: 102 MS
EKG QTC CALCULATION (BAZETT): 476 MS
EKG R AXIS: -24 DEGREES
EKG T AXIS: 52 DEGREES
EKG VENTRICULAR RATE: 85 BPM
GFR AFRICAN AMERICAN: >60
GFR NON-AFRICAN AMERICAN: >60
GLUCOSE BLD-MCNC: 287 MG/DL (ref 70–99)
HCT VFR BLD CALC: 43.2 % (ref 36–48)
HEMOGLOBIN: 14.7 G/DL (ref 12–16)
MCH RBC QN AUTO: 30.3 PG (ref 26–34)
MCHC RBC AUTO-ENTMCNC: 34 G/DL (ref 31–36)
MCV RBC AUTO: 89.1 FL (ref 80–100)
PDW BLD-RTO: 13.3 % (ref 12.4–15.4)
PLATELET # BLD: 193 K/UL (ref 135–450)
PMV BLD AUTO: 8.7 FL (ref 5–10.5)
POTASSIUM SERPL-SCNC: 4.4 MMOL/L (ref 3.5–5.1)
RBC # BLD: 4.85 M/UL (ref 4–5.2)
SODIUM BLD-SCNC: 132 MMOL/L (ref 136–145)
TROPONIN: <0.01 NG/ML
TROPONIN: <0.01 NG/ML
WBC # BLD: 5.2 K/UL (ref 4–11)

## 2020-06-10 PROCEDURE — 93005 ELECTROCARDIOGRAM TRACING: CPT | Performed by: EMERGENCY MEDICINE

## 2020-06-10 PROCEDURE — 93010 ELECTROCARDIOGRAM REPORT: CPT | Performed by: INTERNAL MEDICINE

## 2020-06-10 PROCEDURE — 99285 EMERGENCY DEPT VISIT HI MDM: CPT

## 2020-06-10 PROCEDURE — 6360000002 HC RX W HCPCS: Performed by: EMERGENCY MEDICINE

## 2020-06-10 PROCEDURE — 84484 ASSAY OF TROPONIN QUANT: CPT

## 2020-06-10 PROCEDURE — 71045 X-RAY EXAM CHEST 1 VIEW: CPT

## 2020-06-10 PROCEDURE — 85027 COMPLETE CBC AUTOMATED: CPT

## 2020-06-10 PROCEDURE — 80048 BASIC METABOLIC PNL TOTAL CA: CPT

## 2020-06-10 PROCEDURE — 96374 THER/PROPH/DIAG INJ IV PUSH: CPT

## 2020-06-10 RX ORDER — ONDANSETRON 2 MG/ML
4 INJECTION INTRAMUSCULAR; INTRAVENOUS ONCE
Status: COMPLETED | OUTPATIENT
Start: 2020-06-10 | End: 2020-06-10

## 2020-06-10 RX ADMIN — ONDANSETRON 4 MG: 2 INJECTION INTRAMUSCULAR; INTRAVENOUS at 12:19

## 2020-06-10 ASSESSMENT — PAIN SCALES - GENERAL: PAINLEVEL_OUTOF10: 8

## 2020-06-10 ASSESSMENT — PAIN DESCRIPTION - DESCRIPTORS: DESCRIPTORS: SQUEEZING

## 2020-06-10 ASSESSMENT — ENCOUNTER SYMPTOMS
ABDOMINAL PAIN: 0
SHORTNESS OF BREATH: 0
SORE THROAT: 0
NAUSEA: 1
VOMITING: 0

## 2020-06-10 ASSESSMENT — PAIN DESCRIPTION - FREQUENCY: FREQUENCY: CONTINUOUS

## 2020-06-10 ASSESSMENT — PAIN DESCRIPTION - PAIN TYPE: TYPE: ACUTE PAIN

## 2020-06-10 ASSESSMENT — PAIN DESCRIPTION - LOCATION: LOCATION: CHEST

## 2020-06-10 NOTE — ED PROVIDER NOTES
Emergency Department Provider Note  Location: [address] [place] [address]  ED  6/10/2020     Patient Identification  Manolo Solo is a 53 y.o. female    Chief Complaint  Chest Pain      Mode of Arrival  EMS    HPI  (History provided by patient)  This is a 53 y.o. female with a PMH significant for CAD s/p stent and CABG, DM presented today for chest pain. Patient states she first developed chest pain while vacuuming yesterday. She described it as a chest tightness so she stopped. She took a nitro and that relieved the pain however she remain fatigued for rest of the day. Today, CP started again while drinking her coffee. She then got diaphoretic and nauseous. The pain also radiated up to her left jaw. She feel more tired again. She described it as \"someone sitting on my chest.\" She took 2 nitro and 4 baby ASA and that helped the pain. Patient said she saw her cardiologist not too long ago and Ranexa dose was adjusted. She cannot tolerate any long-acting nitro so her only option is sublingual nitro. ROS  Review of Systems   Constitutional: Negative for chills and fever. HENT: Negative for congestion and sore throat. Eyes: Negative for visual disturbance. Respiratory: Negative for shortness of breath. Cardiovascular: Positive for chest pain. Gastrointestinal: Positive for nausea. Negative for abdominal pain and vomiting. Genitourinary: Negative for dysuria and frequency. Musculoskeletal: Negative for neck pain. Skin: Negative for rash. Neurological: Negative for light-headedness. I have reviewed the following nursing documentation:  Allergies:    Allergies   Allergen Reactions    Penicillins Itching, Swelling and Rash    Arginine      Nausea and vomiting     Imdur [Isosorbide Dinitrate] Swelling     nausea    Metoprolol      Headache, nausea     Nitroglycerin      Headache     Cephalexin Itching and Rash       Past medical history:  has a past medical history of Acute blood Abeba Baires MD   clopidogrel (PLAVIX) 75 MG tablet Take 1 tablet by mouth daily 8/29/19   Abeba Baires MD   ranolazine (RANEXA) 1000 MG extended release tablet Take 1 tablet by mouth 2 times daily 8/29/19   Abeba Baires MD   fluticasone [facility]) 50 MCG/ACT nasal spray 1 spray by Each Nostril route daily  Patient not taking: Reported on 1/6/2020 7/30/19   Fermín Wise MD   pantoprazole (PROTONIX) 40 MG tablet Take 40 mg by mouth 2 times daily  7/23/19   Historical Provider, MD   ondansetron (ZOFRAN-ODT) 4 MG disintegrating tablet Take 1 tablet by mouth every 8 hours as needed for Nausea or Vomiting 9/12/18   Chris Fox MD   albuterol sulfate HFA (PROVENTIL HFA) 108 (90 Base) MCG/ACT inhaler Inhale 2 puffs into the lungs every 6 hours as needed for Wheezing or Shortness of Breath 6/17/18   Michael Pena MD   tiotropium (SPIRIVA) 18 MCG inhalation capsule Inhale 1 capsule into the lungs daily  Patient taking differently: Inhale 18 mcg into the lungs daily as needed  6/17/18   Mcihael Pena MD   metFORMIN (GLUCOPHAGE) 1000 MG tablet Take 1 tablet by mouth 2 times daily (with meals) 6/17/18 1/6/20  Michael Pena MD       Social history:  reports that she quit smoking about 2 years ago. Her smoking use included cigarettes. She has a 20.00 pack-year smoking history. She has never used smokeless tobacco. She reports that she does not drink alcohol or use drugs.     Family history:    Family History   Problem Relation Age of Onset    Emphysema Mother     Heart Failure Mother     Hypertension Mother     Heart Disease Mother     High Blood Pressure Mother     High Cholesterol Mother     Stroke Mother     Heart Failure Father     Hypertension Father     Heart Disease Father     High Blood Pressure Father     High Cholesterol Father     Hypertension Sister     High Blood Pressure Sister     Hypertension Brother     High Blood Pressure Brother     Cancer Brother     Hypertension

## 2020-06-10 NOTE — TELEPHONE ENCOUNTER
Spoke with patient. Message relayed. But patient proceeded to explain that she started with chest pain yesterday. This morning the chest pain continued, but now radiating to her left arm and jaw. States she is so very fatigued and nausea set in. States she took 1 nitro, did help a little, but now pain is coming back. Informed patient to call 911 and proceed to the hospital.  She was very agreeable and will do so.

## 2020-06-10 NOTE — ED NOTES
Bed: 15  Expected date:   Expected time:   Means of arrival:   Comments:  sadia Orellana RN  06/10/20 4661

## 2020-06-10 NOTE — TELEPHONE ENCOUNTER
We can supply any cardiac medical information that is needed but we cannot determine disability, only [facility] does that

## 2020-06-11 ENCOUNTER — CARE COORDINATION (OUTPATIENT)
Dept: CARE COORDINATION | Age: 55
End: 2020-06-11

## 2020-06-11 LAB
EKG ATRIAL RATE: 71 BPM
EKG DIAGNOSIS: NORMAL
EKG P AXIS: 53 DEGREES
EKG P-R INTERVAL: 142 MS
EKG Q-T INTERVAL: 432 MS
EKG QRS DURATION: 100 MS
EKG QTC CALCULATION (BAZETT): 469 MS
EKG R AXIS: -28 DEGREES
EKG T AXIS: 53 DEGREES
EKG VENTRICULAR RATE: 71 BPM

## 2020-06-11 PROCEDURE — 93010 ELECTROCARDIOGRAM REPORT: CPT | Performed by: INTERNAL MEDICINE

## 2020-06-11 NOTE — TELEPHONE ENCOUNTER
Noted. Pt r/o for AMI in Ed and sent home. I could not see due to procedures. She may elect to get disability if she wants but dw [facility].  I would like [facility] or cardiac Rehab to see if helps but not sure if open

## 2020-06-16 ENCOUNTER — TELEPHONE (OUTPATIENT)
Dept: CARDIOLOGY CLINIC | Age: 55
End: 2020-06-16

## 2020-06-16 NOTE — LETTER
[address] Cardiology - [address] [facility] [address] [facility]  Phone: [phone]  Fax: Yue Bhatt MD        June 17, 2020     [address] [place], [place] [id number]    To Whom it May Concern:    Patient has a significant heart history. In May 2018 she underwent a cardiac catheterization in which a PCI of the mid circumflex was placed. She then underwent a 3 vessel coronary artery bypass on 6/13/18. She has since battled recurrent chest pain and graft failure. She has undergone 4 more cardiac catheterizations since that time. (most recent 1/10/2020). She carries a history of microvascular disease in which the chest pain can be debilitating at times. This is a chronic condition and is medically managed. She does have ischemic cardiomyopathy with a heart function of 40%. Although patient is under medical management, her symptoms can escalate unpredictably and would be unable to perform normal duties at that time. Regarding any medical determination, this would require independent physical and occupational assessment. If you have any questions or concerns, please don't hesitate to call.     Sincerely,        Pavithra Resendez MD

## 2020-06-16 NOTE — LETTER
[facility] Cardiology - [address] [facility] [address] [facility]  Phone: [phone]  Fax: Yue Bhatt MD        June 17, 2020     [address] [place] [address]      To Whom it May Concern:    Patient has a significant heart history (CAD). In May 2018 she underwent a Left Heart Cath in which a PCI of the mid-CCx was placed. She then underwent a 3 Vessel Coronary Artery Bypass on 6/13/18. She battled recurrent chest pain and graft failure. She has undergone 4 more Left Heart Cath's since that time. ( most recent 1/10/2020). Patient does have Microvascular Disease in which the chest pain can be debilitating at times. This is a chronic condition in which she is being medically managed. She does have cardiomyopathy in which her heart function is below normal.  Her last ejection fraction showed her heart was functioning at 40%. Therefore regarding the papers that were faxed, it is not feasible to determine her sitting, standing, lifting, carrying, etc.. as these can change periodically depending on her situation and symptoms. Although patient is on medical management,  her symptoms can escalate in which she is unable to perform normal duties. If you have any questions or concerns, please don't hesitate to call.     Sincerely,        Wilber Garrett MD

## 2020-07-08 ENCOUNTER — HOSPITAL ENCOUNTER (OUTPATIENT)
Dept: NON INVASIVE DIAGNOSTICS | Age: 55
Discharge: HOME | End: 2020-07-08
Payer: MEDICAID

## 2020-07-08 ENCOUNTER — TELEPHONE (OUTPATIENT)
Dept: CARDIOLOGY CLINIC | Age: 55
End: 2020-07-08

## 2020-07-08 ENCOUNTER — HOSPITAL ENCOUNTER (OUTPATIENT)
Dept: VASCULAR LAB | Age: 55
Discharge: HOME | End: 2020-07-08
Payer: MEDICAID

## 2020-07-08 ENCOUNTER — HOSPITAL ENCOUNTER (OUTPATIENT)
Age: 55
Discharge: HOME | End: 2020-07-08
Payer: MEDICAID

## 2020-07-08 LAB
LV EF: 55 %
LVEF MODALITY: NORMAL
PRO-BNP: 414 PG/ML (ref 0–124)

## 2020-07-08 PROCEDURE — 83880 ASSAY OF NATRIURETIC PEPTIDE: CPT

## 2020-07-08 PROCEDURE — 93306 TTE W/DOPPLER COMPLETE: CPT

## 2020-07-08 PROCEDURE — 36415 COLL VENOUS BLD VENIPUNCTURE: CPT

## 2020-07-08 PROCEDURE — 93970 EXTREMITY STUDY: CPT

## 2020-07-08 NOTE — TELEPHONE ENCOUNTER
----- Message from Lance Mccoy MD sent at 7/8/2020  1:32 PM EDT -----  Let patient know echo test shows normal heart function, no new orders or changes at this time. Thanks.

## 2020-07-09 ENCOUNTER — TELEPHONE (OUTPATIENT)
Dept: CARDIOLOGY CLINIC | Age: 55
End: 2020-07-09

## 2020-07-09 RX ORDER — FUROSEMIDE 20 MG/1
20 TABLET ORAL DAILY
Qty: 30 TABLET | Refills: 1 | Status: SHIPPED | OUTPATIENT
Start: 2020-07-09 | End: 2020-10-28

## 2020-07-09 NOTE — TELEPHONE ENCOUNTER
Pt informed and VU. She states that she does have BLE and would like lasix called in.   I pended this for you and labs ordered

## 2020-07-09 NOTE — TELEPHONE ENCOUNTER
----- Message from Josefina Kaplan MD sent at 7/8/2020  4:25 PM EDT -----  Let patient know their lab test for fluid is mildly elevated, improved though from last test which was two years ago, can either monitor this or could consider adding diuretics if she is noticing significant leg swelling, if she wishes to do that, lets start her on Lasix 20 mg p.o. daily and get follow-up BMP and BNP in about 1 to 2 weeks. Thanks.

## 2020-07-10 ENCOUNTER — TELEPHONE (OUTPATIENT)
Dept: CARDIOLOGY CLINIC | Age: 55
End: 2020-07-10

## 2020-07-10 NOTE — TELEPHONE ENCOUNTER
----- Message from Bonnie Haynes MD sent at 7/10/2020 10:35 AM EDT -----  Let pt know has reflux in veins in leg, rec: referral to dr Sarah Sanchez from vascular surgery for eval.  Thank you.

## 2020-07-30 ENCOUNTER — OFFICE VISIT (OUTPATIENT)
Dept: VASCULAR SURGERY | Age: 55
End: 2020-07-30
Payer: MEDICAID

## 2020-07-30 VITALS
HEIGHT: 62 IN | HEART RATE: 69 BPM | DIASTOLIC BLOOD PRESSURE: 71 MMHG | WEIGHT: 173 LBS | SYSTOLIC BLOOD PRESSURE: 107 MMHG | TEMPERATURE: 98.1 F | BODY MASS INDEX: 31.83 KG/M2

## 2020-07-30 PROCEDURE — G8427 DOCREV CUR MEDS BY ELIG CLIN: HCPCS | Performed by: SURGERY

## 2020-07-30 PROCEDURE — G8417 CALC BMI ABV UP PARAM F/U: HCPCS | Performed by: SURGERY

## 2020-07-30 PROCEDURE — 3017F COLORECTAL CA SCREEN DOC REV: CPT | Performed by: SURGERY

## 2020-07-30 PROCEDURE — 1036F TOBACCO NON-USER: CPT | Performed by: SURGERY

## 2020-07-30 PROCEDURE — 99204 OFFICE O/P NEW MOD 45 MIN: CPT | Performed by: SURGERY

## 2020-07-30 ASSESSMENT — ENCOUNTER SYMPTOMS: NAUSEA: 1

## 2020-07-30 NOTE — PROGRESS NOTES
Subjective:      Patient ID: Flaca Parikh is a 63 y.o. female. HPI Referral from Sulema Baca MD for evaluation of leg swelling and B leg discomfort. Swelling occurs by the end of the day and resolves by AM. Significant h/o CAD s/p CABG with R thigh GSV harvest and depressed EF (30-40% previously, now 55%). On oral diuretics for elevated BNP. No h/o SVT/DVT. No ulcerations, bleeding, dermatitis or venous interventions. Has not worn stockings. Past Medical History:   Diagnosis Date    Acute blood loss anemia     Asthma     CAD (coronary artery disease)     Chest pain     [facility]     Coronary artery disease 9/4/2012    Depression     Diabetes mellitus (HCC)     Enlarged heart     Fatigue     Generalized headaches     Hyperlipidemia     Hypertension     Migraine     Morning stiffness of joints     Myocardial infarction ([facility] [address] 05/13/2018    Numbness or tingling hands and feet    OA (osteoarthritis) of knee 1/27/2015    Obesity, Class I, BMI 30.0-34.9 (see actual BMI)     Osteoporosis     Pulmonary nodule     Shortness of breath      Past Surgical History:   Procedure Laterality Date    CARDIAC CATHETERIZATION  04/01/2016    Dr. Scott Schumacher. Hildred Bodily CARDIAC CATHETERIZATION  08/01/2012    Dr. Geo Schumacher  11/01/2019    Non Obs CAD, medical management    CHOLECYSTECTOMY      CORONARY ANGIOPLASTY  08/06/2019    POBA of mid LAD, NC Balloon- 3.25 x 15    CORONARY ANGIOPLASTY WITH STENT PLACEMENT  05/13/2018    Dr. Danica Enriquez - w/placement of IABP, Xience 3.25 x 18 mid Circ, POBA 2.5 x 12 to prox LAD    CORONARY ARTERY BYPASS GRAFT  06/13/2018    LIMA, SVG to OM2, SVG to rPDA    DIAGNOSTIC CARDIAC CATH LAB PROCEDURE  06/06/2013    Dr. Floresita Crow - Non Obs CAD    HYSTERECTOMY      PTCA      UPPER GASTROINTESTINAL ENDOSCOPY       Allergies   Allergen Reactions    Penicillins Itching, Swelling and Rash    Arginine      Nausea and vomiting     Imdur [Isosorbide Dinitrate] Swelling     nausea    Metoprolol      Headache, nausea     Nitroglycerin      Headache     Cephalexin Itching and Rash     Current Outpatient Medications   Medication Sig Dispense Refill    furosemide (LASIX) 20 MG tablet Take 1 tablet by mouth daily 30 tablet 1    nitroGLYCERIN (NITROSTAT) 0.4 MG SL tablet up to max of 3 total doses.  If no relief after 1 dose, call 911. 25 tablet 3    amLODIPine (NORVASC) 5 MG tablet Take 2 tablets by mouth daily 30 tablet 11    lisinopril (PRINIVIL;ZESTRIL) 5 MG tablet Take 1 tablet by mouth daily 30 tablet 11    atorvastatin (LIPITOR) 40 MG tablet Take 1 tablet by mouth nightly 30 tablet 11    DULoxetine (CYMBALTA) 60 MG extended release capsule Take 60 mg by mouth daily      DULoxetine (CYMBALTA) 30 MG extended release capsule Take 30 mg by mouth daily      busPIRone (BUSPAR) 5 MG tablet Take 10 mg by mouth 2 times daily       aspirin 81 MG chewable tablet Take 1 tablet by mouth daily 30 tablet 11    clopidogrel (PLAVIX) 75 MG tablet Take 1 tablet by mouth daily 30 tablet 11    ranolazine (RANEXA) 1000 MG extended release tablet Take 1 tablet by mouth 2 times daily 60 tablet 3    pantoprazole (PROTONIX) 40 MG tablet Take 40 mg by mouth 2 times daily       ondansetron (ZOFRAN-ODT) 4 MG disintegrating tablet Take 1 tablet by mouth every 8 hours as needed for Nausea or Vomiting 20 tablet 1    albuterol sulfate HFA (PROVENTIL HFA) 108 (90 Base) MCG/ACT inhaler Inhale 2 puffs into the lungs every 6 hours as needed for Wheezing or Shortness of Breath 1 Inhaler 2    tiotropium (SPIRIVA) 18 MCG inhalation capsule Inhale 1 capsule into the lungs daily (Patient taking differently: Inhale 18 mcg into the lungs daily as needed ) 30 capsule 3    fluticasone (FLONASE) 50 MCG/ACT nasal spray 1 spray by Each Nostril route daily (Patient not taking: Reported on 1/6/2020) 1 Bottle 2    metFORMIN (GLUCOPHAGE) 1000 MG tablet Take 1 tablet by mouth 2 times daily (with meals) 60 tablet 0     No current facility-administered medications for this visit. Social History     Socioeconomic History    Marital status:       Spouse name: Not on file    Number of children: Not on file    Years of education: Not on file    Highest education level: Not on file   Occupational History    Occupation: computer work   Social Needs    Financial resource strain: Not on file    Food insecurity     Worry: Not on file     Inability: Not on file   [employer] needs     Medical: Not on file     Non-medical: Not on file   Tobacco Use    Smoking status: Former Smoker     Packs/day: 1.00     Years: 20.00     Pack years: 20.00     Types: Cigarettes     Last attempt to quit: 2018     Years since quittin.2    Smokeless tobacco: Never Used    Tobacco comment: Quit Mother's Day 2018   Substance and Sexual Activity    Alcohol use: No     Alcohol/week: 0.0 standard drinks    Drug use: No    Sexual activity: Not Currently     Partners: Male   Lifestyle    Physical activity     Days per week: Not on file     Minutes per session: Not on file    Stress: Not on file   Relationships    Social connections     Talks on phone: Not on file     Gets together: Not on file     Attends Cheondo[employer] service: Not on file     Active member of club or organization: Not on file     Attends meetings of clubs or organizations: Not on file     Relationship status: Not on file    Intimate partner violence     Fear of current or ex partner: Not on file     Emotionally abused: Not on file     Physically abused: Not on file     Forced sexual activity: Not on file   Other Topics Concern    Not on file   Social History Narrative    Not on file     Family History   Problem Relation Age of Onset    Emphysema Mother     Heart Failure Mother     Hypertension Mother     Heart Disease Mother     High Blood Pressure Mother     High Cholesterol Mother     Stroke Mother     Heart Failure Father     Hypertension Father     Heart Disease Father     High Blood Pressure Father     High Cholesterol Father     Hypertension Sister     High Blood Pressure Sister     Hypertension Brother     High Blood Pressure Brother     Cancer Brother     Hypertension Maternal Grandmother     High Blood Pressure Maternal Grandmother     Hypertension Maternal Grandfather     High Blood Pressure Maternal Grandfather     Hypertension Paternal Grandmother     High Blood Pressure Paternal Grandmother     Hypertension Paternal Grandfather     High Blood Pressure Paternal Grandfather     High Blood Pressure Sister     High Blood Pressure Sister     High Blood Pressure Sister     Heart Failure Maternal Aunt     Hypertension Maternal Aunt     Heart Failure Maternal Uncle     Hypertension Maternal Uncle     Cancer Other         nephew-colon, liver, lung    Heart Failure Other     Diabetes Other     Osteoarthritis Other     Hypertension Paternal Aunt     Hypertension Paternal Uncle     Asthma Neg Hx          Review of Systems   Cardiovascular: Positive for chest pain and leg swelling. B/LE    Gastrointestinal: Positive for nausea. Pt thinks is all from her medications    Neurological: Positive for light-headedness. Psychiatric/Behavioral: The patient is nervous/anxious. All other systems reviewed and are negative. 15 pt ROS confirmed by this MD personally. Objective:   Physical Exam  Vitals signs and nursing note reviewed. Constitutional:       Appearance: Normal appearance. She is obese. HENT:      Head: Normocephalic and atraumatic. Right Ear: External ear normal.      Left Ear: External ear normal.      Mouth/Throat:      Mouth: Mucous membranes are moist.      Pharynx: Oropharynx is clear. Eyes:      Extraocular Movements: Extraocular movements intact.       Conjunctiva/sclera: Conjunctivae normal.   Neck:      Musculoskeletal: Normal range of motion and neck supple. Cardiovascular:      Rate and Rhythm: Normal rate and regular rhythm. Pulses: Normal pulses. Heart sounds: Normal heart sounds. Pulmonary:      Effort: Pulmonary effort is normal.      Breath sounds: Normal breath sounds. Abdominal:      General: Bowel sounds are normal.      Palpations: Abdomen is soft. There is no mass. Musculoskeletal:      Right lower leg: No edema. Left lower leg: No edema. Skin:     General: Skin is warm and dry. Capillary Refill: Capillary refill takes less than 2 seconds. Neurological:      General: No focal deficit present. Mental Status: She is alert and oriented to person, place, and time. Cranial Nerves: No cranial nerve deficit. Sensory: No sensory deficit. Motor: No weakness. Coordination: Coordination normal.      Gait: Gait normal.   Psychiatric:         Mood and Affect: Mood normal.         Behavior: Behavior normal.         Thought Content: Thought content normal.         Judgment: Judgment normal.       R size  L size     Spider  telangiectasias       Reticular veins       Varicose   veins       Venous reflux study 7/8/2020 at [address] [facility]    Right Impression    Technically limited and difficult exam due to small vessel size especially in    the bilateral calf region and a harvested right greater saphenous vein. There is no evidence of acute deep or superficial venous thrombosis noted in    the examined veins of the right lower extremity. There is no deep venous reflux (lasting longer than 1 second) noted in the    right lower extremity. Clinically significant superficial venous reflux (lasting longer than . 5    second) is noted within the great saphenous vein (GSV) at the sapheno-femoral    junction (2.2 sec), however, the greater saphenous vein was previously    harvested for coronary artery bypass graft ( proximal thigh to mid calf).     There is no evidence of superficial venous reflux involving the anterior    accessory saphenous vein at the sapheno-femoral junction. The small saphenous vein does not communicate with the popliteal vein. There are no incompetent perforators in the calf. Left Impression    There is no evidence of acute deep or superficial venous thrombosis in the    examined veins of the left lower extremity. Clinically significant deep venous reflux (lasting longer than 1 second) noted    in the left common femoral vein (2.2 sec). Clinically significant superficial venous reflux (lasting longer than . 5    second) is noted within the great saphenous vein (GSV) at the knee (2.0 sec)    and proximal calf (1.4 sec). There is no superficial venous reflux involving the short saphenous vein    (lasting longer than . 5 second) noted in the left lower extremity. The small saphenous vein communicates with the popliteal vein. There are no incompetent perforators in the calf.         Conclusions          Summary          No evidence of deep venous insufficiency in the RLE.     Reflux at the right spahenofemoral junction into GSV to the mid thigh where     remainder of GSV has been harvested.    Evie Comment is both deep and superficial venous insufficiency in the LLE.          Signature          ------------------------------------------------------------------     Electronically signed by Tristin Webb MD (Interpreting     [id number]) on 07/09/2020 at 05:27 PM     ------------------------------------------------------------------         Patient Status:Routine. Study [address] - Vascular Lab. Technical Quality:Adequate visualization.         Risk Factors           - The patient's risk factor(s) include: diabetes mellitus, dyslipidemia,        obesity, arterial hypertension, prior MI and prior CABG.            - The patient has a former tobacco history.         Velocities are measured in cm/s ; Diameters are measured in mm         Right Doppler Measurements    +------------------------+------+------+------------+    ! Location                !Signal!Reflux! Reflux (sec)! +------------------------+------+------+------------+    ! Sapheno Femoral Junction! Phasic! Yes   !2.2         !    +------------------------+------+------+------------+    ! Common Femoral          !Phasic! No    !            !    +------------------------+------+------+------------+    ! Mid Femoral             !Phasic! No    !            !    +------------------------+------+------+------------+    ! Deep Femoral            !Phasic! No    !            !    +------------------------+------+------+------------+    ! Popliteal               !Phasic! No    !            !    +------------------------+------+------+------------+    ! SSV                     !Phasic! No    !            !    +------------------------+------+------+------------+         Left Doppler Measurements    +------------------------+------+------+------------+    ! Location                !Signal!Reflux! Reflux (sec)! +------------------------+------+------+------------+    ! Sapheno Femoral Junction! Phasic! No    !            !    +------------------------+------+------+------------+    ! Common Femoral          !Phasic! Yes   !2.1         !    +------------------------+------+------+------------+    ! Mid Femoral             !Phasic! No    !            !    +------------------------+------+------+------------+    ! Deep Femoral            !Phasic! No    !            !    +------------------------+------+------+------------+    ! Popliteal               !Phasic! No    !            !    +------------------------+------+------+------------+    ! GSV Below Knee          !Phasic! Yes   !2           !    +------------------------+------+------+------------+    ! SSV                     !Phasic! No    !            !    +------------------------+------+------+------------+         Number of Perforators +--------++----------+------+----+----------+------+    !        ! ! Right     !      !Left!          !      !    +--------++----------+------+----+----------+------+    ! Location! !Competency! Number!    !Competency! Number! +--------++----------+------+----+----------+------+    ! Mid Calf! ! Yes       !      !    ! Yes       !      !    +--------++----------+------+----+----------+------+    ! Ankle   ! ! Yes       !      !    ! Yes       !      !    +--------++----------+------+----+----------+------+         Right Mapping    +---------------------------+-------+----------+    ! Location                   !AP Diam!Trans Diam!    +---------------------------+-------+----------+    ! GSV 2 cm Distal to Junction! 6. 1    !          !    +---------------------------+-------+----------+    ! GSV Calf                   !2.2    !          !    +---------------------------+-------+----------+    ! SSV                        !2.2    !          !    +---------------------------+-------+----------+    ! SSV High Calf              !1. 5    !          !    +---------------------------+-------+----------+    ! SSV Mid Calf               !1. 7    !          !    +---------------------------+-------+----------+         Left Mapping    +---------------------------+-------+----------+    ! Location                   !AP Diam!Trans Diam!    +---------------------------+-------+----------+    ! GSV 2 cm Distal to Junction! 4. 1    !          !    +---------------------------+-------+----------+    ! GSV Mid Thigh              !2. 5    !          !    +---------------------------+-------+----------+    ! GSV Low Thigh              !1. 5    !          !    +---------------------------+-------+----------+    ! GSV Above Knee             !1.1    !          !    +---------------------------+-------+----------+    ! GSV Below Knee             !1. 4    !          !    +---------------------------+-------+----------+    ! GSV Calf                   !1. 6    !          !

## 2020-07-30 NOTE — Clinical Note
Angie Guardado saw Denman Dakins in the office today for evaluation of her ankle edema and associated venous insufficiency. The reflux identified on the previously performed duplex scan is relatively minor and there are no areas that would benefit from vascular intervention. I have suggested that she begin wearing knee-high 15/20 mmHg compression stockings on a daily basis. A prescription was provided and she will take this under consideration. I will see her back in the office as needed. If you have any questions please feel free to call me. Thanks for asked me to see her and please let me know if I can help you with any of your other patients in the future.     Gayathri Domínguez

## 2020-10-22 RX ORDER — AMLODIPINE BESYLATE 5 MG/1
10 TABLET ORAL DAILY
Qty: 60 TABLET | Refills: 2 | Status: SHIPPED | OUTPATIENT
Start: 2020-10-22 | End: 2021-02-22

## 2020-10-26 ENCOUNTER — TELEPHONE (OUTPATIENT)
Dept: CARDIOLOGY CLINIC | Age: 55
End: 2020-10-26

## 2020-10-26 ENCOUNTER — APPOINTMENT (OUTPATIENT)
Dept: GENERAL RADIOLOGY | Age: 55
End: 2020-10-26
Payer: MEDICAID

## 2020-10-26 ENCOUNTER — HOSPITAL ENCOUNTER (EMERGENCY)
Age: 55
Discharge: HOME | End: 2020-10-27
Payer: MEDICAID

## 2020-10-26 ENCOUNTER — APPOINTMENT (OUTPATIENT)
Dept: CT IMAGING | Age: 55
End: 2020-10-26
Payer: MEDICAID

## 2020-10-26 VITALS
WEIGHT: 175 LBS | TEMPERATURE: 98 F | DIASTOLIC BLOOD PRESSURE: 73 MMHG | RESPIRATION RATE: 18 BRPM | BODY MASS INDEX: 32.53 KG/M2 | OXYGEN SATURATION: 96 % | HEART RATE: 78 BPM | SYSTOLIC BLOOD PRESSURE: 120 MMHG

## 2020-10-26 DIAGNOSIS — R07.9 CHEST PAIN, UNSPECIFIED TYPE: Primary | ICD-10-CM

## 2020-10-26 DIAGNOSIS — R11.2 NAUSEA AND VOMITING, INTRACTABILITY OF VOMITING NOT SPECIFIED, UNSPECIFIED VOMITING TYPE: ICD-10-CM

## 2020-10-26 DIAGNOSIS — R91.8 PULMONARY NODULES: ICD-10-CM

## 2020-10-26 DIAGNOSIS — R06.02 SHORTNESS OF BREATH: ICD-10-CM

## 2020-10-26 LAB
A/G RATIO: 1.7 (ref 1.1–2.2)
ALBUMIN SERPL-MCNC: 4.2 G/DL (ref 3.4–5)
ALP BLD-CCNC: 76 U/L (ref 40–129)
ALT SERPL-CCNC: 25 U/L (ref 10–40)
AMORPHOUS: NORMAL /HPF
ANION GAP SERPL CALCULATED.3IONS-SCNC: 10 MMOL/L (ref 3–16)
AST SERPL-CCNC: 19 U/L (ref 15–37)
BASOPHILS ABSOLUTE: 0 K/UL (ref 0–0.2)
BASOPHILS RELATIVE PERCENT: 0.7 %
BILIRUB SERPL-MCNC: 0.4 MG/DL (ref 0–1)
BILIRUBIN URINE: NEGATIVE
BLOOD, URINE: NEGATIVE
BUN BLDV-MCNC: 9 MG/DL (ref 7–20)
CALCIUM SERPL-MCNC: 9.6 MG/DL (ref 8.3–10.6)
CHLORIDE BLD-SCNC: 99 MMOL/L (ref 99–110)
CLARITY: ABNORMAL
CO2: 27 MMOL/L (ref 21–32)
COLOR: YELLOW
CREAT SERPL-MCNC: 0.7 MG/DL (ref 0.6–1.1)
EOSINOPHILS ABSOLUTE: 0.1 K/UL (ref 0–0.6)
EOSINOPHILS RELATIVE PERCENT: 1.8 %
GFR AFRICAN AMERICAN: >60
GFR NON-AFRICAN AMERICAN: >60
GLOBULIN: 2.5 G/DL
GLUCOSE BLD-MCNC: 251 MG/DL (ref 70–99)
GLUCOSE URINE: >=1000 MG/DL
HCT VFR BLD CALC: 41.9 % (ref 36–48)
HEMOGLOBIN: 14.3 G/DL (ref 12–16)
KETONES, URINE: NEGATIVE MG/DL
LEUKOCYTE ESTERASE, URINE: NEGATIVE
LIPASE: 37 U/L (ref 13–60)
LYMPHOCYTES ABSOLUTE: 1.9 K/UL (ref 1–5.1)
LYMPHOCYTES RELATIVE PERCENT: 35.8 %
MCH RBC QN AUTO: 29.9 PG (ref 26–34)
MCHC RBC AUTO-ENTMCNC: 34 G/DL (ref 31–36)
MCV RBC AUTO: 88 FL (ref 80–100)
MICROSCOPIC EXAMINATION: YES
MONOCYTES ABSOLUTE: 0.3 K/UL (ref 0–1.3)
MONOCYTES RELATIVE PERCENT: 6.6 %
NEUTROPHILS ABSOLUTE: 2.9 K/UL (ref 1.7–7.7)
NEUTROPHILS RELATIVE PERCENT: 55.1 %
NITRITE, URINE: NEGATIVE
PDW BLD-RTO: 13.4 % (ref 12.4–15.4)
PH UA: 7.5 (ref 5–8)
PLATELET # BLD: 183 K/UL (ref 135–450)
PMV BLD AUTO: 8.8 FL (ref 5–10.5)
POTASSIUM SERPL-SCNC: 4.1 MMOL/L (ref 3.5–5.1)
PRO-BNP: 310 PG/ML (ref 0–124)
PROTEIN UA: NEGATIVE MG/DL
RBC # BLD: 4.76 M/UL (ref 4–5.2)
RBC UA: NORMAL /HPF (ref 0–4)
SODIUM BLD-SCNC: 136 MMOL/L (ref 136–145)
SPECIFIC GRAVITY UA: 1.01 (ref 1–1.03)
TOTAL PROTEIN: 6.7 G/DL (ref 6.4–8.2)
TROPONIN: <0.01 NG/ML
TROPONIN: <0.01 NG/ML
URINE REFLEX TO CULTURE: ABNORMAL
URINE TYPE: ABNORMAL
UROBILINOGEN, URINE: 0.2 E.U./DL
WBC # BLD: 5.2 K/UL (ref 4–11)
WBC UA: NORMAL /HPF (ref 0–5)

## 2020-10-26 PROCEDURE — 93005 ELECTROCARDIOGRAM TRACING: CPT | Performed by: EMERGENCY MEDICINE

## 2020-10-26 PROCEDURE — 96376 TX/PRO/DX INJ SAME DRUG ADON: CPT

## 2020-10-26 PROCEDURE — 80053 COMPREHEN METABOLIC PANEL: CPT

## 2020-10-26 PROCEDURE — 6370000000 HC RX 637 (ALT 250 FOR IP): Performed by: NURSE PRACTITIONER

## 2020-10-26 PROCEDURE — 81001 URINALYSIS AUTO W/SCOPE: CPT

## 2020-10-26 PROCEDURE — 83880 ASSAY OF NATRIURETIC PEPTIDE: CPT

## 2020-10-26 PROCEDURE — 83690 ASSAY OF LIPASE: CPT

## 2020-10-26 PROCEDURE — 96374 THER/PROPH/DIAG INJ IV PUSH: CPT

## 2020-10-26 PROCEDURE — 99285 EMERGENCY DEPT VISIT HI MDM: CPT

## 2020-10-26 PROCEDURE — 96375 TX/PRO/DX INJ NEW DRUG ADDON: CPT

## 2020-10-26 PROCEDURE — 85025 COMPLETE CBC W/AUTO DIFF WBC: CPT

## 2020-10-26 PROCEDURE — 96365 THER/PROPH/DIAG IV INF INIT: CPT

## 2020-10-26 PROCEDURE — 84484 ASSAY OF TROPONIN QUANT: CPT

## 2020-10-26 PROCEDURE — 6360000002 HC RX W HCPCS: Performed by: NURSE PRACTITIONER

## 2020-10-26 PROCEDURE — 6360000004 HC RX CONTRAST MEDICATION: Performed by: NURSE PRACTITIONER

## 2020-10-26 PROCEDURE — 71260 CT THORAX DX C+: CPT

## 2020-10-26 PROCEDURE — 71045 X-RAY EXAM CHEST 1 VIEW: CPT

## 2020-10-26 RX ORDER — ONDANSETRON 4 MG/1
4 TABLET, ORALLY DISINTEGRATING ORAL EVERY 8 HOURS PRN
Qty: 20 TABLET | Refills: 1 | Status: SHIPPED | OUTPATIENT
Start: 2020-10-26 | End: 2021-06-11

## 2020-10-26 RX ORDER — ONDANSETRON 2 MG/ML
4 INJECTION INTRAMUSCULAR; INTRAVENOUS EVERY 30 MIN PRN
Status: COMPLETED | OUTPATIENT
Start: 2020-10-26 | End: 2020-10-26

## 2020-10-26 RX ORDER — MORPHINE SULFATE 4 MG/ML
4 INJECTION, SOLUTION INTRAMUSCULAR; INTRAVENOUS EVERY 30 MIN PRN
Status: DISCONTINUED | OUTPATIENT
Start: 2020-10-26 | End: 2020-10-27 | Stop reason: HOSPADM

## 2020-10-26 RX ADMIN — ONDANSETRON 4 MG: 2 INJECTION INTRAMUSCULAR; INTRAVENOUS at 20:53

## 2020-10-26 RX ADMIN — ONDANSETRON 4 MG: 2 INJECTION INTRAMUSCULAR; INTRAVENOUS at 21:49

## 2020-10-26 RX ADMIN — MORPHINE SULFATE 4 MG: 4 INJECTION, SOLUTION INTRAMUSCULAR; INTRAVENOUS at 21:50

## 2020-10-26 RX ADMIN — IOPAMIDOL 75 ML: 755 INJECTION, SOLUTION INTRAVENOUS at 22:08

## 2020-10-26 RX ADMIN — LIDOCAINE HYDROCHLORIDE: 20 SOLUTION ORAL; TOPICAL at 20:53

## 2020-10-26 RX ADMIN — MORPHINE SULFATE 4 MG: 4 INJECTION, SOLUTION INTRAMUSCULAR; INTRAVENOUS at 23:14

## 2020-10-26 ASSESSMENT — PAIN DESCRIPTION - LOCATION: LOCATION: CHEST

## 2020-10-26 ASSESSMENT — PAIN SCALES - GENERAL: PAINLEVEL_OUTOF10: 6

## 2020-10-26 NOTE — ED NOTES
Fall risk screening completed. Fall risk bracelet applied to patient. Non-skid socks provided and placed on patient. The fall risk is indicated using  dome light . Based on score, a bed alarm was indicated and applied. The call light is within the patient's reach, and instructions for use were provided. The bed is in the lowest position with wheels locked. The patient has been advised to notify staff, using the call light, if there is a need to get up or use restroom. The patient verbalized understanding of safety precautions and how to contact staff for assistance.        Eleanor Gardner RN  10/26/20 1323

## 2020-10-26 NOTE — TELEPHONE ENCOUNTER
Pt calling. Pt said that she has been having some swelling in her feet and some chest pain. Pt also said she has had heartburn the past 2 weeks. Pt said on Thursday she had really bad indigestion and had to take a Nitro tab and it went away. Please call to advise. Pt has appt on 11/5 with Jayashree King.

## 2020-10-27 ENCOUNTER — OFFICE VISIT (OUTPATIENT)
Dept: CARDIOLOGY CLINIC | Age: 55
End: 2020-10-27
Payer: MEDICAID

## 2020-10-27 VITALS
WEIGHT: 189 LBS | OXYGEN SATURATION: 98 % | SYSTOLIC BLOOD PRESSURE: 122 MMHG | DIASTOLIC BLOOD PRESSURE: 64 MMHG | HEIGHT: 61 IN | HEART RATE: 85 BPM | BODY MASS INDEX: 35.68 KG/M2

## 2020-10-27 PROBLEM — R53.83 OTHER FATIGUE: Status: ACTIVE | Noted: 2020-10-27

## 2020-10-27 LAB
EKG ATRIAL RATE: 82 BPM
EKG DIAGNOSIS: NORMAL
EKG P AXIS: 49 DEGREES
EKG P-R INTERVAL: 140 MS
EKG Q-T INTERVAL: 392 MS
EKG QRS DURATION: 102 MS
EKG QTC CALCULATION (BAZETT): 457 MS
EKG R AXIS: -27 DEGREES
EKG T AXIS: 42 DEGREES
EKG VENTRICULAR RATE: 82 BPM

## 2020-10-27 PROCEDURE — 3017F COLORECTAL CA SCREEN DOC REV: CPT | Performed by: INTERNAL MEDICINE

## 2020-10-27 PROCEDURE — 1036F TOBACCO NON-USER: CPT | Performed by: INTERNAL MEDICINE

## 2020-10-27 PROCEDURE — 99214 OFFICE O/P EST MOD 30 MIN: CPT | Performed by: INTERNAL MEDICINE

## 2020-10-27 PROCEDURE — G8484 FLU IMMUNIZE NO ADMIN: HCPCS | Performed by: INTERNAL MEDICINE

## 2020-10-27 PROCEDURE — G8427 DOCREV CUR MEDS BY ELIG CLIN: HCPCS | Performed by: INTERNAL MEDICINE

## 2020-10-27 PROCEDURE — 93010 ELECTROCARDIOGRAM REPORT: CPT | Performed by: INTERNAL MEDICINE

## 2020-10-27 PROCEDURE — G8417 CALC BMI ABV UP PARAM F/U: HCPCS | Performed by: INTERNAL MEDICINE

## 2020-10-27 NOTE — PATIENT INSTRUCTIONS
Patient Plan:  1. May stop taking ranolazine (Ranexa)  2.  Referral to Cardiac Rehab    ~ angina and CAD

## 2020-10-27 NOTE — LETTER
[address] [facility]   Cardiovascular Evaluation    PATIENT: Elmer Saez  DATE: 10/27/2020  MRN: [id number]  CSN: [id number]  : 1965      Primary Care Doctor: Kelly Bazan PA-C  Reason for evaluation:   Follow-up; Chest Pain (mid sternal radiates to the left upper side of chest); Fatigue; Swelling (bilateral feet and legs); and Palpitations      Subjective:   History of present illness on initial date of evaluation:   Elmer Saez is a 63 y.o. patient who presents for hospital follow up. Patient presented to the ED on 19 with complaints of chest pain. At that time patient preferred to try medication instead of proceeding with cath. On 19 patient was admitted for [facility] that demonstrated Restenosis at mid LAD stent around heavy angulated section of LAD  75%, HERO-2 ---> 0%, HERO 3 flow and Spasm of left main and also LCx that improved with NTG. Today she reports the chest pain is back. She states doing activity really brings it on. She denies palpitations, dizziness or syncope. She was seen in the ED 10/26/20 with complaints of chest pain. Today she reports she started to feel what she though was heart burn. She states the pain got very bad. She decided to take a nitro and the pain went away. She states she is so very fatigued. She states it has become very bad. She states she has had swelling in her legs and feet. She is taking her medications as prescribed. She denies dizziness or syncope. Patient Active Problem List   Diagnosis    Coronary artery disease involving native heart with angina pectoris ([facility]    Essential hypertension    Mixed hyperlipidemia    Cerebral aneurysm, nonruptured    DM2 (diabetes mellitus, type 2) ([facility]    Hx TIA involving L-ICA    Former smoker    Chronic tension type headache    GERD (gastroesophageal reflux disease)    Obesity (BMI 30-39. 9)    CAD s/p CABGx3 (2018) & stents (May 2018)    Acute chest pain  TATI (acute kidney injury) ([facility] [address]    Pulmonary vascular congestion on CXR    Hx of combined systolic (EF 36-14%) & diastolic (grade 1 LVDD) CHF    Palpitations    S/P PTCA (percutaneous transluminal coronary angioplasty)    CAD in native artery    Precordial pain    Chest pain    Coronary artery spasm (HCC)    History of tobacco abuse    Other fatigue         Past Medical History:   has a past medical history of Acute blood loss anemia, Asthma, CAD (coronary artery disease), Chest pain, COPD (chronic obstructive pulmonary disease) ([facility] [address] Coronary artery disease, Depression, Diabetes mellitus ([facility] [address] Enlarged heart, Fatigue, Generalized headaches, Hyperlipidemia, Hypertension, Migraine, Morning stiffness of joints, Myocardial infarction (HCC), Numbness or tingling, OA (osteoarthritis) of knee, Obesity, Class I, BMI 30.0-34.9 (see actual BMI), Osteoporosis, Pulmonary nodule, and Shortness of breath. Surgical History:   has a past surgical history that includes Cholecystectomy; Hysterectomy; Diagnostic Cardiac Cath Lab Procedure (06/06/2013); Upper gastrointestinal endoscopy; Cardiac catheterization (04/01/2016); Cardiac catheterization (08/01/2012); Coronary artery bypass graft (06/13/2018); Coronary angioplasty with stent (05/13/2018); Percutaneous Transluminal Coronary Angio; Coronary angioplasty (08/06/2019); and Cardiac catheterization (11/01/2019). Social History:   reports that she quit smoking about 2 years ago. Her smoking use included cigarettes. She has a 20.00 pack-year smoking history. She has never used smokeless tobacco. She reports that she does not drink alcohol or use drugs. Family History:  No evidence for sudden cardiac death or premature CAD    Home Medications:  Reviewed and are listed in nursing record.  and/or listed below  Current Outpatient Medications   Medication Sig Dispense Refill    ondansetron (ZOFRAN-ODT) 4 MG disintegrating tablet Take 1 tablet by mouth every 8 hours as needed for Nausea or Vomiting 20 tablet 1    amLODIPine (NORVASC) 5 MG tablet Take 2 tablets by mouth daily 60 tablet 2    nitroGLYCERIN (NITROSTAT) 0.4 MG SL tablet up to max of 3 total doses. If no relief after 1 dose, call 911. 25 tablet 3    lisinopril (PRINIVIL;ZESTRIL) 5 MG tablet Take 1 tablet by mouth daily 30 tablet 11    atorvastatin (LIPITOR) 40 MG tablet Take 1 tablet by mouth nightly 30 tablet 11    DULoxetine (CYMBALTA) 60 MG extended release capsule Take 60 mg by mouth daily      busPIRone (BUSPAR) 5 MG tablet Take 10 mg by mouth 2 times daily       aspirin 81 MG chewable tablet Take 1 tablet by mouth daily 30 tablet 11    clopidogrel (PLAVIX) 75 MG tablet Take 1 tablet by mouth daily 30 tablet 11    ranolazine (RANEXA) 1000 MG extended release tablet Take 1 tablet by mouth 2 times daily 60 tablet 3    fluticasone (FLONASE) 50 MCG/ACT nasal spray 1 spray by Each Nostril route daily 1 Bottle 2    pantoprazole (PROTONIX) 40 MG tablet Take 40 mg by mouth 2 times daily       albuterol sulfate HFA (PROVENTIL HFA) 108 (90 Base) MCG/ACT inhaler Inhale 2 puffs into the lungs every 6 hours as needed for Wheezing or Shortness of Breath 1 Inhaler 2    tiotropium (SPIRIVA) 18 MCG inhalation capsule Inhale 1 capsule into the lungs daily (Patient taking differently: Inhale 18 mcg into the lungs daily as needed ) 30 capsule 3    furosemide (LASIX) 20 MG tablet Take 1 tablet by mouth daily (Patient not taking: Reported on 10/27/2020) 30 tablet 1    DULoxetine (CYMBALTA) 30 MG extended release capsule Take 30 mg by mouth daily      metFORMIN (GLUCOPHAGE) 1000 MG tablet Take 1 tablet by mouth 2 times daily (with meals) 60 tablet 0     No current facility-administered medications for this visit. Allergies:  Penicillins; Arginine; Imdur [isosorbide dinitrate];  Metoprolol; Nitroglycerin; and Cephalexin     Review of Systems: No results found for: PTINR  Liver:  No components found for: CHLPL  Lab Results   Component Value Date    ALT 25 10/26/2020    AST 19 10/26/2020    ALKPHOS 76 10/26/2020    BILITOT 0.4 10/26/2020     Lab Results   Component Value Date    LABA1C 7.4 07/30/2019     Lipids:         Lab Results   Component Value Date    TRIG 103 07/30/2019    TRIG 111 04/09/2019    TRIG 110 02/06/2019            Lab Results   Component Value Date    HDL 41 07/30/2019    HDL 44 04/09/2019    HDL 40 02/06/2019            Lab Results   Component Value Date    LDLCALC 33 07/30/2019    LDLCALC 50 04/09/2019    LDLCALC 61 02/06/2019            Lab Results   Component Value Date    LABVLDL 21 07/30/2019    LABVLDL 22 04/09/2019    LABVLDL 22 02/06/2019         CARDIAC DATA   EKG:  10/26/2020 normal sinus rhythm, RSR prime pattern. Nonspecific ST changes. Inferior infarct    ECHO 7/8/20  Summary   Technically difficult examination due to body habitus. Normal left ventricle size, wall thickness, and systolic function with a   visually estimated ejection fraction of 55%. No regional wall motion abnormalities are seen. Normal left ventricular diastolic filling pressure. Systolic pulmonary artery pressure (SPAP) is normal and estimated at 27 mmHg   (right atrial pressure 3 mmHg). ECHO/MUGA: 9/12/18   Summary   LV systolic function is normal with EF estimated at 55%. Mild hypokinesis of the inferior and lateral walls. No regional wall motion abnormalities. Normal left ventricular diastolic filling pressure. Trivial tricuspid regurgitation. Systolic pulmonary artery pressure (SPAP) is normal estimated at 24mmHg   (Right atrial pressure of 3mmHg). STRESS TEST: 3/18/19  Summary  Small-moderate sized inferolateral partial reversibility defect consistent  with ischemia and infarction in the territory of the mid and distal LCx  and/or RCA.  Fixed defects in the mid and basal lateral walls c/w infarct. Hyperdynamic LV systolic function with LG>61% with uniform wall motion. Intermediate risk abnormal study. CARDIAC CATH 1/10/20  LEFT HEART CATH  LM: luminals  LAD: long section of stent in prox/mid LAD (HERO-3 flow), mid 30% restenosis at acute bend in vessel (Likely LIMA insertion site)  LCX: mid stent with 20% restenosis                OM1- luminals                Om2- Long section of disease from LCx into mid OM1- 65-70% (but OM2 is bypassed) overall improved                + competative flow up SVG graft  RCA: Dominant,  100% mid     LIMA-LAD: 100% occluded, atretic  SVG-OM2: (prominent valves) patent, fills large section of LCX area   SVG-RCA: patent with good flow into PDA and retro flow into PLV     LVEDP: 5  LVEF: 40% global hypokinesis. Inferior wall improved   Assessment  1. Patent LAD stents, unchanged as before  2. Overall flow and lesions appear overall better compared to Nov cath. (due to remodeling)  3. Cardiac MRI showing infarction of anterolateral and inferolateral segments but is well vascularized by competing flow from LCX and SVG grafts  4. Continue treatment for microvascular disease and likely CP along oral-infarct regions.       CARDIAC CATH 11/1/19  LEFT HEART CATH  LM: luminals  LAD: long section of stent in prox/mid LAD (HERO-3 flow), mid 30-40% restenosis at acute bend in vessel and HERO-2 flow afterwards  LCX: mid stent with 30% restenosis                OM1- luminals                Om2- Long section of disease from LCx into mid OM1- 75-80% (but OM2 is bypassed                + competative flow up SVG graft  RCA: Dominant,  100% mid     LIMA-LAD: 100% occluded, atretic  SVG-OM2: patent  SVG-RCA: patent with good flow into PDA and retro flow into PLV     LVEDP: 5  LVEF: 30% with akinesis of the mid/apical inferior wall and moderate diffuse hypokinesis        Mid LAD:  FFR: 0.99-->0.93  CFR: 3.6 (note significant increase in flow with adenosine)        Assessment 1. Patent stent in mid LAD, negative FFR. 2. Significant increase in flow envelope on IV adenosine and CFR was >2 suggesting element of Microvascular disease  3. Ischemic and Nonischemic cardiomyopathy: worsening drop in LVEF and akinesis of inferior wall                - Consider cardiac MRI        CARDIAC CATH: 8/6/19  LEFT HEART CATH  LM: luminals  LAD: long mid stent with 75% restenosis at a sharp bend, HERO-2 flow  LCX: mid stent with 30% resnosis                OM1- luminals                Om2- Long section of disease from LCx into mid OM1- 75-80% but with NTG stenosis was 40-50%. + competative flow up SVG graft  RCA: dominant,  LVEDP: 7  LVEF:  40% with mild apical inferior HK  POBA of mid LAD  NC balloon to distal stent--> post dilated to 2.5mm  NC balloon (3.25 x 15mm) to mid stent-> 3.28mm  OCT  Showed stent well opposed but signficant neointimal hyperplasia. Assessment  1. Restenosis at mid LAD stent around heavy angulated section of LAD                75%, HERO-2 ---> 0%, HERO 3 flow  2. Spasm of left main and also LCx that improved with NTG                - Will increase L-arginine to 1000mg po BID                - imdur allergy, will trial NTG transdermal patch  3. Cont ASA and Plavix     LEFT HEART CATH 4/9/19  LM: short, luminals   LAD: moderate diffuse disease, mid 80%, distal stent with 50% restenosis and HERO-2 flow  LCX: mid patent stent,  50% into OM1 just until anastomosis site. (signficant improvement with iC NTG)    LVEDP: 11  LVEF: 45% with mild ant HK     PCI of prox and mid LAD  STENT: Resolute [place] 3 x 22mm followed by 3 x 38mm. prox was post dilated to 3.5mm and mid was dilated to 3.25mm. Distal stent by OCT showed more restenosis then would expect for new stent and initially poor distal flow so POBA'd this section using 2.25 x 15mm NC trek  Assessment  1.  Successful PCI to prox and mid LAD using CSI orbital arthrectomy and 2 ame drug stents. 0% residual and HERO-3 flow. OCT showed heavy calcific and fibroatheroma burdon and good stent expansion,   2. POBA of distal LAD stent for restenosis and HERO-2 flow. 0% residual and HERO-3 flow   3. Cont ASA 81mg po qday for life  4. Plavix 75mg poqday for life as tolerated  5. Integrillin for 6 hrs     LEFT HEART CATH 2/6/19  LM: luminals  LAD: mid 60%, mid/distal 60% and distal small vessel. NO significant competitive flow seen  LCX: luminals, patent mid LCx stent extending into OM1,                 OM1- superior branch with 30% long lesion and +flow up SVG graft, inferior branch small with 70% lesions  RCA: dominant,  Moderate-severe diffuse disease, long lesion of 50% in [id number]. SVG-PDA graft seen with retrograde flow     LIMA-LAD: atretic, no significantflow to native LAD  SVG-OM2: patent, small vein graft (prx and mid graft is small in size, plumps up distally, + valve seen. Good antegrade flow  SVG-RT PDA: patent  LVEDP: 25  LVEF: 45% with lateral wall hypokinesis  FFR: mid LAD 0.93-->0.83  FFR of mid and distal lesion 0.77. Unable to get post FFR as FFR machine went down  PCI of lesion 1: mid/distal LAD  Resolute [place] 2 x 26mm, post dilated to 2 mm  Assessment  1. Patent mid LCx stent, interval closure of LIMA graft. 2 lesions in LAD were FFR'd and distal lesion ischemic and stented using [place] drug stent 2 x 26mm with HERO-3 flow  2. Watch mid LAD lesion for ischemic progression  3. ASA 81mg poqday for life  4. Plavix 75mg poqday for 1 yr w/o inturruption  5. BB, statin. VASCULAR/OTHER IMAGING:  CARDIAC MRI 11/27/19  Mild cardiomegaly with normal thickness of left ventricular myocardium, including septum  Infarction involving the lateral wall extending into the anterolateral and inferolateral segments as well as a portion of the apex-see above. No signs of mitral, tricuspid or aortic regurgitation.        Assessment and Plan Sundeep Arroyo is a 63 y.o. female who presents today for the following problems:      1. CAD              - 8/6/2019 POBA to mid LAD for restenosis. - 4/9/2019 PCI to prox/mid LAD (CSI and distal POBA)              - 2/6/2019 PCI to mid/distal LAD              - 6/3/2018 CABG (LIMA-LAD, SVG-OM2, SVG-RCA)                          - LIMA atretic              - 5/18/2018 PCI mid LCX  2. Coronary arterial spasm  3. Ischemic cardiomyopathy: Recovered LVEF 45%-->55%  4. HTN: controlled  5. HLD: Controlled  6. DM: Controlled  7. Hx of tobacco abuse  8. Chronic stable angina     PLAN  1. Patient with extensive cardiac history. Essentially LIMA is atretic and has had a full metal jacket to her LAD. Despite her chest pain cardiac cath is demonstrated patent LAD stents, patent grafts. She has a small distal circumflex that is stenotic but not revascularizable due to its small size.   -Overall her cardiac cath in January 2020 looked improved due to remodeling compared to previous cath and EF is now normal  2. Continue to treat medically but if this fails may need evaluation for noncardiac causes of chest pain   -We will DC Ranexa due to lack of benefit. Intolerant to Imdur and L-arginine. And on maximal doses of Norvasc  3. Patient is unable to get to [place] [facility] as it is over an hour away therefore will prescribe a course of cardiac rehab  4. For any angina she should take her nitroglycerin sublingually as needed. 5.  If none of the above fails to alleviate her angina I suggested we evaluate noncardiac causes     PT CP is returning, severe restenosis recurrent in LAD and loss of LIMA   - plan for vincenzo-myoview to eval ischemic burden   - if abnormal, consider Laser to LAD and send for radiation brachytx   0 if normal continue meds  2. BP log, ? BP adding to CP   - agree with starting norvasc, pt to increase to 5mg qday if SBP >100  3.  Asa, plavix, lipitor, dilt, lisinopril, Ranexa, l-arginine - HA with imdur/NTG patch  4. Return in 1 month         Patient Active Problem List   Diagnosis    Coronary artery disease involving native heart with angina pectoris ([facility] [address]    Essential hypertension    Mixed hyperlipidemia    Cerebral aneurysm, nonruptured    DM2 (diabetes mellitus, type 2) ([facility] [address]    Hx TIA involving L-ICA    Former smoker    Chronic tension type headache    GERD (gastroesophageal reflux disease)    Obesity (BMI 30-39. 9)    CAD s/p CABGx3 (June 2018) & stents (May 2018)    Acute chest pain    TATI (acute kidney injury) ([facility] [address]    Pulmonary vascular congestion on CXR    Hx of combined systolic (EF 60-74%) & diastolic (grade 1 LVDD) CHF    Palpitations    S/P PTCA (percutaneous transluminal coronary angioplasty)    CAD in native artery    Precordial pain    Chest pain    Coronary artery spasm (HCC)    History of tobacco abuse    Other fatigue       Patient Plan:  1. May stop taking ranolazine (Ranexa)  2. Referral to Cardiac Rehab    ~ angina and CAD     It is a pleasure to assist in the care of Lauren Nobles. Please call with any questions. This note was scribed in the presence of Lex Randolph MD by Robyn Flaherty RN.      Armando Officer MD, personally performed the services described in this documentation as scribed by the above signed scribe in my presence, and it is both accurate and complete to the best of our ability and knowledge. I agree with the details independently gathered by my clinical support staff, while the remaining scribed note accurately describes my personal service to the patient. The above RN is working as a scribe for and in the presence of myself . Working as a scribe, the RN may have prepopulated components of this note with my historical intellectual property under my direct supervision. Any additions to this intellectual property were performed at my direction.   Furthermore, the content and

## 2020-10-27 NOTE — ED PROVIDER NOTES
Evaluated by [id number] [facility] Provider    [facility]  ED      CHIEF COMPLAINT  Chest Pain (states heartburn for about a wk, started today with CP and nausea, int SOB)    HISTORY OF PRESENT ILLNESS  Soni Pike is a 63 y.o. female who presents to the ED complaining of CP. Indigestion started on the 16th. Has taken Tums, mylanta and prilosec. Thursday she was having bad heartburn and took a Nitro and the pain went away. Pain goes away for about 10-15 minutes and will come back. She has taken 4 nitros since. She took one earlier today, each time she takes it her pain goes away. She is nauseated and vomited at least once on the way here. Nausea worsens with her pain. No appetite. Very tired. Will have associated SOB with the pain. She also has dizziness with the pain. She has pain and numbness in the left arm, feels like she has been hit between the shoulder blades. Takes a baby ASA daily. History of MI in the past., CABG, and stents. Reports that the pain in her chest feels the same as with her MI. Feet and legs are swelling. She was on water pills but is done with those. Denies aggravating factors to her pain. The patient is currently rating their pain as 6/10 and describes it as a aching, burning type of pain. Treatments tried prior to arrival in the ED include: nitroglycerin, antacids. The patient denies other complaints, modifying factors or associated symptoms. The patient arrived to the ED via private car.     PAST MEDICAL HISTORY    Past Medical History:   Diagnosis Date    Acute blood loss anemia     Asthma     CAD (coronary artery disease)     Chest pain     COPD (chronic obstructive pulmonary disease) ([facility] [address]     Coronary artery disease 9/4/2012    Depression     Diabetes mellitus (HCC)     Enlarged heart     Fatigue     Generalized headaches     Hyperlipidemia     Hypertension     Migraine     Morning stiffness of joints     [facility] 05/13/2018    Numbness or tingling hands and feet    OA (osteoarthritis) of knee 1/27/2015    Obesity, Class I, BMI 30.0-34.9 (see actual BMI)     Osteoporosis     Pulmonary nodule     Shortness of breath        SURGICAL HISTORY    Past Surgical History:   Procedure Laterality Date    CARDIAC CATHETERIZATION  04/01/2016    Dr. Magaly Manzo. Jalen Castro  08/01/2012    Dr. Morgan Han  11/01/2019    Non Obs CAD, medical management    CHOLECYSTECTOMY      CORONARY ANGIOPLASTY  08/06/2019    POBA of mid LAD, NC Balloon- 3.25 x 15    CORONARY ANGIOPLASTY WITH STENT PLACEMENT  05/13/2018    Dr. Katia Benitez - w/placement of IABP, Xience 3.25 x 18 mid Circ, POBA 2.5 x 12 to prox LAD    CORONARY ARTERY BYPASS GRAFT  06/13/2018    LIMA, SVG to OM2, SVG to rPDA    DIAGNOSTIC CARDIAC CATH LAB PROCEDURE  06/06/2013    Dr. Jed Kaplan - Non Obs CAD    HYSTERECTOMY      PTCA      UPPER GASTROINTESTINAL ENDOSCOPY         CURRENT MEDICATIONS    Current Outpatient Rx   Medication Sig Dispense Refill    ondansetron (ZOFRAN-ODT) 4 MG disintegrating tablet Take 1 tablet by mouth every 8 hours as needed for Nausea or Vomiting 20 tablet 1    amLODIPine (NORVASC) 5 MG tablet Take 2 tablets by mouth daily 60 tablet 2    furosemide (LASIX) 20 MG tablet Take 1 tablet by mouth daily 30 tablet 1    nitroGLYCERIN (NITROSTAT) 0.4 MG SL tablet up to max of 3 total doses.  If no relief after 1 dose, call 911. 25 tablet 3    lisinopril (PRINIVIL;ZESTRIL) 5 MG tablet Take 1 tablet by mouth daily 30 tablet 11    atorvastatin (LIPITOR) 40 MG tablet Take 1 tablet by mouth nightly 30 tablet 11    DULoxetine (CYMBALTA) 60 MG extended release capsule Take 60 mg by mouth daily      DULoxetine (CYMBALTA) 30 MG extended release capsule Take 30 mg by mouth daily      busPIRone (BUSPAR) 5 MG tablet Take 10 mg by mouth 2 times daily       aspirin 81 MG chewable tablet Take 1 tablet by mouth daily 30 tablet 11    clopidogrel (PLAVIX) 75 MG tablet Take 1 tablet by mouth daily 30 tablet 11    ranolazine (RANEXA) 1000 MG extended release tablet Take 1 tablet by mouth 2 times daily 60 tablet 3    fluticasone (FLONASE) 50 MCG/ACT nasal spray 1 spray by Each Nostril route daily (Patient not taking: Reported on 1/6/2020) 1 Bottle 2    pantoprazole (PROTONIX) 40 MG tablet Take 40 mg by mouth 2 times daily       albuterol sulfate HFA (PROVENTIL HFA) 108 (90 Base) MCG/ACT inhaler Inhale 2 puffs into the lungs every 6 hours as needed for Wheezing or Shortness of Breath 1 Inhaler 2    tiotropium (SPIRIVA) 18 MCG inhalation capsule Inhale 1 capsule into the lungs daily (Patient taking differently: Inhale 18 mcg into the lungs daily as needed ) 30 capsule 3    metFORMIN (GLUCOPHAGE) 1000 MG tablet Take 1 tablet by mouth 2 times daily (with meals) 60 tablet 0       ALLERGIES    Allergies   Allergen Reactions    Penicillins Itching, Swelling and Rash    Arginine      Nausea and vomiting     Imdur [Isosorbide Dinitrate] Swelling     nausea    Metoprolol      Headache, nausea     Nitroglycerin      Headache     Cephalexin Itching and Rash       FAMILY HISTORY    Family History   Problem Relation Age of Onset    Emphysema Mother     Heart Failure Mother     Hypertension Mother     Heart Disease Mother     High Blood Pressure Mother     High Cholesterol Mother     Stroke Mother     Heart Failure Father     Hypertension Father     Heart Disease Father     High Blood Pressure Father     High Cholesterol Father     Hypertension Sister     High Blood Pressure Sister     Hypertension Brother     High Blood Pressure Brother     Cancer Brother     Hypertension Maternal Grandmother     High Blood Pressure Maternal Grandmother     Hypertension Maternal Grandfather     High Blood Pressure Maternal Grandfather     Hypertension Paternal Grandmother     High Blood Pressure Paternal Grandmother     Hypertension Paternal Grandfather     High Blood Pressure Paternal Grandfather     High Blood Pressure Sister     High Blood Pressure Sister     High Blood Pressure Sister     Heart Failure Maternal Aunt     Hypertension Maternal Aunt     Heart Failure Maternal Uncle     Hypertension Maternal Uncle     Cancer Other         nephew-colon, liver, lung    Heart Failure Other     Diabetes Other     Osteoarthritis Other     Hypertension Paternal Aunt     Hypertension Paternal Uncle     Asthma Neg Hx        SOCIAL HISTORY    Social History     Socioeconomic History    Marital status:       Spouse name: None    Number of children: None    Years of education: None    Highest education level: None   Occupational History    Occupation: computer work   Social Needs    Financial resource strain: None    Food insecurity     Worry: None     Inability: None    Transportation needs     Medical: None     Non-medical: None   Tobacco Use    Smoking status: Former Smoker     Packs/day: 1.00     Years: 20.00     Pack years: 20.00     Types: Cigarettes     Last attempt to quit: 2018     Years since quittin.4    Smokeless tobacco: Never Used    Tobacco comment: Quit Mother's Day    Substance and Sexual Activity    Alcohol use: No     Alcohol/week: 0.0 standard drinks    Drug use: No    Sexual activity: Not Currently     Partners: Male   Lifestyle    Physical activity     Days per week: None     Minutes per session: None    Stress: None   Relationships    Social connections     Talks on phone: None     Gets together: None     Attends [employer] service: None     Active member of club or organization: None     Attends meetings of clubs or organizations: None     Relationship status: None    Intimate partner violence     Fear of current or ex partner: None     Emotionally abused: None     Physically abused: None     Forced sexual activity: None   Other Topics Concern    None   Social History Narrative    None       REVIEW OF SYSTEMS    10 systems reviewed, pertinent positives per HPI otherwise noted to be negative    PHYSICAL EXAM  Vitals:    10/26/20 2356   BP: 120/73   Pulse: 78   Resp: 18   Temp:    SpO2: 96%       GENERAL: Patient is well-developed, well-nourished. Awake and alert. Cooperative. Resting in bed. No apparent distress. HEENT:  Normocephalic, atraumatic. Conjunctiva appear normal. Sclera is non-icteric. External ears are normal.    NECK: Supple with normal ROM. Trachea midline. LUNGS: Equal and symmetric chest rise. Breathing is unlabored. Speaking comfortably in full sentences. Lungs are clear bilaterally to auscultation. Without wheezing, rales, or rhonchi. CADIOVASCULAR:  Regular rate and rhythm. Normal S1-S2 sounds. No murmurs, rubs, or gallops. Capillary refill is brisk in all 4 extremities. Bilateral lower extremities are equal in size, there is no swelling observed. There is no tenderness to palpation. There is no erythema observed or warmth palpated. GI: Soft, nontender, nondistended with positive bowel sounds. No rebound tenderness, guarding or any peritoneal signs. No masses or hepatosplenomegaly    MUSCULOSKELETAL:  No gross deformities or trauma noted. Moving all extremities equally and appropriately. Normal ROM. SKIN: Warm/dry. Skin is intact. No rashes/lesions noted. PSYCHIATRIC: Mood and affect appropriate. Speech is clear and articulate. NEUROLOGIC: Alert and oriented. No focal motor or sensory deficits. LABS  I have reviewed all labs for this visit.    Results for orders placed or performed during the hospital encounter of 10/26/20   CBC Auto Differential   Result Value Ref Range    WBC 5.2 4.0 - 11.0 K/uL    RBC 4.76 4.00 - 5.20 M/uL    Hemoglobin 14.3 12.0 - 16.0 g/dL    Hematocrit 41.9 36.0 - 48.0 %    MCV 88.0 80.0 - 100.0 fL    MCH 29.9 26.0 - 34.0 pg    MCHC 34.0 31.0 - 36.0 g/dL    RDW 13.4 12.4 - 15.4 %    Platelets 729 379 - 604 K/uL    MPV 8.8 5.0 - 10.5 fL    Neutrophils % 55.1 %    Lymphocytes % 35.8 %    Monocytes % 6.6 %    Eosinophils % 1.8 %    Basophils % 0.7 %    Neutrophils Absolute 2.9 1.7 - 7.7 K/uL    Lymphocytes Absolute 1.9 1.0 - 5.1 K/uL    Monocytes Absolute 0.3 0.0 - 1.3 K/uL    Eosinophils Absolute 0.1 0.0 - 0.6 K/uL    Basophils Absolute 0.0 0.0 - 0.2 K/uL   Comprehensive Metabolic Panel   Result Value Ref Range    Sodium 136 136 - 145 mmol/L    Potassium 4.1 3.5 - 5.1 mmol/L    Chloride 99 99 - 110 mmol/L    CO2 27 21 - 32 mmol/L    Anion Gap 10 3 - 16    Glucose 251 (H) 70 - 99 mg/dL    BUN 9 7 - 20 mg/dL    CREATININE 0.7 0.6 - 1.1 mg/dL    GFR Non-African American >60 >60    GFR African American >60 >60    Calcium 9.6 8.3 - 10.6 mg/dL    Total Protein 6.7 6.4 - 8.2 g/dL    Alb 4.2 3.4 - 5.0 g/dL    Albumin/Globulin Ratio 1.7 1.1 - 2.2    Total Bilirubin 0.4 0.0 - 1.0 mg/dL    Alkaline Phosphatase 76 40 - 129 U/L    ALT 25 10 - 40 U/L    AST 19 15 - 37 U/L    Globulin 2.5 g/dL   Troponin   Result Value Ref Range    Troponin <0.01 <0.01 ng/mL   Troponin   Result Value Ref Range    Troponin <0.01 <0.01 ng/mL   Urine, reflex to culture    Specimen: Urine, clean catch   Result Value Ref Range    Color, UA Yellow Straw/Yellow    Clarity, UA CLOUDY (A) Clear    Glucose, Ur >=1000 (A) Negative mg/dL    Bilirubin Urine Negative Negative    Ketones, Urine Negative Negative mg/dL    Specific Gravity, UA 1.015 1.005 - 1.030    Blood, Urine Negative Negative    pH, UA 7.5 5.0 - 8.0    Protein, UA Negative Negative mg/dL    Urobilinogen, Urine 0.2 <2.0 E.U./dL    Nitrite, Urine Negative Negative    Leukocyte Esterase, Urine Negative Negative    Microscopic Examination YES     Urine Type NotGiven     Urine Reflex to Culture Not Indicated    Brain Natriuretic Peptide   Result Value Ref Range    Pro- (H) 0 - 124 pg/mL   Lipase   Result Value Ref Range    Lipase 37.0 13.0 - 60.0 U/L   Microscopic Urinalysis   Result Value Ref Range    WBC, UA None seen 0 - 5 /HPF    RBC, UA None seen 0 - 4 /HPF    Amorphous, UA 2+ /HPF       RADIOLOGY    Xr Chest Portable    Result Date: 10/26/2020  EXAMINATION: ONE XRAY VIEW OF THE CHEST 10/26/2020 7:43 pm COMPARISON: 06/10/2020. HISTORY: ORDERING SYSTEM PROVIDED HISTORY: cp TECHNOLOGIST PROVIDED HISTORY: Reason for exam:->cp Reason for Exam: chest pain, hx opoen heart surgery Acuity: Acute Type of Exam: Initial FINDINGS: The cardiomediastinal silhouette is stable status post median sternotomy. The lungs remain clear. No infiltrate, pleural fluid or evidence of overt failure. No acute cardiopulmonary disease. Ct Chest Pulmonary Embolism W Contrast    Result Date: 10/26/2020  EXAMINATION: CTA OF THE CHEST 10/26/2020 9:58 pm TECHNIQUE: CTA of the chest was performed after the administration of intravenous contrast.  Multiplanar reformatted images are provided for review. MIP images are provided for review. Dose modulation, iterative reconstruction, and/or weight based adjustment of the mA/kV was utilized to reduce the radiation dose to as low as reasonably achievable. COMPARISON: None. HISTORY: ORDERING SYSTEM PROVIDED HISTORY: CP, SOB, 2 weeks ago 6 [place] out [place] TECHNOLOGIST PROVIDED HISTORY: Reason for exam:->CP, SOB, 2 weeks ago 6 hour drive out of state Reason for Exam: CP and SOB for 2 weeks Acuity: Acute Type of Exam: Initial Relevant Medical/Surgical History: hx of hypertenison, hx of smoking FINDINGS: Pulmonary Arteries: Pulmonary arteries are adequately opacified for evaluation. No evidence of intraluminal filling defect to suggest pulmonary embolism. Main pulmonary artery is normal in caliber. Mediastinum: Multiple calcified mediastinal lymph nodes. No evidence of mediastinal lymphadenopathy. The heart and pericardium demonstrate no acute abnormality. There is no acute abnormality of the thoracic aorta. Lungs/pleura: The lungs are without acute process.   No focal consolidation or pulmonary edema. No evidence of pleural effusion or pneumothorax. 6 mm right middle lobe nodule. Multiple additional 2-3 mm sized nodules. Upper Abdomen: Limited images of the upper abdomen are unremarkable. Soft Tissues/Bones: No acute bone or soft tissue abnormality. No evidence of pulmonary embolism or acute pulmonary abnormality. Multiple pulmonary nodules. Most severe: 6.0 mm solid pulmonary nodule. Recommend a non-contrast Chest CT at 3-6 months, then another non-contrast Chest CT at 18-24 months. These guidelines do not apply to immunocompromised patients and patients with cancer. Follow up in patients with significant comorbidities as clinically warranted. For lung cancer screening, adhere to Lung-RADS guidelines. Reference: Radiology. 2017; 284(1):228-43. REVIEW OF RECORDS    Tato Crook MD  Date: 1/10/2020  Time: 10:59 AM  LEFT HEART CATH  LM: luminals  LAD: long section of stent in prox/mid LAD (HERO-3 flow), mid 30% restenosis at acute bend in vessel (Likely LIMA insertion site)  LCX: mid stent with 20% restenosis                OM1- luminals                Om2- Long section of disease from LCx into mid OM1- 65-70% (but OM2 is bypassed) overall improved                + competative flow up SVG graft  RCA: Dominant,  100% mid     LIMA-LAD: 100% occluded, atretic  SVG-OM2: (prominent valves) patent, fills large section of LCX area   SVG-RCA: patent with good flow into PDA and retro flow into PLV     LVEDP: 5  LVEF: 40% global hypokinesis. Inferior wall improved         Assessment  1. Patent LAD stents, unchanged as before  2. Overall flow and lesions appear overall better compared to Nov cath. (due to remodeling)  3. Cardiac MRI showing infarction of anterolateral and inferolateral segments but is well vascularized by competing flow from LCX and SVG grafts  4. Continue treatment for microvascular disease and likely CP along oral-infarct regions.      ED COURSE/MDM  Patient seen and showing infarction of anterolateral and inferolateral segments but well vascularized by competing flow from left circumflex and SVG grafts. Recommendation at that time was continue treatment for microvascular disease and likely chest pain along oral-infarct regions. I also reviewed her most recent ED visit in June 2020 where she was seen for chest pain. At that time they did consult cardiology and spoke with her cardiologist Dr. Yanique Ocasio. They decided at that point patient would be discharged if repeat troponin was unchanged and follow-up as an outpatient with him in the office. Patient was expressing to me on my initial interview that she would like to be discharged if she was not having a heart attack and did not need to stay. I advised her that if we did repeat the troponin and it was negative as well as CT scan was negative I felt discharge was reasonable as long as she followed up quickly and Dr. Gurvinder Ross office. As these were both unremarkable patient did agree to contacting his office as well as primary care provider to secure outpatient follow-up. At this point I do not feel the patient requires further work up and it is reasonable to discharge the patient. Please refer to AVS for further details regarding discharge instructions. A discussion was had with the patient regarding diagnosis, diagnostic testing results, treatment/ plan of care, and follow up. All questions were answered. Patient will follow up as directed for further evaluation/treatment. The patient was given strict return precautions as we discussed symptoms that would necessitate return to the ED. Patient will return to ED for new/worsening symptoms. The patient verbalized their understanding and agreement with the above plan.     I estimate there is LOW risk for ACUTE CORONARY SYNDROME, INTRACRANIAL HEMORRHAGE, MALIGNANT DYSRHYTHMIA or HYPERTENSION, PULMONARY EMBOLISM, SEPSIS, SUBARACHNOID HEMORRHAGE, SUBDURAL HEMATOMA,

## 2020-10-27 NOTE — ED NOTES
All discharge paperwork and follow-up instructions reviewed with pt. Pt verbalized understanding. Pt ambulatory upon discharge in stable condition to private vehicle with Daughter.        Yamilex Carroll RN  10/27/20 0000

## 2020-10-27 NOTE — PROGRESS NOTES
(acute kidney injury) ([address]    Pulmonary vascular congestion on CXR    Hx of combined systolic (EF 58-18%) & diastolic (grade 1 LVDD) CHF    Palpitations    S/P PTCA (percutaneous transluminal coronary angioplasty)    CAD in native artery    Precordial pain    Chest pain    Coronary artery spasm (HCC)    History of tobacco abuse    Other fatigue         Past Medical History:   has a past medical history of Acute blood loss anemia, Asthma, CAD (coronary artery disease), Chest pain, COPD (chronic obstructive pulmonary disease) ([address] Coronary artery disease, Depression, Diabetes mellitus ([address] Enlarged heart, Fatigue, Generalized headaches, Hyperlipidemia, Hypertension, Migraine, Morning stiffness of joints, Myocardial infarction ([address] Numbness or tingling, OA (osteoarthritis) of knee, Obesity, Class I, BMI 30.0-34.9 (see actual BMI), Osteoporosis, Pulmonary nodule, and Shortness of breath. Surgical History:   has a past surgical history that includes Cholecystectomy; Hysterectomy; Diagnostic Cardiac Cath Lab Procedure (06/06/2013); Upper gastrointestinal endoscopy; Cardiac catheterization (04/01/2016); Cardiac catheterization (08/01/2012); Coronary artery bypass graft (06/13/2018); Coronary angioplasty with stent (05/13/2018); Percutaneous Transluminal Coronary Angio; Coronary angioplasty (08/06/2019); and Cardiac catheterization (11/01/2019). Social History:   reports that she quit smoking about 2 years ago. Her smoking use included cigarettes. She has a 20.00 pack-year smoking history. She has never used smokeless tobacco. She reports that she does not drink alcohol or use drugs. Family History:  No evidence for sudden cardiac death or premature CAD    Home Medications:  Reviewed and are listed in nursing record.  and/or listed below  Current Outpatient Medications   Medication Sig Dispense Refill    ondansetron (ZOFRAN-ODT) 4 MG disintegrating tablet Take 1 tablet by mouth every 8 hours as needed for Nausea or Vomiting 20 tablet 1    amLODIPine (NORVASC) 5 MG tablet Take 2 tablets by mouth daily 60 tablet 2    nitroGLYCERIN (NITROSTAT) 0.4 MG SL tablet up to max of 3 total doses. If no relief after 1 dose, call 911. 25 tablet 3    lisinopril (PRINIVIL;ZESTRIL) 5 MG tablet Take 1 tablet by mouth daily 30 tablet 11    atorvastatin (LIPITOR) 40 MG tablet Take 1 tablet by mouth nightly 30 tablet 11    DULoxetine (CYMBALTA) 60 MG extended release capsule Take 60 mg by mouth daily      busPIRone (BUSPAR) 5 MG tablet Take 10 mg by mouth 2 times daily       aspirin 81 MG chewable tablet Take 1 tablet by mouth daily 30 tablet 11    clopidogrel (PLAVIX) 75 MG tablet Take 1 tablet by mouth daily 30 tablet 11    ranolazine (RANEXA) 1000 MG extended release tablet Take 1 tablet by mouth 2 times daily 60 tablet 3    fluticasone (FLONASE) 50 MCG/ACT nasal spray 1 spray by Each Nostril route daily 1 Bottle 2    pantoprazole (PROTONIX) 40 MG tablet Take 40 mg by mouth 2 times daily       albuterol sulfate HFA (PROVENTIL HFA) 108 (90 Base) MCG/ACT inhaler Inhale 2 puffs into the lungs every 6 hours as needed for Wheezing or Shortness of Breath 1 Inhaler 2    tiotropium (SPIRIVA) 18 MCG inhalation capsule Inhale 1 capsule into the lungs daily (Patient taking differently: Inhale 18 mcg into the lungs daily as needed ) 30 capsule 3    furosemide (LASIX) 20 MG tablet Take 1 tablet by mouth daily (Patient not taking: Reported on 10/27/2020) 30 tablet 1    DULoxetine (CYMBALTA) 30 MG extended release capsule Take 30 mg by mouth daily      metFORMIN (GLUCOPHAGE) 1000 MG tablet Take 1 tablet by mouth 2 times daily (with meals) 60 tablet 0     No current facility-administered medications for this visit. Allergies:  Penicillins; Arginine; Imdur [isosorbide dinitrate]; Metoprolol; Nitroglycerin; and Cephalexin     Review of Systems:   A 14 point review of symptoms completed.  Pertinent positives identified Component Value Date    ALT 25 10/26/2020    AST 19 10/26/2020    ALKPHOS 76 10/26/2020    BILITOT 0.4 10/26/2020     Lab Results   Component Value Date    LABA1C 7.4 07/30/2019     Lipids:         Lab Results   Component Value Date    TRIG 103 07/30/2019    TRIG 111 04/09/2019    TRIG 110 02/06/2019            Lab Results   Component Value Date    HDL 41 07/30/2019    HDL 44 04/09/2019    HDL 40 02/06/2019            Lab Results   Component Value Date    LDLCALC 33 07/30/2019    LDLCALC 50 04/09/2019    LDLCALC 61 02/06/2019            Lab Results   Component Value Date    LABVLDL 21 07/30/2019    LABVLDL 22 04/09/2019    LABVLDL 22 02/06/2019         CARDIAC DATA   EKG:  10/26/2020 normal sinus rhythm, RSR prime pattern. Nonspecific ST changes. Inferior infarct    ECHO 7/8/20  Summary   Technically difficult examination due to body habitus. Normal left ventricle size, wall thickness, and systolic function with a   visually estimated ejection fraction of 55%. No regional wall motion abnormalities are seen. Normal left ventricular diastolic filling pressure. Systolic pulmonary artery pressure (SPAP) is normal and estimated at 27 mmHg   (right atrial pressure 3 mmHg). ECHO/MUGA: 9/12/18   Summary   LV systolic function is normal with EF estimated at 55%. Mild hypokinesis of the inferior and lateral walls. No regional wall motion abnormalities. Normal left ventricular diastolic filling pressure. Trivial tricuspid regurgitation. Systolic pulmonary artery pressure (SPAP) is normal estimated at 24mmHg   (Right atrial pressure of 3mmHg). STRESS TEST: 3/18/19  Summary  Small-moderate sized inferolateral partial reversibility defect consistent  with ischemia and infarction in the territory of the mid and distal LCx  and/or RCA. Fixed defects in the mid and basal lateral walls c/w infarct. Hyperdynamic LV systolic function with VS>92% with uniform wall motion.   Intermediate risk abnormal study.     CARDIAC CATH 1/10/20  LEFT HEART CATH  LM: luminals  LAD: long section of stent in prox/mid LAD (HERO-3 flow), mid 30% restenosis at acute bend in vessel (Likely LIMA insertion site)  LCX: mid stent with 20% restenosis                OM1- luminals                Om2- Long section of disease from LCx into mid OM1- 65-70% (but OM2 is bypassed) overall improved                + competative flow up SVG graft  RCA: Dominant,  100% mid     LIMA-LAD: 100% occluded, atretic  SVG-OM2: (prominent valves) patent, fills large section of LCX area   SVG-RCA: patent with good flow into PDA and retro flow into PLV     LVEDP: 5  LVEF: 40% global hypokinesis. Inferior wall improved   Assessment  1. Patent LAD stents, unchanged as before  2. Overall flow and lesions appear overall better compared to Nov cath. (due to remodeling)  3. Cardiac MRI showing infarction of anterolateral and inferolateral segments but is well vascularized by competing flow from LCX and SVG grafts  4. Continue treatment for microvascular disease and likely CP along oral-infarct regions. CARDIAC CATH 11/1/19  LEFT HEART CATH  LM: luminals  LAD: long section of stent in prox/mid LAD (HERO-3 flow), mid 30-40% restenosis at acute bend in vessel and HERO-2 flow afterwards  LCX: mid stent with 30% restenosis                OM1- luminals                Om2- Long section of disease from LCx into mid OM1- 75-80% (but OM2 is bypassed                + competative flow up SVG graft  RCA: Dominant,  100% mid     LIMA-LAD: 100% occluded, atretic  SVG-OM2: patent  SVG-RCA: patent with good flow into PDA and retro flow into PLV     LVEDP: 5  LVEF: 30% with akinesis of the mid/apical inferior wall and moderate diffuse hypokinesis        Mid LAD:  FFR: 0.99-->0.93  CFR: 3.6 (note significant increase in flow with adenosine)        Assessment  1. Patent stent in mid LAD, negative FFR.    2. Significant increase in flow envelope on IV adenosine and CFR was >2 and HERO-2 flow. 0% residual and HERO-3 flow   3. Cont ASA 81mg po qday for life  4. Plavix 75mg poqday for life as tolerated  5. Integrillin for 6 hrs     LEFT HEART CATH 2/6/19  LM: luminals  LAD: mid 60%, mid/distal 60% and distal small vessel. NO significant competitive flow seen  LCX: luminals, patent mid LCx stent extending into OM1,                 OM1- superior branch with 30% long lesion and +flow up SVG graft, inferior branch small with 70% lesions  RCA: dominant,  Moderate-severe diffuse disease, long lesion of 50% in [id number]. SVG-PDA graft seen with retrograde flow     LIMA-LAD: atretic, no significantflow to native LAD  SVG-OM2: patent, small vein graft (prx and mid graft is small in size, plumps up distally, + valve seen. Good antegrade flow  SVG-RT PDA: patent  LVEDP: 25  LVEF: 45% with lateral wall hypokinesis  FFR: mid LAD 0.93-->0.83  FFR of mid and distal lesion 0.77. Unable to get post FFR as FFR machine went down  PCI of lesion 1: mid/distal LAD  Resolute Agustin 2 x 26mm, post dilated to 2 mm  Assessment  1. Patent mid LCx stent, interval closure of LIMA graft. 2 lesions in LAD were FFR'd and distal lesion ischemic and stented using Hollis drug stent 2 x 26mm with HERO-3 flow  2. Watch mid LAD lesion for ischemic progression  3. ASA 81mg poqday for life  4. Plavix 75mg poqday for 1 yr w/o inturruption  5. BB, statin. VASCULAR/OTHER IMAGING:  CARDIAC MRI 11/27/19  Mild cardiomegaly with normal thickness of left ventricular myocardium, including septum  Infarction involving the lateral wall extending into the anterolateral and inferolateral segments as well as a portion of the apex-see above. No signs of mitral, tricuspid or aortic regurgitation. Assessment and Plan   Barbara Groves is a 63 y.o. female who presents today for the following problems:      1. CAD              - 8/6/2019 POBA to mid LAD for restenosis.                - 4/9/2019 PCI to prox/mid LAD (CSI and distal POBA) - 2/6/2019 PCI to mid/distal LAD              - 6/3/2018 CABG (LIMA-LAD, SVG-OM2, SVG-RCA)                          - LIMA atretic              - 5/18/2018 PCI mid LCX  2. Coronary arterial spasm  3. Ischemic cardiomyopathy: Recovered LVEF 45%-->55%  4. HTN: controlled  5. HLD: Controlled  6. DM: Controlled  7. Hx of tobacco abuse  8. Chronic stable angina     PLAN  1. Patient with extensive cardiac history. Essentially LIMA is atretic and has had a full metal jacket to her LAD. Despite her chest pain cardiac cath is demonstrated patent LAD stents, patent grafts. She has a small distal circumflex that is stenotic but not revascularizable due to its small size.   -Overall her cardiac cath in January 2020 looked improved due to remodeling compared to previous cath and EF is now normal  2. Continue to treat medically but if this fails may need evaluation for noncardiac causes of chest pain   -We will DC Ranexa due to lack of benefit. Intolerant to Imdur and L-arginine. And on maximal doses of Norvasc  3. Patient is unable to get to [facility] as it is over an hour away therefore will prescribe a course of cardiac rehab  4. For any angina she should take her nitroglycerin sublingually as needed. 5.  If none of the above fails to alleviate her angina I suggested we evaluate noncardiac causes         Patient Active Problem List   Diagnosis    Coronary artery disease involving native heart with angina pectoris ([address]    Essential hypertension    Mixed hyperlipidemia    Cerebral aneurysm, nonruptured    DM2 (diabetes mellitus, type 2) ([address]    Hx TIA involving L-ICA    Former smoker    Chronic tension type headache    GERD (gastroesophageal reflux disease)    Obesity (BMI 30-39. 9)    CAD s/p CABGx3 (June 2018) & stents (May 2018)    Acute chest pain    TATI (acute kidney injury) ([address]    Pulmonary vascular congestion on CXR    Hx of combined systolic (EF 09-41%) & diastolic (grade 1 LVDD) CHF    Palpitations    S/P PTCA (percutaneous transluminal coronary angioplasty)    CAD in native artery    Precordial pain    Chest pain    Coronary artery spasm (HCC)    History of tobacco abuse    Other fatigue       Patient Plan:  1. May stop taking ranolazine (Ranexa)  2. Referral to Cardiac Rehab    ~ angina and CAD     It is a pleasure to assist in the care of Alice Cid. Please call with any questions. This note was scribed in the presence of Tomás Espinoza MD by Sergio Diaz RN.      Mauricio Killian MD, personally performed the services described in this documentation as scribed by the above signed scribe in my presence, and it is both accurate and complete to the best of our ability and knowledge. I agree with the details independently gathered by my clinical support staff, while the remaining scribed note accurately describes my personal service to the patient. The above RN is working as a scribe for and in the presence of myself . Working as a scribe, the RN may have prepopulated components of this note with my historical intellectual property under my direct supervision. Any additions to this intellectual property were performed at my direction. Furthermore, the content and accuracy of this note have been reviewed by me to the best of my ability.    *      Hector Obando MD, [id number] Cardiologist  [facility]  [phone] [facility]  [phone] [address] [place] [address]

## 2020-10-29 RX ORDER — FUROSEMIDE 20 MG/1
20 TABLET ORAL DAILY
Qty: 30 TABLET | Refills: 5 | Status: SHIPPED | OUTPATIENT
Start: 2020-10-29 | End: 2021-12-03

## 2020-11-03 PROBLEM — R07.9 CHEST PAIN: Status: RESOLVED | Noted: 2019-07-29 | Resolved: 2020-11-03

## 2020-11-04 ENCOUNTER — HOSPITAL ENCOUNTER (OUTPATIENT)
Dept: MAMMOGRAPHY | Age: 55
Discharge: HOME | End: 2020-11-09
Payer: MEDICAID

## 2020-11-04 ENCOUNTER — TELEPHONE (OUTPATIENT)
Dept: CARDIOLOGY CLINIC | Age: 55
End: 2020-11-04

## 2020-11-04 PROCEDURE — 77067 SCR MAMMO BI INCL CAD: CPT

## 2021-01-12 ENCOUNTER — TELEPHONE (OUTPATIENT)
Dept: CARDIOLOGY CLINIC | Age: 56
End: 2021-01-12

## 2021-01-12 DIAGNOSIS — Z95.1 S/P CABG X 3: Chronic | ICD-10-CM

## 2021-01-12 DIAGNOSIS — R07.9 CHEST PAIN, UNSPECIFIED TYPE: Primary | ICD-10-CM

## 2021-01-12 DIAGNOSIS — I25.10 CAD IN NATIVE ARTERY: ICD-10-CM

## 2021-01-12 NOTE — TELEPHONE ENCOUNTER
Pt calling to alert that the last week and a half she has had chest pain, arm pain, neck pain and I real tired as well as SOB. Pt states that the nitro did help. 1/8/21 pt had to take 2 nitro and 2 baby aspirin be cause of the chest pain. Please call pt to advise.

## 2021-01-12 NOTE — TELEPHONE ENCOUNTER
Medication Refill    Medication needing refilled:  nitroGLYCERIN (NITROSTAT) 0.4 MG SL tablet      Dosage of the medication:    How are you taking this medication (QD, BID, TID, QID, PRN):    30 or 90 day supply called in:  30  When will you run out of your medication:  Pt only has 2 left  Which Pharmacy are we sending the medication to?:    [address] [place] [address] [phone]   [facility] [address] [place] [address]   Phone:  [phone]  Fax:  [phone]

## 2021-01-13 ENCOUNTER — HOSPITAL ENCOUNTER (EMERGENCY)
Age: 56
Discharge: LEFT BEFORE DISCHARGE | End: 2021-01-13
Attending: EMERGENCY MEDICINE
Payer: MEDICAID

## 2021-01-13 ENCOUNTER — APPOINTMENT (OUTPATIENT)
Dept: GENERAL RADIOLOGY | Age: 56
End: 2021-01-13
Payer: MEDICAID

## 2021-01-13 VITALS
SYSTOLIC BLOOD PRESSURE: 134 MMHG | TEMPERATURE: 97.5 F | DIASTOLIC BLOOD PRESSURE: 82 MMHG | WEIGHT: 175 LBS | OXYGEN SATURATION: 99 % | BODY MASS INDEX: 33.04 KG/M2 | HEART RATE: 86 BPM | HEIGHT: 61 IN | RESPIRATION RATE: 16 BRPM

## 2021-01-13 DIAGNOSIS — I20.0 UNSTABLE ANGINA (HCC): Primary | ICD-10-CM

## 2021-01-13 DIAGNOSIS — R07.9 ACUTE CHEST PAIN: ICD-10-CM

## 2021-01-13 LAB
ANION GAP SERPL CALCULATED.3IONS-SCNC: 10 MMOL/L (ref 3–16)
APTT: 28.7 SEC (ref 24.2–36.2)
BASOPHILS ABSOLUTE: 0 K/UL (ref 0–0.2)
BASOPHILS RELATIVE PERCENT: 0.8 %
BUN BLDV-MCNC: 14 MG/DL (ref 7–20)
CALCIUM SERPL-MCNC: 9.6 MG/DL (ref 8.3–10.6)
CHLORIDE BLD-SCNC: 103 MMOL/L (ref 99–110)
CO2: 24 MMOL/L (ref 21–32)
CREAT SERPL-MCNC: 0.7 MG/DL (ref 0.6–1.1)
EKG ATRIAL RATE: 85 BPM
EKG DIAGNOSIS: NORMAL
EKG P AXIS: 56 DEGREES
EKG P-R INTERVAL: 152 MS
EKG Q-T INTERVAL: 400 MS
EKG QRS DURATION: 100 MS
EKG QTC CALCULATION (BAZETT): 476 MS
EKG R AXIS: -26 DEGREES
EKG T AXIS: 54 DEGREES
EKG VENTRICULAR RATE: 85 BPM
EOSINOPHILS ABSOLUTE: 0.1 K/UL (ref 0–0.6)
EOSINOPHILS RELATIVE PERCENT: 1.8 %
GFR AFRICAN AMERICAN: >60
GFR NON-AFRICAN AMERICAN: >60
GLUCOSE BLD-MCNC: 184 MG/DL (ref 70–99)
HCT VFR BLD CALC: 42.2 % (ref 36–48)
HEMOGLOBIN: 14.4 G/DL (ref 12–16)
INR BLD: 1 (ref 0.86–1.14)
LYMPHOCYTES ABSOLUTE: 1.7 K/UL (ref 1–5.1)
LYMPHOCYTES RELATIVE PERCENT: 32.1 %
MCH RBC QN AUTO: 30.2 PG (ref 26–34)
MCHC RBC AUTO-ENTMCNC: 34.2 G/DL (ref 31–36)
MCV RBC AUTO: 88.3 FL (ref 80–100)
MONOCYTES ABSOLUTE: 0.3 K/UL (ref 0–1.3)
MONOCYTES RELATIVE PERCENT: 5.6 %
NEUTROPHILS ABSOLUTE: 3.2 K/UL (ref 1.7–7.7)
NEUTROPHILS RELATIVE PERCENT: 59.7 %
PDW BLD-RTO: 13.1 % (ref 12.4–15.4)
PLATELET # BLD: 172 K/UL (ref 135–450)
PMV BLD AUTO: 8.5 FL (ref 5–10.5)
POTASSIUM SERPL-SCNC: 3.8 MMOL/L (ref 3.5–5.1)
PROTHROMBIN TIME: 11.6 SEC (ref 10–13.2)
RBC # BLD: 4.78 M/UL (ref 4–5.2)
SODIUM BLD-SCNC: 137 MMOL/L (ref 136–145)
TROPONIN: <0.01 NG/ML
WBC # BLD: 5.4 K/UL (ref 4–11)

## 2021-01-13 PROCEDURE — 93010 ELECTROCARDIOGRAM REPORT: CPT | Performed by: INTERNAL MEDICINE

## 2021-01-13 PROCEDURE — 36415 COLL VENOUS BLD VENIPUNCTURE: CPT

## 2021-01-13 PROCEDURE — 84484 ASSAY OF TROPONIN QUANT: CPT

## 2021-01-13 PROCEDURE — 96372 THER/PROPH/DIAG INJ SC/IM: CPT

## 2021-01-13 PROCEDURE — 80048 BASIC METABOLIC PNL TOTAL CA: CPT

## 2021-01-13 PROCEDURE — 85025 COMPLETE CBC W/AUTO DIFF WBC: CPT

## 2021-01-13 PROCEDURE — 99284 EMERGENCY DEPT VISIT MOD MDM: CPT

## 2021-01-13 PROCEDURE — 93005 ELECTROCARDIOGRAM TRACING: CPT | Performed by: EMERGENCY MEDICINE

## 2021-01-13 PROCEDURE — 85610 PROTHROMBIN TIME: CPT

## 2021-01-13 PROCEDURE — 6370000000 HC RX 637 (ALT 250 FOR IP): Performed by: EMERGENCY MEDICINE

## 2021-01-13 PROCEDURE — 6360000002 HC RX W HCPCS

## 2021-01-13 PROCEDURE — 85730 THROMBOPLASTIN TIME PARTIAL: CPT

## 2021-01-13 PROCEDURE — 71046 X-RAY EXAM CHEST 2 VIEWS: CPT

## 2021-01-13 RX ORDER — HEPARIN SODIUM 10000 [USP'U]/100ML
12 INJECTION, SOLUTION INTRAVENOUS CONTINUOUS
Status: DISCONTINUED | OUTPATIENT
Start: 2021-01-13 | End: 2021-01-13 | Stop reason: HOSPADM

## 2021-01-13 RX ORDER — NITROGLYCERIN 20 MG/100ML
5 INJECTION INTRAVENOUS CONTINUOUS
Status: DISCONTINUED | OUTPATIENT
Start: 2021-01-13 | End: 2021-01-13 | Stop reason: HOSPADM

## 2021-01-13 RX ORDER — ASPIRIN 81 MG/1
162 TABLET, CHEWABLE ORAL ONCE
Status: COMPLETED | OUTPATIENT
Start: 2021-01-13 | End: 2021-01-13

## 2021-01-13 RX ORDER — HEPARIN SODIUM 1000 [USP'U]/ML
4000 INJECTION, SOLUTION INTRAVENOUS; SUBCUTANEOUS PRN
Status: DISCONTINUED | OUTPATIENT
Start: 2021-01-13 | End: 2021-01-13 | Stop reason: HOSPADM

## 2021-01-13 RX ORDER — HEPARIN SODIUM 1000 [USP'U]/ML
4000 INJECTION, SOLUTION INTRAVENOUS; SUBCUTANEOUS ONCE
Status: DISCONTINUED | OUTPATIENT
Start: 2021-01-13 | End: 2021-01-13 | Stop reason: HOSPADM

## 2021-01-13 RX ORDER — HEPARIN SODIUM 1000 [USP'U]/ML
2000 INJECTION, SOLUTION INTRAVENOUS; SUBCUTANEOUS PRN
Status: DISCONTINUED | OUTPATIENT
Start: 2021-01-13 | End: 2021-01-13 | Stop reason: HOSPADM

## 2021-01-13 RX ORDER — ONDANSETRON 2 MG/ML
INJECTION INTRAMUSCULAR; INTRAVENOUS
Status: COMPLETED
Start: 2021-01-13 | End: 2021-01-13

## 2021-01-13 RX ORDER — NITROGLYCERIN 0.4 MG/1
TABLET SUBLINGUAL
Qty: 25 TABLET | Refills: 3 | Status: SHIPPED | OUTPATIENT
Start: 2021-01-13 | End: 2021-07-08 | Stop reason: SDUPTHER

## 2021-01-13 RX ADMIN — ASPIRIN 162 MG: 81 TABLET, CHEWABLE ORAL at 17:34

## 2021-01-13 RX ADMIN — ONDANSETRON HYDROCHLORIDE 4 MG: 2 INJECTION, SOLUTION INTRAMUSCULAR; INTRAVENOUS at 17:34

## 2021-01-13 ASSESSMENT — ENCOUNTER SYMPTOMS
ABDOMINAL PAIN: 0
ANAL BLEEDING: 0
SHORTNESS OF BREATH: 1
WHEEZING: 0
CHEST TIGHTNESS: 1
STRIDOR: 0
COUGH: 0
ABDOMINAL DISTENTION: 0
NAUSEA: 1
SINUS PRESSURE: 0

## 2021-01-13 ASSESSMENT — PAIN DESCRIPTION - FREQUENCY: FREQUENCY: INTERMITTENT

## 2021-01-13 ASSESSMENT — PAIN DESCRIPTION - PAIN TYPE: TYPE: CHRONIC PAIN

## 2021-01-13 ASSESSMENT — PAIN DESCRIPTION - DESCRIPTORS: DESCRIPTORS: SHARP

## 2021-01-13 ASSESSMENT — PAIN SCALES - GENERAL: PAINLEVEL_OUTOF10: 8

## 2021-01-13 ASSESSMENT — PAIN DESCRIPTION - PROGRESSION: CLINICAL_PROGRESSION: GRADUALLY WORSENING

## 2021-01-13 NOTE — TELEPHONE ENCOUNTER
He has known chronic stable angina as well as chest pain. She feels she is having more symptoms I would go ahead and set up for a Shyanne Myoview to evaluate for any new ischemia.

## 2021-01-13 NOTE — ED PROVIDER NOTES
[facility] [facility] Name: Galina Valladares  MRN: [id number]  [place] 1965  Date of evaluation: 1/13/2021  Provider: Bekah Cook MD    [address] [place] [address]       Chief Complaint   Patient presents with    Chest Pain         HISTORY OF PRESENT ILLNESS   (Location/Symptom, Timing/Onset, Context/Setting, Quality, Duration, Modifying Factors, Severity)  Note limiting factors. Galina Valladares is a 63 y.o. female who presents to the emergency department     This patient presents emergency department history complaining some heaviness in her chest since Friday night  She does have a history of coronary bypass grafting in 2018 since then she has had reocclusions and has had 3 cardiac stents placed by [facility] since Friday she has been extremely tired she says that she is gets intermittent episodes lasting about 3 to 5 minutes of heaviness in her chest.  It does radiate to both shoulders she took nitroglycerin and 4 extra aspirin on Friday night. She does feel short of breath she does feel nauseated she does get dizzy and she did get a little bit clammy. Patient at the chest discomfort 4-5 times today it was precipitated each time by exertion  Patient is a diabetic on oral agents history of hypertensive disease history of hyperlipidemia and she used to smoke up. Patient is followed by Dr. Alejandrina Soto  Apparently their office told her to go get checked out at a hospital immediately. Upon arrival she is having about 2/3 over 10 chest pain  She denies fever hemoptysis productive cough or other infectious symptoms    The history is provided by the patient. Nursing Notes were reviewed. REVIEW OF SYSTEMS    (2-9 systems for level 4, 10 or more for level 5)     Review of Systems   Constitutional: Positive for activity change. Negative for appetite change, chills, diaphoresis and fatigue. HENT: Negative for congestion and sinus pressure.     Eyes: Negative for visual disturbance. Respiratory: Positive for chest tightness and shortness of breath. Negative for cough, wheezing and stridor. Cardiovascular: Positive for chest pain. Negative for palpitations and leg swelling. Gastrointestinal: Positive for nausea. Negative for abdominal distention, abdominal pain and anal bleeding. Genitourinary: Negative for difficulty urinating and urgency. Allergic/Immunologic: Negative for immunocompromised state. Neurological: Positive for weakness and light-headedness. Negative for tremors, syncope and speech difficulty. All other systems reviewed and are negative. Except as noted above the remainder of the review of systems was reviewed and negative. PAST MEDICAL HISTORY     Past Medical History:   Diagnosis Date    Acute blood loss anemia     Asthma     CAD (coronary artery disease)     Chest pain     [facility]chronic obstructive [facility]) [facility]     Coronary artery disease 9/4/2012    Depression     Diabetes mellitus (HCC)     Enlarged heart     Fatigue     Generalized headaches     Hyperlipidemia     Hypertension     Migraine     Morning stiffness of joints     Myocardial infarction ([facility] [address] 05/13/2018    Numbness or tingling hands and feet    OA (osteoarthritis) of knee 1/27/2015    Obesity, Class I, BMI 30.0-34.9 (see actual BMI)     Osteoporosis     Pulmonary nodule     Shortness of breath          SURGICAL HISTORY       Past Surgical History:   Procedure Laterality Date    CARDIAC CATHETERIZATION  04/01/2016    Dr. Jhonathan Sanderson. [place] Persons CARDIAC CATHETERIZATION  08/01/2012    Dr. Gabbie Galeano  11/01/2019    Non Obs CAD, medical management    CHOLECYSTECTOMY      CORONARY ANGIOPLASTY  08/06/2019    POBA of mid LAD, [place] Balloon- 3.25 x 15    CORONARY ANGIOPLASTY WITH STENT PLACEMENT  05/13/2018    Dr. John Littlejohn - w/placement of IABP, Xience 3.25 x 18 mid Circ, POBA 2.5 x 12 to prox LAD    CORONARY ARTERY BYPASS Father     Hypertension Father     Heart Disease Father     High Blood Pressure Father     High Cholesterol Father     Hypertension Sister     High Blood Pressure Sister     Hypertension Brother     High Blood Pressure Brother     Cancer Brother     Hypertension Maternal Grandmother     High Blood Pressure Maternal Grandmother     Hypertension Maternal Grandfather     High Blood Pressure Maternal Grandfather     Hypertension Paternal Grandmother     High Blood Pressure Paternal Grandmother     Hypertension Paternal Grandfather     High Blood Pressure Paternal Grandfather     High Blood Pressure Sister     High Blood Pressure Sister     High Blood Pressure Sister     Heart Failure Maternal Aunt     Hypertension Maternal Aunt     Heart Failure Maternal Uncle     Hypertension Maternal Uncle     Cancer Other         nephew-colon, liver, lung    Heart Failure Other     Diabetes Other     Osteoarthritis Other     Hypertension Paternal Aunt     Hypertension Paternal Uncle     Asthma Neg Hx           SOCIAL HISTORY       Social History     Socioeconomic History    Marital status:       Spouse name: None    Number of children: None    Years of education: None    Highest education level: None   Occupational History    Occupation: computer work   Social Needs    Financial resource strain: None    Food insecurity     Worry: None     Inability: None    Transportation needs     Medical: None     Non-medical: None   Tobacco Use    Smoking status: Former Smoker     Packs/day: 1.00     Years: 20.00     Pack years: 20.00     Types: Cigarettes     Quit date: 2018     Years since quittin.7    Smokeless tobacco: Never Used    Tobacco comment: Quit Mother's Day 2018   Substance and Sexual Activity    Alcohol use: No     Alcohol/week: 0.0 standard drinks    Drug use: No    Sexual activity: Not Currently     Partners: Male   Lifestyle    Physical activity     Days per week: None tenderness. Musculoskeletal: Normal range of motion. Neurological:      Mental Status: She is alert and oriented to person, place, and time. GCS: GCS eye subscore is 4. GCS verbal subscore is 5. GCS motor subscore is 6. Cranial Nerves: No cranial nerve deficit. Sensory: No sensory deficit. Motor: No abnormal muscle tone. Coordination: Coordination normal.      Deep Tendon Reflexes: Reflexes normal.   Psychiatric:         Behavior: Behavior normal.         DIAGNOSTIC RESULTS     EKG: All EKG's are interpreted by the Emergency Department Physician who either signs or Co-signs this chart in the absence of a cardiologist.  Rate is approximately 73-75  Rhythm is sinus  Incomplete right bundle branch with possibly signs of a posterior however it is unchanged from tracing dated in October  No other ectopy noted no other acute ischemic injury pattern noted except for the posterior findings but fortunately they look pretty stable compared to her old tracings        RADIOLOGY:   Non-plain film images such as CT, Ultrasound and MRI are read by the radiologist. Plain radiographic images are visualized and preliminarily interpreted by the emergency physician with the below findings:        Interpretation per the Radiologist below, if available at the time of this note:    XR CHEST (2 VW)   Final Result   No radiographic evidence of acute cardiopulmonary disease.                  LABS:  Results for orders placed or performed during the hospital encounter of 77/41/42   Basic Metabolic Panel   Result Value Ref Range    Sodium 137 136 - 145 mmol/L    Potassium 3.8 3.5 - 5.1 mmol/L    Chloride 103 99 - 110 mmol/L    CO2 24 21 - 32 mmol/L    Anion Gap 10 3 - 16    Glucose 184 (H) 70 - 99 mg/dL    BUN 14 7 - 20 mg/dL    CREATININE 0.7 0.6 - 1.1 mg/dL    GFR Non-African American >60 >60    GFR African American >60 >60    Calcium 9.6 8.3 - 10.6 mg/dL   Troponin   Result Value Ref Range    Troponin <0.01 <0.01 ng/mL   CBC Auto Differential   Result Value Ref Range    WBC 5.4 4.0 - 11.0 K/uL    RBC 4.78 4.00 - 5.20 M/uL    Hemoglobin 14.4 12.0 - 16.0 g/dL    Hematocrit 42.2 36.0 - 48.0 %    MCV 88.3 80.0 - 100.0 fL    MCH 30.2 26.0 - 34.0 pg    MCHC 34.2 31.0 - 36.0 g/dL    RDW 13.1 12.4 - 15.4 %    Platelets 560 391 - 615 K/uL    MPV 8.5 5.0 - 10.5 fL    Neutrophils % 59.7 %    Lymphocytes % 32.1 %    Monocytes % 5.6 %    Eosinophils % 1.8 %    Basophils % 0.8 %    Neutrophils Absolute 3.2 1.7 - 7.7 K/uL    Lymphocytes Absolute 1.7 1.0 - 5.1 K/uL    Monocytes Absolute 0.3 0.0 - 1.3 K/uL    Eosinophils Absolute 0.1 0.0 - 0.6 K/uL    Basophils Absolute 0.0 0.0 - 0.2 K/uL   Protime-INR   Result Value Ref Range    Protime 11.6 10.0 - 13.2 sec    INR 1.00 0.86 - 1.14   EKG 12 Lead   Result Value Ref Range    Ventricular Rate 85 BPM    Atrial Rate 85 BPM    P-R Interval 152 ms    QRS Duration 100 ms    Q-T Interval 400 ms    QTc Calculation (Bazett) 476 ms    P Axis 56 degrees    R Axis -26 degrees    T Axis 54 degrees    Diagnosis       Normal sinus rhythmInferior-posterior infarct (cited on or before 10-MARY-2020)Abnormal ECGWhen compared with ECG of 26-OCT-2020 18:56,No significant change was found            EMERGENCY DEPARTMENT COURSE and DIFFERENTIAL DIAGNOSIS/MDM:     Vitals:    01/13/21 1628   BP: 134/82   Pulse: 86   Resp: 16   Temp: 97.5 °F (36.4 °C)   TempSrc: Oral   SpO2: 99%   Weight: 175 lb (79.4 kg)   Height: 5' 1\" (1.549 m)           MDM      REASSESSMENT      Patient at this point was starting to have some discomfort therefore I am going to be aggressive with this individual we are going to give her 162 mg more of aspirin since she had taken 3 baby aspirin earlier today  We will go ahead and complete the cycle with another 162 I am going to start nitroglycerin due to her abnormal EKG and the description of the pain being a heaviness with radiation to her shoulders I am going to start her on nitroglycerin and fully heparinize her. Patient will be discussed with her cardiology group and then will be transferred to [facility] my impression is unstable angina at this point  A total of 45 minutes critical care time was spent managing this patient chest x-ray ordered and reviewed  EKG ordered and interpreted  All labs ordered and reviewed  There was no procedure time    CRITICAL CARE TIME     CONSULTS:  None      PROCEDURES:     Procedures    MEDICATIONS GIVEN THIS VISIT:  Medications   aspirin chewable tablet 162 mg (has no administration in time range)   nitroGLYCERIN 50 mg in dextrose 5% 250 mL infusion (has no administration in time range)   heparin (porcine) injection 4,000 Units (has no administration in time range)   heparin (porcine) injection 4,000 Units (has no administration in time range)   heparin (porcine) injection 2,000 Units (has no administration in time range)   heparin 25,000 units in dextrose 5% 250 mL infusion (has no administration in time range)        FINAL IMPRESSION      1. Unstable angina (HCC)          Was literally almost talking on the phone to the cardiologist when all of a sudden she told the nursing staff that she was not staying at that she did not want nitroglycerin although she has taken nitroglycerin before  At this point the patient for some reason just decided that she was leaving interestingly she goes I knew this was not my heart so not sure why she came in the beginning but she tells me that now she knows that this is not her heart this point I did have a heart-to-heart conversation with this individual advised her if she changed her mind at any time to merely return and we would take good care of her  At this point she seems rather upset about the situation and is leaving. DISPOSITION/PLAN   DISPOSITION Decision To Transfer 01/13/2021 05:08:21 PM      PATIENT REFERRED TO:  No follow-up provider specified.     DISCHARGE MEDICATIONS:  New Prescriptions    No medications on file I did advise her that she does need to sign out AMA since we are strongly recommending admission  Controlled Substances Monitoring  No flowsheet data found. (Please note that portions of this note were completed with a voice recognition program.  Efforts were made to edit the dictations but occasionally words are mis-transcribed.)    Patient was advised to return to the Emergency Department if there was any worsening.     Denzel Harris MD (electronically signed)  Attending Emergency Physician         Dionisio Vera MD  01/13/21 [address] Tesfaye MD  01/13/21 [id number]

## 2021-01-13 NOTE — ED TRIAGE NOTES
Presents to triage with c/o mid substernal chest pain since last Friday. States it was 8/10 and has been all week. States she has taken nitro sl and ASA with some relief. [facility] she called her cardiologist and he suggested a stress test.  [facility] she call to schedule that but they were unable to see her until feb 2021. [facility] she called her cardiologist to relay that information and he told her to come to the ER.

## 2021-01-13 NOTE — ED NOTES
This rn to room to administer ordered medications. Pt refuses all except the ASA and zofran. Pt also states she does not wish to be admitted. Dr. Cesilia Anderson updated and to pt bedside. Dr. Cesilia Anderson explains rationale for admission and informs pt of benefits and risks up to and including death. Pt verbalizes understanding and continues to state she would like to go home.        Pansy Heimlich, RN  01/13/21 [id number]

## 2021-01-13 NOTE — TELEPHONE ENCOUNTER
Pt called to sched. Pt was told they could not get in for lexiscan until 2/1. Pt wants to know if there is something that could be done instead. Pt feels that is too far away to wait. Please call pt to advise.

## 2021-01-13 NOTE — TELEPHONE ENCOUNTER
Spoke with patient. Chest pain is more intense and is radiating to her back. This is concerning to her. I informed patient since symptoms are intense and concerning that she go to the ED to be checked out. She agreed and will proceed.

## 2021-01-13 NOTE — CONSULTS
Pharmacy to Manage Heparin Infusion per [facility]    Dx: ACS  Pt wt = 79. kg.  Baseline aPTT = pending. Low Dose Heparin Infusion  Heparin 60 units/kg IVP bolus followed by Heparin infusion at 12 units/kg/hr. Recheck aPTT in 6 hours. Goal aPTT = 49-76 seconds.   Jazlyn Brown, PharmD  1/13/2021 5:16 PM

## 2021-02-01 ENCOUNTER — HOSPITAL ENCOUNTER (OUTPATIENT)
Dept: NUCLEAR MEDICINE | Age: 56
Discharge: HOME | End: 2021-02-01
Payer: MEDICAID

## 2021-02-01 ENCOUNTER — HOSPITAL ENCOUNTER (OUTPATIENT)
Dept: NON INVASIVE DIAGNOSTICS | Age: 56
Discharge: HOME | End: 2021-02-01
Payer: MEDICAID

## 2021-02-01 DIAGNOSIS — I25.10 CAD IN NATIVE ARTERY: ICD-10-CM

## 2021-02-01 DIAGNOSIS — R07.9 CHEST PAIN, UNSPECIFIED TYPE: ICD-10-CM

## 2021-02-01 DIAGNOSIS — Z95.1 S/P CABG X 3: Chronic | ICD-10-CM

## 2021-02-01 LAB
LV EF: 69 %
LVEF MODALITY: NORMAL

## 2021-02-01 PROCEDURE — 3430000000 HC RX DIAGNOSTIC RADIOPHARMACEUTICAL: Performed by: INTERNAL MEDICINE

## 2021-02-01 PROCEDURE — A9502 TC99M TETROFOSMIN: HCPCS | Performed by: INTERNAL MEDICINE

## 2021-02-01 PROCEDURE — 6360000002 HC RX W HCPCS: Performed by: INTERNAL MEDICINE

## 2021-02-01 PROCEDURE — 93017 CV STRESS TEST TRACING ONLY: CPT

## 2021-02-01 PROCEDURE — 78452 HT MUSCLE IMAGE SPECT MULT: CPT

## 2021-02-01 RX ORDER — AMINOPHYLLINE DIHYDRATE 25 MG/ML
500 INJECTION, SOLUTION INTRAVENOUS ONCE
Status: COMPLETED | OUTPATIENT
Start: 2021-02-01 | End: 2021-02-01

## 2021-02-01 RX ADMIN — TETROFOSMIN 10.9 MILLICURIE: 1.38 INJECTION, POWDER, LYOPHILIZED, FOR SOLUTION INTRAVENOUS at 08:15

## 2021-02-01 RX ADMIN — AMINOPHYLLINE 75 MG: 25 INJECTION, SOLUTION INTRAVENOUS at 09:18

## 2021-02-01 RX ADMIN — REGADENOSON 0.4 MG: 0.08 INJECTION, SOLUTION INTRAVENOUS at 09:15

## 2021-02-01 RX ADMIN — TETROFOSMIN 33.9 MILLICURIE: 1.38 INJECTION, POWDER, LYOPHILIZED, FOR SOLUTION INTRAVENOUS at 09:15

## 2021-02-01 NOTE — PROGRESS NOTES
Pt completed stress portion of cardiac stress test Aminophylline 75 mg ivp given with good effect for c/o chest pain and sob. Pt is discharged to nuclear department for final scan. Nuclear tech will remove PIV. Discharge instructions given to pt. Pt verbalizes understanding to discharge instructions.

## 2021-02-02 ENCOUNTER — TELEPHONE (OUTPATIENT)
Dept: CARDIOLOGY CLINIC | Age: 56
End: 2021-02-02

## 2021-02-02 NOTE — TELEPHONE ENCOUNTER
----- Message from Justin Crane MD sent at 2/1/2021  3:46 PM EST -----  Please let patient know the stress test showed only her old blockages that were well-known to us. There is no new areas of ischemia or blockage.   No need to proceed with cath

## 2021-02-22 RX ORDER — AMLODIPINE BESYLATE 5 MG/1
10 TABLET ORAL DAILY
Qty: 60 TABLET | Refills: 9 | Status: SHIPPED | OUTPATIENT
Start: 2021-02-22 | End: 2022-03-04 | Stop reason: SDUPTHER

## 2021-02-22 RX ORDER — ATORVASTATIN CALCIUM 40 MG/1
TABLET, FILM COATED ORAL
Qty: 30 TABLET | Refills: 1 | Status: SHIPPED | OUTPATIENT
Start: 2021-02-22 | End: 2021-05-17

## 2021-02-22 RX ORDER — LISINOPRIL 5 MG/1
5 TABLET ORAL DAILY
Qty: 30 TABLET | Refills: 1 | Status: SHIPPED | OUTPATIENT
Start: 2021-02-22 | End: 2021-05-17

## 2021-03-17 ENCOUNTER — HOSPITAL ENCOUNTER (OUTPATIENT)
Age: 56
Discharge: HOME | End: 2021-03-17
Payer: MEDICAID

## 2021-03-17 ENCOUNTER — TELEPHONE (OUTPATIENT)
Dept: CARDIOLOGY CLINIC | Age: 56
End: 2021-03-17

## 2021-03-17 ENCOUNTER — HOSPITAL ENCOUNTER (OUTPATIENT)
Dept: GENERAL RADIOLOGY | Age: 56
Discharge: HOME | End: 2021-03-17
Payer: MEDICAID

## 2021-03-17 DIAGNOSIS — R53.83 FATIGUE, UNSPECIFIED TYPE: ICD-10-CM

## 2021-03-17 PROCEDURE — 71046 X-RAY EXAM CHEST 2 VIEWS: CPT

## 2021-03-17 NOTE — TELEPHONE ENCOUNTER
Spoke with patient. States chest pressure, lasts about 1 minute. She states she has taken SL Nitro for this. Last night she states she broke out in a cold sweat and felt completely drained. States had the chest pressure at that time. Took a SL nitro and went away with 1st nitro. She states lately the pressure has occurred every day. And she is SOB. She states when she gets up and moves that's when the pressure hits. After she rests or takes the SL nitro it goes away. She states it also feels like her arms weigh 1000 lbs when this occurs. Saw her PCP today thinking maybe she was coming down with something. Did a chest xray (in chart) and it was negative. PCP didn't have an answer.   Please advise

## 2021-03-17 NOTE — LETTER
[address] Cardiology - [address]  Phone: [phone]  Fax: [phone]    Mona Webster MD        March 19, 2021     Brooke Mayer 1974 [place] [address]      ATTN:   [facility]    Dr. Sandoval Knows would like patient scheduled for a Cardiac Pulmonary Test - CPX. Needing to see her cardiac function and VO2 max. Diagnosis: R07.2 (ongoing)                    R06.02 (ongoing)                      History of  Z95.1      If you have any questions or concerns, please don't hesitate to call.     Sincerely,        Mona Webster MD

## 2021-03-18 ENCOUNTER — TELEPHONE (OUTPATIENT)
Dept: CARDIOLOGY CLINIC | Age: 56
End: 2021-03-18

## 2021-03-18 NOTE — TELEPHONE ENCOUNTER
Left detailed message (ok per hippa)  Need to know if patient would like to proceed with the test.  If so, we will order the test, fax order to UC and UC will call her and schedule this.

## 2021-03-18 NOTE — TELEPHONE ENCOUNTER
Pt returned call to office per Atif Gordon RN request. Pt verbalized that she would like to proceed with test that Atif Gordon had called her about. Informed pt that I would let Atif Gordon know her decision and that fax will be sent to  for them to contact her for an appointment. Pt verbalized understanding that I would relay her authorization for testing to Atif Gordon.

## 2021-04-08 ENCOUNTER — TELEPHONE (OUTPATIENT)
Dept: CARDIOLOGY CLINIC | Age: 56
End: 2021-04-08

## 2021-04-08 DIAGNOSIS — I10 ESSENTIAL HYPERTENSION: Primary | Chronic | ICD-10-CM

## 2021-04-08 DIAGNOSIS — R60.9 EDEMA, UNSPECIFIED TYPE: ICD-10-CM

## 2021-04-08 DIAGNOSIS — I25.10 CAD IN NATIVE ARTERY: ICD-10-CM

## 2021-04-08 NOTE — TELEPHONE ENCOUNTER
She has furosemide. She is wanting to know if she should take this. Has not been taking it. If so, for how long?

## 2021-04-14 ENCOUNTER — TELEPHONE (OUTPATIENT)
Dept: CARDIOLOGY CLINIC | Age: 56
End: 2021-04-14

## 2021-04-15 NOTE — TELEPHONE ENCOUNTER
Dr. Dc Vasquez-    This is a mutual patient that we share but has not seen you for a few years. She c/o severe fatigue, JESSICA and some CP. I worked her heart up extensively. Although she has cardiac disease, her stress test and cardiac MRI are showing only scar no ischemia. BNP and chest x-ray is negative for fluid overload. Multiple agents for cardiac microvascular disease have not improved her symptoms. I did order a cardiopulmonary stress test last CPX at  that is under media. It was an incomplete study and is missing the flow chart. Would you mind taking a look at it and rendering me your opinion? Also the patient had a history of COPD and has pulmonary nodules on recent CT and likely needs to be reevaluated by you. Let me know which you think and see if your office can reach out to her and schedule an appointment          To my office  - ensure patient is getting BNP and BMP. Please add NAHOMY with reflex to this. .  I did talk to the patient extensively and await to hear from Dr. Dc Vasquez

## 2021-04-15 NOTE — TELEPHONE ENCOUNTER
Nasra Sin,    I confess to being confused by the read as well. I've reached out to the reading physician for additional information. Either way, I'll ask my staff to set the patient up to see me in the next couple of weeks to see what else we can do.   Thanks    Christopher

## 2021-04-16 ENCOUNTER — HOSPITAL ENCOUNTER (OUTPATIENT)
Age: 56
Discharge: HOME | End: 2021-04-16
Payer: MEDICAID

## 2021-04-16 DIAGNOSIS — R60.9 EDEMA, UNSPECIFIED TYPE: ICD-10-CM

## 2021-04-16 DIAGNOSIS — I10 ESSENTIAL HYPERTENSION: Chronic | ICD-10-CM

## 2021-04-16 DIAGNOSIS — I25.10 CAD IN NATIVE ARTERY: ICD-10-CM

## 2021-04-16 LAB
ANION GAP SERPL CALCULATED.3IONS-SCNC: 11 MMOL/L (ref 3–16)
BUN BLDV-MCNC: 12 MG/DL (ref 7–20)
CALCIUM SERPL-MCNC: 9.6 MG/DL (ref 8.3–10.6)
CHLORIDE BLD-SCNC: 100 MMOL/L (ref 99–110)
CO2: 27 MMOL/L (ref 21–32)
CREAT SERPL-MCNC: 0.7 MG/DL (ref 0.6–1.1)
GFR AFRICAN AMERICAN: >60
GFR NON-AFRICAN AMERICAN: >60
GLUCOSE BLD-MCNC: 367 MG/DL (ref 70–99)
POTASSIUM SERPL-SCNC: 3.7 MMOL/L (ref 3.5–5.1)
PRO-BNP: 309 PG/ML (ref 0–124)
SODIUM BLD-SCNC: 138 MMOL/L (ref 136–145)

## 2021-04-16 PROCEDURE — 36415 COLL VENOUS BLD VENIPUNCTURE: CPT

## 2021-04-16 PROCEDURE — 80048 BASIC METABOLIC PNL TOTAL CA: CPT

## 2021-04-16 PROCEDURE — 83880 ASSAY OF NATRIURETIC PEPTIDE: CPT

## 2021-04-19 ENCOUNTER — TELEPHONE (OUTPATIENT)
Dept: CARDIOLOGY CLINIC | Age: 56
End: 2021-04-19

## 2021-04-22 ENCOUNTER — TELEPHONE (OUTPATIENT)
Dept: CARDIOLOGY CLINIC | Age: 56
End: 2021-04-22

## 2021-04-22 NOTE — TELEPHONE ENCOUNTER
Pt called and said that she needs a referral to see Dr Delphine Robles and why Dylan Saxena wants her to see him.    Dr Nora Garcia ph#:  [phone]

## 2021-04-23 ENCOUNTER — TELEPHONE (OUTPATIENT)
Dept: PULMONOLOGY | Age: 56
End: 2021-04-23

## 2021-04-23 NOTE — TELEPHONE ENCOUNTER
Ra Judd MD   Physician   Specialty:  Pulmonology   Telephone Encounter   Signed   Encounter Date:  4/14/2021               Signed            See me in next 2-4 weeks. Dx shortness of breath, abnormal CPXT, Pulmonary Nodule             ·   Telephone on 4/14/2021        Please see above. Patient called to set up appointment with Dr. Oriana Marie. Called our office back stating she needed referred. Dr. Oriana Marie states that patient needs to see him. Thank you. Please call patient to schedule.

## 2021-04-23 NOTE — TELEPHONE ENCOUNTER
Ra Judd MD  to CHRISTOPHER Rosa MA  Me        4:52 PM  Note     See me in next 2-4 weeks.  Dx shortness of breath, abnormal CPXT, Pulmonary Nodule

## 2021-05-05 ENCOUNTER — OFFICE VISIT (OUTPATIENT)
Dept: PULMONOLOGY | Age: 56
End: 2021-05-05
Payer: MEDICAID

## 2021-05-05 VITALS
RESPIRATION RATE: 16 BRPM | WEIGHT: 173 LBS | BODY MASS INDEX: 32.66 KG/M2 | SYSTOLIC BLOOD PRESSURE: 112 MMHG | HEART RATE: 75 BPM | DIASTOLIC BLOOD PRESSURE: 76 MMHG | TEMPERATURE: 96.9 F | HEIGHT: 61 IN | OXYGEN SATURATION: 98 %

## 2021-05-05 DIAGNOSIS — R06.02 SHORTNESS OF BREATH: Primary | ICD-10-CM

## 2021-05-05 PROCEDURE — 99244 OFF/OP CNSLTJ NEW/EST MOD 40: CPT | Performed by: INTERNAL MEDICINE

## 2021-05-05 PROCEDURE — G8427 DOCREV CUR MEDS BY ELIG CLIN: HCPCS | Performed by: INTERNAL MEDICINE

## 2021-05-05 PROCEDURE — G8417 CALC BMI ABV UP PARAM F/U: HCPCS | Performed by: INTERNAL MEDICINE

## 2021-05-05 RX ORDER — ALBUTEROL SULFATE 90 UG/1
1 AEROSOL, METERED RESPIRATORY (INHALATION) 4 TIMES DAILY PRN
Qty: 1 INHALER | Refills: 0 | Status: SHIPPED | OUTPATIENT
Start: 2021-05-05 | End: 2021-06-10 | Stop reason: CLARIF

## 2021-05-05 NOTE — PROGRESS NOTES
CC: shortness of breath   HPI  62 yo seen by me remotely for shortness of breath, COPD, possible ILD, more recently seen by cardiology after acute MI. She has a several month h/o shortness of breath, worse with exertion, associated with minimal cough, not better with inhaled bronchodilators. She had CPXT. Patient is allergic to penicillins; arginine; imdur [isosorbide dinitrate]; metoprolol; nitroglycerin; and cephalexin. Past Medical History:   Diagnosis Date    Acute blood loss anemia     Asthma     CAD (coronary artery disease)     Chest pain     COPD (chronic obstructive pulmonary disease) (HCC)     Coronary artery disease 9/4/2012    Depression     Diabetes mellitus (HCC)     Enlarged heart     Fatigue     Generalized headaches     Hyperlipidemia     Hypertension     Migraine     Morning stiffness of joints     Myocardial infarction ([address] 05/13/2018    Numbness or tingling hands and feet    OA (osteoarthritis) of knee 1/27/2015    Obesity, Class I, BMI 30.0-34.9 (see actual BMI)     Osteoporosis     Pulmonary nodule     Shortness of breath        Past Surgical History:   Procedure Laterality Date    CARDIAC CATHETERIZATION  04/01/2016    Dr. Segundo Kang. Sariah Mclean CARDIAC CATHETERIZATION  08/01/2012    Dr. Farhan Huang  11/01/2019    Non Obs CAD, medical management    CHOLECYSTECTOMY      CORONARY ANGIOPLASTY  08/06/2019    POBA of mid LAD, NC Balloon- 3.25 x 15    CORONARY ANGIOPLASTY WITH STENT PLACEMENT  05/13/2018    Dr. Phil Abraham - w/placement of IABP, Xience 3.25 x 18 mid Circ, POBA 2.5 x 12 to prox LAD    CORONARY ARTERY BYPASS GRAFT  06/13/2018    LIMA, SVG to OM2, SVG to rPDA    DIAGNOSTIC CARDIAC CATH LAB PROCEDURE  06/06/2013    Dr. Marv Trinidad - Non Obs CAD    HYSTERECTOMY      PTCA      UPPER GASTROINTESTINAL ENDOSCOPY         Allergies   Allergen Reactions    Penicillins Itching, Swelling and Rash    Arginine      Nausea and vomiting     sounds. No hernia. Skin: Warm and dry. No nodule on exposed extremities. No rash on exposed extremities. Lymph: No cervical LAD. No supraclavicular LAD. M/S: No cyanosis. No joint deformity. No clubbing. Neuro: A&OX3. Patellar reflexes are symmetric. Psych: No agitation, no anxiety, affect is full. CTPA 10/26/20   Pulmonary Arteries: Pulmonary arteries are adequately opacified for   evaluation.  No evidence of intraluminal filling defect to suggest pulmonary   embolism.  Main pulmonary artery is normal in caliber.       Mediastinum: Multiple calcified mediastinal lymph nodes.  No evidence of   mediastinal lymphadenopathy.  The heart and pericardium demonstrate no acute   abnormality.  There is no acute abnormality of the thoracic aorta.       Lungs/pleura: The lungs are without acute process.  No focal consolidation or   pulmonary edema.  No evidence of pleural effusion or pneumothorax.  6 mm   right middle lobe nodule.  Multiple additional 2-3 mm sized nodules.       Upper Abdomen: Limited images of the upper abdomen are unremarkable.       Soft Tissues/Bones: No acute bone or soft tissue abnormality.           Impression   No evidence of pulmonary embolism or acute pulmonary abnormality.       Multiple pulmonary nodules. Most severe: 6.0 mm solid pulmonary nodule     PFT May 10, 2011  FEV1 2.39 L 102%, ratio . 76  TLC 77% with low ERV DLCO 83%  PFT Feb 8, 2012 FEV1 2.35 L 101%  TLC 77%   DLCO 70%  PFT Sept 2012 FEV1 2.26 L 88%  TLC 77%   DLCO 61%  PFT Feb 2013  FEv1 2.33 L 91%  TLC 3.13 65%   DLCO 14.11 63%  PFT 9-22-15  FEV1 2.43 L 97%  TLC 3.97 L 83%  DLCO 14.73 66%  PFT 2/12/18  FEV1 2.15 L 87%  TLC 4.31 L 90%  DLCO 15.72 72%      STRESS 2/1/21    There is no evidence of stress induced ischemia. Moderate sized    inferolateral and lateral fixed defects consistent with infarction in the    territory of the mid and distal LCx and/or RCA.  LV function is normal with    uniform wall motion and ejection fraction of 69%. Lower-intermediate risk    abnormal study. [address] [facility]               - 8/6/2019 POBA to mid LAD for restenosis.             - 4/9/2019 PCI to prox/mid LAD (CSI and distal POBA)              - 2/6/2019 PCI to mid/distal LAD              - 6/3/2018 CABG (LIMA-LAD, SVG-OM2, SVG-RCA)                          - LIMA atretic              - 5/18/2018 PCI mid LCX    Alpha 1 - 144    CPXT @  April 2021 chest pain limited, submaximal effort, VO2 at anaerobic threshold less than 70%, normal breathing reserve and functional capacity     Assessment:      · Shortness of breath - favor deconditioning   · Abnormal CPXT with low   · Mild reduction in diffusing capacity  · Coronary Artery Disease s/p CABG, multiple PCI   · Tobacco abuse in remission, 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

## 2021-05-05 NOTE — Clinical Note
[facility], I never did get a response from the physician at [address] [place] [address] who read this CPXT test.  It was an unusual way to read a CPXT. However, I think based upon what I am seeing and in meeting the patient that this is likely deconditioning due to mobility restriction. We are starting a graded exercise program.  I'll update you in 8-10 weeks.   Thanks,  Tasneem Vazquez

## 2021-05-05 NOTE — PATIENT INSTRUCTIONS
Exercise plan:    Graded exercise 20 minutes total aerobic exercise 5 times per week. Measure time and distance & record     Light weight resistance training for bicep, tricep and shoulders 3 times per week    The goal over 8-10 weeks is to increase distance and pace and eventually 20 minute solid block. Keep track of what you are doing every day. Can try albuterol prior to activity to see if helpful, 1 or 2 puffs. If not helpful don't keep doing it.

## 2021-05-17 RX ORDER — ATORVASTATIN CALCIUM 40 MG/1
TABLET, FILM COATED ORAL
Qty: 30 TABLET | Refills: 4 | Status: SHIPPED | OUTPATIENT
Start: 2021-05-17 | End: 2021-12-03

## 2021-05-17 RX ORDER — LISINOPRIL 5 MG/1
5 TABLET ORAL DAILY
Qty: 30 TABLET | Refills: 4 | Status: SHIPPED | OUTPATIENT
Start: 2021-05-17 | End: 2021-06-18

## 2021-06-10 ENCOUNTER — OFFICE VISIT (OUTPATIENT)
Dept: FAMILY MEDICINE CLINIC | Age: 56
End: 2021-06-10
Payer: MEDICAID

## 2021-06-10 VITALS
WEIGHT: 179 LBS | OXYGEN SATURATION: 98 % | SYSTOLIC BLOOD PRESSURE: 132 MMHG | DIASTOLIC BLOOD PRESSURE: 68 MMHG | HEART RATE: 81 BPM | BODY MASS INDEX: 33.82 KG/M2 | TEMPERATURE: 97.5 F

## 2021-06-10 DIAGNOSIS — E11.59 TYPE 2 DIABETES MELLITUS WITH OTHER CIRCULATORY COMPLICATION, WITHOUT LONG-TERM CURRENT USE OF INSULIN (HCC): Primary | ICD-10-CM

## 2021-06-10 DIAGNOSIS — F33.2 SEVERE EPISODE OF RECURRENT MAJOR DEPRESSIVE DISORDER, WITHOUT PSYCHOTIC FEATURES (HCC): ICD-10-CM

## 2021-06-10 DIAGNOSIS — E66.9 OBESITY (BMI 30-39.9): Chronic | ICD-10-CM

## 2021-06-10 DIAGNOSIS — I50.42 CHRONIC COMBINED SYSTOLIC AND DIASTOLIC CHF (CONGESTIVE HEART FAILURE) (HCC): Chronic | ICD-10-CM

## 2021-06-10 DIAGNOSIS — I25.119 CORONARY ARTERY DISEASE INVOLVING NATIVE CORONARY ARTERY OF NATIVE HEART WITH ANGINA PECTORIS (HCC): ICD-10-CM

## 2021-06-10 DIAGNOSIS — K21.9 GASTROESOPHAGEAL REFLUX DISEASE WITHOUT ESOPHAGITIS: Chronic | ICD-10-CM

## 2021-06-10 PROBLEM — R09.89 PULMONARY VASCULAR CONGESTION: Status: RESOLVED | Noted: 2018-09-10 | Resolved: 2021-06-10

## 2021-06-10 PROBLEM — Z98.61 S/P PTCA (PERCUTANEOUS TRANSLUMINAL CORONARY ANGIOPLASTY): Status: RESOLVED | Noted: 2019-02-06 | Resolved: 2021-06-10

## 2021-06-10 PROBLEM — N17.9 AKI (ACUTE KIDNEY INJURY) (HCC): Status: RESOLVED | Noted: 2018-09-10 | Resolved: 2021-06-10

## 2021-06-10 PROBLEM — R07.9 ACUTE CHEST PAIN: Status: RESOLVED | Noted: 2018-09-10 | Resolved: 2021-06-10

## 2021-06-10 PROBLEM — R00.2 PALPITATIONS: Status: RESOLVED | Noted: 2019-02-01 | Resolved: 2021-06-10

## 2021-06-10 PROCEDURE — 3046F HEMOGLOBIN A1C LEVEL >9.0%: CPT | Performed by: NURSE PRACTITIONER

## 2021-06-10 PROCEDURE — G8417 CALC BMI ABV UP PARAM F/U: HCPCS | Performed by: NURSE PRACTITIONER

## 2021-06-10 PROCEDURE — G8427 DOCREV CUR MEDS BY ELIG CLIN: HCPCS | Performed by: NURSE PRACTITIONER

## 2021-06-10 PROCEDURE — 2022F DILAT RTA XM EVC RTNOPTHY: CPT | Performed by: NURSE PRACTITIONER

## 2021-06-10 PROCEDURE — 3017F COLORECTAL CA SCREEN DOC REV: CPT | Performed by: NURSE PRACTITIONER

## 2021-06-10 PROCEDURE — 1036F TOBACCO NON-USER: CPT | Performed by: NURSE PRACTITIONER

## 2021-06-10 PROCEDURE — 99204 OFFICE O/P NEW MOD 45 MIN: CPT | Performed by: NURSE PRACTITIONER

## 2021-06-10 RX ORDER — SEMAGLUTIDE 1.34 MG/ML
INJECTION, SOLUTION SUBCUTANEOUS
Qty: 1 PEN | Refills: 0 | COMMUNITY
Start: 2021-06-10 | End: 2021-08-25 | Stop reason: CLARIF

## 2021-06-10 RX ORDER — DULOXETIN HYDROCHLORIDE 30 MG/1
30 CAPSULE, DELAYED RELEASE ORAL DAILY
Qty: 30 CAPSULE | Refills: 3 | Status: SHIPPED | OUTPATIENT
Start: 2021-06-10 | End: 2021-08-25 | Stop reason: SINTOL

## 2021-06-10 ASSESSMENT — ENCOUNTER SYMPTOMS
DIARRHEA: 0
BLOOD IN STOOL: 0
COUGH: 0
CONSTIPATION: 0
SHORTNESS OF BREATH: 0
CHEST TIGHTNESS: 0

## 2021-06-10 ASSESSMENT — PATIENT HEALTH QUESTIONNAIRE - PHQ9
SUM OF ALL RESPONSES TO PHQ QUESTIONS 1-9: 9
4. FEELING TIRED OR HAVING LITTLE ENERGY: 3
8. MOVING OR SPEAKING SO SLOWLY THAT OTHER PEOPLE COULD HAVE NOTICED. OR THE OPPOSITE, BEING SO FIGETY OR RESTLESS THAT YOU HAVE BEEN MOVING AROUND A LOT MORE THAN USUAL: 0
2. FEELING DOWN, DEPRESSED OR HOPELESS: 2
9. THOUGHTS THAT YOU WOULD BE BETTER OFF DEAD, OR OF HURTING YOURSELF: 0
SUM OF ALL RESPONSES TO PHQ9 QUESTIONS 1 & 2: 4
5. POOR APPETITE OR OVEREATING: 1
1. LITTLE INTEREST OR PLEASURE IN DOING THINGS: 2
10. IF YOU CHECKED OFF ANY PROBLEMS, HOW DIFFICULT HAVE THESE PROBLEMS MADE IT FOR YOU TO DO YOUR WORK, TAKE CARE OF THINGS AT HOME, OR GET ALONG WITH OTHER PEOPLE: 1
SUM OF ALL RESPONSES TO PHQ QUESTIONS 1-9: 9
3. TROUBLE FALLING OR STAYING ASLEEP: 1
7. TROUBLE CONCENTRATING ON THINGS, SUCH AS READING THE NEWSPAPER OR WATCHING TELEVISION: 0
6. FEELING BAD ABOUT YOURSELF - OR THAT YOU ARE A FAILURE OR HAVE LET YOURSELF OR YOUR FAMILY DOWN: 0
SUM OF ALL RESPONSES TO PHQ QUESTIONS 1-9: 9

## 2021-06-10 NOTE — PROGRESS NOTES
diastolic (grade 1 LVDD) CHF  See #2    6. Obesity (BMI 30-39. 9)  Encouraged 5-10 lbs weight loss      Return in about 4 weeks (around 7/8/2021). New patient to practice. Wanting to have all providers at [address] to have better coordination of care. Depression is recently worse. Will spiral downward some days. Occurred for 2 months, and then went to provider and checked her bs. Knows when her sugars are high and too low. Has not had any additional mood swings. Has stopped eating frequents. Drinks coffee in the am.  Drinks 2 cups daily. Drinks 2 cokes/ day. Tries to frequently drink water. Has not had annual eye exam.  Previously dx Bipolar, PTSD, Depression by [facility]. Prior meds: Buspirone 10 mg TID. Cymbalta 30 (unable to tolerate higher doses)  DM: A1C 1 month ago- 12.   is checking BS, 150s-240s. .  no hypoglycemic symptoms. no foot/ eye complaints. Annual Eye exam not UTD. 2.  is tolerating Metformin. CAD/ CHF/ HTN: Last night had feeling of impending doom. Today feeling is not as severe. No CP, SOB, leg swelling, orthopnea, PND. Recent Stress Testing 2/2021 negative with no recommendation of left heart cath although patient would prefer for this to be done. Tolerating meds well. is checking BP at home. Not watching diet intake due to above issue. SOB-  No recent hospitalization/ COPD flares. No cough, SOB, hemoptysis.   Currently following with Cassandra Schultz)        Lab Results   Component Value Date    CHOL 95 07/30/2019    CHOL 116 04/09/2019    CHOL 123 02/06/2019     Lab Results   Component Value Date    TRIG 103 07/30/2019    TRIG 111 04/09/2019    TRIG 110 02/06/2019     Lab Results   Component Value Date    HDL 41 07/30/2019    HDL 44 04/09/2019    HDL 40 02/06/2019     Lab Results   Component Value Date    LDLCALC 33 07/30/2019    LDLCALC 50 04/09/2019    LDLCALC 61 02/06/2019     Lab Results   Component Value Date    LABVLDL 21 07/30/2019    LABVLDL 22 04/09/2019 04/01/2016    Dr. Amanda Kwok. Sonu De La Cruz  08/01/2012    Dr. David Rivera  11/01/2019    Non Obs CAD, medical management    CHOLECYSTECTOMY      CORONARY ANGIOPLASTY  08/06/2019    POBA of mid LAD, [place] Balloon- 3.25 x 15    CORONARY ANGIOPLASTY WITH STENT PLACEMENT  05/13/2018    Dr. Sahra Addison - w/placement of IABP, Xience 3.25 x 18 mid Circ, POBA 2.5 x 12 to prox LAD    CORONARY ARTERY BYPASS GRAFT  06/13/2018    LIMA, SVG to OM2, SVG to [address] [facility] CATH LAB PROCEDURE  06/06/2013    Dr. Genoveva Carrera - Non Obs CAD    HYSTERECTOMY      PTCA      UPPER GASTROINTESTINAL ENDOSCOPY        Social History     Tobacco Use    Smoking status: Former Smoker     Packs/day: 1.00     Years: 20.00     Pack years: 20.00     Types: Cigarettes     Quit date: 5/1/2018     Years since quitting: 3.1    Smokeless tobacco: Never Used    Tobacco comment: Quit Mother's Day 2018   Substance Use Topics    Alcohol use: No     Alcohol/week: 0.0 standard drinks      Family History   Problem Relation Age of Onset    Emphysema Mother     Heart Failure Mother     Hypertension Mother     Heart Disease Mother     High Blood Pressure Mother     High Cholesterol Mother     Stroke Mother     Heart Failure Father     Hypertension Father     Heart Disease Father     High Blood Pressure Father     High Cholesterol Father     Hypertension Sister     High Blood Pressure Sister     Hypertension Brother     High Blood Pressure Brother     Cancer Brother     Hypertension Maternal Grandmother     High Blood Pressure Maternal Grandmother     Hypertension Maternal Grandfather     High Blood Pressure Maternal Grandfather     Hypertension Paternal Grandmother     High Blood Pressure Paternal Grandmother     Hypertension Paternal Grandfather     High Blood Pressure Paternal Grandfather     High Blood Pressure Sister     High Blood Pressure Sister     High Blood Pressure Sister  Heart Failure Maternal Aunt     Hypertension Maternal Aunt     Heart Failure Maternal Uncle     Hypertension Maternal Uncle     Cancer Other         nephew-colon, liver, lung    Heart Failure Other     Diabetes Other     Osteoarthritis Other     Hypertension Paternal Aunt     Hypertension Paternal Uncle     Asthma Neg Hx         Vitals:    06/10/21 1352   BP: 132/68   Pulse: 81   Temp: 97.5 °F (36.4 °C)   TempSrc: Infrared   SpO2: 98%   Weight: 179 lb (81.2 kg)     Estimated body mass index is 33.82 kg/m² as calculated from the following:    Height as of 5/5/21: 5' 1\" (1.549 m). Weight as of this encounter: 179 lb (81.2 kg). Physical Exam  Vitals and nursing note reviewed. Constitutional:       Appearance: Normal appearance. Eyes:      Extraocular Movements: Extraocular movements intact. Conjunctiva/sclera: Conjunctivae normal.      Pupils: Pupils are equal, round, and reactive to light. Neck:      Vascular: No carotid bruit. Cardiovascular:      Rate and Rhythm: Normal rate and regular rhythm. Pulses: Normal pulses. Carotid pulses are 2+ on the right side and 2+ on the left side. Dorsalis pedis pulses are 2+ on the right side and 2+ on the left side. Posterior tibial pulses are 2+ on the right side and 2+ on the left side. Heart sounds: Normal heart sounds. Pulmonary:      Effort: Pulmonary effort is normal.   Abdominal:      General: Abdomen is flat. Palpations: Abdomen is soft. Musculoskeletal:      Cervical back: Normal range of motion. Right lower leg: No edema. Left lower leg: No edema. Right foot: Normal range of motion. No deformity, bunion, Charcot foot, foot drop or prominent metatarsal heads. Left foot: Normal range of motion. No deformity, bunion, Charcot foot, foot drop or prominent metatarsal heads. Feet:      Right foot:      Protective Sensation: 10 sites tested. 10 sites sensed.       Skin integrity: Skin integrity normal.      Toenail Condition: Right toenails are abnormally thick. Left foot:      Protective Sensation: 10 sites tested. 10 sites sensed. Skin integrity: Skin integrity normal.      Toenail Condition: Left toenails are abnormally thick. Lymphadenopathy:      Cervical: No cervical adenopathy. Skin:     General: Skin is warm and dry. Capillary Refill: Capillary refill takes less than 2 seconds. Neurological:      General: No focal deficit present. Mental Status: She is alert and oriented to person, place, and time. Psychiatric:         Mood and Affect: Mood normal.         Behavior: Behavior normal.           Patient's questions answered and concerns addressed. Patient agrees to plan of care.           Electronically signed by GERMAN Linton CNP on 6/10/2021 at 6:03 PM

## 2021-06-11 ENCOUNTER — HOSPITAL ENCOUNTER (EMERGENCY)
Age: 56
Discharge: HOME | End: 2021-06-11
Attending: EMERGENCY MEDICINE
Payer: MEDICAID

## 2021-06-11 ENCOUNTER — APPOINTMENT (OUTPATIENT)
Dept: GENERAL RADIOLOGY | Age: 56
End: 2021-06-11
Payer: MEDICAID

## 2021-06-11 VITALS
HEIGHT: 62 IN | OXYGEN SATURATION: 98 % | WEIGHT: 179 LBS | SYSTOLIC BLOOD PRESSURE: 122 MMHG | HEART RATE: 68 BPM | DIASTOLIC BLOOD PRESSURE: 74 MMHG | RESPIRATION RATE: 16 BRPM | TEMPERATURE: 98.4 F | BODY MASS INDEX: 32.94 KG/M2

## 2021-06-11 DIAGNOSIS — R73.9 HYPERGLYCEMIA: ICD-10-CM

## 2021-06-11 DIAGNOSIS — R07.9 CHEST PAIN, UNSPECIFIED TYPE: Primary | ICD-10-CM

## 2021-06-11 LAB
A/G RATIO: 1.5 (ref 1.1–2.2)
ALBUMIN SERPL-MCNC: 4.3 G/DL (ref 3.4–5)
ALP BLD-CCNC: 93 U/L (ref 40–129)
ALT SERPL-CCNC: 29 U/L (ref 10–40)
ANION GAP SERPL CALCULATED.3IONS-SCNC: 11 MMOL/L (ref 3–16)
AST SERPL-CCNC: 24 U/L (ref 15–37)
BASOPHILS ABSOLUTE: 0 K/UL (ref 0–0.2)
BASOPHILS RELATIVE PERCENT: 0.6 %
BILIRUB SERPL-MCNC: 0.9 MG/DL (ref 0–1)
BUN BLDV-MCNC: 10 MG/DL (ref 7–20)
CALCIUM SERPL-MCNC: 9.8 MG/DL (ref 8.3–10.6)
CHLORIDE BLD-SCNC: 98 MMOL/L (ref 99–110)
CO2: 24 MMOL/L (ref 21–32)
CREAT SERPL-MCNC: <0.5 MG/DL (ref 0.6–1.1)
EKG ATRIAL RATE: 76 BPM
EKG DIAGNOSIS: NORMAL
EKG P AXIS: 55 DEGREES
EKG P-R INTERVAL: 146 MS
EKG Q-T INTERVAL: 402 MS
EKG QRS DURATION: 102 MS
EKG QTC CALCULATION (BAZETT): 452 MS
EKG R AXIS: -30 DEGREES
EKG T AXIS: 60 DEGREES
EKG VENTRICULAR RATE: 76 BPM
EOSINOPHILS ABSOLUTE: 0.1 K/UL (ref 0–0.6)
EOSINOPHILS RELATIVE PERCENT: 1.3 %
GFR AFRICAN AMERICAN: >60
GFR NON-AFRICAN AMERICAN: >60
GLOBULIN: 2.9 G/DL
GLUCOSE BLD-MCNC: 187 MG/DL (ref 70–99)
GLUCOSE BLD-MCNC: 404 MG/DL (ref 70–99)
HCT VFR BLD CALC: 46.1 % (ref 36–48)
HEMOGLOBIN: 15.6 G/DL (ref 12–16)
LIPASE: 30 U/L (ref 13–60)
LYMPHOCYTES ABSOLUTE: 1.4 K/UL (ref 1–5.1)
LYMPHOCYTES RELATIVE PERCENT: 24.4 %
MCH RBC QN AUTO: 30.3 PG (ref 26–34)
MCHC RBC AUTO-ENTMCNC: 33.9 G/DL (ref 31–36)
MCV RBC AUTO: 89.5 FL (ref 80–100)
MONOCYTES ABSOLUTE: 0.3 K/UL (ref 0–1.3)
MONOCYTES RELATIVE PERCENT: 5.3 %
NEUTROPHILS ABSOLUTE: 3.9 K/UL (ref 1.7–7.7)
NEUTROPHILS RELATIVE PERCENT: 68.4 %
PDW BLD-RTO: 13 % (ref 12.4–15.4)
PERFORMED ON: ABNORMAL
PLATELET # BLD: 188 K/UL (ref 135–450)
PMV BLD AUTO: 9.3 FL (ref 5–10.5)
POTASSIUM REFLEX MAGNESIUM: 4.4 MMOL/L (ref 3.5–5.1)
PRO-BNP: 324 PG/ML (ref 0–124)
RBC # BLD: 5.15 M/UL (ref 4–5.2)
SODIUM BLD-SCNC: 133 MMOL/L (ref 136–145)
TOTAL PROTEIN: 7.2 G/DL (ref 6.4–8.2)
TROPONIN: <0.01 NG/ML
TROPONIN: <0.01 NG/ML
WBC # BLD: 5.7 K/UL (ref 4–11)

## 2021-06-11 PROCEDURE — 93010 ELECTROCARDIOGRAM REPORT: CPT | Performed by: INTERNAL MEDICINE

## 2021-06-11 PROCEDURE — 36415 COLL VENOUS BLD VENIPUNCTURE: CPT

## 2021-06-11 PROCEDURE — 71045 X-RAY EXAM CHEST 1 VIEW: CPT

## 2021-06-11 PROCEDURE — 83880 ASSAY OF NATRIURETIC PEPTIDE: CPT

## 2021-06-11 PROCEDURE — 96372 THER/PROPH/DIAG INJ SC/IM: CPT

## 2021-06-11 PROCEDURE — 80053 COMPREHEN METABOLIC PANEL: CPT

## 2021-06-11 PROCEDURE — 6360000002 HC RX W HCPCS: Performed by: EMERGENCY MEDICINE

## 2021-06-11 PROCEDURE — 99285 EMERGENCY DEPT VISIT HI MDM: CPT

## 2021-06-11 PROCEDURE — 93005 ELECTROCARDIOGRAM TRACING: CPT | Performed by: EMERGENCY MEDICINE

## 2021-06-11 PROCEDURE — 85025 COMPLETE CBC W/AUTO DIFF WBC: CPT

## 2021-06-11 PROCEDURE — 96374 THER/PROPH/DIAG INJ IV PUSH: CPT

## 2021-06-11 PROCEDURE — 6370000000 HC RX 637 (ALT 250 FOR IP): Performed by: EMERGENCY MEDICINE

## 2021-06-11 PROCEDURE — 84484 ASSAY OF TROPONIN QUANT: CPT

## 2021-06-11 PROCEDURE — 83690 ASSAY OF LIPASE: CPT

## 2021-06-11 RX ORDER — ONDANSETRON 2 MG/ML
4 INJECTION INTRAMUSCULAR; INTRAVENOUS ONCE
Status: COMPLETED | OUTPATIENT
Start: 2021-06-11 | End: 2021-06-11

## 2021-06-11 RX ORDER — ONDANSETRON 4 MG/1
4 TABLET, ORALLY DISINTEGRATING ORAL EVERY 8 HOURS PRN
Qty: 15 TABLET | Refills: 0 | Status: SHIPPED | OUTPATIENT
Start: 2021-06-11 | End: 2022-03-04 | Stop reason: CLARIF

## 2021-06-11 RX ADMIN — INSULIN LISPRO 5 UNITS: 100 INJECTION, SOLUTION INTRAVENOUS; SUBCUTANEOUS at 16:07

## 2021-06-11 RX ADMIN — ONDANSETRON HYDROCHLORIDE 4 MG: 2 INJECTION, SOLUTION INTRAMUSCULAR; INTRAVENOUS at 13:54

## 2021-06-11 RX ADMIN — LIDOCAINE HYDROCHLORIDE: 20 SOLUTION ORAL; TOPICAL at 13:54

## 2021-06-11 ASSESSMENT — PAIN SCALES - GENERAL: PAINLEVEL_OUTOF10: 7

## 2021-06-11 NOTE — ED PROVIDER NOTES
headaches     Hyperlipidemia     Hypertension     Migraine     Morning stiffness of joints     Myocardial infarction (HCC) 05/13/2018    Numbness or tingling hands and feet    OA (osteoarthritis) of knee 1/27/2015    Obesity, Class I, BMI 30.0-34.9 (see actual BMI)     Osteoporosis     Pulmonary nodule     S/P PTCA (percutaneous transluminal coronary angioplasty) 2/6/2019    Shortness of breath      Past Surgical History:   Procedure Laterality Date    CARDIAC CATHETERIZATION  04/01/2016    Dr. Jacqueline Briceno Day  08/01/2012    Dr. Ev Kang  11/01/2019    Non Obs CAD, medical management    CHOLECYSTECTOMY      CORONARY ANGIOPLASTY  08/06/2019    POBA of mid LAD, NC Balloon- 3.25 x 15    CORONARY ANGIOPLASTY WITH STENT PLACEMENT  05/13/2018    Dr. Zach Winn - w/placement of IABP, Xience 3.25 x 18 mid Circ, POBA 2.5 x 12 to prox LAD    CORONARY ARTERY BYPASS GRAFT  06/13/2018    LIMA, SVG to OM2, SVG to [address] CATH LAB PROCEDURE  06/06/2013    Dr. Montilla Serum - Non Obs CAD    HYSTERECTOMY      PTCA      UPPER GASTROINTESTINAL ENDOSCOPY       Family History   Problem Relation Age of Onset    Emphysema Mother     Heart Failure Mother     Hypertension Mother     Heart Disease Mother     High Blood Pressure Mother     High Cholesterol Mother     Stroke Mother     Heart Failure Father     Hypertension Father     Heart Disease Father     High Blood Pressure Father     High Cholesterol Father     Hypertension Sister     High Blood Pressure Sister     Hypertension Brother     High Blood Pressure Brother     Cancer Brother     Hypertension Maternal Grandmother     High Blood Pressure Maternal Grandmother     Hypertension Maternal Grandfather     High Blood Pressure Maternal Grandfather     Hypertension Paternal Grandmother     High Blood Pressure Paternal Grandmother     Hypertension Paternal Grandfather     High Blood Pressure Paternal Grandfather     High Blood Pressure Sister     High Blood Pressure Sister     High Blood Pressure Sister     Heart Failure Maternal Aunt     Hypertension Maternal Aunt     Heart Failure Maternal Uncle     Hypertension Maternal Uncle     Cancer Other         nephew-colon, liver, lung    Heart Failure Other     Diabetes Other     Osteoarthritis Other     Hypertension Paternal Aunt     Hypertension Paternal Uncle     Asthma Neg Hx      Social History     Socioeconomic History    Marital status:      Spouse name: Not on file    Number of children: Not on file    Years of education: Not on file    Highest education level: Not on file   Occupational History    Occupation: computer work   Tobacco Use    Smoking status: Former Smoker     Packs/day: 1.00     Years: 20.00     Pack years: 20.00     Types: Cigarettes     Quit date: 5/1/2018     Years since quitting: 3.1    Smokeless tobacco: Never Used    Tobacco comment: Quit Mother's Day 2018   Vaping Use    Vaping Use: Never used   Substance and Sexual Activity    Alcohol use: No     Alcohol/week: 0.0 standard drinks    Drug use: No    Sexual activity: Not Currently     Partners: Male   Other Topics Concern    Not on file   Social History Narrative    Not on file     Social Determinants of Health     Financial Resource Strain:     Difficulty of Paying Living Expenses:    Food Insecurity:     Worried About [facility] in the Last Year:     [address] [facility] [address] in the Last Year:    Transportation Needs:     Lack of Transportation (Medical):      Lack of Transportation (Non-Medical):    Physical Activity:     Days of Exercise per Week:     Minutes of Exercise per Session:    Stress:     Feeling of Stress :    Social Connections:     Frequency of Communication with Friends and Family:     Frequency of Social Gatherings with Friends and Family:     Attends Mandaeism Services:     Active Member of Clubs or Organizations:     Attends Club or Organization Meetings:     Marital Status:    Intimate Partner Violence:     Fear of Current or Ex-Partner:     Emotionally Abused:     Physically Abused:     Sexually Abused:      No current facility-administered medications for this encounter. Current Outpatient Medications   Medication Sig Dispense Refill    ondansetron (ZOFRAN ODT) 4 MG disintegrating tablet Take 1 tablet by mouth every 8 hours as needed for Nausea or Vomiting 15 tablet 0    Semaglutide, 1 MG/DOSE, 2 MG/1.5ML SOPN Inject 0.5 mg into the skin once a week diabetes 2 pen 0    DULoxetine (CYMBALTA) 30 MG extended release capsule Take 1 capsule by mouth daily 30 capsule 3    Semaglutide,0.25 or 0.5MG/DOS, (OZEMPIC, 0.25 OR 0.5 MG/DOSE,) 2 MG/1.5ML SOPN LOT [id number] EXP 01/23 1 pen 0    lisinopril (PRINIVIL;ZESTRIL) 5 MG tablet Take 1 tablet by mouth daily 30 tablet 4    atorvastatin (LIPITOR) 40 MG tablet TAKE ONE TABLET BY MOUTH EVERY DAY AT NIGHT 30 tablet 4    amLODIPine (NORVASC) 5 MG tablet Take 2 tablets by mouth daily 60 tablet 9    nitroGLYCERIN (NITROSTAT) 0.4 MG SL tablet up to max of 3 total doses.  If no relief after 1 dose, call 911. 25 tablet 3    furosemide (LASIX) 20 MG tablet Take 1 tablet by mouth daily 30 tablet 5    aspirin 81 MG chewable tablet Take 1 tablet by mouth daily 30 tablet 11    clopidogrel (PLAVIX) 75 MG tablet Take 1 tablet by mouth daily 30 tablet 11    fluticasone (FLONASE) 50 MCG/ACT nasal spray 1 spray by Each Nostril route daily 1 Bottle 2    pantoprazole (PROTONIX) 40 MG tablet Take 40 mg by mouth 2 times daily       albuterol sulfate HFA (PROVENTIL HFA) 108 (90 Base) MCG/ACT inhaler Inhale 2 puffs into the lungs every 6 hours as needed for Wheezing or Shortness of Breath 1 Inhaler 2    metFORMIN (GLUCOPHAGE) 1000 MG tablet Take 1 tablet by mouth 2 times daily (with meals) 60 tablet 0     Allergies   Allergen Reactions    Penicillins Itching, Swelling and Rash    Arginine      Nausea and vomiting     Imdur [Isosorbide Dinitrate] Swelling     nausea    Metoprolol      Headache, nausea     Nitroglycerin      Headache     Cephalexin Itching and Rash       REVIEW OF SYSTEMS  10 systems reviewed, pertinent positives per HPI otherwise noted to be negative. PHYSICAL EXAM  /74   Pulse 68   Temp 98.4 °F (36.9 °C) (Oral)   Resp 16   Ht 5' 2\" (1.575 m)   Wt 179 lb (81.2 kg)   SpO2 98%   BMI 32.74 kg/m²    Physical exam:  General appearance: awake and cooperative. No distress. Non toxic appearing. Skin: Warm and dry. No rashes or lesions. HENT: Normocephalic. Atraumatic. Mucus membranes are moist.   Neck: supple  Eyes: JENN. EOM intact. Heart: RRR. No murmurs. Lungs: Respirations unlabored. Mildly diminished in posterior lower lobes. No wheezes, rales, or rhonchi. Fair air exchange  Abdomen: No tenderness. Soft. Non distended. No peritoneal signs. Musculoskeletal: No extremity edema. Compartments soft. No deformity. No tenderness in the extremities. All extremities neurovascularly intact. Radial, Dp, and PT pulses +2/4 bilaterally  Neurological: Alert and oriented. No focal deficits. No aphasia or dysarthria. No gait ataxia. Psychiatric: Normal mood and affect. LABS  I have reviewed all labs for this visit.    Results for orders placed or performed during the hospital encounter of 06/11/21   CBC Auto Differential   Result Value Ref Range    WBC 5.7 4.0 - 11.0 K/uL    RBC 5.15 4.00 - 5.20 M/uL    Hemoglobin 15.6 12.0 - 16.0 g/dL    Hematocrit 46.1 36.0 - 48.0 %    MCV 89.5 80.0 - 100.0 fL    MCH 30.3 26.0 - 34.0 pg    MCHC 33.9 31.0 - 36.0 g/dL    RDW 13.0 12.4 - 15.4 %    Platelets 708 508 - 995 K/uL    MPV 9.3 5.0 - 10.5 fL    Neutrophils % 68.4 %    Lymphocytes % 24.4 %    Monocytes % 5.3 %    Eosinophils % 1.3 %    Basophils % 0.6 %    Neutrophils Absolute 3.9 1.7 - 7.7 K/uL    Lymphocytes Absolute 1.4 1.0 - 5.1 K/uL    Monocytes Absolute 0.3 0.0 - 1.3 K/uL    Eosinophils Absolute 0.1 0.0 - 0.6 K/uL    Basophils Absolute 0.0 0.0 - 0.2 K/uL   Comprehensive Metabolic Panel w/ Reflex to MG   Result Value Ref Range    Sodium 133 (L) 136 - 145 mmol/L    Potassium reflex Magnesium 4.4 3.5 - 5.1 mmol/L    Chloride 98 (L) 99 - 110 mmol/L    CO2 24 21 - 32 mmol/L    Anion Gap 11 3 - 16    Glucose 404 (H) 70 - 99 mg/dL    BUN 10 7 - 20 mg/dL    CREATININE <0.5 (L) 0.6 - 1.1 mg/dL    GFR Non-African American >60 >60    GFR African American >60 >60    Calcium 9.8 8.3 - 10.6 mg/dL    Total Protein 7.2 6.4 - 8.2 g/dL    Albumin 4.3 3.4 - 5.0 g/dL    Albumin/Globulin Ratio 1.5 1.1 - 2.2    Total Bilirubin 0.9 0.0 - 1.0 mg/dL    Alkaline Phosphatase 93 40 - 129 U/L    ALT 29 10 - 40 U/L    AST 24 15 - 37 U/L    Globulin 2.9 g/dL   Troponin   Result Value Ref Range    Troponin <0.01 <0.01 ng/mL   Brain Natriuretic Peptide   Result Value Ref Range    Pro- (H) 0 - 124 pg/mL   Lipase   Result Value Ref Range    Lipase 30.0 13.0 - 60.0 U/L   Troponin   Result Value Ref Range    Troponin <0.01 <0.01 ng/mL   POCT Glucose   Result Value Ref Range    POC Glucose 187 (H) 70 - 99 mg/dl    Performed on ACCU-CHEK    EKG 12 Lead   Result Value Ref Range    Ventricular Rate 76 BPM    Atrial Rate 76 BPM    P-R Interval 146 ms    QRS Duration 102 ms    Q-T Interval 402 ms    QTc Calculation (Bazett) 452 ms    P Axis 55 degrees    R Axis -30 degrees    T Axis 60 degrees    Diagnosis       Normal sinus rhythmLeft axis deviationPulmonary disease patternRSR' or QR pattern in V1 suggests right ventricular conduction delayInferior-posterior infarct (cited on or before 10-MARY-2020)Abnormal ECGWhen compared with ECG of 13-JAN-2021 16:18,No significant change was foundConfirmed by Sherwin Green MD, [address] ([id number]) on 6/11/2021 5:01:36 PM       ECG  The Ekg interpreted by me shows  normal sinus rhythm with a rate of 76  Axis is   Left axis deviation  QTc is  within an acceptable nonischemic, her troponin is negative and she states she would like to go home. She did agree to stay for a 3-hour troponin which was also negative. I reviewed her chart, and consult cardiology and spoke with Dr. Gloria Lloyd. We discussed the case, he agrees that it is reasonable for the patient to follow-up outpatient given her 2 negative troponins and her recent negative stress test.  I feel she is safe to be discharged home since she has had a negative work-up here today and a recent negative stress test.  She is told to call cardiology on Monday morning. She is also given strict return precautions back to the ED. She voices understanding. During the patient's ED course, the patient was given:  Medications   aluminum & magnesium hydroxide-simethicone (MAALOX) 30 mL, lidocaine viscous hcl (XYLOCAINE) 5 mL (GI COCKTAIL) ( Oral Given 6/11/21 1354)   ondansetron (ZOFRAN) injection 4 mg (4 mg Intravenous Given 6/11/21 1354)   insulin lispro (HUMALOG) injection vial 5 Units (5 Units Subcutaneous Given 6/11/21 1607)        CLINICAL IMPRESSION  1. Chest pain, unspecified type    2. Hyperglycemia        Blood pressure 122/74, pulse 68, temperature 98.4 °F (36.9 °C), temperature source Oral, resp. rate 16, height 5' 2\" (1.575 m), weight 179 lb (81.2 kg), SpO2 98 %, not currently breastfeeding. Patient was given scripts for the following medications. I counseled patient how to take these medications. Discharge Medication List as of 6/11/2021  4:38 PM          Follow-up with:  Quang Leroy, APRN - CNP  [place] [address] [place] [id number]  [phone]    Call   Regarding blood sugar    Deisy Chun MD  [address] [place] [address] [id number] [place]  [phone]    Call today  For follow-up of chest pain      DISCLAIMER: This chart was created using Dragon dictation software.   Efforts were made by me to ensure accuracy, however some errors may be present due to limitations of this technology and occasionally words are not transcribed correctly.        Kevin, DO  06/11/21 [id number] [facility], DO  06/12/21 [id number]

## 2021-06-11 NOTE — ED TRIAGE NOTES
CP started last night at 0230. Pt did take nitro and ASA and then symptoms went away and pt was able to sleep at 0530. Pt came back today due to chest pain and pain between bilat shoulder blades. Pt has heart hx.

## 2021-06-14 ENCOUNTER — TELEPHONE (OUTPATIENT)
Dept: PULMONOLOGY | Age: 56
End: 2021-06-14

## 2021-06-14 ENCOUNTER — OFFICE VISIT (OUTPATIENT)
Dept: FAMILY MEDICINE CLINIC | Age: 56
End: 2021-06-14
Payer: MEDICAID

## 2021-06-14 ENCOUNTER — TELEPHONE (OUTPATIENT)
Dept: FAMILY MEDICINE CLINIC | Age: 56
End: 2021-06-14

## 2021-06-14 VITALS
BODY MASS INDEX: 32.19 KG/M2 | WEIGHT: 176 LBS | SYSTOLIC BLOOD PRESSURE: 118 MMHG | DIASTOLIC BLOOD PRESSURE: 72 MMHG | OXYGEN SATURATION: 96 % | TEMPERATURE: 97.9 F | HEART RATE: 80 BPM

## 2021-06-14 DIAGNOSIS — I10 ESSENTIAL HYPERTENSION: ICD-10-CM

## 2021-06-14 DIAGNOSIS — E11.59 TYPE 2 DIABETES MELLITUS WITH OTHER CIRCULATORY COMPLICATION, WITHOUT LONG-TERM CURRENT USE OF INSULIN (HCC): ICD-10-CM

## 2021-06-14 DIAGNOSIS — R07.89 OTHER CHEST PAIN: Primary | ICD-10-CM

## 2021-06-14 PROCEDURE — G8427 DOCREV CUR MEDS BY ELIG CLIN: HCPCS | Performed by: NURSE PRACTITIONER

## 2021-06-14 PROCEDURE — G8417 CALC BMI ABV UP PARAM F/U: HCPCS | Performed by: NURSE PRACTITIONER

## 2021-06-14 PROCEDURE — 3046F HEMOGLOBIN A1C LEVEL >9.0%: CPT | Performed by: NURSE PRACTITIONER

## 2021-06-14 PROCEDURE — 3017F COLORECTAL CA SCREEN DOC REV: CPT | Performed by: NURSE PRACTITIONER

## 2021-06-14 PROCEDURE — 99213 OFFICE O/P EST LOW 20 MIN: CPT | Performed by: NURSE PRACTITIONER

## 2021-06-14 PROCEDURE — 2022F DILAT RTA XM EVC RTNOPTHY: CPT | Performed by: NURSE PRACTITIONER

## 2021-06-14 PROCEDURE — 1036F TOBACCO NON-USER: CPT | Performed by: NURSE PRACTITIONER

## 2021-06-14 RX ORDER — INSULIN GLARGINE 100 [IU]/ML
5 INJECTION, SOLUTION SUBCUTANEOUS NIGHTLY
Qty: 2 PEN | Refills: 0 | Status: SHIPPED | OUTPATIENT
Start: 2021-06-14 | End: 2021-07-21 | Stop reason: DRUGHIGH

## 2021-06-14 ASSESSMENT — ENCOUNTER SYMPTOMS
SHORTNESS OF BREATH: 0
DIARRHEA: 0
COUGH: 0
BLOOD IN STOOL: 0
CONSTIPATION: 0
CHEST TIGHTNESS: 0

## 2021-06-14 NOTE — TELEPHONE ENCOUNTER
Patient cancelled appointment on 6/23/21 with Dr. Ivette Flores for 8 week PFT. Reason: pt did not give reason    Patient did not reschedule appointment. Pt declines to r/s at this time. Pt also cancelled PFT. Appointment rescheduled for . 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

## 2021-06-14 NOTE — PROGRESS NOTES
6/14/2021    Chief Complaint   Patient presents with    Follow-up     [facility] on 6/11    Chest Pain     states she is still having this, and still feels bad     Diabetes     she was told her sugars        Rhiannon Combs is a 63 y.o. female, presents today for:      ASSESSMENT/PLAN:  1. Other chest pain  Monitor for now  Consider Amlodipine for Vasospasms. 2. Type 2 diabetes mellitus with other circulatory complication, without long-term current use of insulin (HCC)  Start Lantus at 5 units for now. Continue Ozempic at current dosing  Counselled on Insulin injections, sites to give, importance of blood sugar monitoring while awaiting for Ozempic to start working. Keep BS- call at end of week with readings. Will continue to attempt to obtain records from prior PCP      Return in about 2 weeks (around 6/28/2021) for insulin. Presenting today for ER follow up for chest pain on 6/11. Patient went to sleep after initial eval with this provider on 6/11 and work up with severe chest discomfort around 3 AM.  Took Nitro and ASA with resolution of symptoms initially but they returned around 2 hours later. Pt was initially concerned she was having a reaction to Ozempic injection given during clinic visit. ECG at ER showing no changes from ECG obtained 2/2021. Negative Troponin and CXR. Labs normal.  Pt was discharged home and took Benadryl which finally relief her symptoms. Today continues to endorse severe fatigue. CP has resolved. Concerned about elevated BS- high 300s/ 400s. We have not obtained prior records from prior PCP although A1C per her report was 12%. No abdominal pain, N/V, changes in bowel function. She has scheduled an appt with Dr. Tomi Hill for early July. Review of Systems   Constitutional: Negative. Respiratory: Negative for cough, chest tightness and shortness of breath. Cardiovascular: Negative.     Gastrointestinal: Negative for blood in stool, constipation and diarrhea. Skin: Negative. Neurological: Negative for dizziness, tremors, light-headedness and headaches. Psychiatric/Behavioral: Negative for decreased concentration, dysphoric mood, self-injury, sleep disturbance and suicidal ideas. The patient is not nervous/anxious. Current Outpatient Medications on File Prior to Visit   Medication Sig Dispense Refill    ondansetron (ZOFRAN ODT) 4 MG disintegrating tablet Take 1 tablet by mouth every 8 hours as needed for Nausea or Vomiting 15 tablet 0    Semaglutide, 1 MG/DOSE, 2 MG/1.5ML SOPN Inject 0.5 mg into the skin once a week diabetes 2 pen 0    DULoxetine (CYMBALTA) 30 MG extended release capsule Take 1 capsule by mouth daily 30 capsule 3    Semaglutide,0.25 or 0.5MG/DOS, (OZEMPIC, 0.25 OR 0.5 MG/DOSE,) 2 MG/1.5ML SOPN LOT [id number] EXP 01/23 1 pen 0    lisinopril (PRINIVIL;ZESTRIL) 5 MG tablet Take 1 tablet by mouth daily (Patient taking differently: Take 20 mg by mouth daily ) 30 tablet 4    atorvastatin (LIPITOR) 40 MG tablet TAKE ONE TABLET BY MOUTH EVERY DAY AT NIGHT 30 tablet 4    amLODIPine (NORVASC) 5 MG tablet Take 2 tablets by mouth daily 60 tablet 9    nitroGLYCERIN (NITROSTAT) 0.4 MG SL tablet up to max of 3 total doses.  If no relief after 1 dose, call 911. 25 tablet 3    furosemide (LASIX) 20 MG tablet Take 1 tablet by mouth daily 30 tablet 5    aspirin 81 MG chewable tablet Take 1 tablet by mouth daily 30 tablet 11    clopidogrel (PLAVIX) 75 MG tablet Take 1 tablet by mouth daily 30 tablet 11    fluticasone (FLONASE) 50 MCG/ACT nasal spray 1 spray by Each Nostril route daily 1 Bottle 2    pantoprazole (PROTONIX) 40 MG tablet Take 40 mg by mouth 2 times daily       albuterol sulfate HFA (PROVENTIL HFA) 108 (90 Base) MCG/ACT inhaler Inhale 2 puffs into the lungs every 6 hours as needed for Wheezing or Shortness of Breath 1 Inhaler 2    metFORMIN (GLUCOPHAGE) 1000 MG tablet Take 1 tablet by mouth 2 times daily (with meals) 60 tablet 0     No current facility-administered medications on file prior to visit. Allergies   Allergen Reactions    Penicillins Itching, Swelling and Rash    Arginine      Nausea and vomiting     Imdur [Isosorbide Dinitrate] Swelling     nausea    Metoprolol      Headache, nausea     Nitroglycerin      Headache     Cephalexin Itching and Rash     Past Medical History:   Diagnosis Date    Acute blood loss anemia     Asthma     CAD (coronary artery disease)     Chest pain     COPD (chronic obstructive pulmonary disease) (HCC)     Coronary artery disease 9/4/2012    Depression     Diabetes mellitus (HCC)     Enlarged heart     Fatigue     Generalized headaches     Hyperlipidemia     Hypertension     Migraine     Morning stiffness of joints     Myocardial infarction ([address] 05/13/2018    Numbness or tingling hands and feet    OA (osteoarthritis) of knee 1/27/2015    Obesity, Class I, BMI 30.0-34.9 (see actual BMI)     Osteoporosis     Pulmonary nodule     S/P PTCA (percutaneous transluminal coronary angioplasty) 2/6/2019    Shortness of breath      Past Surgical History:   Procedure Laterality Date    CARDIAC CATHETERIZATION  04/01/2016    Dr. Andrew [facility]and. [facility] CARDIAC CATHETERIZATION  08/01/2012    Dr. Woo Lipscomb  11/01/2019    Non Obs CAD, medical management    CHOLECYSTECTOMY      CORONARY ANGIOPLASTY  08/06/2019    POBA of mid LAD, NC Balloon- 3.25 x 15    CORONARY ANGIOPLASTY WITH STENT PLACEMENT  05/13/2018    Dr. Caity Hayes - w/placement of IABP, Xience 3.25 x 18 mid Circ, POBA 2.5 x 12 to prox LAD    CORONARY ARTERY BYPASS GRAFT  06/13/2018    LIMA, SVG to OM2, SVG to rPDA    DIAGNOSTIC CARDIAC CATH LAB PROCEDURE  06/06/2013    Dr. Cira Peoples - Non Obs CAD    HYSTERECTOMY      PTCA      UPPER GASTROINTESTINAL ENDOSCOPY        Social History     Tobacco Use    Smoking status: Former Smoker     Packs/day: 1.00     Years: 20.00     Pack years: 20.00 Types: Cigarettes     Quit date: 5/1/2018     Years since quitting: 3.1    Smokeless tobacco: Never Used    Tobacco comment: Quit Mother's Day 2018   Substance Use Topics    Alcohol use: No     Alcohol/week: 0.0 standard drinks      Family History   Problem Relation Age of Onset    Emphysema Mother     Heart Failure Mother     Hypertension Mother     Heart Disease Mother     High Blood Pressure Mother     High Cholesterol Mother     Stroke Mother     Heart Failure Father     Hypertension Father     Heart Disease Father     High Blood Pressure Father     High Cholesterol Father     Hypertension Sister     High Blood Pressure Sister     Hypertension Brother     High Blood Pressure Brother     Cancer Brother     Hypertension Maternal Grandmother     High Blood Pressure Maternal Grandmother     Hypertension Maternal Grandfather     High Blood Pressure Maternal Grandfather     Hypertension Paternal Grandmother     High Blood Pressure Paternal Grandmother     Hypertension Paternal Grandfather     High Blood Pressure Paternal Grandfather     High Blood Pressure Sister     High Blood Pressure Sister     High Blood Pressure Sister     Heart Failure Maternal Aunt     Hypertension Maternal Aunt     Heart Failure Maternal Uncle     Hypertension Maternal Uncle     Cancer Other         nephew-colon, liver, lung    Heart Failure Other     Diabetes Other     Osteoarthritis Other     Hypertension Paternal Aunt     Hypertension Paternal Uncle     Asthma Neg Hx         Vitals:    06/14/21 1109   BP: 118/72   Pulse: 80   Temp: 97.9 °F (36.6 °C)   TempSrc: Infrared   SpO2: 96%   Weight: 176 lb (79.8 kg)     Estimated body mass index is 32.19 kg/m² as calculated from the following:    Height as of 6/11/21: 5' 2\" (1.575 m). Weight as of this encounter: 176 lb (79.8 kg). Physical Exam  Vitals and nursing note reviewed. Constitutional:       Appearance: Normal appearance.    Neck:

## 2021-06-15 ENCOUNTER — TELEPHONE (OUTPATIENT)
Dept: FAMILY MEDICINE CLINIC | Age: 56
End: 2021-06-15

## 2021-06-15 RX ORDER — DULAGLUTIDE 0.75 MG/.5ML
0.75 INJECTION, SOLUTION SUBCUTANEOUS WEEKLY
Qty: 2 PEN | Refills: 1 | Status: SHIPPED | OUTPATIENT
Start: 2021-06-15 | End: 2021-09-09 | Stop reason: DRUGHIGH

## 2021-06-15 NOTE — TELEPHONE ENCOUNTER
----- Message from GERMAN Flanagan CNP sent at 6/14/2021  8:41 PM EDT -----  Please call on Friday 6/18 for BS readings. Thank you.

## 2021-06-15 NOTE — TELEPHONE ENCOUNTER
Teo Kauffman, APRN - CNP  [facility] Just now (1:58 PM)   AW  She is already on Metformin.  Start Trulicity 9.54.  She can finish the sample we gave her and then start the Trulicity.       Also- she was having quite a bit of chest pain.  We can attempt Amlodipine 5 mg if she desires and that may relieve her chest pain?  Let me know what she wants and I will sign order.       Thank you!     Message text

## 2021-06-17 DIAGNOSIS — I10 ESSENTIAL HYPERTENSION: ICD-10-CM

## 2021-06-17 NOTE — TELEPHONE ENCOUNTER
Lisinopril 20 MG, Pantaprazole 40 MG, Glimepiride 2 MG, Metformin 1000 MG, [facility].  [address] [place] [address] ED FU 6/14/21 fu 2w    Next OV- 7/8/21

## 2021-06-17 NOTE — TELEPHONE ENCOUNTER
I am confused about her request, patient states she is taking 4 tablets of the 5 mg lisinopril which makes her taking 20 mg, because in your note it says take one tablet of 5 mg lisinopril, and she said she is on the glimepiride, but I do not see that on her list.  So can you clarify those 2

## 2021-06-18 RX ORDER — PANTOPRAZOLE SODIUM 40 MG/1
40 TABLET, DELAYED RELEASE ORAL 2 TIMES DAILY
Qty: 60 TABLET | Refills: 1 | Status: SHIPPED | OUTPATIENT
Start: 2021-06-18 | End: 2021-10-08

## 2021-06-18 RX ORDER — LISINOPRIL 20 MG/1
20 TABLET ORAL DAILY
Qty: 90 TABLET | Refills: 0 | Status: SHIPPED | OUTPATIENT
Start: 2021-06-18 | End: 2022-03-04 | Stop reason: SDUPTHER

## 2021-06-18 RX ORDER — PANTOPRAZOLE SODIUM 40 MG/1
40 TABLET, DELAYED RELEASE ORAL 2 TIMES DAILY
Qty: 30 TABLET | Refills: 1 | Status: SHIPPED | OUTPATIENT
Start: 2021-06-18 | End: 2021-06-18 | Stop reason: SDUPTHER

## 2021-06-18 NOTE — TELEPHONE ENCOUNTER
Monday 318 , 300  Tuesday 265, 191  Wednesday 211, 211  Thursday 227, 200  Friday 212    Morning and night readings

## 2021-06-18 NOTE — TELEPHONE ENCOUNTER
We stopped Glimepiride with the addition of the Ozempic. Her Lisinopril was increased with her recent hospital visit. I addended my last note.      Sorry for the confusion

## 2021-06-18 NOTE — TELEPHONE ENCOUNTER
[facility]ix [facility] we gave her #30 instead of #60 so I am sending a new rx in for her

## 2021-06-22 DIAGNOSIS — E11.59 TYPE 2 DIABETES MELLITUS WITH OTHER CIRCULATORY COMPLICATION, WITHOUT LONG-TERM CURRENT USE OF INSULIN (HCC): Primary | ICD-10-CM

## 2021-06-22 RX ORDER — GLUCOSAMINE HCL/CHONDROITIN SU 500-400 MG
CAPSULE ORAL
Qty: 200 STRIP | Refills: 5 | Status: SHIPPED | OUTPATIENT
Start: 2021-06-22 | End: 2021-08-26 | Stop reason: SDUPTHER

## 2021-06-22 NOTE — TELEPHONE ENCOUNTER
Trumatrix test strips, checks 2 times daily, Derrick Calixto.  [address] [place]- 6/14/21 ED fu    Next OV- 6/28/21

## 2021-06-28 ENCOUNTER — OFFICE VISIT (OUTPATIENT)
Dept: FAMILY MEDICINE CLINIC | Age: 56
End: 2021-06-28
Payer: MEDICAID

## 2021-06-28 VITALS
HEART RATE: 82 BPM | WEIGHT: 178 LBS | DIASTOLIC BLOOD PRESSURE: 72 MMHG | OXYGEN SATURATION: 97 % | BODY MASS INDEX: 32.56 KG/M2 | SYSTOLIC BLOOD PRESSURE: 116 MMHG

## 2021-06-28 DIAGNOSIS — E11.59 TYPE 2 DIABETES MELLITUS WITH OTHER CIRCULATORY COMPLICATION, WITHOUT LONG-TERM CURRENT USE OF INSULIN (HCC): Primary | ICD-10-CM

## 2021-06-28 PROCEDURE — G8417 CALC BMI ABV UP PARAM F/U: HCPCS | Performed by: NURSE PRACTITIONER

## 2021-06-28 PROCEDURE — 3017F COLORECTAL CA SCREEN DOC REV: CPT | Performed by: NURSE PRACTITIONER

## 2021-06-28 PROCEDURE — 3046F HEMOGLOBIN A1C LEVEL >9.0%: CPT | Performed by: NURSE PRACTITIONER

## 2021-06-28 PROCEDURE — 99213 OFFICE O/P EST LOW 20 MIN: CPT | Performed by: NURSE PRACTITIONER

## 2021-06-28 PROCEDURE — 1036F TOBACCO NON-USER: CPT | Performed by: NURSE PRACTITIONER

## 2021-06-28 PROCEDURE — 2022F DILAT RTA XM EVC RTNOPTHY: CPT | Performed by: NURSE PRACTITIONER

## 2021-06-28 PROCEDURE — G8427 DOCREV CUR MEDS BY ELIG CLIN: HCPCS | Performed by: NURSE PRACTITIONER

## 2021-06-28 ASSESSMENT — ENCOUNTER SYMPTOMS
CONSTIPATION: 0
DIARRHEA: 0
BLOOD IN STOOL: 0
COUGH: 0
CHEST TIGHTNESS: 0
SHORTNESS OF BREATH: 0

## 2021-06-28 NOTE — PROGRESS NOTES
not nervous/anxious. Current Outpatient Medications on File Prior to Visit   Medication Sig Dispense Refill    blood glucose monitor strips Test blood sugars two times a day. E11.9- pt uses trumatrix 200 strip 5    metFORMIN (GLUCOPHAGE) 1000 MG tablet Take 1 tablet by mouth 2 times daily (with meals) 60 tablet 1    lisinopril (PRINIVIL;ZESTRIL) 20 MG tablet Take 1 tablet by mouth daily 90 tablet 0    pantoprazole (PROTONIX) 40 MG tablet Take 1 tablet by mouth 2 times daily 60 tablet 1    Insulin Pen Needle 32G X 4 MM MISC 1 each by Does not apply route daily For Lantus 100 each 0    insulin glargine (LANTUS SOLOSTAR) 100 UNIT/ML injection pen Inject 5 Units into the skin nightly 2 pen 0    ondansetron (ZOFRAN ODT) 4 MG disintegrating tablet Take 1 tablet by mouth every 8 hours as needed for Nausea or Vomiting 15 tablet 0    DULoxetine (CYMBALTA) 30 MG extended release capsule Take 1 capsule by mouth daily 30 capsule 3    Semaglutide,0.25 or 0.5MG/DOS, (OZEMPIC, 0.25 OR 0.5 MG/DOSE,) 2 MG/1.5ML SOPN LOT [id number] EXP 01/23 1 pen 0    atorvastatin (LIPITOR) 40 MG tablet TAKE ONE TABLET BY MOUTH EVERY DAY AT NIGHT 30 tablet 4    amLODIPine (NORVASC) 5 MG tablet Take 2 tablets by mouth daily 60 tablet 9    nitroGLYCERIN (NITROSTAT) 0.4 MG SL tablet up to max of 3 total doses.  If no relief after 1 dose, call 911. 25 tablet 3    furosemide (LASIX) 20 MG tablet Take 1 tablet by mouth daily 30 tablet 5    aspirin 81 MG chewable tablet Take 1 tablet by mouth daily 30 tablet 11    clopidogrel (PLAVIX) 75 MG tablet Take 1 tablet by mouth daily 30 tablet 11    fluticasone (FLONASE) 50 MCG/ACT nasal spray 1 spray by Each Nostril route daily 1 Bottle 2    albuterol sulfate HFA (PROVENTIL HFA) 108 (90 Base) MCG/ACT inhaler Inhale 2 puffs into the lungs every 6 hours as needed for Wheezing or Shortness of Breath 1 Inhaler 2    Dulaglutide (TRULICITY) 0.77 GS/2.3PE SOPN Inject 0.75 mg into the skin once a week (Patient not taking: Reported on 6/28/2021) 2 pen 1     No current facility-administered medications on file prior to visit. Allergies   Allergen Reactions    Penicillins Itching, Swelling and Rash    Arginine      Nausea and vomiting     Imdur [Isosorbide Dinitrate] Swelling     nausea    Metoprolol      Headache, nausea     Nitroglycerin      Headache     Cephalexin Itching and Rash     Past Medical History:   Diagnosis Date    Acute blood loss anemia     Asthma     CAD (coronary artery disease)     Chest pain     COPD [facility]chronic obstructive pulmonary disease) [facility]     Coronary artery disease 9/4/2012    Depression     Diabetes mellitus (HCC)     Enlarged heart     Fatigue     Generalized headaches     Hyperlipidemia     Hypertension     Migraine     Morning stiffness of joints     Myocardial infarction ([facility] [address] 05/13/2018    Numbness or tingling hands and feet    OA (osteoarthritis) of knee 1/27/2015    Obesity, Class I, BMI 30.0-34.9 (see actual BMI)     Osteoporosis     Pulmonary nodule     S/P PTCA (percutaneous transluminal coronary angioplasty) 2/6/2019    Shortness of breath      Past Surgical History:   Procedure Laterality Date    CARDIAC CATHETERIZATION  04/01/2016    Dr. Keira Khan. Jewel Other CARDIAC CATHETERIZATION  08/01/2012    Dr. Eze Harper  11/01/2019    Non Obs CAD, medical management    CHOLECYSTECTOMY      CORONARY ANGIOPLASTY  08/06/2019    POBA of mid LAD, NC Balloon- 3.25 x 15    CORONARY ANGIOPLASTY WITH STENT PLACEMENT  05/13/2018    Dr. Regan Doan - w/placement of IABP, Xience 3.25 x 18 mid Circ, POBA 2.5 x 12 to prox LAD    CORONARY ARTERY BYPASS GRAFT  06/13/2018    LIMA, SVG to OM2, SVG to rPDA    DIAGNOSTIC CARDIAC CATH LAB PROCEDURE  06/06/2013    Dr. Devon Zhao - Non Obs CAD    HYSTERECTOMY      PTCA      UPPER GASTROINTESTINAL ENDOSCOPY        Social History     Tobacco Use    Smoking status: Former Smoker Packs/day: 1.00     Years: 20.00     Pack years: 20.00     Types: Cigarettes     Quit date: 5/1/2018     Years since quitting: 3.1    Smokeless tobacco: Never Used    Tobacco comment: Quit Mother's Day 2018   Substance Use Topics    Alcohol use: No     Alcohol/week: 0.0 standard drinks      Family History   Problem Relation Age of Onset    Emphysema Mother     Heart Failure Mother     Hypertension Mother     Heart Disease Mother     High Blood Pressure Mother     High Cholesterol Mother     Stroke Mother     Heart Failure Father     Hypertension Father     Heart Disease Father     High Blood Pressure Father     High Cholesterol Father     Hypertension Sister     High Blood Pressure Sister     Hypertension Brother     High Blood Pressure Brother     Cancer Brother     Hypertension Maternal Grandmother     High Blood Pressure Maternal Grandmother     Hypertension Maternal Grandfather     High Blood Pressure Maternal Grandfather     Hypertension Paternal Grandmother     High Blood Pressure Paternal Grandmother     Hypertension Paternal Grandfather     High Blood Pressure Paternal Grandfather     High Blood Pressure Sister     High Blood Pressure Sister     High Blood Pressure Sister     Heart Failure Maternal Aunt     Hypertension Maternal Aunt     Heart Failure Maternal Uncle     Hypertension Maternal Uncle     Cancer Other         nephew-colon, liver, lung    Heart Failure Other     Diabetes Other     Osteoarthritis Other     Hypertension Paternal Aunt     Hypertension Paternal Uncle     Asthma Neg Hx         Vitals:    06/28/21 1320 06/28/21 1322   BP: (!) 102/52 116/72   Pulse: 82    SpO2: 97%    Weight: 178 lb (80.7 kg)      Estimated body mass index is 32.56 kg/m² as calculated from the following:    Height as of 6/11/21: 5' 2\" (1.575 m). Weight as of this encounter: 178 lb (80.7 kg). Physical Exam  Vitals and nursing note reviewed.    Constitutional:

## 2021-07-07 NOTE — PROGRESS NOTES
[address] [facility]   Cardiovascular Evaluation    PATIENT: Mason Reyes  DATE: 2021  MRN: [id number]  CSN: [id number]  : 1965      Primary Care Doctor: GERMAN Suazo - CNP  Reason for evaluation:   Follow-up (ER)      Subjective:   History of present illness on initial date of evaluation:   Mason Reyes is a 63 y.o. patient who presents for hospital follow up. Patient presented to the ED on 19 with complaints of chest pain. At that time patient preferred to try medication instead of proceeding with cath. On 19 patient was admitted for [facility] that demonstrated Restenosis at mid LAD stent around heavy angulated section of LAD 75%, HERO-2 ---> 0%, HERO 3 flow and Spasm of left main and also LCx that improved with NTG. Today, she reports being tired and feeling like someone is punching her in chest. It woke her up at 3 AM.  It felt like she was being squeezed. It stopped and she went back to sleep and it started again. She took nitro and sat up. She has stopped smoking. She has a history of COVID. She notes more persistent mid chest pain. Patient Active Problem List   Diagnosis    Coronary artery disease involving native heart with angina pectoris ([address]    Essential hypertension    Mixed hyperlipidemia    Cerebral aneurysm, nonruptured    DM2 (diabetes mellitus, type 2) ([address]    Hx TIA involving L-ICA    Former smoker    Chronic tension type headache    GERD (gastroesophageal reflux disease)    Obesity (BMI 30-39. 9)    CAD s/p CABGx3 (2018) & stents (May 2018)    Hx of combined systolic (EF 48-30%) & diastolic (grade 1 LVDD) CHF    Coronary artery spasm (HCC)       Past Medical History:   has a past medical history of Acute blood loss anemia, Asthma, CAD (coronary artery disease), Chest pain, COPD (chronic obstructive pulmonary disease) ([address] Coronary artery disease, Depression, Diabetes mellitus ([address] Enlarged heart, Fatigue, Generalized headaches, Hyperlipidemia, Hypertension, Migraine, Morning stiffness of joints, Myocardial infarction (HCC), Numbness or tingling, OA (osteoarthritis) of knee, Obesity, Class I, BMI 30.0-34.9 (see actual BMI), Osteoporosis, Pulmonary nodule, S/P PTCA (percutaneous transluminal coronary angioplasty), and Shortness of breath. Surgical History:   has a past surgical history that includes Cholecystectomy; Hysterectomy; Diagnostic Cardiac Cath Lab Procedure (06/06/2013); Upper gastrointestinal endoscopy; Cardiac catheterization (04/01/2016); Cardiac catheterization (08/01/2012); Coronary artery bypass graft (06/13/2018); Coronary angioplasty with stent (05/13/2018); Percutaneous Transluminal Coronary Angio; Coronary angioplasty (08/06/2019); and Cardiac catheterization (11/01/2019). Social History:   reports that she quit smoking about 3 years ago. Her smoking use included cigarettes. She has a 20.00 pack-year smoking history. She has never used smokeless tobacco. She reports that she does not drink alcohol and does not use drugs. Family History:  No evidence for sudden cardiac death or premature CAD    Home Medications:  Reviewed and are listed in nursing record. and/or listed below  Current Outpatient Medications   Medication Sig Dispense Refill    blood glucose monitor strips Test blood sugars two times a day.  E11.9- pt uses trumatrix 200 strip 5    metFORMIN (GLUCOPHAGE) 1000 MG tablet Take 1 tablet by mouth 2 times daily (with meals) 60 tablet 1    lisinopril (PRINIVIL;ZESTRIL) 20 MG tablet Take 1 tablet by mouth daily 90 tablet 0    pantoprazole (PROTONIX) 40 MG tablet Take 1 tablet by mouth 2 times daily 60 tablet 1    Insulin Pen Needle 32G X 4 MM MISC 1 each by Does not apply route daily For Lantus 100 each 0    insulin glargine (LANTUS SOLOSTAR) 100 UNIT/ML injection pen Inject 5 Units into the skin nightly 2 pen 0    ondansetron (ZOFRAN ODT) 4 MG disintegrating tablet Take 1 tablet by mouth every 8 hours as needed for Nausea or Vomiting 15 tablet 0    DULoxetine (CYMBALTA) 30 MG extended release capsule Take 1 capsule by mouth daily 30 capsule 3    Semaglutide,0.25 or 0.5MG/DOS, (OZEMPIC, 0.25 OR 0.5 MG/DOSE,) 2 MG/1.5ML SOPN LOT [id number] EXP 01/23 1 pen 0    atorvastatin (LIPITOR) 40 MG tablet TAKE ONE TABLET BY MOUTH EVERY DAY AT NIGHT 30 tablet 4    amLODIPine (NORVASC) 5 MG tablet Take 2 tablets by mouth daily 60 tablet 9    nitroGLYCERIN (NITROSTAT) 0.4 MG SL tablet up to max of 3 total doses. If no relief after 1 dose, call 911. 25 tablet 3    furosemide (LASIX) 20 MG tablet Take 1 tablet by mouth daily 30 tablet 5    aspirin 81 MG chewable tablet Take 1 tablet by mouth daily 30 tablet 11    clopidogrel (PLAVIX) 75 MG tablet Take 1 tablet by mouth daily 30 tablet 11    fluticasone (FLONASE) 50 MCG/ACT nasal spray 1 spray by Each Nostril route daily 1 Bottle 2    albuterol sulfate HFA (PROVENTIL HFA) 108 (90 Base) MCG/ACT inhaler Inhale 2 puffs into the lungs every 6 hours as needed for Wheezing or Shortness of Breath 1 Inhaler 2    Dulaglutide (TRULICITY) 7.16 HY/7.3JJ SOPN Inject 0.75 mg into the skin once a week (Patient not taking: Reported on 6/28/2021) 2 pen 1     No current facility-administered medications for this visit. Allergies:  Penicillins, Arginine, Imdur [isosorbide dinitrate], Metoprolol, Nitroglycerin, and Cephalexin     Review of Systems:   A 14 point review of symptoms completed. Pertinent positives identified in the HPI, all other review of symptoms negative as below.     Objective:   PHYSICAL EXAM:    Vitals:    07/08/21 0929   BP: 118/80   Pulse: 89   SpO2: 98%    Weight: 175 lb (79.4 kg)     Wt Readings from Last 3 Encounters:   07/08/21 175 lb (79.4 kg)   06/28/21 178 lb (80.7 kg)   06/14/21 176 lb (79.8 kg)         General Appearance:  Alert, cooperative, no distress, appears stated age, overweight   Head:  Normocephalic, atraumatic   Eyes:  PERRL, conjunctiva/corneas clear   Nose: Nares normal, no drainage or sinus tenderness   Throat: Lips, mucosa, and tongue normal   Neck: Supple, symmetrical, trachea midline, NL thyroid no carotid bruit or JVD   Lungs:   CTAB, respirations unlabored   Chest Wall:  No tenderness or deformity   Heart:  Regular rhythm and normal rate; S1, S2 are normal;   no murmur noted; no rub or gallop   Abdomen:   Soft, non-tender, +BS x 4, no masses, no organomegaly   Extremities: Extremities normal, atraumatic, no cyanosis or edema   Pulses: 2+ and symmetric   Skin: Skin color, texture, turgor normal, no rashes or lesions   Pysch: Normal mood and affect   Neurologic: Normal gross motor and sensory exam.         LABS   CBC:      Lab Results   Component Value Date    WBC 5.7 06/11/2021    RBC 5.15 06/11/2021    HGB 15.6 06/11/2021    HCT 46.1 06/11/2021    MCV 89.5 06/11/2021    RDW 13.0 06/11/2021     06/11/2021     CMP:  Lab Results   Component Value Date     06/11/2021    K 4.4 06/11/2021    CL 98 06/11/2021    CO2 24 06/11/2021    BUN 10 06/11/2021    CREATININE <0.5 06/11/2021    GFRAA >60 06/11/2021    GFRAA >60 06/06/2013    AGRATIO 1.5 06/11/2021    LABGLOM >60 06/11/2021    GLUCOSE 404 06/11/2021    PROT 7.2 06/11/2021    PROT 7.0 01/22/2013    CALCIUM 9.8 06/11/2021    BILITOT 0.9 06/11/2021    ALKPHOS 93 06/11/2021    AST 24 06/11/2021    ALT 29 06/11/2021     PT/INR:   No results found for: PTINR  Liver:  No components found for: CHLPL  Lab Results   Component Value Date    ALT 29 06/11/2021    AST 24 06/11/2021    ALKPHOS 93 06/11/2021    BILITOT 0.9 06/11/2021     Lab Results   Component Value Date    LABA1C 7.4 07/30/2019     Lipids:         Lab Results   Component Value Date    TRIG 103 07/30/2019    TRIG 111 04/09/2019    TRIG 110 02/06/2019            Lab Results   Component Value Date    HDL 41 07/30/2019    HDL 44 04/09/2019    HDL 40 02/06/2019            Lab Results   Component Value Date    LDLCALC 33 07/30/2019    LDLCALC 50 04/09/2019    LDLCALC 61 02/06/2019            Lab Results   Component Value Date    LABVLDL 21 07/30/2019    LABVLDL 22 04/09/2019    LABVLDL 22 02/06/2019         CARDIAC DATA   EKG:  10/26/2020 normal sinus rhythm, RSR prime pattern. Nonspecific ST changes. Inferior infarct    ECHO 7/8/20  Summary   Technically difficult examination due to body habitus. Normal left ventricle size, wall thickness, and systolic function with a   visually estimated ejection fraction of 55%. No regional wall motion abnormalities are seen. Normal left ventricular diastolic filling pressure. Systolic pulmonary artery pressure (SPAP) is normal and estimated at 27 mmHg   (right atrial pressure 3 mmHg). ECHO/MUGA: 9/12/18   Summary   LV systolic function is normal with EF estimated at 55%. Mild hypokinesis of the inferior and lateral walls. No regional wall motion abnormalities. Normal left ventricular diastolic filling pressure. Trivial tricuspid regurgitation. Systolic pulmonary artery pressure (SPAP) is normal estimated at 24mmHg   (Right atrial pressure of 3mmHg). STRESS TEST: 3/18/19  Summary  Small-moderate sized inferolateral partial reversibility defect consistent  with ischemia and infarction in the territory of the mid and distal LCx  and/or RCA. Fixed defects in the mid and basal lateral walls c/w infarct. Hyperdynamic LV systolic function with WN>16% with uniform wall motion. Intermediate risk abnormal study.      CARDIAC CATH 1/10/20  LEFT HEART CATH  LM: luminals  LAD: long section of stent in prox/mid LAD (HERO-3 flow), mid 30% restenosis at acute bend in vessel (Likely LIMA insertion site)  LCX: mid stent with 20% restenosis                OM1- luminals                Om2- Long section of disease from LCx into mid OM1- 65-70% (but OM2 is bypassed) overall improved                + competative flow up SVG graft  RCA: Dominant,  100% mid     LIMA-LAD: 100% occluded, + competative flow up SVG graft  RCA: dominant,  LVEDP: 7  LVEF:  40% with mild apical inferior HK  POBA of mid LAD  NC balloon to distal stent--> post dilated to 2.5mm  NC balloon (3.25 x 15mm) to mid stent-> 3.28mm  OCT  Showed stent well opposed but signficant neointimal hyperplasia. Assessment  1. Restenosis at mid LAD stent around heavy angulated section of LAD                75%, HERO-2 ---> 0%, HERO 3 flow  2. Spasm of left main and also LCx that improved with NTG                - Will increase L-arginine to 1000mg po BID                - imdur allergy, will trial NTG transdermal patch  3. Cont ASA and Plavix     LEFT HEART CATH 4/9/19  LM: short, luminals   LAD: moderate diffuse disease, mid 80%, distal stent with 50% restenosis and HERO-2 flow  LCX: mid patent stent,  50% into OM1 just until anastomosis site. (signficant improvement with iC NTG)    LVEDP: 11  LVEF: 45% with mild ant HK     PCI of prox and mid LAD  STENT: Resolute [place] 3 x 22mm followed by 3 x 38mm. prox was post dilated to 3.5mm and mid was dilated to 3.25mm. Distal stent by OCT showed more restenosis then would expect for new stent and initially poor distal flow so POBA'd this section using 2.25 x 15mm NC trek  Assessment  1. Successful PCI to prox and mid LAD using CSI orbital arthrectomy and 2 ame drug stents. 0% residual and HERO-3 flow. OCT showed heavy calcific and fibroatheroma burdon and good stent expansion,   2. POBA of distal LAD stent for restenosis and HERO-2 flow. 0% residual and HREO-3 flow   3. Cont ASA 81mg po qday for life  4. Plavix 75mg poqday for life as tolerated  5. Integrillin for 6 hrs     LEFT HEART CATH 2/6/19  LM: luminals  LAD: mid 60%, mid/distal 60% and distal small vessel.  NO significant competitive flow seen  LCX: luminals, patent mid LCx stent extending into OM1,                 OM1- superior branch with 30% long lesion and +flow up SVG graft, inferior branch small with 70% lesions  RCA: dominant, Moderate-severe diffuse disease, long lesion of 50% in [id number]. SVG-PDA graft seen with retrograde flow     LIMA-LAD: atretic, no significantflow to native LAD  SVG-OM2: patent, small vein graft (prx and mid graft is small in size, plumps up distally, + valve seen. Good antegrade flow  SVG-RT PDA: patent  LVEDP: 25  LVEF: 45% with lateral wall hypokinesis  FFR: mid LAD 0.93-->0.83  FFR of mid and distal lesion 0.77. Unable to get post FFR as FFR machine went down  PCI of lesion 1: mid/distal LAD  Resolute Agustin 2 x 26mm, post dilated to 2 mm  Assessment  1. Patent mid LCx stent, interval closure of LIMA graft. 2 lesions in LAD were FFR'd and distal lesion ischemic and stented using Agustin drug stent 2 x 26mm with HERO-3 flow  2. Watch mid LAD lesion for ischemic progression  3. ASA 81mg poqday for life  4. Plavix 75mg poqday for 1 yr w/o inturruption  5. BB, statin. VASCULAR/OTHER IMAGING:  CARDIAC MRI 11/27/19  Mild cardiomegaly with normal thickness of left ventricular myocardium, including septum  Infarction involving the lateral wall extending into the anterolateral and inferolateral segments as well as a portion of the apex-see above. No signs of mitral, tricuspid or aortic regurgitation. Assessment and Plan   Loli Leong is a 63 y.o. female who presents today for the following problems:      1. CAD              - 8/6/2019 POBA to mid LAD for restenosis. - 4/9/2019 PCI to prox/mid LAD (CSI and distal POBA)              - 2/6/2019 PCI to mid/distal LAD              - 6/3/2018 CABG (LIMA-LAD, SVG-OM2, SVG-RCA)                          - LIMA atretic              - 5/18/2018 PCI mid LCX  2. Chronic stable angina   -Difficult to discern angina versus sternal wire pain versus noncardiac   - no help with Ranexa   - intolerant to Imdur and l-arginine, unable to get to rehab or [facility]   - Sublingual NTG helps at times  3. Ischemic cardiomyopathy: Recovered LVEF 45%-->55%  4.  HTN: controlled  5. HLD: Controlled  6. DM: Controlled  7. Hx of tobacco abuse: Has quit! PLAN  1. Patient with extensive cardiac history. Essentially LIMA is atretic and has had a full metal jacket to her LAD. Despite her chest pain cardiac cath is demonstrated patent LAD stents, patent grafts. She has a small distal circumflex that is stenotic but not revascularizable due to its small size.   -Overall her cardiac cath in January 2020 looked improved due to remodeling compared to previous cath and EF is now normal  2. Have not made her better with any cardiac medications. - will ask PCP to eval noncardiac causes, ? GI vs msk/fibromylagia   - did d/w pt having surgeon eval for sternal wire pain- pt not interested  3. For any angina she should take her nitroglycerin sublingually as needed. Patient Active Problem List   Diagnosis    Coronary artery disease involving native heart with angina pectoris ([facility] [address]    Essential hypertension    Mixed hyperlipidemia    Cerebral aneurysm, nonruptured    DM2 (diabetes mellitus, type 2) ([facility] [address]    Hx TIA involving L-ICA    Former smoker    Chronic tension type headache    GERD (gastroesophageal reflux disease)    Obesity (BMI 30-39. 9)    CAD s/p CABGx3 (June 2018) & stents (May 2018)    Hx of combined systolic (EF 53-14%) & diastolic (grade 1 LVDD) CHF    Coronary artery spasm [facility])       Patient Plan:  1. Refer to PCP for evaluation of chest pain. Cardiac testing has been negative. Possibly be referred to GI  2. Discussed following up with surgeon Dr. Michelle Russo to have sternal wires assessed/removed. 3. Follow up with me in 1 year      It is a pleasure to assist in the care of Lilia Leiva. Please call with any questions.     This note was scribed in the presence of Amalia Molina MD by Jess Emery RN.        Jo-Ann Garcia MD, personally performed the services described in this documentation as scribed by the above signed scribe in my presence, and it is both accurate and complete to the best of our ability and knowledge. I agree with the details independently gathered by my clinical support staff, while the remaining scribed note accurately describes my personal service to the patient. The above RN is working as a scribe for and in the presence of myself . Working as a scribe, the RN may have prepopulated components of this note with my historical intellectual property under my direct supervision. Any additions to this intellectual property were performed at my direction. Furthermore, the content and accuracy of this note have been reviewed by me to the best of my ability.          Preeti Pinedo MD, [id number] [facility] Cardiologist  [facility]  [phone] [facility]  [phone] [address] [place] [address]

## 2021-07-08 ENCOUNTER — OFFICE VISIT (OUTPATIENT)
Dept: CARDIOLOGY CLINIC | Age: 56
End: 2021-07-08
Payer: MEDICAID

## 2021-07-08 VITALS
BODY MASS INDEX: 32.2 KG/M2 | HEART RATE: 89 BPM | HEIGHT: 62 IN | DIASTOLIC BLOOD PRESSURE: 80 MMHG | SYSTOLIC BLOOD PRESSURE: 118 MMHG | WEIGHT: 175 LBS | OXYGEN SATURATION: 98 %

## 2021-07-08 DIAGNOSIS — I50.42 CHRONIC COMBINED SYSTOLIC AND DIASTOLIC CHF (CONGESTIVE HEART FAILURE) (HCC): Chronic | ICD-10-CM

## 2021-07-08 DIAGNOSIS — I10 ESSENTIAL HYPERTENSION: Chronic | ICD-10-CM

## 2021-07-08 DIAGNOSIS — E78.2 MIXED HYPERLIPIDEMIA: Chronic | ICD-10-CM

## 2021-07-08 DIAGNOSIS — I25.119 CORONARY ARTERY DISEASE INVOLVING NATIVE CORONARY ARTERY OF NATIVE HEART WITH ANGINA PECTORIS (HCC): Primary | ICD-10-CM

## 2021-07-08 DIAGNOSIS — R53.83 FATIGUE, UNSPECIFIED TYPE: ICD-10-CM

## 2021-07-08 DIAGNOSIS — I20.8 CHRONIC STABLE ANGINA (HCC): ICD-10-CM

## 2021-07-08 DIAGNOSIS — R07.9 CHEST PAIN, UNSPECIFIED TYPE: ICD-10-CM

## 2021-07-08 DIAGNOSIS — I25.5 ISCHEMIC CARDIOMYOPATHY: ICD-10-CM

## 2021-07-08 PROCEDURE — G8417 CALC BMI ABV UP PARAM F/U: HCPCS | Performed by: INTERNAL MEDICINE

## 2021-07-08 PROCEDURE — 3017F COLORECTAL CA SCREEN DOC REV: CPT | Performed by: INTERNAL MEDICINE

## 2021-07-08 PROCEDURE — G8427 DOCREV CUR MEDS BY ELIG CLIN: HCPCS | Performed by: INTERNAL MEDICINE

## 2021-07-08 PROCEDURE — 99214 OFFICE O/P EST MOD 30 MIN: CPT | Performed by: INTERNAL MEDICINE

## 2021-07-08 PROCEDURE — 1036F TOBACCO NON-USER: CPT | Performed by: INTERNAL MEDICINE

## 2021-07-08 RX ORDER — NITROGLYCERIN 0.4 MG/1
TABLET SUBLINGUAL
Qty: 25 TABLET | Refills: 3 | Status: SHIPPED | OUTPATIENT
Start: 2021-07-08

## 2021-07-08 NOTE — PATIENT INSTRUCTIONS
1. Refer to PCP for evaluation of chest pain. Cardiac testing has been negative. Possibly be referred to GI  2. Discussed following up with surgeon Dr. Nina Hedrick to have sternal wires assessed/removed.    3. Follow up with me in 1 year

## 2021-07-21 ENCOUNTER — TELEPHONE (OUTPATIENT)
Dept: FAMILY MEDICINE CLINIC | Age: 56
End: 2021-07-21

## 2021-07-21 DIAGNOSIS — E11.59 TYPE 2 DIABETES MELLITUS WITH OTHER CIRCULATORY COMPLICATION, WITHOUT LONG-TERM CURRENT USE OF INSULIN (HCC): ICD-10-CM

## 2021-07-21 RX ORDER — INSULIN GLARGINE 100 [IU]/ML
7 INJECTION, SOLUTION SUBCUTANEOUS NIGHTLY
Qty: 2 PEN | Refills: 0 | Status: SHIPPED
Start: 2021-07-21 | End: 2021-08-25 | Stop reason: SDUPTHER

## 2021-07-21 NOTE — TELEPHONE ENCOUNTER
Patient informed. She has not started trulicity, going to get it today. Informed to call back in 2-3 weeks with how her sugars are doing.

## 2021-07-21 NOTE — TELEPHONE ENCOUNTER
Patient says her blood sugar has not been lower than 160 and as high as 300. At night it is higher and in morning it is lower.  Asking if she needs to increase her lantus

## 2021-07-21 NOTE — TELEPHONE ENCOUNTER
She can increase by 2 units. I'm hesitant to increase it more as her Trulicity is going to start working soon. We can also consider increase her dose of Trulicity if she desires and is tolerating it well.

## 2021-08-25 ENCOUNTER — OFFICE VISIT (OUTPATIENT)
Dept: FAMILY MEDICINE CLINIC | Age: 56
End: 2021-08-25
Payer: MEDICAID

## 2021-08-25 VITALS
SYSTOLIC BLOOD PRESSURE: 124 MMHG | WEIGHT: 175 LBS | HEART RATE: 80 BPM | OXYGEN SATURATION: 96 % | TEMPERATURE: 97.7 F | BODY MASS INDEX: 32.01 KG/M2 | DIASTOLIC BLOOD PRESSURE: 70 MMHG

## 2021-08-25 DIAGNOSIS — I25.119 CORONARY ARTERY DISEASE INVOLVING NATIVE CORONARY ARTERY OF NATIVE HEART WITH ANGINA PECTORIS (HCC): ICD-10-CM

## 2021-08-25 DIAGNOSIS — E11.59 TYPE 2 DIABETES MELLITUS WITH OTHER CIRCULATORY COMPLICATION, WITHOUT LONG-TERM CURRENT USE OF INSULIN (HCC): Primary | ICD-10-CM

## 2021-08-25 DIAGNOSIS — I10 ESSENTIAL HYPERTENSION: ICD-10-CM

## 2021-08-25 DIAGNOSIS — F33.2 SEVERE EPISODE OF RECURRENT MAJOR DEPRESSIVE DISORDER, WITHOUT PSYCHOTIC FEATURES (HCC): ICD-10-CM

## 2021-08-25 LAB — HBA1C MFR BLD: 6.8 %

## 2021-08-25 PROCEDURE — 3017F COLORECTAL CA SCREEN DOC REV: CPT | Performed by: NURSE PRACTITIONER

## 2021-08-25 PROCEDURE — G8417 CALC BMI ABV UP PARAM F/U: HCPCS | Performed by: NURSE PRACTITIONER

## 2021-08-25 PROCEDURE — 99214 OFFICE O/P EST MOD 30 MIN: CPT | Performed by: NURSE PRACTITIONER

## 2021-08-25 PROCEDURE — G8427 DOCREV CUR MEDS BY ELIG CLIN: HCPCS | Performed by: NURSE PRACTITIONER

## 2021-08-25 PROCEDURE — 2022F DILAT RTA XM EVC RTNOPTHY: CPT | Performed by: NURSE PRACTITIONER

## 2021-08-25 PROCEDURE — 3044F HG A1C LEVEL LT 7.0%: CPT | Performed by: NURSE PRACTITIONER

## 2021-08-25 PROCEDURE — 1036F TOBACCO NON-USER: CPT | Performed by: NURSE PRACTITIONER

## 2021-08-25 PROCEDURE — 83036 HEMOGLOBIN GLYCOSYLATED A1C: CPT | Performed by: NURSE PRACTITIONER

## 2021-08-25 NOTE — PROGRESS NOTES
8/25/2021    Chief Complaint   Patient presents with    Diabetes     checks sugar bid and they are still running around 200    Hyperlipidemia    Hypertension    Knee Pain     bilateral knee pain     Other     she needs a new glucometer and she wants it sent to [address] is a 61 y.o. female, presents today for:      ASSESSMENT/PLAN:  1. 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 Never obtained A1C from prior PCP but if it was 12% as per her report (which would be consistent with 300-400 BS readings she presented with), the GLP1 class of medication has been very effective for her. Continue Trulicity. Continue Lantus at 5 units for now. If BS <100 asked pt to hold Lantus injection. Goal is for her to not be on insulin and primarily GLP1 and SGLT2. Continue Metformin  Continue ACE/ statin  Encouraged annual eye exam.    - insulin glargine (LANTUS SOLOSTAR) 100 UNIT/ML injection pen; Inject 7 Units into the skin nightly  Dispense: 5 pen; Refill: 3  - POCT glycosylated hemoglobin (Hb A1C)  - amitriptyline (ELAVIL) 25 MG tablet; Take 1 tablet by mouth nightly  Dispense: 30 tablet; Refill: 5    2. Coronary artery disease involving native coronary artery of native heart with angina pectoris (HCC)  Trial Amitriptyline for pain today. Reassuring visit with Cardio last month. Stress Testing was normal.   Continue Lisinopril, Plavix, Atorvastatin, Amlodipine, ASA  Encouraged heart healthy diet  Encouraged 30 min activity daily  Encouraged weight loss    3. Essential hypertension  See #2    4. Severe episode of recurrent major depressive disorder, without psychotic features ([facility] [address]  Start Amitriptyline for pain, sleep and depression. Discussed how and when to take and possible SE. Return in about 2 weeks (around 9/8/2021) for depression/ amitripytline phone please.        Presenting today for chronic disease follow up. DM:  Now taking Trulicity and tolerating well as Ozempic was not covered. Continuing to take Lantus 5 units daily with no hypoglycemic episodes reports. Is checking -140s. Watching diet. No structured exercise. Annual eye exams are not up to date. Is having some bilateral feet neuropathy, attempted Cymbalta in the past without relief of symptoms. Requesting new med to help if possible. HTN/ CP: No CP, SOB, leg swelling, orthopnea, PND. Tolerating meds well. Not checking BP at home. Watching diet intake. No structured physical activity. Recent OV with Dr. Renata Carrillo who adjusted her medications due to her chest pain  HLD: No focal sensory loss, leg myalgias. Tolerating atorvastatin. Depression: Persistenting, down feelings most days out of the week. Difficulty sleeping. No SI/ HI. Prior med: Cymbalta. Requesting new med if possible.       Lab Results   Component Value Date    CHOL 95 07/30/2019    CHOL 116 04/09/2019    CHOL 123 02/06/2019     Lab Results   Component Value Date    TRIG 103 07/30/2019    TRIG 111 04/09/2019    TRIG 110 02/06/2019     Lab Results   Component Value Date    HDL 41 07/30/2019    HDL 44 04/09/2019    HDL 40 02/06/2019     Lab Results   Component Value Date    LDLCALC 33 07/30/2019    LDLCALC 50 04/09/2019    LDLCALC 61 02/06/2019     Lab Results   Component Value Date    LABVLDL 21 07/30/2019    LABVLDL 22 04/09/2019    LABVLDL 22 02/06/2019     No results found for: [facility]    Lab Results   Component Value Date     (L) 06/11/2021    K 4.4 06/11/2021    CL 98 (L) 06/11/2021    CO2 24 06/11/2021    BUN 10 06/11/2021    CREATININE <0.5 (L) 06/11/2021    GLUCOSE 404 (H) 06/11/2021    CALCIUM 9.8 06/11/2021    PROT 7.2 06/11/2021    LABALBU 4.3 06/11/2021    BILITOT 0.9 06/11/2021    ALKPHOS 93 06/11/2021    AST 24 06/11/2021    ALT 29 06/11/2021    LABGLOM >60 06/11/2021    GFRAA >60 06/11/2021    AGRATIO 1.5 06/11/2021    GLOB 2.9 06/11/2021        Lab Results   Component Value Date    LABA1C 6.8 08/25/2021    LABA1C 7.4 07/30/2019    LABA1C 7.1 09/11/2018     Lab Results   Component Value Date    .7 07/30/2019        Lab Results   Component Value Date    LABMICR YES 10/26/2020     Review of Systems   Constitutional: Negative. Respiratory: Negative for cough, chest tightness and shortness of breath. Cardiovascular: Negative. Gastrointestinal: Negative for blood in stool, constipation and diarrhea. Skin: Negative. Neurological: Negative for dizziness, tremors, light-headedness and headaches. Psychiatric/Behavioral: Negative for decreased concentration, dysphoric mood, self-injury, sleep disturbance and suicidal ideas. The patient is not nervous/anxious. Current Outpatient Medications on File Prior to Visit   Medication Sig Dispense Refill    nitroGLYCERIN (NITROSTAT) 0.4 MG SL tablet up to max of 3 total doses.  If no relief after 1 dose, call 911. 25 tablet 3    lisinopril (PRINIVIL;ZESTRIL) 20 MG tablet Take 1 tablet by mouth daily 90 tablet 0    pantoprazole (PROTONIX) 40 MG tablet Take 1 tablet by mouth 2 times daily 60 tablet 1    ondansetron (ZOFRAN ODT) 4 MG disintegrating tablet Take 1 tablet by mouth every 8 hours as needed for Nausea or Vomiting 15 tablet 0    atorvastatin (LIPITOR) 40 MG tablet TAKE ONE TABLET BY MOUTH EVERY DAY AT NIGHT 30 tablet 4    amLODIPine (NORVASC) 5 MG tablet Take 2 tablets by mouth daily 60 tablet 9    furosemide (LASIX) 20 MG tablet Take 1 tablet by mouth daily 30 tablet 5    aspirin 81 MG chewable tablet Take 1 tablet by mouth daily 30 tablet 11    clopidogrel (PLAVIX) 75 MG tablet Take 1 tablet by mouth daily 30 tablet 11    fluticasone (FLONASE) 50 MCG/ACT nasal spray 1 spray by Each Nostril route daily 1 Bottle 2    albuterol sulfate HFA (PROVENTIL HFA) 108 (90 Base) MCG/ACT inhaler Inhale 2 puffs into the lungs every 6 hours as needed for Wheezing or Shortness of Breath 1 Inhaler 2     No current facility-administered medications on file prior to visit. Allergies   Allergen Reactions    Penicillins Itching, Swelling and Rash    Arginine      Nausea and vomiting     Cymbalta [Duloxetine Hcl] Other (See Comments)     Severe headache, abnormal dreams    Imdur [Isosorbide Dinitrate] Swelling     nausea    Metoprolol      Headache, nausea     Nitroglycerin      Headache     Cephalexin Itching and Rash     Past Medical History:   Diagnosis Date    Acute blood loss anemia     Asthma     CAD (coronary artery disease)     Chest pain     [facility]ch[facility] obstructive pulmonary [facility]     Coronary artery disease 9/4/2012    Depression     Diabetes mellitus (HCC)     Enlarged heart     Fatigue     Generalized headaches     Hyperlipidemia     Hypertension     Migraine     Morning stiffness of joints     Myocardial infarction ([address] 05/13/2018    Numbness or tingling hands and feet    OA (osteoarthritis) of knee 1/27/2015    Obesity, Class I, BMI 30.0-34.9 (see actual BMI)     Osteoporosis     Pulmonary nodule     S/P PTCA (percutaneous transluminal coronary angioplasty) 2/6/2019    Shortness of breath      Past Surgical History:   Procedure Laterality Date    CARDIAC CATHETERIZATION  04/01/2016    Dr. Amberly Crain. Sarita Corcoran CARDIAC CATHETERIZATION  08/01/2012    Dr. Lexi Prieto  11/01/2019    Non Obs CAD, medical management    CHOLECYSTECTOMY      CORONARY ANGIOPLASTY  08/06/2019    POBA of mid LAD, [place] Balloon- 3.25 x 15    CORONARY ANGIOPLASTY WITH STENT PLACEMENT  05/13/2018    Dr. Bolivar Coughlin - w/placement of IABP, Xience 3.25 x 18 mid Circ, POBA 2.5 x 12 to prox LAD    CORONARY ARTERY BYPASS GRAFT  06/13/2018    LIMA, SVG to OM2, SVG to [address] CATH LAB PROCEDURE  06/06/2013    Dr. Dayanna Lovell - Non Obs CAD    HYSTERECTOMY      PTCA      UPPER GASTROINTESTINAL ENDOSCOPY        Social History Tobacco Use    Smoking status: Former Smoker     Packs/day: 1.00     Years: 20.00     Pack years: 20.00     Types: Cigarettes     Quit date: 5/1/2018     Years since quitting: 3.3    Smokeless tobacco: Never Used    Tobacco comment: Quit Mother's Day 2018   Substance Use Topics    Alcohol use: No     Alcohol/week: 0.0 standard drinks      Family History   Problem Relation Age of Onset    Emphysema Mother     Heart Failure Mother     Hypertension Mother     Heart Disease Mother     High Blood Pressure Mother     High Cholesterol Mother     Stroke Mother     Heart Failure Father     Hypertension Father     Heart Disease Father     High Blood Pressure Father     High Cholesterol Father     Hypertension Sister     High Blood Pressure Sister     Hypertension Brother     High Blood Pressure Brother     Cancer Brother     Hypertension Maternal Grandmother     High Blood Pressure Maternal Grandmother     Hypertension Maternal Grandfather     High Blood Pressure Maternal Grandfather     Hypertension Paternal Grandmother     High Blood Pressure Paternal Grandmother     Hypertension Paternal Grandfather     High Blood Pressure Paternal Grandfather     High Blood Pressure Sister     High Blood Pressure Sister     High Blood Pressure Sister     Heart Failure Maternal Aunt     Hypertension Maternal Aunt     Heart Failure Maternal Uncle     Hypertension Maternal Uncle     Cancer Other         nephew-colon, liver, lung    Heart Failure Other     Diabetes Other     Osteoarthritis Other     Hypertension Paternal Aunt     Hypertension Paternal Uncle     Asthma Neg Hx         Vitals:    08/25/21 1301   BP: 124/70   Pulse: 80   Temp: 97.7 °F (36.5 °C)   TempSrc: Tympanic   SpO2: 96%   Weight: 175 lb (79.4 kg)     Estimated body mass index is 32.01 kg/m² as calculated from the following:    Height as of 7/8/21: 5' 2\" (1.575 m). Weight as of this encounter: 175 lb (79.4 kg).     Physical Exam  Vitals and nursing note reviewed. Constitutional:       Appearance: Normal appearance. Neck:      Vascular: No carotid bruit. Cardiovascular:      Rate and Rhythm: Normal rate and regular rhythm. Pulses: Normal pulses. Carotid pulses are 2+ on the right side and 2+ on the left side. Heart sounds: Normal heart sounds. Pulmonary:      Effort: Pulmonary effort is normal.   Abdominal:      General: Abdomen is flat. Palpations: Abdomen is soft. Musculoskeletal:      Cervical back: Normal range of motion. Right lower leg: No edema. Left lower leg: No edema. Lymphadenopathy:      Cervical: No cervical adenopathy. Skin:     General: Skin is warm and dry. Capillary Refill: Capillary refill takes less than 2 seconds. Neurological:      General: No focal deficit present. Mental Status: She is alert and oriented to person, place, and time. Psychiatric:         Mood and Affect: Mood normal.         Behavior: Behavior normal.           Patient's questions answered and concerns addressed. Patient agrees to plan of care.           Electronically signed by GERMAN Marcos CNP on 9/22/2021 at 10:42 AM

## 2021-08-26 ENCOUNTER — TELEPHONE (OUTPATIENT)
Dept: FAMILY MEDICINE CLINIC | Age: 56
End: 2021-08-26

## 2021-08-26 DIAGNOSIS — E11.59 TYPE 2 DIABETES MELLITUS WITH OTHER CIRCULATORY COMPLICATION, WITHOUT LONG-TERM CURRENT USE OF INSULIN (HCC): ICD-10-CM

## 2021-08-26 RX ORDER — GLUCOSAMINE HCL/CHONDROITIN SU 500-400 MG
CAPSULE ORAL
Qty: 200 STRIP | Refills: 5 | Status: SHIPPED | OUTPATIENT
Start: 2021-08-26 | End: 2022-03-04 | Stop reason: CLARIF

## 2021-08-26 RX ORDER — INSULIN GLARGINE 100 [IU]/ML
7 INJECTION, SOLUTION SUBCUTANEOUS NIGHTLY
Qty: 5 PEN | Refills: 3 | Status: SHIPPED | OUTPATIENT
Start: 2021-08-26 | End: 2022-03-04 | Stop reason: SDUPTHER

## 2021-08-26 RX ORDER — AMITRIPTYLINE HYDROCHLORIDE 25 MG/1
25 TABLET, FILM COATED ORAL NIGHTLY
Qty: 30 TABLET | Refills: 5 | Status: SHIPPED | OUTPATIENT
Start: 2021-08-26 | End: 2022-03-04 | Stop reason: ALTCHOICE

## 2021-08-26 NOTE — TELEPHONE ENCOUNTER
Sent in test strips and metformin pt stated she didn't get this rx either at [address] [place] [address]

## 2021-08-26 NOTE — TELEPHONE ENCOUNTER
Shanon Russo was supposed to be taking care of the glucose strips. I don't know the name of her meter. I sent the Amitriptyline.

## 2021-08-26 NOTE — TELEPHONE ENCOUNTER
Pt needs a script for glucose strips sent to new pharmacy. Pt and her pharmacy also never got the script for Amitriptyline.

## 2021-09-03 DIAGNOSIS — E11.59 TYPE 2 DIABETES MELLITUS WITH OTHER CIRCULATORY COMPLICATION, WITHOUT LONG-TERM CURRENT USE OF INSULIN (HCC): ICD-10-CM

## 2021-09-03 NOTE — TELEPHONE ENCOUNTER
Last office visit 2021     Last written 2021    Next office visit scheduled 9/10/2021    Requested Prescriptions     Pending Prescriptions Disp Refills    Insulin Pen Needle 32G X 4 MM MISC 100 each 0     Si each by Does not apply route daily For Lantus

## 2021-09-09 ENCOUNTER — TELEPHONE (OUTPATIENT)
Dept: FAMILY MEDICINE CLINIC | Age: 56
End: 2021-09-09

## 2021-09-09 DIAGNOSIS — E11.59 TYPE 2 DIABETES MELLITUS WITH OTHER CIRCULATORY COMPLICATION, WITHOUT LONG-TERM CURRENT USE OF INSULIN (HCC): Primary | ICD-10-CM

## 2021-09-09 RX ORDER — DULAGLUTIDE 1.5 MG/.5ML
1.5 INJECTION, SOLUTION SUBCUTANEOUS WEEKLY
Qty: 4 PEN | Refills: 2 | Status: SHIPPED | OUTPATIENT
Start: 2021-09-09 | End: 2022-01-13

## 2021-09-09 NOTE — TELEPHONE ENCOUNTER
Patient says her blood sugar is running around 200. New medication, amitriptyline 25 mg was too strong so she is taking half at night. Asking if you want to increase the Trulicity.  [id number]

## 2021-09-22 ASSESSMENT — ENCOUNTER SYMPTOMS
CHEST TIGHTNESS: 0
CONSTIPATION: 0
DIARRHEA: 0
SHORTNESS OF BREATH: 0
COUGH: 0
BLOOD IN STOOL: 0

## 2021-10-08 RX ORDER — PANTOPRAZOLE SODIUM 40 MG/1
TABLET, DELAYED RELEASE ORAL
Qty: 60 TABLET | Refills: 0 | Status: SHIPPED | OUTPATIENT
Start: 2021-10-08 | End: 2021-12-01

## 2021-10-21 ENCOUNTER — HOSPITAL ENCOUNTER (EMERGENCY)
Age: 56
Discharge: HOME | End: 2021-10-21
Attending: EMERGENCY MEDICINE
Payer: MEDICAID

## 2021-10-21 ENCOUNTER — APPOINTMENT (OUTPATIENT)
Dept: GENERAL RADIOLOGY | Age: 56
End: 2021-10-21
Payer: MEDICAID

## 2021-10-21 VITALS
HEIGHT: 61 IN | SYSTOLIC BLOOD PRESSURE: 136 MMHG | TEMPERATURE: 98.9 F | HEART RATE: 80 BPM | WEIGHT: 173 LBS | BODY MASS INDEX: 32.66 KG/M2 | DIASTOLIC BLOOD PRESSURE: 80 MMHG | RESPIRATION RATE: 16 BRPM | OXYGEN SATURATION: 97 %

## 2021-10-21 DIAGNOSIS — R07.9 CHEST PAIN, UNSPECIFIED TYPE: Primary | ICD-10-CM

## 2021-10-21 LAB
ANION GAP SERPL CALCULATED.3IONS-SCNC: 12 MMOL/L (ref 3–16)
BASOPHILS ABSOLUTE: 0 K/UL (ref 0–0.2)
BASOPHILS RELATIVE PERCENT: 0.8 %
BUN BLDV-MCNC: 11 MG/DL (ref 7–20)
CALCIUM SERPL-MCNC: 9.7 MG/DL (ref 8.3–10.6)
CHLORIDE BLD-SCNC: 98 MMOL/L (ref 99–110)
CO2: 26 MMOL/L (ref 21–32)
CREAT SERPL-MCNC: 0.6 MG/DL (ref 0.6–1.1)
EKG ATRIAL RATE: 86 BPM
EKG DIAGNOSIS: NORMAL
EKG P AXIS: 49 DEGREES
EKG P-R INTERVAL: 140 MS
EKG Q-T INTERVAL: 380 MS
EKG QRS DURATION: 102 MS
EKG QTC CALCULATION (BAZETT): 454 MS
EKG R AXIS: -21 DEGREES
EKG T AXIS: 45 DEGREES
EKG VENTRICULAR RATE: 86 BPM
EOSINOPHILS ABSOLUTE: 0.1 K/UL (ref 0–0.6)
EOSINOPHILS RELATIVE PERCENT: 1.1 %
GFR AFRICAN AMERICAN: >60
GFR NON-AFRICAN AMERICAN: >60
GLUCOSE BLD-MCNC: 354 MG/DL (ref 70–99)
HCT VFR BLD CALC: 41.9 % (ref 36–48)
HEMOGLOBIN: 14.5 G/DL (ref 12–16)
LYMPHOCYTES ABSOLUTE: 1.3 K/UL (ref 1–5.1)
LYMPHOCYTES RELATIVE PERCENT: 20.6 %
MCH RBC QN AUTO: 30.8 PG (ref 26–34)
MCHC RBC AUTO-ENTMCNC: 34.7 G/DL (ref 31–36)
MCV RBC AUTO: 88.7 FL (ref 80–100)
MONOCYTES ABSOLUTE: 0.4 K/UL (ref 0–1.3)
MONOCYTES RELATIVE PERCENT: 6.3 %
NEUTROPHILS ABSOLUTE: 4.4 K/UL (ref 1.7–7.7)
NEUTROPHILS RELATIVE PERCENT: 71.2 %
PDW BLD-RTO: 12.9 % (ref 12.4–15.4)
PLATELET # BLD: 171 K/UL (ref 135–450)
PMV BLD AUTO: 8.6 FL (ref 5–10.5)
POTASSIUM SERPL-SCNC: 4.2 MMOL/L (ref 3.5–5.1)
RBC # BLD: 4.72 M/UL (ref 4–5.2)
SODIUM BLD-SCNC: 136 MMOL/L (ref 136–145)
TROPONIN: <0.01 NG/ML
WBC # BLD: 6.1 K/UL (ref 4–11)

## 2021-10-21 PROCEDURE — 85025 COMPLETE CBC W/AUTO DIFF WBC: CPT

## 2021-10-21 PROCEDURE — 93005 ELECTROCARDIOGRAM TRACING: CPT | Performed by: EMERGENCY MEDICINE

## 2021-10-21 PROCEDURE — 84484 ASSAY OF TROPONIN QUANT: CPT

## 2021-10-21 PROCEDURE — 36415 COLL VENOUS BLD VENIPUNCTURE: CPT

## 2021-10-21 PROCEDURE — 80048 BASIC METABOLIC PNL TOTAL CA: CPT

## 2021-10-21 PROCEDURE — 99285 EMERGENCY DEPT VISIT HI MDM: CPT

## 2021-10-21 PROCEDURE — 93010 ELECTROCARDIOGRAM REPORT: CPT | Performed by: INTERNAL MEDICINE

## 2021-10-21 ASSESSMENT — ENCOUNTER SYMPTOMS
SHORTNESS OF BREATH: 0
ABDOMINAL PAIN: 0
BACK PAIN: 0
STRIDOR: 0
COUGH: 0
CHEST TIGHTNESS: 1
WHEEZING: 0

## 2021-10-21 ASSESSMENT — PAIN DESCRIPTION - ONSET: ONSET: PROGRESSIVE

## 2021-10-21 ASSESSMENT — PAIN DESCRIPTION - LOCATION: LOCATION: CHEST

## 2021-10-21 ASSESSMENT — PAIN DESCRIPTION - ORIENTATION: ORIENTATION: MID

## 2021-10-21 ASSESSMENT — PAIN DESCRIPTION - PROGRESSION: CLINICAL_PROGRESSION: GRADUALLY WORSENING

## 2021-10-21 ASSESSMENT — PAIN DESCRIPTION - DESCRIPTORS: DESCRIPTORS: SHARP

## 2021-10-21 ASSESSMENT — PAIN SCALES - GENERAL: PAINLEVEL_OUTOF10: 4

## 2021-10-21 ASSESSMENT — PAIN DESCRIPTION - PAIN TYPE: TYPE: ACUTE PAIN

## 2021-10-21 ASSESSMENT — PAIN DESCRIPTION - FREQUENCY: FREQUENCY: CONTINUOUS

## 2021-10-21 NOTE — ED NOTES
AVS provided and reviewed with the patient. The patient verbalized understanding of care at home, follow up care, and emergent symptoms to return for. No questions or concerns verbalized at this time. The patient is alert, oriented, stable, and ambulatory out of the department at the time of discharge.          Tung Littlejohn RN  10/21/21 [id number]

## 2021-10-21 NOTE — ED PROVIDER NOTES
[address] [facility]      Pt Name: Ivette Palencia  MRN: [id number]  [place] 1965  Date of evaluation: 10/21/2021  Provider: Margy Sarabia MD    [address] [place] [address]       Chief Complaint   Patient presents with    Chest Pain     Midsternal chest pain that started at noon radiating to the back. Nitro SL x3 with improvement in pain. ASA 81 mg X4 taken PTAN         HISTORY OF PRESENT ILLNESS   (Location/Symptom, Timing/Onset, Context/Setting, Quality, Duration, Modifying Factors, Severity)  Note limiting factors. Ivette Palencia is a 61 y.o. female who presents to the emergency department     This patient presents emergency department history of apparently complaining of some tightness in her chest that started about 11 AM he got 1/10 intensity around noon. She therefore drove herself out here at 1:00  Patient is a diabetic type 2 insulin-dependent  Patient has history of hypercholesteremia on Lipitor  Patient has history of hypertension  Patient is a smoker he history but quit in 2018 when she had her heart attack    With the chest pressure this morning which radiated up into her jaw back  She was short of breath nauseated and had a hot flash  She has had a CABG x3 vessels 2018  She had a has a history since then of 4 cardiac stents she is followed by Dr. Lisa Hernandez she is on Plavix and Lipitor  For this chest discomfort today which did radiate to the jaw and back and shoulder she took 4 baby aspirin and 3 nitros and got quite a bit of relief but still drove herself here    Upon my end of my history taking she is only Doc if my EKG are normal and my cardiac enzymes I am just getting the hell out of here and I am not staying so do not waste your time she is    At this point does I did check her vital signs. We went ahead and I did do an exam.    The history is provided by the patient. Nursing Notes were reviewed.     REVIEW OF SYSTEMS    (2-9 systems for level 4, 10 or more for level 5)     Review of Systems   Constitutional: Positive for activity change. Negative for chills and fever. HENT: Negative for congestion. Eyes: Negative for visual disturbance. Respiratory: Positive for chest tightness. Negative for cough, shortness of breath, wheezing and stridor. Cardiovascular: Positive for chest pain. Negative for palpitations and leg swelling. Gastrointestinal: Negative for abdominal pain. Genitourinary: Negative for flank pain. Musculoskeletal: Negative for back pain and neck pain. Allergic/Immunologic: Negative for immunocompromised state. Neurological: Negative for dizziness, seizures, syncope, speech difficulty, light-headedness, numbness and headaches. Psychiatric/Behavioral: Negative for behavioral problems. All other systems reviewed and are negative. Except as noted above the remainder of the review of systems was reviewed and negative. PAST MEDICAL HISTORY     Past Medical History:   Diagnosis Date    Acute blood loss anemia     Asthma     CAD (coronary artery disease)     Chest pain     COPD (chronic obstructive pulmonary disease) (HCC)     Coronary artery disease 9/4/2012    Depression     Diabetes mellitus (HCC)     Enlarged heart     Fatigue     Generalized headaches     Hyperlipidemia     Hypertension     Migraine     Morning stiffness of joints     Myocardial infarction ([facility] [address] 05/13/2018    Numbness or tingling hands and feet    OA (osteoarthritis) of knee 1/27/2015    Obesity, Class I, BMI 30.0-34.9 (see actual BMI)     Osteoporosis     Pulmonary nodule     S/P PTCA (percutaneous transluminal coronary angioplasty) 2/6/2019    Shortness of breath          SURGICAL HISTORY       Past Surgical History:   Procedure Laterality Date    CARDIAC CATHETERIZATION  04/01/2016    Dr. Andreas Mccarty CARDIAC CATHETERIZATION  08/01/2012    Dr. Rodrick Ewing  11/01/2019    Non Obs CAD, medical management    CHOLECYSTECTOMY      CORONARY ANGIOPLASTY  08/06/2019    POBA of mid LAD, [place] Balloon- 3.25 x 15    CORONARY ANGIOPLASTY WITH STENT PLACEMENT  05/13/2018    Dr. Mani Ruffin - w/placement of IABP, Xience 3.25 x 18 mid Circ, POBA 2.5 x 12 to prox LAD    CORONARY ARTERY BYPASS GRAFT  06/13/2018    LIMA, SVG to OM2, SVG to [address] [facility] CATH LAB PROCEDURE  06/06/2013    Dr. Paxton Ulrich - Non Obs CAD    HYSTERECTOMY      PTCA      UPPER GASTROINTESTINAL ENDOSCOPY           CURRENT MEDICATIONS       Discharge Medication List as of 10/21/2021  2:29 PM      CONTINUE these medications which have NOT CHANGED    Details   nitroGLYCERIN (NITROSTAT) 0.4 MG SL tablet up to max of 3 total doses. If no relief after 1 dose, call 911., Disp-25 tablet, R-3Normal      aspirin 81 MG chewable tablet Take 1 tablet by mouth daily, Disp-30 tablet, R-11Normal      pantoprazole (PROTONIX) 40 MG tablet TAKE ONE TABLET BY MOUTH 2 TIMES A DAY, Disp-60 tablet, R-0Normal      Dulaglutide (TRULICITY) 1.5 [place]/4.9DI SOPN Inject 1.5 mg into the skin once a week, Disp-4 pen, R-2Normal      Insulin Pen Needle 32G X 4 MM MISC DAILY Starting Fri 9/3/2021, Disp-100 each, R-0, NormalFor Lantus      insulin glargine (LANTUS SOLOSTAR) 100 UNIT/ML injection pen Inject 7 Units into the skin nightly, Disp-5 pen, R-3Normal      amitriptyline (ELAVIL) 25 MG tablet Take 1 tablet by mouth nightly, Disp-30 tablet, R-5Normal      metFORMIN (GLUCOPHAGE) 1000 MG tablet Take 1 tablet by mouth 2 times daily (with meals), Disp-60 tablet, R-1Normal      blood glucose monitor strips Test blood sugars two times a day.  E11.9- pt uses trumatrix, Disp-200 strip, R-5, Normal      lisinopril (PRINIVIL;ZESTRIL) 20 MG tablet Take 1 tablet by mouth daily, Disp-90 tablet, R-0Normal      ondansetron (ZOFRAN ODT) 4 MG disintegrating tablet Take 1 tablet by mouth every 8 hours as needed for Nausea or Vomiting, Disp-15 tablet, R-0Normal      atorvastatin (LIPITOR) 40 MG tablet TAKE ONE TABLET BY MOUTH EVERY DAY AT NIGHT, Disp-30 tablet, R-4Normal      amLODIPine (NORVASC) 5 MG tablet Take 2 tablets by mouth daily, Disp-60 tablet, R-9Normal      furosemide (LASIX) 20 MG tablet Take 1 tablet by mouth daily, Disp-30 tablet, R-5Normal      clopidogrel (PLAVIX) 75 MG tablet Take 1 tablet by mouth daily, Disp-30 tablet, R-11Normal      fluticasone (FLONASE) 50 MCG/ACT nasal spray 1 spray by Each Nostril route daily, Disp-1 Bottle, R-2Normal      albuterol sulfate HFA (PROVENTIL HFA) 108 (90 Base) MCG/ACT inhaler Inhale 2 puffs into the lungs every 6 hours as needed for Wheezing or Shortness of Breath, Disp-1 Inhaler, R-2Auto sub proair, ventolin or proventil based upon lowest costPrint             ALLERGIES     Penicillins, Arginine, Cymbalta [duloxetine hcl], Imdur [isosorbide dinitrate], Metoprolol, Nitroglycerin, and Cephalexin    FAMILY HISTORY       Family History   Problem Relation Age of Onset    Emphysema Mother     Heart Failure Mother     Hypertension Mother     Heart Disease Mother     High Blood Pressure Mother     High Cholesterol Mother     Stroke Mother     Heart Failure Father     Hypertension Father     Heart Disease Father     High Blood Pressure Father     High Cholesterol Father     Hypertension Sister     High Blood Pressure Sister     Hypertension Brother     High Blood Pressure Brother     Cancer Brother     Hypertension Maternal Grandmother     High Blood Pressure Maternal Grandmother     Hypertension Maternal Grandfather     High Blood Pressure Maternal Grandfather     Hypertension Paternal Grandmother     High Blood Pressure Paternal Grandmother     Hypertension Paternal Grandfather     High Blood Pressure Paternal Grandfather     High Blood Pressure Sister     High Blood Pressure Sister     High Blood Pressure Sister     Heart Failure Maternal Aunt     Hypertension Maternal Aunt     Heart Failure Maternal Uncle  Hypertension Maternal Uncle     Cancer Other         nephew-colon, liver, lung    Heart Failure Other     Diabetes Other     Osteoarthritis Other     Hypertension Paternal Aunt     Hypertension Paternal Uncle     Asthma Neg Hx           SOCIAL HISTORY       Social History     Socioeconomic History    Marital status:      Spouse name: None    Number of children: None    Years of education: None    Highest education level: None   Occupational History    Occupation: computer work   Tobacco Use    Smoking status: Former Smoker     Packs/day: 1.00     Years: 20.00     Pack years: 20.00     Types: Cigarettes     Quit date: 5/1/2018     Years since quitting: 3.4    Smokeless tobacco: Never Used   Vaping Use    Vaping Use: Never used   Substance and Sexual Activity    Alcohol use: No     Alcohol/week: 0.0 standard drinks    Drug use: No    Sexual activity: Not Currently     Partners: Male   Other Topics Concern    None   Social History Narrative    None     Social Determinants of Health     Financial Resource Strain:     Difficulty of Paying Living Expenses:    Food Insecurity:     Worried About Running Out of Food in the Last Year:     Ran Out of Food in the Last Year:    Transportation Needs:     Lack of Transportation (Medical):      Lack of Transportation (Non-Medical):    Physical Activity:     Days of Exercise per Week:     Minutes of Exercise per Session:    Stress:     Feeling of Stress :    Social Connections:     Frequency of Communication with Friends and Family:     Frequency of Social Gatherings with Friends and Family:     Attends [employer] Services:     Active Member of Clubs or Organizations:     Attends Club or Organization Meetings:     Marital Status:    Intimate Partner Violence:     Fear of Current or Ex-Partner:     Emotionally Abused:     Physically Abused:     Sexually Abused:        SCREENINGS    Ronan Coma Scale  Eye Opening: Spontaneous  Best Verbal Response: Oriented  Best Motor Response: Obeys commands  Ronan Coma Scale Score: 15          PHYSICAL EXAM    (up to 7 for level 4, 8 or more for level 5)     ED Triage Vitals [10/21/21 1348]   BP Temp Temp Source Pulse Resp SpO2 Height Weight   (!) 158/88 98.9 °F (37.2 °C) Oral 82 16 96 % 5' 1\" (1.549 m) 173 lb (78.5 kg)       Physical Exam  Vitals and nursing note reviewed. Constitutional:       General: She is not in acute distress. Appearance: She is well-developed. She is not diaphoretic. HENT:      Head: Normocephalic. Right Ear: Ear canal and external ear normal.      Left Ear: Ear canal normal.      Nose: Nose normal.   Eyes:      Conjunctiva/sclera: Conjunctivae normal.      Pupils: Pupils are equal, round, and reactive to light. Neck:      Thyroid: No thyromegaly. Cardiovascular:      Rate and Rhythm: Normal rate and regular rhythm. Heart sounds: Normal heart sounds. No murmur heard. No friction rub. No gallop. Pulmonary:      Effort: Pulmonary effort is normal. No respiratory distress. Breath sounds: Normal breath sounds. Abdominal:      General: Bowel sounds are normal. There is no distension. Palpations: Abdomen is soft. Tenderness: There is no abdominal tenderness. Musculoskeletal:         General: Normal range of motion. Cervical back: Normal range of motion and neck supple. Neurological:      Mental Status: She is alert and oriented to person, place, and time. GCS: GCS eye subscore is 4. GCS verbal subscore is 5. GCS motor subscore is 6. Cranial Nerves: No cranial nerve deficit. Sensory: No sensory deficit. Motor: No abnormal muscle tone.       Coordination: Coordination normal.      Deep Tendon Reflexes: Reflexes normal.   Psychiatric:         Behavior: Behavior normal.         DIAGNOSTIC RESULTS     EKG: All EKG's are interpreted by the Emergency Department Physician who either signs or Co-signs this chart in the (Bazett) 454 ms    P Axis 49 degrees    R Axis -21 degrees    T Axis 45 degrees    Diagnosis       Normal sinus rhythmInferior infarct , age undeterminedIncomplete right bundle branch blockNonspecific ST abnormalityAbnormal ECGNo previous ECGs availableConfirmed by ABBIE FRANCIS MD ([id number]) on 10/21/2021 5:58:43 PM            EMERGENCY DEPARTMENT COURSE and DIFFERENTIAL DIAGNOSIS/MDM:     Vitals:    10/21/21 1345 10/21/21 1348 10/21/21 1353 10/21/21 1420   BP: 138/82 (!) 158/88  136/80   Pulse: 78 82 80 80   Resp: 16 16  16   Temp:  98.9 °F (37.2 °C)     TempSrc:  Oral     SpO2: 99% 96%  97%   Weight:  173 lb (78.5 kg)     Height:  5' 1\" (1.549 m)             [address]   Patient presented to the emergency department with heaviness in her chest.  Her full history was reviewed with her. Old charts reviewed. Full physical examination performed. EKG ordered and interpreted stat as showing an old posterior inferior but no signs of an acute ischemic or injury pattern  All labs ordered and reviewed. Discussion about her leaving. Patient underwent 35 minutes of critical care time  Continuous cardiac monitoring performed. Patient was advised in a time to merely return if she changes her mind  CONSULTS:  None      PROCEDURES:     Procedures    MEDICATIONS GIVEN THIS VISIT:  Medications - No data to display     FINAL IMPRESSION      1. Chest pain, unspecified type            DISPOSITION/PLAN   DISPOSITION Decision To Discharge 10/21/2021 02:28:31 PM      PATIENT REFERRED TO:  Arin Bearden MD  [facility] [address]  [phone]    Schedule an appointment as soon as possible for a visit in 1 week        DISCHARGE MEDICATIONS:  Discharge Medication List as of 10/21/2021  2:29 PM          Controlled Substances Monitoring  No flowsheet data found.         (Please note that portions of this note were completed with a voice recognition program.  Efforts were

## 2021-12-01 RX ORDER — PANTOPRAZOLE SODIUM 40 MG/1
TABLET, DELAYED RELEASE ORAL
Qty: 60 TABLET | Refills: 0 | Status: SHIPPED | OUTPATIENT
Start: 2021-12-01 | End: 2022-01-13

## 2021-12-01 NOTE — TELEPHONE ENCOUNTER
Liliana Chicas is requesting refill(s)   Last OV 8/25/21 (pertaining to medication)  LR 10/8/21 (per medication requested)  Next office visit scheduled or attempted No   If no, reason:

## 2021-12-03 RX ORDER — FUROSEMIDE 20 MG/1
TABLET ORAL
Qty: 90 TABLET | Refills: 1 | Status: SHIPPED | OUTPATIENT
Start: 2021-12-03 | End: 2022-03-04 | Stop reason: SDUPTHER

## 2021-12-03 RX ORDER — ATORVASTATIN CALCIUM 40 MG/1
TABLET, FILM COATED ORAL
Qty: 90 TABLET | Refills: 1 | Status: SHIPPED | OUTPATIENT
Start: 2021-12-03 | End: 2022-03-04 | Stop reason: SDUPTHER

## 2021-12-07 DIAGNOSIS — E11.59 TYPE 2 DIABETES MELLITUS WITH OTHER CIRCULATORY COMPLICATION, WITHOUT LONG-TERM CURRENT USE OF INSULIN (HCC): ICD-10-CM

## 2021-12-07 RX ORDER — PEN NEEDLE, DIABETIC 32GX 5/32"
NEEDLE, DISPOSABLE MISCELLANEOUS
Qty: 100 EACH | Refills: 0 | Status: SHIPPED | OUTPATIENT
Start: 2021-12-07 | End: 2022-03-04 | Stop reason: SDUPTHER

## 2021-12-29 ENCOUNTER — TELEPHONE (OUTPATIENT)
Dept: FAMILY MEDICINE CLINIC | Age: 56
End: 2021-12-29

## 2021-12-29 DIAGNOSIS — J02.9 SORE THROAT: ICD-10-CM

## 2021-12-29 DIAGNOSIS — H92.09 EARACHE: Primary | ICD-10-CM

## 2021-12-29 DIAGNOSIS — J34.89 STUFFY AND RUNNY NOSE: ICD-10-CM

## 2021-12-29 RX ORDER — AZITHROMYCIN 250 MG/1
250 TABLET, FILM COATED ORAL SEE ADMIN INSTRUCTIONS
Qty: 6 TABLET | Refills: 0 | Status: SHIPPED | OUTPATIENT
Start: 2021-12-29 | End: 2022-01-03

## 2021-12-29 RX ORDER — AMOXICILLIN AND CLAVULANATE POTASSIUM 875; 125 MG/1; MG/1
1 TABLET, FILM COATED ORAL 2 TIMES DAILY
Qty: 14 TABLET | Refills: 0 | Status: CANCELLED | OUTPATIENT
Start: 2021-12-29 | End: 2022-01-05

## 2021-12-29 RX ORDER — METHYLPREDNISOLONE 4 MG/1
TABLET ORAL
Qty: 1 KIT | Refills: 0 | Status: SHIPPED | OUTPATIENT
Start: 2021-12-29 | End: 2022-01-04

## 2021-12-29 NOTE — TELEPHONE ENCOUNTER
Onset x 1 week  Earache  Sore throat  Headache  Runny nose  Head congestion  Nausea  Fatigue    Pt has taken an at home Covid test on Monday and was negative. Please advise. Thank you.

## 2021-12-29 NOTE — TELEPHONE ENCOUNTER
Patient called. Says she is allergic to penicillin and augmentin falls in the penicillin category.  Asking if it is going to be changed

## 2022-01-13 ENCOUNTER — TELEPHONE (OUTPATIENT)
Dept: FAMILY MEDICINE CLINIC | Age: 57
End: 2022-01-13

## 2022-01-13 DIAGNOSIS — E11.59 TYPE 2 DIABETES MELLITUS WITH OTHER CIRCULATORY COMPLICATION, WITHOUT LONG-TERM CURRENT USE OF INSULIN (HCC): ICD-10-CM

## 2022-01-13 DIAGNOSIS — R05.9 COUGH: Primary | ICD-10-CM

## 2022-01-13 RX ORDER — DEXTROMETHORPHAN HYDROBROMIDE AND PROMETHAZINE HYDROCHLORIDE 15; 6.25 MG/5ML; MG/5ML
5 SYRUP ORAL 4 TIMES DAILY PRN
Qty: 150 ML | Refills: 1 | Status: SHIPPED | OUTPATIENT
Start: 2022-01-13 | End: 2022-01-20

## 2022-01-13 RX ORDER — DULAGLUTIDE 1.5 MG/.5ML
1.5 INJECTION, SOLUTION SUBCUTANEOUS WEEKLY
Qty: 2 ML | Refills: 1 | Status: SHIPPED | OUTPATIENT
Start: 2022-01-13 | End: 2022-03-04 | Stop reason: DRUGHIGH

## 2022-01-13 RX ORDER — PANTOPRAZOLE SODIUM 40 MG/1
TABLET, DELAYED RELEASE ORAL
Qty: 60 TABLET | Refills: 1 | Status: SHIPPED | OUTPATIENT
Start: 2022-01-13 | End: 2022-03-04 | Stop reason: SDUPTHER

## 2022-01-13 NOTE — TELEPHONE ENCOUNTER
Patient says she tested positive for covid on 1-11-22. Asking if Lavonne Paige DNP would  prescribe something for persistent dry cough.  [facility]

## 2022-03-04 ENCOUNTER — OFFICE VISIT (OUTPATIENT)
Dept: FAMILY MEDICINE CLINIC | Age: 57
End: 2022-03-04
Payer: MEDICAID

## 2022-03-04 VITALS
WEIGHT: 172 LBS | OXYGEN SATURATION: 95 % | HEART RATE: 78 BPM | SYSTOLIC BLOOD PRESSURE: 122 MMHG | TEMPERATURE: 97.3 F | DIASTOLIC BLOOD PRESSURE: 66 MMHG | BODY MASS INDEX: 32.5 KG/M2

## 2022-03-04 DIAGNOSIS — K21.9 GASTROESOPHAGEAL REFLUX DISEASE WITHOUT ESOPHAGITIS: ICD-10-CM

## 2022-03-04 DIAGNOSIS — Z87.891 FORMER SMOKER: ICD-10-CM

## 2022-03-04 DIAGNOSIS — I20.1 CORONARY ARTERY SPASM (HCC): ICD-10-CM

## 2022-03-04 DIAGNOSIS — I10 ESSENTIAL HYPERTENSION: ICD-10-CM

## 2022-03-04 DIAGNOSIS — J30.89 ALLERGIC RHINITIS DUE TO OTHER ALLERGIC TRIGGER, UNSPECIFIED SEASONALITY: ICD-10-CM

## 2022-03-04 DIAGNOSIS — I50.42 CHRONIC COMBINED SYSTOLIC AND DIASTOLIC CHF (CONGESTIVE HEART FAILURE) (HCC): ICD-10-CM

## 2022-03-04 DIAGNOSIS — I67.1 CEREBRAL ANEURYSM, NONRUPTURED: ICD-10-CM

## 2022-03-04 DIAGNOSIS — F33.2 SEVERE EPISODE OF RECURRENT MAJOR DEPRESSIVE DISORDER, WITHOUT PSYCHOTIC FEATURES (HCC): ICD-10-CM

## 2022-03-04 DIAGNOSIS — E78.2 MIXED HYPERLIPIDEMIA: ICD-10-CM

## 2022-03-04 DIAGNOSIS — E11.59 TYPE 2 DIABETES MELLITUS WITH OTHER CIRCULATORY COMPLICATION, WITHOUT LONG-TERM CURRENT USE OF INSULIN (HCC): Primary | ICD-10-CM

## 2022-03-04 DIAGNOSIS — E66.9 OBESITY (BMI 30-39.9): ICD-10-CM

## 2022-03-04 DIAGNOSIS — I25.119 CORONARY ARTERY DISEASE INVOLVING NATIVE CORONARY ARTERY OF NATIVE HEART WITH ANGINA PECTORIS (HCC): ICD-10-CM

## 2022-03-04 DIAGNOSIS — J42 CHRONIC BRONCHITIS, UNSPECIFIED CHRONIC BRONCHITIS TYPE (HCC): ICD-10-CM

## 2022-03-04 DIAGNOSIS — M17.0 BILATERAL PRIMARY OSTEOARTHRITIS OF KNEE: ICD-10-CM

## 2022-03-04 PROCEDURE — G8417 CALC BMI ABV UP PARAM F/U: HCPCS | Performed by: NURSE PRACTITIONER

## 2022-03-04 PROCEDURE — 3046F HEMOGLOBIN A1C LEVEL >9.0%: CPT | Performed by: NURSE PRACTITIONER

## 2022-03-04 PROCEDURE — G8484 FLU IMMUNIZE NO ADMIN: HCPCS | Performed by: NURSE PRACTITIONER

## 2022-03-04 PROCEDURE — 2022F DILAT RTA XM EVC RTNOPTHY: CPT | Performed by: NURSE PRACTITIONER

## 2022-03-04 PROCEDURE — 99214 OFFICE O/P EST MOD 30 MIN: CPT | Performed by: NURSE PRACTITIONER

## 2022-03-04 PROCEDURE — 1036F TOBACCO NON-USER: CPT | Performed by: NURSE PRACTITIONER

## 2022-03-04 PROCEDURE — G8427 DOCREV CUR MEDS BY ELIG CLIN: HCPCS | Performed by: NURSE PRACTITIONER

## 2022-03-04 PROCEDURE — 36415 COLL VENOUS BLD VENIPUNCTURE: CPT | Performed by: NURSE PRACTITIONER

## 2022-03-04 PROCEDURE — 3017F COLORECTAL CA SCREEN DOC REV: CPT | Performed by: NURSE PRACTITIONER

## 2022-03-04 PROCEDURE — 3023F SPIROM DOC REV: CPT | Performed by: NURSE PRACTITIONER

## 2022-03-04 RX ORDER — DULAGLUTIDE 3 MG/.5ML
3 INJECTION, SOLUTION SUBCUTANEOUS WEEKLY
Qty: 4 PEN | Refills: 5 | Status: SHIPPED | OUTPATIENT
Start: 2022-03-04 | End: 2022-06-09

## 2022-03-04 RX ORDER — ATORVASTATIN CALCIUM 40 MG/1
TABLET, FILM COATED ORAL
Qty: 30 TABLET | Refills: 5 | Status: SHIPPED | OUTPATIENT
Start: 2022-03-04 | End: 2022-06-09 | Stop reason: SDUPTHER

## 2022-03-04 RX ORDER — PANTOPRAZOLE SODIUM 40 MG/1
TABLET, DELAYED RELEASE ORAL
Qty: 60 TABLET | Refills: 5 | Status: SHIPPED | OUTPATIENT
Start: 2022-03-04 | End: 2022-06-09 | Stop reason: SDUPTHER

## 2022-03-04 RX ORDER — CLOPIDOGREL BISULFATE 75 MG/1
75 TABLET ORAL DAILY
Qty: 30 TABLET | Refills: 5 | Status: SHIPPED | OUTPATIENT
Start: 2022-03-04 | End: 2022-06-09 | Stop reason: SDUPTHER

## 2022-03-04 RX ORDER — INSULIN GLARGINE 100 [IU]/ML
7 INJECTION, SOLUTION SUBCUTANEOUS NIGHTLY
Qty: 5 PEN | Refills: 3 | Status: SHIPPED | OUTPATIENT
Start: 2022-03-04 | End: 2022-06-09 | Stop reason: SDUPTHER

## 2022-03-04 RX ORDER — DULAGLUTIDE 1.5 MG/.5ML
1.5 INJECTION, SOLUTION SUBCUTANEOUS WEEKLY
Qty: 2 ML | Refills: 5 | Status: CANCELLED | OUTPATIENT
Start: 2022-03-04

## 2022-03-04 RX ORDER — AMITRIPTYLINE HYDROCHLORIDE 25 MG/1
25 TABLET, FILM COATED ORAL NIGHTLY
Qty: 30 TABLET | Refills: 5 | Status: CANCELLED | OUTPATIENT
Start: 2022-03-04

## 2022-03-04 RX ORDER — AMLODIPINE BESYLATE 10 MG/1
10 TABLET ORAL DAILY
Qty: 30 TABLET | Refills: 5 | Status: SHIPPED | OUTPATIENT
Start: 2022-03-04 | End: 2022-06-09 | Stop reason: ALTCHOICE

## 2022-03-04 RX ORDER — PEN NEEDLE, DIABETIC 32GX 5/32"
NEEDLE, DISPOSABLE MISCELLANEOUS
Qty: 100 EACH | Refills: 0 | Status: SHIPPED | OUTPATIENT
Start: 2022-03-04

## 2022-03-04 RX ORDER — LISINOPRIL 20 MG/1
20 TABLET ORAL DAILY
Qty: 30 TABLET | Refills: 5 | Status: SHIPPED | OUTPATIENT
Start: 2022-03-04 | End: 2022-06-09 | Stop reason: SDUPTHER

## 2022-03-04 RX ORDER — FLUTICASONE PROPIONATE 50 MCG
1 SPRAY, SUSPENSION (ML) NASAL DAILY
Qty: 1 EACH | Refills: 5 | Status: SHIPPED | OUTPATIENT
Start: 2022-03-04 | End: 2022-06-09 | Stop reason: SDUPTHER

## 2022-03-04 RX ORDER — FUROSEMIDE 20 MG/1
TABLET ORAL
Qty: 30 TABLET | Refills: 5 | Status: SHIPPED | OUTPATIENT
Start: 2022-03-04 | End: 2022-06-09 | Stop reason: ALTCHOICE

## 2022-03-04 NOTE — PROGRESS NOTES
3/4/2022    Chief Complaint   Patient presents with    Hypertension    Hyperlipidemia     fasting     Diabetes     not checking bs     Insomnia     takes the amitriptyline but she stated it is giving her weird dreams        Malcolm Blancas is a 61 y.o. female, presents today for:      ASSESSMENT/PLAN:    1. 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 Never obtained A1C from prior PCP but if it was 12% as per her report (which would be consistent with 300-400 BS readings she presented with), the GLP1 class of medication has been very effective for her. Continue Trulicity. Continue Lantus at 5 units for now.  If BS <100 asked pt to hold Lantus injection.  Goal is for her to not be on insulin and primarily GLP1 and SGLT2.    Continue Metformin  Continue ACE/ statin  Encouraged annual eye exam.  - atorvastatin (LIPITOR) 40 MG tablet; TAKE ONE TABLET BY MOUTH DAILY AT NIGHT  Dispense: 30 tablet; Refill: 5  - insulin glargine (LANTUS SOLOSTAR) 100 UNIT/ML injection pen; Inject 7 Units into the skin nightly  Dispense: 5 pen; Refill: 3  - Insulin Pen Needle (TECHLITE PEN NEEDLES) 32G X 4 MM MISC; USE AS DIRECTED FOR LANTUS  Dispense: 100 each; Refill: 0  - Dulaglutide (TRULICITY) 3 CM/2.5HZ SOPN; Inject 3 mg into the skin once a week diabetes  Dispense: 4 pen; Refill: 5  - Lipid Panel  - Hemoglobin A1C  - Microalbumin / Creatinine Urine Ratio  - Vitamin B12    2. Chronic combined systolic and diastolic CHF (congestive heart failure) ([facility])  Continue care with Cardio  Stress Testing was normal.   Continue Lisinopril, Plavix, Atorvastatin, Amlodipine, ASA  Encouraged heart healthy diet  Encouraged 30 min activity daily  Encouraged weight loss  - furosemide (LASIX) 20 MG tablet; TAKE ONE TABLET BY MOUTH EVERY DAY  Dispense: 30 tablet; Refill: 5    3.  Coronary artery disease involving native coronary artery of native heart with angina pectoris ([facility]  See #2  - amLODIPine (NORVASC) 10 MG tablet; Take 1 tablet by mouth daily  Dispense: 30 tablet; Refill: 5  - clopidogrel (PLAVIX) 75 MG tablet; Take 1 tablet by mouth daily  Dispense: 30 tablet; Refill: 5  - furosemide (LASIX) 20 MG tablet; TAKE ONE TABLET BY MOUTH EVERY DAY  Dispense: 30 tablet; Refill: 5  - metFORMIN (GLUCOPHAGE) 1000 MG tablet; Take 1 tablet by mouth 2 times daily (with meals)  Dispense: 60 tablet; Refill: 5    4. Essential hypertension  See #2  - amLODIPine (NORVASC) 10 MG tablet; Take 1 tablet by mouth daily  Dispense: 30 tablet; Refill: 5  - atorvastatin (LIPITOR) 40 MG tablet; TAKE ONE TABLET BY MOUTH DAILY AT NIGHT  Dispense: 30 tablet; Refill: 5  - lisinopril (PRINIVIL;ZESTRIL) 20 MG tablet; Take 1 tablet by mouth daily  Dispense: 30 tablet; Refill: 5  - CBC with Auto Differential  - Comprehensive Metabolic Panel  - Lipid Panel    5. Coronary artery spasm (HCC)  See#2  - amLODIPine (NORVASC) 10 MG tablet; Take 1 tablet by mouth daily  Dispense: 30 tablet; Refill: 5    6. Chronic bronchitis, unspecified chronic bronchitis type ([facility]  Stable today  Continue PRN Albuterol, Flonase  No need for preventive medications today  Encouraged continued smoking cessation    7. Severe episode of recurrent major depressive disorder, without psychotic features ([facility]  Continues to have suboptimal control. No improvement with Amitriptyline. Declines additional medication today    8. Cerebral aneurysm, nonruptured  Not present on imaging from 2017 (CT Head and MRI)  Asymptomatic today    9. Gastroesophageal reflux disease without esophagitis  Stable today  Continue Pantoprazole  Consider weaning next OV  Encouraged avoidance of heartburn inducing medicatons. - pantoprazole (PROTONIX) 40 MG tablet; TAKE ONE TABLET BY MOUTH 2 TIMES A DAY  Dispense: 60 tablet; Refill: 5    10. Mixed hyperlipidemia  Labs today  CotninueAtorvastatin  - Lipid Panel    11.  Bilateral primary osteoarthritis of knee  Stable   Continue PRN Tylenol  Encouraged HEP    12. Allergic rhinitis due to other allergic trigger, unspecified seasonality  Stable today  Continue Flonase  - fluticasone (FLONASE) 50 MCG/ACT nasal spray; 1 spray by Each Nostril route daily  Dispense: 1 each; Refill: 5    13. Former smoker  Encouraged continued cessation    14. Obesity (BMI 30-39. 9)  Encouraged 5-10 lb weight loss    Return in about 3 months (around 6/4/2022). Presenting today for chronic disease follow up. Sister and niece passed away- having difficulty with coping     DM: Tolerating Trulicity without SE. Continuing to take Lantus 5 units daily with no hypoglycemic episodes reports. Not checking BS due to grief. Not watching diet like she was prior. No structured exercise. Annual eye exams are not up to date. Is having some bilateral feet neuropathy, attempted Cymbalta in the past without relief of symptoms. HTN/ CP: Continues to have recurrent chest pain. Planning to discuss LHC again with Dr. Otto Gitelman. No CP, SOB, leg swelling, orthopnea, PND. Tolerating meds well. Not checking BP at home. Watching diet intake. No structured physical activity. HLD: No focal sensory loss, leg myalgias. Tolerating atorvastatin. Depression: Persistenting, down feelings most days out of the week. Difficulty sleeping. No SI/ HI. Prior med: Cymbalta. Attempted Amitriptyline last OV without resolution of symptoms.   GeneSight testing done in past by [facility]- we will obtain these records    Lab Results   Component Value Date    LABA1C 11.8 03/04/2022    LABA1C 6.8 08/25/2021    LABA1C 7.4 07/30/2019     Lab Results   Component Value Date    .0 03/04/2022        Lab Results   Component Value Date    LABMICR <1.20 03/04/2022       PHQ Scores 6/10/2021   PHQ2 Score 4   PHQ9 Score 9     Interpretation of Total Score Depression Severity: 1-4 = Minimal depression, 5-9 = Mild depression, 10-14 = Moderate depression, 15-19 = Moderately severe depression, 20-27 = Severe depression    Lab Results   Component Value Date     03/04/2022    K 4.7 03/04/2022    CL 98 (L) 03/04/2022    CO2 25 03/04/2022    BUN 10 03/04/2022    CREATININE 0.6 03/04/2022    GLUCOSE 325 (H) 03/04/2022    CALCIUM 10.1 03/04/2022    PROT 7.3 03/04/2022    LABALBU 4.7 03/04/2022    BILITOT 0.9 03/04/2022    ALKPHOS 99 03/04/2022    AST 14 (L) 03/04/2022    ALT 20 03/04/2022    LABGLOM >60 03/04/2022    GFRAA >60 03/04/2022    AGRATIO 1.8 03/04/2022    GLOB 2.9 06/11/2021         Review of Systems   Constitutional: Negative. Respiratory: Negative for cough, chest tightness and shortness of breath. Cardiovascular: Positive for chest pain. Negative for palpitations and leg swelling. Gastrointestinal: Negative for blood in stool, constipation and diarrhea. Endocrine: Negative for polydipsia, polyphagia and polyuria. Genitourinary: Negative. Musculoskeletal: Negative. Skin: Negative. Neurological: Negative for dizziness, tremors, light-headedness and headaches. Psychiatric/Behavioral: Negative for decreased concentration, dysphoric mood, self-injury, sleep disturbance and suicidal ideas. The patient is not nervous/anxious. Current Outpatient Medications on File Prior to Visit   Medication Sig Dispense Refill    nitroGLYCERIN (NITROSTAT) 0.4 MG SL tablet up to max of 3 total doses. If no relief after 1 dose, call 911. 25 tablet 3    aspirin 81 MG chewable tablet Take 1 tablet by mouth daily 30 tablet 11    albuterol sulfate HFA (PROVENTIL HFA) 108 (90 Base) MCG/ACT inhaler Inhale 2 puffs into the lungs every 6 hours as needed for Wheezing or Shortness of Breath 1 Inhaler 2     No current facility-administered medications on file prior to visit.      Allergies   Allergen Reactions    Penicillins Itching, Swelling and Rash    Arginine      Nausea and vomiting     Cymbalta [Duloxetine Hcl] Other (See Comments)     Severe headache, abnormal dreams    Imdur [Isosorbide Dinitrate] Swelling     nausea    Metoprolol      Headache, nausea     Nitroglycerin      Headache     Cephalexin Itching and Rash     Past Medical History:   Diagnosis Date    Acute blood loss anemia     Asthma     CAD (coronary artery disease)     Chest pain     COPD (chronic obstructive pulmonary disease) (HCC)     Coronary artery disease 9/4/2012    Depression     Diabetes mellitus (HCC)     Enlarged heart     Fatigue     Generalized headaches     Hyperlipidemia     Hypertension     Migraine     Morning stiffness of joints     Myocardial infarction ([address] 05/13/2018    Numbness or tingling hands and feet    OA (osteoarthritis) of knee 1/27/2015    Obesity, Class I, BMI 30.0-34.9 (see actual BMI)     Osteoporosis     Pulmonary nodule     S/P PTCA (percutaneous transluminal coronary angioplasty) 2/6/2019    Shortness of breath      Past Surgical History:   Procedure Laterality Date    CARDIAC CATHETERIZATION  04/01/2016    Dr. Funmi Platt. [place] Lathe CARDIAC CATHETERIZATION  08/01/2012    Dr. Tesfaye Haider  11/01/2019    Non Obs CAD, medical management    CHOLECYSTECTOMY      CORONARY ANGIOPLASTY  08/06/2019    POBA of mid LAD, [place] Balloon- 3.25 x 15    CORONARY ANGIOPLASTY WITH STENT PLACEMENT  05/13/2018    Dr. Kirstie Faust - w/placement of IABP, Xience 3.25 x 18 mid Circ, POBA 2.5 x 12 to prox LAD    CORONARY ARTERY BYPASS GRAFT  06/13/2018    LIMA, SVG to OM2, SVG to rPDA    DIAGNOSTIC CARDIAC CATH LAB PROCEDURE  06/06/2013    Dr. Diego Plaza - Non Obs CAD    HYSTERECTOMY      PTCA      UPPER GASTROINTESTINAL ENDOSCOPY       Social History     Tobacco Use    Smoking status: Former Smoker     Packs/day: 1.00     Years: 20.00     Pack years: 20.00     Types: Cigarettes     Quit date: 5/1/2018     Years since quitting: 3.8    Smokeless tobacco: Never Used   Substance Use Topics    Alcohol use: No     Alcohol/week: 0.0 standard drinks     Family History   Problem Relation Age of Onset    Emphysema Mother     Heart Failure Mother     Hypertension Mother     Heart Disease Mother     High Blood Pressure Mother     High Cholesterol Mother     Stroke Mother     Heart Failure Father     Hypertension Father     Heart Disease Father     High Blood Pressure Father     High Cholesterol Father     Hypertension Sister     High Blood Pressure Sister     Hypertension Brother     High Blood Pressure Brother     Cancer Brother     Hypertension Maternal Grandmother     High Blood Pressure Maternal Grandmother     Hypertension Maternal Grandfather     High Blood Pressure Maternal Grandfather     Hypertension Paternal Grandmother     High Blood Pressure Paternal Grandmother     Hypertension Paternal Grandfather     High Blood Pressure Paternal Grandfather     High Blood Pressure Sister     High Blood Pressure Sister     High Blood Pressure Sister     Heart Failure Maternal Aunt     Hypertension Maternal Aunt     Heart Failure Maternal Uncle     Hypertension Maternal Uncle     Cancer Other         nephew-colon, liver, lung    Heart Failure Other     Diabetes Other     Osteoarthritis Other     Hypertension Paternal Aunt     Hypertension Paternal Uncle     Asthma Neg Hx        Vitals:    03/04/22 0954   BP: 122/66   Pulse: 78   Temp: 97.3 °F (36.3 °C)   TempSrc: Infrared   SpO2: 95%   Weight: 172 lb (78 kg)     Estimated body mass index is 32.5 kg/m² as calculated from the following:    Height as of 10/21/21: 5' 1\" (1.549 m). Weight as of this encounter: 172 lb (78 kg). Physical Exam  Vitals and nursing note reviewed. Constitutional:       Appearance: Normal appearance. Neck:      Vascular: No carotid bruit. Cardiovascular:      Rate and Rhythm: Normal rate and regular rhythm. Pulses: Normal pulses. Carotid pulses are 2+ on the right side and 2+ on the left side.      Heart sounds: Normal heart sounds. Pulmonary:      Effort: Pulmonary effort is normal.   Abdominal:      General: Abdomen is flat. Palpations: Abdomen is soft. Musculoskeletal:      Cervical back: Normal range of motion. Right lower leg: No edema. Left lower leg: No edema. Lymphadenopathy:      Cervical: No cervical adenopathy. Skin:     General: Skin is warm and dry. Capillary Refill: Capillary refill takes less than 2 seconds. Neurological:      General: No focal deficit present. Mental Status: She is alert and oriented to person, place, and time. Psychiatric:         Mood and Affect: Mood normal.         Behavior: Behavior normal.           Patient's questions answered and concerns addressed. Patient agrees to plan of care.         Electronically signed by GERMAN Mata CNP on 3/8/2022 at 2:27 PM

## 2022-03-05 LAB
A/G RATIO: 1.8 (ref 1.1–2.2)
ALBUMIN SERPL-MCNC: 4.7 G/DL (ref 3.4–5)
ALP BLD-CCNC: 99 U/L (ref 40–129)
ALT SERPL-CCNC: 20 U/L (ref 10–40)
ANION GAP SERPL CALCULATED.3IONS-SCNC: 14 MMOL/L (ref 3–16)
AST SERPL-CCNC: 14 U/L (ref 15–37)
BASOPHILS ABSOLUTE: 0 K/UL (ref 0–0.2)
BASOPHILS RELATIVE PERCENT: 0.4 %
BILIRUB SERPL-MCNC: 0.9 MG/DL (ref 0–1)
BUN BLDV-MCNC: 10 MG/DL (ref 7–20)
CALCIUM SERPL-MCNC: 10.1 MG/DL (ref 8.3–10.6)
CHLORIDE BLD-SCNC: 98 MMOL/L (ref 99–110)
CHOLESTEROL, TOTAL: 165 MG/DL (ref 0–199)
CO2: 25 MMOL/L (ref 21–32)
CREAT SERPL-MCNC: 0.6 MG/DL (ref 0.6–1.1)
CREATININE URINE: 74.3 MG/DL (ref 28–259)
EOSINOPHILS ABSOLUTE: 0.1 K/UL (ref 0–0.6)
EOSINOPHILS RELATIVE PERCENT: 1.4 %
ESTIMATED AVERAGE GLUCOSE: 292 MG/DL
GFR AFRICAN AMERICAN: >60
GFR NON-AFRICAN AMERICAN: >60
GLUCOSE BLD-MCNC: 325 MG/DL (ref 70–99)
HBA1C MFR BLD: 11.8 %
HCT VFR BLD CALC: 47.4 % (ref 36–48)
HDLC SERPL-MCNC: 46 MG/DL (ref 40–60)
HEMOGLOBIN: 16 G/DL (ref 12–16)
LDL CHOLESTEROL CALCULATED: 93 MG/DL
LYMPHOCYTES ABSOLUTE: 1.5 K/UL (ref 1–5.1)
LYMPHOCYTES RELATIVE PERCENT: 27.4 %
MCH RBC QN AUTO: 30.4 PG (ref 26–34)
MCHC RBC AUTO-ENTMCNC: 33.7 G/DL (ref 31–36)
MCV RBC AUTO: 90.2 FL (ref 80–100)
MICROALBUMIN UR-MCNC: <1.2 MG/DL
MICROALBUMIN/CREAT UR-RTO: NORMAL MG/G (ref 0–30)
MONOCYTES ABSOLUTE: 0.3 K/UL (ref 0–1.3)
MONOCYTES RELATIVE PERCENT: 5.2 %
NEUTROPHILS ABSOLUTE: 3.5 K/UL (ref 1.7–7.7)
NEUTROPHILS RELATIVE PERCENT: 65.6 %
PDW BLD-RTO: 13.8 % (ref 12.4–15.4)
PLATELET # BLD: 198 K/UL (ref 135–450)
PMV BLD AUTO: 9.4 FL (ref 5–10.5)
POTASSIUM SERPL-SCNC: 4.7 MMOL/L (ref 3.5–5.1)
RBC # BLD: 5.25 M/UL (ref 4–5.2)
SODIUM BLD-SCNC: 137 MMOL/L (ref 136–145)
TOTAL PROTEIN: 7.3 G/DL (ref 6.4–8.2)
TRIGL SERPL-MCNC: 130 MG/DL (ref 0–150)
VITAMIN B-12: 519 PG/ML (ref 211–911)
VLDLC SERPL CALC-MCNC: 26 MG/DL
WBC # BLD: 5.4 K/UL (ref 4–11)

## 2022-03-05 NOTE — RESULT ENCOUNTER NOTE
No anemia/ infection. Cholesterol is normal. Normal kidney/ liver function. No protein in your urine. What we were hoping ot see given the diabetes. Diabetes is now very uncontrolled. A1C went from 6.8% to 11.8%. Watch your diet like we discussed and this number should improve. Please call if you have additional questions and/ or concerns. Please keep your next appt. Thank you.

## 2022-03-08 ASSESSMENT — ENCOUNTER SYMPTOMS
DIARRHEA: 0
BLOOD IN STOOL: 0
SHORTNESS OF BREATH: 0
CHEST TIGHTNESS: 0
COUGH: 0
CONSTIPATION: 0

## 2022-03-15 ENCOUNTER — TELEPHONE (OUTPATIENT)
Dept: FAMILY MEDICINE CLINIC | Age: 57
End: 2022-03-15

## 2022-03-15 DIAGNOSIS — F33.2 SEVERE EPISODE OF RECURRENT MAJOR DEPRESSIVE DISORDER, WITHOUT PSYCHOTIC FEATURES (HCC): Primary | ICD-10-CM

## 2022-03-15 NOTE — TELEPHONE ENCOUNTER
Patient calling to inquire about if we had received genesight testing results from [facility] Gauthier, we have. Wants to know what Therese Martinez made of them and if she would like her to start on any new medication. Please advise.

## 2022-03-17 RX ORDER — PAROXETINE 10 MG/1
10 TABLET, FILM COATED ORAL
Qty: 30 TABLET | Refills: 1 | Status: SHIPPED | OUTPATIENT
Start: 2022-03-17 | End: 2022-03-31 | Stop reason: ALTCHOICE

## 2022-03-23 ENCOUNTER — TELEPHONE (OUTPATIENT)
Dept: FAMILY MEDICINE CLINIC | Age: 57
End: 2022-03-23

## 2022-03-23 NOTE — TELEPHONE ENCOUNTER
See if she can cut her amlodipine in half and check her blood pressures daily for the next couple of days. She may need to be seen and bring those readings in with her. If she can't cut them let me know.     Thanks,  Jarrett Santoyo

## 2022-03-28 ENCOUNTER — TELEPHONE (OUTPATIENT)
Dept: CARDIOLOGY CLINIC | Age: 57
End: 2022-03-28

## 2022-03-28 ENCOUNTER — OFFICE VISIT (OUTPATIENT)
Dept: FAMILY MEDICINE CLINIC | Age: 57
End: 2022-03-28
Payer: MEDICAID

## 2022-03-28 VITALS
OXYGEN SATURATION: 97 % | HEART RATE: 85 BPM | WEIGHT: 167 LBS | TEMPERATURE: 97.5 F | BODY MASS INDEX: 31.55 KG/M2 | DIASTOLIC BLOOD PRESSURE: 78 MMHG | SYSTOLIC BLOOD PRESSURE: 128 MMHG

## 2022-03-28 DIAGNOSIS — I25.119 CORONARY ARTERY DISEASE INVOLVING NATIVE CORONARY ARTERY OF NATIVE HEART WITH ANGINA PECTORIS (HCC): ICD-10-CM

## 2022-03-28 DIAGNOSIS — G45.1 TIA INVOLVING LEFT INTERNAL CAROTID ARTERY: Chronic | ICD-10-CM

## 2022-03-28 DIAGNOSIS — I95.9 HYPOTENSION, UNSPECIFIED HYPOTENSION TYPE: ICD-10-CM

## 2022-03-28 DIAGNOSIS — R11.0 NAUSEA: ICD-10-CM

## 2022-03-28 DIAGNOSIS — I50.42 CHRONIC COMBINED SYSTOLIC AND DIASTOLIC CHF (CONGESTIVE HEART FAILURE) (HCC): Chronic | ICD-10-CM

## 2022-03-28 DIAGNOSIS — R42 DIZZINESS: Primary | ICD-10-CM

## 2022-03-28 DIAGNOSIS — E11.59 TYPE 2 DIABETES MELLITUS WITH OTHER CIRCULATORY COMPLICATION, WITHOUT LONG-TERM CURRENT USE OF INSULIN (HCC): Chronic | ICD-10-CM

## 2022-03-28 LAB
BILIRUBIN, POC: NORMAL
BLOOD URINE, POC: NORMAL
CLARITY, POC: CLEAR
COLOR, POC: YELLOW
GLUCOSE URINE, POC: NORMAL
KETONES, POC: NORMAL
LEUKOCYTE EST, POC: NORMAL
NITRITE, POC: NORMAL
PH, POC: 6
PROTEIN, POC: NORMAL
SPECIFIC GRAVITY, POC: 1.01
UROBILINOGEN, POC: 2

## 2022-03-28 PROCEDURE — 81002 URINALYSIS NONAUTO W/O SCOPE: CPT | Performed by: NURSE PRACTITIONER

## 2022-03-28 PROCEDURE — 3046F HEMOGLOBIN A1C LEVEL >9.0%: CPT | Performed by: NURSE PRACTITIONER

## 2022-03-28 PROCEDURE — 36415 COLL VENOUS BLD VENIPUNCTURE: CPT | Performed by: NURSE PRACTITIONER

## 2022-03-28 PROCEDURE — 93000 ELECTROCARDIOGRAM COMPLETE: CPT | Performed by: NURSE PRACTITIONER

## 2022-03-28 PROCEDURE — G8427 DOCREV CUR MEDS BY ELIG CLIN: HCPCS | Performed by: NURSE PRACTITIONER

## 2022-03-28 PROCEDURE — 3017F COLORECTAL CA SCREEN DOC REV: CPT | Performed by: NURSE PRACTITIONER

## 2022-03-28 PROCEDURE — G8417 CALC BMI ABV UP PARAM F/U: HCPCS | Performed by: NURSE PRACTITIONER

## 2022-03-28 PROCEDURE — 99214 OFFICE O/P EST MOD 30 MIN: CPT | Performed by: NURSE PRACTITIONER

## 2022-03-28 PROCEDURE — G8484 FLU IMMUNIZE NO ADMIN: HCPCS | Performed by: NURSE PRACTITIONER

## 2022-03-28 PROCEDURE — 1036F TOBACCO NON-USER: CPT | Performed by: NURSE PRACTITIONER

## 2022-03-28 PROCEDURE — 2022F DILAT RTA XM EVC RTNOPTHY: CPT | Performed by: NURSE PRACTITIONER

## 2022-03-28 RX ORDER — DULAGLUTIDE 1.5 MG/.5ML
INJECTION, SOLUTION SUBCUTANEOUS
Qty: 2 PEN | Refills: 0 | COMMUNITY
Start: 2022-03-28 | End: 2022-04-19 | Stop reason: SDUPTHER

## 2022-03-28 RX ORDER — ONDANSETRON 4 MG/1
4 TABLET, ORALLY DISINTEGRATING ORAL 3 TIMES DAILY PRN
Qty: 21 TABLET | Refills: 0 | Status: SHIPPED | OUTPATIENT
Start: 2022-03-28 | End: 2022-06-09

## 2022-03-28 ASSESSMENT — ENCOUNTER SYMPTOMS
CHEST TIGHTNESS: 0
SHORTNESS OF BREATH: 0
DIARRHEA: 0
BLOOD IN STOOL: 0
COUGH: 0
CONSTIPATION: 0

## 2022-03-28 NOTE — TELEPHONE ENCOUNTER
JPAULETTEP please advise:    Spoke with pt. She states her BP at home this morning was 80/62. At her PCP this afternoon /62. Pt states her BP drops when she is up walking around. She feels dizzy. Denies syncope.

## 2022-03-28 NOTE — TELEPHONE ENCOUNTER
Pt called and reported that a week ago she started to experience nausea, dizziness, extreme fatigue, and low BP. Pt reports for the last week diastolic has been in the 85'T and systolic has been in the 35'C-'s. Pt stated nothing has changed. PCP lowered the Amlodipine from 10 mg to 5 mg once daily. Pt seemed very distressed over her symptoms. Please advise. Last OV 07/08/2021 with YOEL.

## 2022-03-28 NOTE — PROGRESS NOTES
Blood drawn per order. Needle size: 23 g butterlfy  Site: L Antecubital.  First attempt successful Yes    Second attempt na    Pressure applied until bleeding stopped. Cotton bal and band aid  applied. Patient informed to call office or return if bleeding reoccurs and unable to stop.     Tubes drawn: 1 purple     2 red

## 2022-03-28 NOTE — PROGRESS NOTES
3/28/2022    Chief Complaint   Patient presents with    Hypotension     bp is running low she cut the amlodipine in half but this has not helped     Fatigue    Dizziness    Nausea       Rosana Crow is a 61 y.o. female, presents today for:      ASSESSMENT/PLAN:    1. Dizziness  Unclear etiology  Orthostatic blood pressures are normal.    ECG showing Normal sinus rhythm with Inferior infarct and incomplete right bundle branch block with Nonspecific ST abnormality. Similar to prior ECGs  Labs today  Encouraged patient to call Cardio and discuss symptoms as when she had the sequela of symptoms she was having an MI despite having normal Cardiac Stress testing. Continue to hold Paxil  Decrease Trulicity back to 1.5 given symptoms seemed to start after dose increase. May need to decrease Lisinopril if BP continues to be low  - EKG 12 Lead  - POCT Urinalysis no Micro  - TSH with Reflex  - Vitamin B12 & Folate    2. Hypotension, unspecified hypotension type  See above  - EKG 12 Lead  - Comprehensive Metabolic Panel  - CBC with Auto Differential  - Brain Natriuretic Peptide  - TSH with Reflex    3. Nausea  Start Zofran  - ondansetron (ZOFRAN-ODT) 4 MG disintegrating tablet; Take 1 tablet by mouth 3 times daily as needed for Nausea or Vomiting  Dispense: 21 tablet; Refill: 0  - Vitamin B12 & Folate    4. 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 Coronary artery disease involving native coronary artery of native heart with angina pectoris ([address]  See above  - TSH with Reflex    6. Hx TIA involving L-ICA  See above    7. Type 2 diabetes mellitus with other circulatory complication, without long-term current use of insulin (HCC)  Continue to monitor blood sugars especially with changes in Trulicity  - POCT Urinalysis no Micro  - Vitamin B12 & Folate      Return in about 3 days (around 3/31/2022). Presenting today for acute complaint  Fatigue/ Dizzines/ Nausea x 1 week. Blood pressure last week 80-90s/  50-60s. Mild right parietal headaches, appetite loss and leg swelling. Room and patient are intermittently spinning but worse with position changes. Continues to have persistent chest pain and tightness which has previously been discussed with Cardiology. No recent URI/ UTI. No cough or fever. No melena/ hematochezia. No recent falls. BS running around 170s which has improved since last visit. Trying to drink more water due to concern for dehydration which has not helped. Started splitting Amlodipine last week due to concern for hypotension which she does not feel has helped. Only medication change since last visit on 3/4/22 was an increase in Trulicity from 4.7-1 mg weekly. Did not start Paxil as previously discussed. Hx significant for TIA, left 2mm cerebral aneurysm off of Left ICA, CHF, CAD, HTN. PHQ Scores 6/10/2021   PHQ2 Score 4   PHQ9 Score 9     Interpretation of Total Score Depression Severity: 1-4 = Minimal depression, 5-9 = Mild depression, 10-14 = Moderate depression, 15-19 = Moderately severe depression, 20-27 = Severe depression       Lab Results   Component Value Date     03/04/2022    K 4.7 03/04/2022    CL 98 (L) 03/04/2022    CO2 25 03/04/2022    BUN 10 03/04/2022    CREATININE 0.6 03/04/2022    GLUCOSE 325 (H) 03/04/2022    CALCIUM 10.1 03/04/2022    PROT 7.3 03/04/2022    LABALBU 4.7 03/04/2022    BILITOT 0.9 03/04/2022    ALKPHOS 99 03/04/2022    AST 14 (L) 03/04/2022    ALT 20 03/04/2022    LABGLOM >60 03/04/2022    GFRAA >60 03/04/2022    AGRATIO 1.8 03/04/2022    GLOB 2.9 06/11/2021         Review of Systems   Constitutional: Negative. Respiratory: Negative for cough, chest tightness and shortness of breath. Cardiovascular: Positive for chest pain. Negative for palpitations and leg swelling. Gastrointestinal: Negative for blood in stool, constipation and diarrhea. Endocrine: Negative for polydipsia, polyphagia and polyuria. Genitourinary: Negative. Musculoskeletal: Negative. Skin: Negative. Neurological: Negative for dizziness, tremors, light-headedness and headaches. Psychiatric/Behavioral: Negative for decreased concentration, dysphoric mood, self-injury, sleep disturbance and suicidal ideas. The patient is not nervous/anxious. Current Outpatient Medications on File Prior to Visit   Medication Sig Dispense Refill    PARoxetine (PAXIL) 10 MG tablet Take 1 tablet by mouth Daily with supper 30 tablet 1    amLODIPine (NORVASC) 10 MG tablet Take 1 tablet by mouth daily (Patient taking differently: Take 5 mg by mouth daily ) 30 tablet 5    atorvastatin (LIPITOR) 40 MG tablet TAKE ONE TABLET BY MOUTH DAILY AT NIGHT 30 tablet 5    clopidogrel (PLAVIX) 75 MG tablet Take 1 tablet by mouth daily 30 tablet 5    furosemide (LASIX) 20 MG tablet TAKE ONE TABLET BY MOUTH EVERY DAY 30 tablet 5    insulin glargine (LANTUS SOLOSTAR) 100 UNIT/ML injection pen Inject 7 Units into the skin nightly 5 pen 3    lisinopril (PRINIVIL;ZESTRIL) 20 MG tablet Take 1 tablet by mouth daily 30 tablet 5    metFORMIN (GLUCOPHAGE) 1000 MG tablet Take 1 tablet by mouth 2 times daily (with meals) 60 tablet 5    pantoprazole (PROTONIX) 40 MG tablet TAKE ONE TABLET BY MOUTH 2 TIMES A DAY 60 tablet 5    Insulin Pen Needle (TECHLITE PEN NEEDLES) 32G X 4 MM MISC USE AS DIRECTED FOR LANTUS 100 each 0    fluticasone (FLONASE) 50 MCG/ACT nasal spray 1 spray by Each Nostril route daily 1 each 5    Dulaglutide (TRULICITY) 3 KH/8.4NJ SOPN Inject 3 mg into the skin once a week diabetes 4 pen 5    nitroGLYCERIN (NITROSTAT) 0.4 MG SL tablet up to max of 3 total doses.  If no relief after 1 dose, call 911. 25 tablet 3    aspirin 81 MG chewable tablet Take 1 tablet by mouth daily 30 tablet 11    albuterol sulfate HFA (PROVENTIL HFA) 108 (90 Base) MCG/ACT inhaler Inhale 2 puffs into the lungs every 6 hours as needed for Wheezing or Shortness of Breath 1 Inhaler 2     No current facility-administered medications on file prior to visit. Allergies   Allergen Reactions    Penicillins Itching, Swelling and Rash    Arginine      Nausea and vomiting     Cymbalta [Duloxetine Hcl] Other (See Comments)     Severe headache, abnormal dreams    Imdur [Isosorbide Dinitrate] Swelling     nausea    Metoprolol      Headache, nausea     Nitroglycerin      Headache     Cephalexin Itching and Rash     Past Medical History:   Diagnosis Date    Acute blood loss anemia     Asthma     CAD (coronary artery disease)     Chest pain     COPD (chronic obstructive pulmonary disease) ([facility])     Coronary artery disease 9/4/2012    Depression     Diabetes mellitus ([facility])     Enlarged heart     Fatigue     Generalized headaches     Hyperlipidemia     Hypertension     Migraine     Morning stiffness of joints     Myocardial infarction ([facility] 05/13/2018    Numbness or tingling hands and feet    OA (osteoarthritis) of knee 1/27/2015    Obesity, Class I, BMI 30.0-34.9 (see actual BMI)     Osteoporosis     Pulmonary nodule     S/P PTCA (percutaneous transluminal coronary angioplasty) 2/6/2019    Shortness of breath      Past Surgical History:   Procedure Laterality Date    CARDIAC CATHETERIZATION  04/01/2016    Dr. Magi Richter. Radha Márquez CARDIAC CATHETERIZATION  08/01/2012    Dr. Chiquita Frankel  11/01/2019    Non Obs CAD, medical management    CHOLECYSTECTOMY      CORONARY ANGIOPLASTY  08/06/2019    POBA of mid LAD, [place] Balloon- 3.25 x 15    CORONARY ANGIOPLASTY WITH STENT PLACEMENT  05/13/2018    Dr. Hazel Haynes - w/placement of IABP, Xience 3.25 x 18 mid Circ, POBA 2.5 x 12 to prox LAD    CORONARY ARTERY BYPASS GRAFT  06/13/2018    LIMA, SVG to OM2, SVG to [address] CATH LAB PROCEDURE  06/06/2013    Dr. Kirit Munoz - Non Obs CAD    HYSTERECTOMY      PTCA      UPPER GASTROINTESTINAL ENDOSCOPY       Social History     Tobacco Use    Smoking status: Former Smoker     Packs/day: 1.00     Years: 20.00     Pack years: 20.00     Types: Cigarettes     Quit date: 5/1/2018     Years since quitting: 3.9    Smokeless tobacco: Never Used   Substance Use Topics    Alcohol use: No     Alcohol/week: 0.0 standard drinks     Family History   Problem Relation Age of Onset    Emphysema Mother     Heart Failure Mother     Hypertension Mother     Heart Disease Mother     High Blood Pressure Mother     High Cholesterol Mother     Stroke Mother     Heart Failure Father     Hypertension Father     Heart Disease Father     High Blood Pressure Father     High Cholesterol Father     Hypertension Sister     High Blood Pressure Sister     Hypertension Brother     High Blood Pressure Brother     Cancer Brother     Hypertension Maternal Grandmother     High Blood Pressure Maternal Grandmother     Hypertension Maternal Grandfather     High Blood Pressure Maternal Grandfather     Hypertension Paternal Grandmother     High Blood Pressure Paternal Grandmother     Hypertension Paternal Grandfather     High Blood Pressure Paternal Grandfather     High Blood Pressure Sister     High Blood Pressure Sister     High Blood Pressure Sister     Heart Failure Maternal Aunt     Hypertension Maternal Aunt     Heart Failure Maternal Uncle     Hypertension Maternal Uncle     Cancer Other         nephew-colon, liver, lung    Heart Failure Other     Diabetes Other     Osteoarthritis Other     Hypertension Paternal Aunt     Hypertension Paternal Uncle     Asthma Neg Hx        Vitals:    03/28/22 1325 03/28/22 1341 03/28/22 1344 03/28/22 1345   BP: 116/62 116/88 (!) 114/90 128/78   Site: Right Upper Arm Right Upper Arm Right Upper Arm Right Upper Arm   Position: Sitting Supine Sitting Standing   Cuff Size: Medium Adult Medium Adult Medium Adult Medium Adult   Pulse: 85      Temp: 97.5 °F (36.4 °C)      TempSrc: Infrared      SpO2: 97%      Weight: 167 lb (75.8 kg)        Estimated body mass index is 31.55 kg/m² as calculated from the following:    Height as of 10/21/21: 5' 1\" (1.549 m). Weight as of this encounter: 167 lb (75.8 kg). Physical Exam  Vitals and nursing note reviewed. Constitutional:       Appearance: Normal appearance. HENT:      Head: Normocephalic. Right Ear: Tympanic membrane, ear canal and external ear normal.      Left Ear: Tympanic membrane, ear canal and external ear normal.      Nose: Nose normal.      Mouth/Throat:      Mouth: Mucous membranes are moist.   Eyes:      Extraocular Movements: Extraocular movements intact. Conjunctiva/sclera: Conjunctivae normal.      Pupils: Pupils are equal, round, and reactive to light. Neck:      Vascular: No carotid bruit. Cardiovascular:      Rate and Rhythm: Normal rate and regular rhythm. Pulses: Normal pulses. Carotid pulses are 2+ on the right side and 2+ on the left side. Heart sounds: Normal heart sounds. Pulmonary:      Effort: Pulmonary effort is normal.      Breath sounds: No wheezing or rhonchi. Abdominal:      General: Abdomen is flat. Palpations: Abdomen is soft. Tenderness: There is no abdominal tenderness. Hernia: No hernia is present. Musculoskeletal:      Cervical back: Normal range of motion. Right lower leg: No edema. Left lower leg: No edema. Lymphadenopathy:      Cervical: No cervical adenopathy. Skin:     General: Skin is warm and dry. Capillary Refill: Capillary refill takes less than 2 seconds. Neurological:      General: No focal deficit present. Mental Status: She is alert and oriented to person, place, and time. Cranial Nerves: No cranial nerve deficit. Sensory: No sensory deficit. Motor: No weakness.       Coordination: Coordination normal.      Gait: Gait normal.      Deep Tendon Reflexes: Reflexes normal.   Psychiatric:         Mood and Affect: Mood normal.         Behavior: Behavior normal.         Thought Content: Thought content normal.         Judgment: Judgment normal.           Patient's questions answered and concerns addressed. Patient agrees to plan of care.         Electronically signed by GERMAN Maria CNP on 3/28/2022 at 3:40 PM

## 2022-03-29 LAB
A/G RATIO: 1.7 (ref 1.1–2.2)
ALBUMIN SERPL-MCNC: 4.3 G/DL (ref 3.4–5)
ALP BLD-CCNC: 92 U/L (ref 40–129)
ALT SERPL-CCNC: 20 U/L (ref 10–40)
ANION GAP SERPL CALCULATED.3IONS-SCNC: 19 MMOL/L (ref 3–16)
AST SERPL-CCNC: 16 U/L (ref 15–37)
BASOPHILS ABSOLUTE: 0 K/UL (ref 0–0.2)
BASOPHILS RELATIVE PERCENT: 0.4 %
BILIRUB SERPL-MCNC: 1 MG/DL (ref 0–1)
BUN BLDV-MCNC: 9 MG/DL (ref 7–20)
CALCIUM SERPL-MCNC: 9.8 MG/DL (ref 8.3–10.6)
CHLORIDE BLD-SCNC: 99 MMOL/L (ref 99–110)
CO2: 24 MMOL/L (ref 21–32)
CREAT SERPL-MCNC: 0.7 MG/DL (ref 0.6–1.1)
EOSINOPHILS ABSOLUTE: 0.1 K/UL (ref 0–0.6)
EOSINOPHILS RELATIVE PERCENT: 0.9 %
FOLATE: 9.45 NG/ML (ref 4.78–24.2)
GFR AFRICAN AMERICAN: >60
GFR NON-AFRICAN AMERICAN: >60
GLUCOSE BLD-MCNC: 142 MG/DL (ref 70–99)
HCT VFR BLD CALC: 44.5 % (ref 36–48)
HEMOGLOBIN: 15.5 G/DL (ref 12–16)
LYMPHOCYTES ABSOLUTE: 1.9 K/UL (ref 1–5.1)
LYMPHOCYTES RELATIVE PERCENT: 30.8 %
MCH RBC QN AUTO: 30.2 PG (ref 26–34)
MCHC RBC AUTO-ENTMCNC: 34.8 G/DL (ref 31–36)
MCV RBC AUTO: 86.7 FL (ref 80–100)
MONOCYTES ABSOLUTE: 0.4 K/UL (ref 0–1.3)
MONOCYTES RELATIVE PERCENT: 6.2 %
NEUTROPHILS ABSOLUTE: 3.8 K/UL (ref 1.7–7.7)
NEUTROPHILS RELATIVE PERCENT: 61.7 %
PDW BLD-RTO: 12.7 % (ref 12.4–15.4)
PLATELET # BLD: 211 K/UL (ref 135–450)
PMV BLD AUTO: 8.8 FL (ref 5–10.5)
POTASSIUM SERPL-SCNC: 3.6 MMOL/L (ref 3.5–5.1)
PRO-BNP: 205 PG/ML (ref 0–124)
RBC # BLD: 5.13 M/UL (ref 4–5.2)
SODIUM BLD-SCNC: 142 MMOL/L (ref 136–145)
TOTAL PROTEIN: 6.9 G/DL (ref 6.4–8.2)
TSH REFLEX: 0.66 UIU/ML (ref 0.27–4.2)
VITAMIN B-12: 467 PG/ML (ref 211–911)
WBC # BLD: 6.1 K/UL (ref 4–11)

## 2022-03-30 NOTE — RESULT ENCOUNTER NOTE
Normal sinus rhythm, Inferior infarct, Incomplete right bundle branch block, Nonspecific ST abnormality, similar to prior ECG

## 2022-03-30 NOTE — RESULT ENCOUNTER NOTE
BNP level is normal- what we were hoping. Thyroid level is normal.  Normal kidney/ liver function. Vitamin levels are normal.  No anemia/ infection    Please call if you have additional questions and/ or concerns. Please keep your next appt. Thank you.

## 2022-03-31 ENCOUNTER — OFFICE VISIT (OUTPATIENT)
Dept: FAMILY MEDICINE CLINIC | Age: 57
End: 2022-03-31
Payer: MEDICAID

## 2022-03-31 VITALS
SYSTOLIC BLOOD PRESSURE: 122 MMHG | HEART RATE: 70 BPM | DIASTOLIC BLOOD PRESSURE: 78 MMHG | BODY MASS INDEX: 32.12 KG/M2 | TEMPERATURE: 97.6 F | WEIGHT: 170 LBS | OXYGEN SATURATION: 98 %

## 2022-03-31 DIAGNOSIS — I95.9 HYPOTENSION, UNSPECIFIED HYPOTENSION TYPE: Primary | ICD-10-CM

## 2022-03-31 DIAGNOSIS — R42 DIZZINESS: ICD-10-CM

## 2022-03-31 DIAGNOSIS — E11.59 TYPE 2 DIABETES MELLITUS WITH OTHER CIRCULATORY COMPLICATION, WITHOUT LONG-TERM CURRENT USE OF INSULIN (HCC): ICD-10-CM

## 2022-03-31 DIAGNOSIS — R11.0 NAUSEA: ICD-10-CM

## 2022-03-31 DIAGNOSIS — I25.119 CORONARY ARTERY DISEASE INVOLVING NATIVE CORONARY ARTERY OF NATIVE HEART WITH ANGINA PECTORIS (HCC): ICD-10-CM

## 2022-03-31 DIAGNOSIS — I50.42 CHRONIC COMBINED SYSTOLIC AND DIASTOLIC CHF (CONGESTIVE HEART FAILURE) (HCC): ICD-10-CM

## 2022-03-31 PROCEDURE — G8417 CALC BMI ABV UP PARAM F/U: HCPCS | Performed by: NURSE PRACTITIONER

## 2022-03-31 PROCEDURE — G8427 DOCREV CUR MEDS BY ELIG CLIN: HCPCS | Performed by: NURSE PRACTITIONER

## 2022-03-31 PROCEDURE — 3017F COLORECTAL CA SCREEN DOC REV: CPT | Performed by: NURSE PRACTITIONER

## 2022-03-31 PROCEDURE — 99214 OFFICE O/P EST MOD 30 MIN: CPT | Performed by: NURSE PRACTITIONER

## 2022-03-31 PROCEDURE — G8484 FLU IMMUNIZE NO ADMIN: HCPCS | Performed by: NURSE PRACTITIONER

## 2022-03-31 PROCEDURE — 3046F HEMOGLOBIN A1C LEVEL >9.0%: CPT | Performed by: NURSE PRACTITIONER

## 2022-03-31 PROCEDURE — 1036F TOBACCO NON-USER: CPT | Performed by: NURSE PRACTITIONER

## 2022-03-31 PROCEDURE — 2022F DILAT RTA XM EVC RTNOPTHY: CPT | Performed by: NURSE PRACTITIONER

## 2022-03-31 ASSESSMENT — ENCOUNTER SYMPTOMS
CONSTIPATION: 0
CHEST TIGHTNESS: 0
SHORTNESS OF BREATH: 0
DIARRHEA: 0
COUGH: 0
BLOOD IN STOOL: 0

## 2022-04-01 ASSESSMENT — ENCOUNTER SYMPTOMS
VOMITING: 1
NAUSEA: 1

## 2022-04-08 NOTE — PROGRESS NOTES
[address] [facility]   Cardiovascular Evaluation    PATIENT: Epifanio Orlando  DATE: 2022  MRN: [id number]  CSN: [id number]  : 1965      Primary Care Doctor: GERMAN Dolan CNP  Reason for evaluation:   Follow-up, Coronary Artery Disease, Congestive Heart Failure, Hyperlipidemia, Hypertension, Chest Pain, Edema (discuss restarting lasix), and Shortness of Breath      Subjective:   History of present illness on initial date of evaluation:   Epifanio Orlando is a 61 y.o. patient who presents for hospital follow up. Patient presented to the ED on 19 with complaints of chest pain. At that time patient preferred to try medication instead of proceeding with cath. On 19 patient was admitted for [facility] that demonstrated Restenosis at mid LAD stent around heavy angulated section of LAD 75%, HERO-2 ---> 0%, HERO 3 flow and Spasm of left main and also LCx that improved with NTG.    OV 2021, she reported being tired and feeling like someone is punching her in chest. It woke her up at 3 AM.  It felt like she was being squeezed. It stopped and she went back to sleep and it started again. She took nitro and sat up. She has stopped smoking. She has a history of COVID. She notes more persistent mid chest pain. Today she states her blood pressure has been running low. She states her BP goes down to 981-26 systolic and 28-20 diastolic. She feels more fatigued and dizzy. She states she was at home washing dishes and passed out. Before the episode she noticed her heart \"fluttering\". She states her swelling is worse in the morning in her hands. She states her legs feel tight. Patient denies current chest pain, sob, palpitations, or syncope.        Patient Active Problem List   Diagnosis    Coronary artery disease involving native heart with angina pectoris ([address]    Essential hypertension    Mixed hyperlipidemia    Cerebral aneurysm, nonruptured    Diabetes mellitus ([address]    Hx TIA involving L-ICA    Former smoker    Chronic tension type headache    GERD (gastroesophageal reflux disease)    Obesity (BMI 30-39. 9)    CAD s/p CABGx3 (June 2018) & stents (May 2018)    Hx of combined systolic (EF 21-24%) & diastolic (grade 1 LVDD) CHF    Coronary artery spasm (HCC)    Chest pain    Fatigue    Severe episode of recurrent major depressive disorder, without psychotic features ([address]    Chronic bronchitis, unspecified chronic bronchitis type ([address]       Past Medical History:   has a past medical history of Acute blood loss anemia, Asthma, CAD (coronary artery disease), Chest pain, COPD (chronic obstructive pulmonary disease) ([address] Coronary artery disease, Depression, Diabetes mellitus ([address] Enlarged heart, Fatigue, Generalized headaches, Hyperlipidemia, Hypertension, Migraine, Morning stiffness of joints, Myocardial infarction ([address] Numbness or tingling, OA (osteoarthritis) of knee, Obesity, Class I, BMI 30.0-34.9 (see actual BMI), Osteoporosis, Pulmonary nodule, S/P PTCA (percutaneous transluminal coronary angioplasty), and Shortness of breath. Surgical History:   has a past surgical history that includes Cholecystectomy; Hysterectomy; Diagnostic Cardiac Cath Lab Procedure (06/06/2013); Upper gastrointestinal endoscopy; Cardiac catheterization (04/01/2016); Cardiac catheterization (08/01/2012); Coronary artery bypass graft (06/13/2018); Coronary angioplasty with stent (05/13/2018); Percutaneous Transluminal Coronary Angio; Coronary angioplasty (08/06/2019); and Cardiac catheterization (11/01/2019). Social History:   reports that she quit smoking about 3 years ago. Her smoking use included cigarettes. She has a 20.00 pack-year smoking history. She has never used smokeless tobacco. She reports that she does not drink alcohol and does not use drugs.      Family History:  No evidence for sudden cardiac death or premature CAD    Home Medications:  Reviewed and are listed in nursing record. and/or listed below  Current Outpatient Medications   Medication Sig Dispense Refill    ondansetron (ZOFRAN-ODT) 4 MG disintegrating tablet Take 1 tablet by mouth 3 times daily as needed for Nausea or Vomiting 21 tablet 0    Dulaglutide (TRULICITY) 1.5 JU/6.0XL SOPN Sample Lot# [id number] exp 7/22/23 2 pen 0    atorvastatin (LIPITOR) 40 MG tablet TAKE ONE TABLET BY MOUTH DAILY AT NIGHT 30 tablet 5    clopidogrel (PLAVIX) 75 MG tablet Take 1 tablet by mouth daily 30 tablet 5    insulin glargine (LANTUS SOLOSTAR) 100 UNIT/ML injection pen Inject 7 Units into the skin nightly 5 pen 3    lisinopril (PRINIVIL;ZESTRIL) 20 MG tablet Take 1 tablet by mouth daily 30 tablet 5    pantoprazole (PROTONIX) 40 MG tablet TAKE ONE TABLET BY MOUTH 2 TIMES A DAY 60 tablet 5    Insulin Pen Needle (TECHLITE PEN NEEDLES) 32G X 4 MM MISC USE AS DIRECTED FOR LANTUS 100 each 0    fluticasone (FLONASE) 50 MCG/ACT nasal spray 1 spray by Each Nostril route daily 1 each 5    Dulaglutide (TRULICITY) 3 TQ/0.7KB SOPN Inject 3 mg into the skin once a week diabetes 4 pen 5    nitroGLYCERIN (NITROSTAT) 0.4 MG SL tablet up to max of 3 total doses. If no relief after 1 dose, call 911. 25 tablet 3    aspirin 81 MG chewable tablet Take 1 tablet by mouth daily 30 tablet 11    albuterol sulfate HFA (PROVENTIL HFA) 108 (90 Base) MCG/ACT inhaler Inhale 2 puffs into the lungs every 6 hours as needed for Wheezing or Shortness of Breath 1 Inhaler 2    amLODIPine (NORVASC) 10 MG tablet Take 1 tablet by mouth daily (Patient not taking: Reported on 3/31/2022) 30 tablet 5    furosemide (LASIX) 20 MG tablet TAKE ONE TABLET BY MOUTH EVERY DAY (Patient not taking: Reported on 3/31/2022) 30 tablet 5    metFORMIN (GLUCOPHAGE) 1000 MG tablet Take 1 tablet by mouth 2 times daily (with meals) 60 tablet 5     No current facility-administered medications for this visit.         Allergies:  Penicillins, Arginine, Cymbalta [duloxetine hcl], Imdur [isosorbide dinitrate], Metoprolol, Nitroglycerin, and Cephalexin     Review of Systems:   A 14 point review of symptoms completed. Pertinent positives identified in the HPI, all other review of symptoms negative as below.     Objective:   PHYSICAL EXAM:    Vitals:    04/12/22 1027   BP: 108/62   Pulse: 72   SpO2: 96%    Weight: 173 lb (78.5 kg)     Wt Readings from Last 3 Encounters:   04/12/22 173 lb (78.5 kg)   03/31/22 170 lb (77.1 kg)   03/28/22 167 lb (75.8 kg)         General Appearance:  Alert, cooperative, no distress, appears stated age, overweight   Head:  Normocephalic, atraumatic   Eyes:  PERRL, conjunctiva/corneas clear   Nose: Nares normal, no drainage or sinus tenderness   Throat: Lips, mucosa, and tongue normal   Neck: Supple, symmetrical, trachea midline, NL thyroid no carotid bruit or JVD   Lungs:   CTAB, respirations unlabored   Chest Wall:  No tenderness or deformity   Heart:  Regular rhythm and normal rate; S1, S2 are normal;   no murmur noted; no rub or gallop   Abdomen:   Soft, non-tender, +BS x 4, no masses, no organomegaly   Extremities: Extremities normal, atraumatic, no cyanosis or edema   Pulses: 2+ and symmetric   Skin: Skin color, texture, turgor normal, no rashes or lesions   Pysch: Normal mood and affect   Neurologic: Normal gross motor and sensory exam.         LABS   CBC:      Lab Results   Component Value Date    WBC 6.1 03/28/2022    RBC 5.13 03/28/2022    HGB 15.5 03/28/2022    HCT 44.5 03/28/2022    MCV 86.7 03/28/2022    RDW 12.7 03/28/2022     03/28/2022     CMP:  Lab Results   Component Value Date     03/28/2022    K 3.6 03/28/2022    K 4.4 06/11/2021    CL 99 03/28/2022    CO2 24 03/28/2022    BUN 9 03/28/2022    CREATININE 0.7 03/28/2022    GFRAA >60 03/28/2022    GFRAA >60 06/06/2013    AGRATIO 1.7 03/28/2022    LABGLOM >60 03/28/2022    GLUCOSE 142 03/28/2022    PROT 6.9 03/28/2022    PROT 7.0 01/22/2013    CALCIUM 9.8 03/28/2022    BILITOT 1.0 03/28/2022 ALKPHOS 92 03/28/2022    AST 16 03/28/2022    ALT 20 03/28/2022     PT/INR:   No results found for: PTINR  Liver:  No components found for: CHLPL  Lab Results   Component Value Date    ALT 20 03/28/2022    AST 16 03/28/2022    ALKPHOS 92 03/28/2022    BILITOT 1.0 03/28/2022     Lab Results   Component Value Date    LABA1C 11.8 03/04/2022     Lipids:         Lab Results   Component Value Date    TRIG 130 03/04/2022    TRIG 103 07/30/2019    TRIG 111 04/09/2019            Lab Results   Component Value Date    HDL 46 03/04/2022    HDL 41 07/30/2019    HDL 44 04/09/2019            Lab Results   Component Value Date    LDLCALC 93 03/04/2022    LDLCALC 33 07/30/2019    LDLCALC 50 04/09/2019            Lab Results   Component Value Date    LABVLDL 26 03/04/2022    LABVLDL 21 07/30/2019    LABVLDL 22 04/09/2019         CARDIAC DATA   EKG 3/28/2022  Sinus  Rhythm   -Incomplete right bundle branch block.    -Old inferolateral infarct.    -  Nonspecific T-abnormality. ABNORMAL     ECHO 7/8/20  Summary   Technically difficult examination due to body habitus. Normal left ventricle size, wall thickness, and systolic function with a   visually estimated ejection fraction of 55%. No regional wall motion abnormalities are seen. Normal left ventricular diastolic filling pressure. Systolic pulmonary artery pressure (SPAP) is normal and estimated at 27 mmHg   (right atrial pressure 3 mmHg). ECHO/MUGA: 9/12/18   Summary   LV systolic function is normal with EF estimated at 55%. Mild hypokinesis of the inferior and lateral walls. No regional wall motion abnormalities. Normal left ventricular diastolic filling pressure. Trivial tricuspid regurgitation. Systolic pulmonary artery pressure (SPAP) is normal estimated at 24mmHg   (Right atrial pressure of 3mmHg). Stress Test 2/1/2021  Summary  There is no evidence of stress induced ischemia.  Moderate sized  inferolateral and lateral fixed defects consistent with infarction in the  territory of the mid and distal LCx and/or RCA. LV function is normal with  uniform wall motion and ejection fraction of 69%. Lower-intermediate risk  abnormal study. STRESS TEST: 3/18/19  Summary  Small-moderate sized inferolateral partial reversibility defect consistent  with ischemia and infarction in the territory of the mid and distal LCx  and/or RCA. Fixed defects in the mid and basal lateral walls c/w infarct. Hyperdynamic LV systolic function with FH>75% with uniform wall motion. Intermediate risk abnormal study. CARDIAC CATH 1/10/20  LEFT HEART CATH  LM: luminals  LAD: long section of stent in prox/mid LAD (HERO-3 flow), mid 30% restenosis at acute bend in vessel (Likely LIMA insertion site)  LCX: mid stent with 20% restenosis                OM1- luminals                Om2- Long section of disease from LCx into mid OM1- 65-70% (but OM2 is bypassed) overall improved                + competative flow up SVG graft  RCA: Dominant,  100% mid     LIMA-LAD: 100% occluded, atretic  SVG-OM2: (prominent valves) patent, fills large section of LCX area   SVG-RCA: patent with good flow into PDA and retro flow into PLV     LVEDP: 5  LVEF: 40% global hypokinesis. Inferior wall improved   Assessment  1. Patent LAD stents, unchanged as before  2. Overall flow and lesions appear overall better compared to Nov cath. (due to remodeling)  3. Cardiac MRI showing infarction of anterolateral and inferolateral segments but is well vascularized by competing flow from LCX and SVG grafts  4. Continue treatment for microvascular disease and likely CP along oral-infarct regions.       CARDIAC CATH 11/1/19  LEFT HEART CATH  LM: luminals  LAD: long section of stent in prox/mid LAD (HERO-3 flow), mid 30-40% restenosis at acute bend in vessel and HERO-2 flow afterwards  LCX: mid stent with 30% restenosis                OM1- luminals                Om2- Long section of disease from LCx into mid OM1- 75-80% (but OM2 is bypassed                + competative flow up SVG graft  RCA: Dominant,  100% mid     LIMA-LAD: 100% occluded, atretic  SVG-OM2: patent  SVG-RCA: patent with good flow into PDA and retro flow into PLV     LVEDP: 5  LVEF: 30% with akinesis of the mid/apical inferior wall and moderate diffuse hypokinesis        Mid LAD:  FFR: 0.99-->0.93  CFR: 3.6 (note significant increase in flow with adenosine)        Assessment  1. Patent stent in mid LAD, negative FFR. 2. Significant increase in flow envelope on IV adenosine and CFR was >2 suggesting element of Microvascular disease  3. Ischemic and Nonischemic cardiomyopathy: worsening drop in LVEF and akinesis of inferior wall                - Consider cardiac MRI        CARDIAC CATH: 8/6/19  LEFT HEART CATH  LM: luminals  LAD: long mid stent with 75% restenosis at a sharp bend, HERO-2 flow  LCX: mid stent with 30% resnosis                OM1- luminals                Om2- Long section of disease from LCx into mid OM1- 75-80% but with NTG stenosis was 40-50%. + competative flow up SVG graft  RCA: dominant,  LVEDP: 7  LVEF:  40% with mild apical inferior HK  POBA of mid LAD  NC balloon to distal stent--> post dilated to 2.5mm  NC balloon (3.25 x 15mm) to mid stent-> 3.28mm  OCT  Showed stent well opposed but signficant neointimal hyperplasia. Assessment  1. Restenosis at mid LAD stent around heavy angulated section of LAD                75%, HERO-2 ---> 0%, HERO 3 flow  2. Spasm of left main and also LCx that improved with NTG                - Will increase L-arginine to 1000mg po BID                - imdur allergy, will trial NTG transdermal patch  3.  Cont ASA and Plavix     LEFT HEART CATH 4/9/19  LM: short, luminals   LAD: moderate diffuse disease, mid 80%, distal stent with 50% restenosis and HERO-2 flow  LCX: mid patent stent,  50% into OM1 just until anastomosis site. (signficant improvement with iC NTG)    LVEDP: 11  LVEF: 45% with mild ant HK     PCI of prox and mid LAD  STENT: Resolute Edgar 3 x 22mm followed by 3 x 38mm. prox was post dilated to 3.5mm and mid was dilated to 3.25mm. Distal stent by OCT showed more restenosis then would expect for new stent and initially poor distal flow so POBA'd this section using 2.25 x 15mm NC trek  Assessment  1. Successful PCI to prox and mid LAD using CSI orbital arthrectomy and 2 ame drug stents. 0% residual and HERO-3 flow. OCT showed heavy calcific and fibroatheroma burdon and good stent expansion,   2. POBA of distal LAD stent for restenosis and HERO-2 flow. 0% residual and HERO-3 flow   3. Cont ASA 81mg po qday for life  4. Plavix 75mg poqday for life as tolerated  5. Integrillin for 6 hrs     LEFT HEART CATH 2/6/19  LM: luminals  LAD: mid 60%, mid/distal 60% and distal small vessel. NO significant competitive flow seen  LCX: luminals, patent mid LCx stent extending into OM1,                 OM1- superior branch with 30% long lesion and +flow up SVG graft, inferior branch small with 70% lesions  RCA: dominant,  Moderate-severe diffuse disease, long lesion of 50% in [id number]. SVG-PDA graft seen with retrograde flow     LIMA-LAD: atretic, no significantflow to native LAD  SVG-OM2: patent, small vein graft (prx and mid graft is small in size, plumps up distally, + valve seen. Good antegrade flow  SVG-RT PDA: patent  LVEDP: 25  LVEF: 45% with lateral wall hypokinesis  FFR: mid LAD 0.93-->0.83  FFR of mid and distal lesion 0.77. Unable to get post FFR as FFR machine went down  PCI of lesion 1: mid/distal LAD  Resolute Ame 2 x 26mm, post dilated to 2 mm  Assessment  1. Patent mid LCx stent, interval closure of LIMA graft. 2 lesions in LAD were FFR'd and distal lesion ischemic and stented using Edgar drug stent 2 x 26mm with HERO-3 flow  2. Watch mid LAD lesion for ischemic progression  3. ASA 81mg poqday for life  4. Plavix 75mg poqday for 1 yr w/o inturruption  5. BB, statin.     VASCULAR/OTHER IMAGING:  CARDIAC MRI 11/27/19  Mild cardiomegaly with normal thickness of left ventricular myocardium, including septum  Infarction involving the lateral wall extending into the anterolateral and inferolateral segments as well as a portion of the apex-see above. No signs of mitral, tricuspid or aortic regurgitation. Assessment and Plan   Preeti Vera is a 61 y.o. female who presents today for the following problems:      1. CAD              - 8/6/2019 POBA to mid LAD for restenosis. - 4/9/2019 PCI to prox/mid LAD (CSI and distal POBA)              - 2/6/2019 PCI to mid/distal LAD              - 6/3/2018 CABG (LIMA-LAD, SVG-OM2, SVG-RCA)                          - LIMA atretic              - 5/18/2018 PCI mid LCX  2. Chronic stable angina   -Difficult to discern angina versus sternal wire pain versus noncardiac   - no help with Ranexa   - intolerant to Imdur and l-arginine, unable to get to rehab or [facility]   - Sublingual NTG helps at times  3. Ischemic cardiomyopathy: Recovered LVEF 45%-->55%  4. HTN: controlled  5. HLD: Controlled  6. DM: Controlled  7. Hx of tobacco abuse: Has quit! 8. Syncope     PLAN  1. More CP   - plan for vincenzo=myoview to re-eval for ischemia, can't do treadmill   - pt wanted cath but stated cannot jump to invasive procedure  2. 2 wk monitor+ Fatigue, tirendess and syncope this week  3. Patient with extensive cardiac history. Essentially LIMA is atretic and has had a full metal jacket to her LAD. Despite her chest pain cardiac cath is demonstrated patent LAD stents, patent grafts. She has a small distal circumflex that is stenotic but not revascularizable due to its small size.   -Overall her cardiac cath in January 2020 looked improved due to remodeling compared to previous cath and EF is now normal  2. Have not made her better with any cardiac medications. - will ask PCP to eval noncardiac causes if no ischemic on stress, ?  GI vs msk/fibromylagia   - did d/w pt having surgeon eval for sternal wire pain- pt not interested  3. For any angina she should take her nitroglycerin sublingually as needed. Patient Active Problem List   Diagnosis    Coronary artery disease involving native heart with angina pectoris ([address]    Essential hypertension    Mixed hyperlipidemia    Cerebral aneurysm, nonruptured    Diabetes mellitus ([address]    Hx TIA involving L-ICA    Former smoker    Chronic tension type headache    GERD (gastroesophageal reflux disease)    Obesity (BMI 30-39. 9)    CAD s/p CABGx3 (June 2018) & stents (May 2018)    Hx of combined systolic (EF 37-79%) & diastolic (grade 1 LVDD) CHF    Coronary artery spasm (HCC)    Chest pain    Fatigue    Severe episode of recurrent major depressive disorder, without psychotic features ([address]    Chronic bronchitis, unspecified chronic bronchitis type ([address]       Patient Plan:  1. Continue to monitor blood pressure at home  2. Order 2 week cardiac event monitor to assess your heart rhythm   - I will call you with results  3. Order stress test to assess for blockages in your heart   - I will call you with reults  4. Follow up after testing    This note was scribed in the presence of Betty Vargas MD by Jamie Mayer RN.        Shu Martell MD, personally performed the services described in this documentation as scribed by the above signed scribe in my presence, and it is both accurate and complete to the best of our ability and knowledge. I agree with the details independently gathered by my clinical support staff, while the remaining scribed note accurately describes my personal service to the patient. The above RN is working as a scribe for and in the presence of myself . Working as a scribe, the RN may have prepopulated components of this note with my historical intellectual property under my direct supervision. Any additions to this intellectual property were performed at my direction.   Furthermore, the content and accuracy of this note have been reviewed by me to the best of my ability.

## 2022-04-12 ENCOUNTER — OFFICE VISIT (OUTPATIENT)
Dept: CARDIOLOGY CLINIC | Age: 57
End: 2022-04-12
Payer: MEDICAID

## 2022-04-12 ENCOUNTER — TELEPHONE (OUTPATIENT)
Dept: CARDIOLOGY CLINIC | Age: 57
End: 2022-04-12

## 2022-04-12 VITALS
HEART RATE: 72 BPM | SYSTOLIC BLOOD PRESSURE: 108 MMHG | DIASTOLIC BLOOD PRESSURE: 62 MMHG | WEIGHT: 173 LBS | BODY MASS INDEX: 32.66 KG/M2 | HEIGHT: 61 IN | OXYGEN SATURATION: 96 %

## 2022-04-12 DIAGNOSIS — I10 PRIMARY HYPERTENSION: ICD-10-CM

## 2022-04-12 DIAGNOSIS — I25.119 CORONARY ARTERY DISEASE INVOLVING NATIVE CORONARY ARTERY OF NATIVE HEART WITH ANGINA PECTORIS (HCC): ICD-10-CM

## 2022-04-12 DIAGNOSIS — R42 DIZZY: ICD-10-CM

## 2022-04-12 DIAGNOSIS — I20.8 CHRONIC STABLE ANGINA (HCC): ICD-10-CM

## 2022-04-12 DIAGNOSIS — R55 SYNCOPE, UNSPECIFIED SYNCOPE TYPE: Primary | ICD-10-CM

## 2022-04-12 PROCEDURE — G8427 DOCREV CUR MEDS BY ELIG CLIN: HCPCS | Performed by: INTERNAL MEDICINE

## 2022-04-12 PROCEDURE — G8417 CALC BMI ABV UP PARAM F/U: HCPCS | Performed by: INTERNAL MEDICINE

## 2022-04-12 PROCEDURE — 99214 OFFICE O/P EST MOD 30 MIN: CPT | Performed by: INTERNAL MEDICINE

## 2022-04-12 PROCEDURE — 1036F TOBACCO NON-USER: CPT | Performed by: INTERNAL MEDICINE

## 2022-04-12 PROCEDURE — 3017F COLORECTAL CA SCREEN DOC REV: CPT | Performed by: INTERNAL MEDICINE

## 2022-04-12 NOTE — PATIENT INSTRUCTIONS
Your provider has ordered testing for further evaluation. An order/prescription has been included in your paper work.  To schedule outpatient testing, contact Central Scheduling by calling [address] [place] [address] ([phone]). Patient Plan:  1. Continue to monitor blood pressure at home  2. Order 2 week cardiac event monitor to assess your heart rhythm   - I will call you with results  3. Order stress test to assess for blockages in your heart   - I will call you with reults  4.  Follow up after testing

## 2022-04-12 NOTE — TELEPHONE ENCOUNTER
Monitor placed by BELEN Park  Monitor [employer] vital connect  Length of monitor 14 days  Monitor ordered by MATILDE Gold 98 successful prior to pt leaving office?  Yes

## 2022-04-19 RX ORDER — DULAGLUTIDE 1.5 MG/.5ML
INJECTION, SOLUTION SUBCUTANEOUS
Qty: 2 PEN | Refills: 0 | Status: SHIPPED | OUTPATIENT
Start: 2022-04-19 | End: 2022-06-09

## 2022-04-22 ENCOUNTER — HOSPITAL ENCOUNTER (OUTPATIENT)
Dept: NUCLEAR MEDICINE | Age: 57
Discharge: HOME | End: 2022-04-22
Payer: MEDICAID

## 2022-04-22 ENCOUNTER — TELEPHONE (OUTPATIENT)
Dept: CARDIOLOGY CLINIC | Age: 57
End: 2022-04-22

## 2022-04-22 ENCOUNTER — HOSPITAL ENCOUNTER (OUTPATIENT)
Dept: NON INVASIVE DIAGNOSTICS | Age: 57
Discharge: HOME | End: 2022-04-22
Payer: MEDICAID

## 2022-04-22 DIAGNOSIS — R55 SYNCOPE, UNSPECIFIED SYNCOPE TYPE: ICD-10-CM

## 2022-04-22 DIAGNOSIS — R42 DIZZY: ICD-10-CM

## 2022-04-22 LAB
LV EF: 54 %
LVEF MODALITY: NORMAL

## 2022-04-22 PROCEDURE — 78452 HT MUSCLE IMAGE SPECT MULT: CPT

## 2022-04-22 PROCEDURE — 93017 CV STRESS TEST TRACING ONLY: CPT

## 2022-04-22 PROCEDURE — 6360000002 HC RX W HCPCS: Performed by: INTERNAL MEDICINE

## 2022-04-22 PROCEDURE — A9502 TC99M TETROFOSMIN: HCPCS | Performed by: INTERNAL MEDICINE

## 2022-04-22 PROCEDURE — 3430000000 HC RX DIAGNOSTIC RADIOPHARMACEUTICAL: Performed by: INTERNAL MEDICINE

## 2022-04-22 RX ADMIN — TETROFOSMIN 30 MILLICURIE: 1.38 INJECTION, POWDER, LYOPHILIZED, FOR SOLUTION INTRAVENOUS at 08:52

## 2022-04-22 RX ADMIN — TETROFOSMIN 10.9 MILLICURIE: 1.38 INJECTION, POWDER, LYOPHILIZED, FOR SOLUTION INTRAVENOUS at 07:28

## 2022-04-22 RX ADMIN — REGADENOSON 0.4 MG: 0.08 INJECTION, SOLUTION INTRAVENOUS at 08:53

## 2022-04-22 NOTE — TELEPHONE ENCOUNTER
Created telephone encounter. Spoke with Loyda relayed message per Ronak Aceves regarding stress test. Pt verbalized understanding.

## 2022-04-22 NOTE — TELEPHONE ENCOUNTER
----- Message from Gary Cook MD sent at 4/22/2022  2:21 PM EDT -----  Please let patient know that stress test came back okay.   Blockages or areas of concern, and did show her old heart disease which she has had for quite some time and similar to previous stress test.  No change at this time no need for cardiac cath

## 2022-05-02 PROCEDURE — 93228 REMOTE 30 DAY ECG REV/REPORT: CPT | Performed by: INTERNAL MEDICINE

## 2022-05-02 NOTE — TELEPHONE ENCOUNTER
Pt calling for monitor results. I did not see report under media. I did advise monitor results can take a few weeks to come back. Please call once monitor results are in.

## 2022-05-04 ENCOUNTER — TELEPHONE (OUTPATIENT)
Dept: CARDIOLOGY CLINIC | Age: 57
End: 2022-05-04

## 2022-05-04 DIAGNOSIS — R55 SYNCOPE, UNSPECIFIED SYNCOPE TYPE: ICD-10-CM

## 2022-05-04 DIAGNOSIS — R42 DIZZY: ICD-10-CM

## 2022-05-04 NOTE — TELEPHONE ENCOUNTER
Please advise date and time. First available with EP is 7/27. First potential overbook by  would be 6/27. Please advise what is best. Thank you!

## 2022-05-05 NOTE — TELEPHONE ENCOUNTER
6/16th at noon with AGK would be appropriate. Patient's burden for PVCs, PACs, and briet PAT around 1% (none of which are harmful).

## 2022-06-06 RX ORDER — DULOXETIN HYDROCHLORIDE 30 MG/1
CAPSULE, DELAYED RELEASE ORAL
Qty: 30 CAPSULE | Refills: 0 | OUTPATIENT
Start: 2022-06-06

## 2022-06-08 DIAGNOSIS — R11.0 NAUSEA: ICD-10-CM

## 2022-06-09 ENCOUNTER — OFFICE VISIT (OUTPATIENT)
Dept: FAMILY MEDICINE CLINIC | Age: 57
End: 2022-06-09
Payer: MEDICAID

## 2022-06-09 ENCOUNTER — TELEPHONE (OUTPATIENT)
Dept: FAMILY MEDICINE CLINIC | Age: 57
End: 2022-06-09

## 2022-06-09 VITALS
OXYGEN SATURATION: 98 % | DIASTOLIC BLOOD PRESSURE: 70 MMHG | HEART RATE: 64 BPM | SYSTOLIC BLOOD PRESSURE: 108 MMHG | BODY MASS INDEX: 33.07 KG/M2 | WEIGHT: 175 LBS

## 2022-06-09 DIAGNOSIS — J42 CHRONIC BRONCHITIS, UNSPECIFIED CHRONIC BRONCHITIS TYPE (HCC): ICD-10-CM

## 2022-06-09 DIAGNOSIS — G45.1 TIA INVOLVING LEFT INTERNAL CAROTID ARTERY: Chronic | ICD-10-CM

## 2022-06-09 DIAGNOSIS — J30.89 ALLERGIC RHINITIS DUE TO OTHER ALLERGIC TRIGGER, UNSPECIFIED SEASONALITY: ICD-10-CM

## 2022-06-09 DIAGNOSIS — I50.42 CHRONIC COMBINED SYSTOLIC AND DIASTOLIC CHF (CONGESTIVE HEART FAILURE) (HCC): Chronic | ICD-10-CM

## 2022-06-09 DIAGNOSIS — I10 ESSENTIAL HYPERTENSION: ICD-10-CM

## 2022-06-09 DIAGNOSIS — E11.59 TYPE 2 DIABETES MELLITUS WITH OTHER CIRCULATORY COMPLICATION, WITHOUT LONG-TERM CURRENT USE OF INSULIN (HCC): Primary | ICD-10-CM

## 2022-06-09 DIAGNOSIS — I25.119 CORONARY ARTERY DISEASE INVOLVING NATIVE CORONARY ARTERY OF NATIVE HEART WITH ANGINA PECTORIS (HCC): ICD-10-CM

## 2022-06-09 DIAGNOSIS — K21.9 GASTROESOPHAGEAL REFLUX DISEASE WITHOUT ESOPHAGITIS: ICD-10-CM

## 2022-06-09 DIAGNOSIS — F33.2 SEVERE EPISODE OF RECURRENT MAJOR DEPRESSIVE DISORDER, WITHOUT PSYCHOTIC FEATURES (HCC): ICD-10-CM

## 2022-06-09 PROBLEM — R07.9 CHEST PAIN: Status: RESOLVED | Noted: 2021-07-08 | Resolved: 2022-06-09

## 2022-06-09 LAB — HBA1C MFR BLD: 8.3 %

## 2022-06-09 PROCEDURE — G8427 DOCREV CUR MEDS BY ELIG CLIN: HCPCS | Performed by: NURSE PRACTITIONER

## 2022-06-09 PROCEDURE — 3017F COLORECTAL CA SCREEN DOC REV: CPT | Performed by: NURSE PRACTITIONER

## 2022-06-09 PROCEDURE — 1036F TOBACCO NON-USER: CPT | Performed by: NURSE PRACTITIONER

## 2022-06-09 PROCEDURE — 2022F DILAT RTA XM EVC RTNOPTHY: CPT | Performed by: NURSE PRACTITIONER

## 2022-06-09 PROCEDURE — 83036 HEMOGLOBIN GLYCOSYLATED A1C: CPT | Performed by: NURSE PRACTITIONER

## 2022-06-09 PROCEDURE — 3023F SPIROM DOC REV: CPT | Performed by: NURSE PRACTITIONER

## 2022-06-09 PROCEDURE — G8417 CALC BMI ABV UP PARAM F/U: HCPCS | Performed by: NURSE PRACTITIONER

## 2022-06-09 PROCEDURE — 3052F HG A1C>EQUAL 8.0%<EQUAL 9.0%: CPT | Performed by: NURSE PRACTITIONER

## 2022-06-09 PROCEDURE — 99214 OFFICE O/P EST MOD 30 MIN: CPT | Performed by: NURSE PRACTITIONER

## 2022-06-09 RX ORDER — DULAGLUTIDE 1.5 MG/.5ML
1.5 INJECTION, SOLUTION SUBCUTANEOUS WEEKLY
Qty: 4 PEN | Refills: 0 | Status: SHIPPED | OUTPATIENT
Start: 2022-06-09 | End: 2022-08-11

## 2022-06-09 RX ORDER — DULAGLUTIDE 1.5 MG/.5ML
INJECTION, SOLUTION SUBCUTANEOUS
Qty: 2 ML | Refills: 0 | OUTPATIENT
Start: 2022-06-09

## 2022-06-09 RX ORDER — CLOPIDOGREL BISULFATE 75 MG/1
75 TABLET ORAL DAILY
Qty: 30 TABLET | Refills: 5 | Status: SHIPPED | OUTPATIENT
Start: 2022-06-09

## 2022-06-09 RX ORDER — DULAGLUTIDE 3 MG/.5ML
3 INJECTION, SOLUTION SUBCUTANEOUS WEEKLY
Qty: 4 PEN | Refills: 5 | Status: CANCELLED | OUTPATIENT
Start: 2022-06-09

## 2022-06-09 RX ORDER — ASPIRIN 81 MG/1
81 TABLET, CHEWABLE ORAL DAILY
Qty: 30 TABLET | Refills: 11 | Status: SHIPPED | OUTPATIENT
Start: 2022-06-09

## 2022-06-09 RX ORDER — INSULIN GLARGINE 100 [IU]/ML
7 INJECTION, SOLUTION SUBCUTANEOUS NIGHTLY
Qty: 5 PEN | Refills: 3 | Status: SHIPPED | OUTPATIENT
Start: 2022-06-09

## 2022-06-09 RX ORDER — ONDANSETRON 4 MG/1
4 TABLET, ORALLY DISINTEGRATING ORAL 3 TIMES DAILY PRN
Qty: 21 TABLET | Refills: 0 | Status: SHIPPED | OUTPATIENT
Start: 2022-06-09 | End: 2022-07-12

## 2022-06-09 RX ORDER — DULAGLUTIDE 1.5 MG/.5ML
1.5 INJECTION, SOLUTION SUBCUTANEOUS WEEKLY
Qty: 2 PEN | Refills: 2 | Status: CANCELLED | OUTPATIENT
Start: 2022-06-09

## 2022-06-09 RX ORDER — DULAGLUTIDE 1.5 MG/.5ML
1.5 INJECTION, SOLUTION SUBCUTANEOUS WEEKLY
COMMUNITY
End: 2022-06-09 | Stop reason: SDUPTHER

## 2022-06-09 RX ORDER — PANTOPRAZOLE SODIUM 40 MG/1
TABLET, DELAYED RELEASE ORAL
Qty: 60 TABLET | Refills: 5 | Status: SHIPPED | OUTPATIENT
Start: 2022-06-09

## 2022-06-09 RX ORDER — LISINOPRIL 20 MG/1
20 TABLET ORAL DAILY
Qty: 30 TABLET | Refills: 5 | Status: SHIPPED | OUTPATIENT
Start: 2022-06-09

## 2022-06-09 RX ORDER — ALBUTEROL SULFATE 90 UG/1
2 AEROSOL, METERED RESPIRATORY (INHALATION) EVERY 6 HOURS PRN
Qty: 1 EACH | Refills: 2 | Status: SHIPPED | OUTPATIENT
Start: 2022-06-09

## 2022-06-09 RX ORDER — ATORVASTATIN CALCIUM 40 MG/1
TABLET, FILM COATED ORAL
Qty: 30 TABLET | Refills: 5 | Status: SHIPPED | OUTPATIENT
Start: 2022-06-09 | End: 2022-09-19 | Stop reason: SDUPTHER

## 2022-06-09 RX ORDER — FLUTICASONE PROPIONATE 50 MCG
1 SPRAY, SUSPENSION (ML) NASAL DAILY
Qty: 1 EACH | Refills: 5 | Status: SHIPPED | OUTPATIENT
Start: 2022-06-09

## 2022-06-09 ASSESSMENT — PATIENT HEALTH QUESTIONNAIRE - PHQ9
3. TROUBLE FALLING OR STAYING ASLEEP: 3
7. TROUBLE CONCENTRATING ON THINGS, SUCH AS READING THE NEWSPAPER OR WATCHING TELEVISION: 2
8. MOVING OR SPEAKING SO SLOWLY THAT OTHER PEOPLE COULD HAVE NOTICED. OR THE OPPOSITE, BEING SO FIGETY OR RESTLESS THAT YOU HAVE BEEN MOVING AROUND A LOT MORE THAN USUAL: 0
10. IF YOU CHECKED OFF ANY PROBLEMS, HOW DIFFICULT HAVE THESE PROBLEMS MADE IT FOR YOU TO DO YOUR WORK, TAKE CARE OF THINGS AT HOME, OR GET ALONG WITH OTHER PEOPLE: 2
SUM OF ALL RESPONSES TO PHQ QUESTIONS 1-9: 18
2. FEELING DOWN, DEPRESSED OR HOPELESS: 2
SUM OF ALL RESPONSES TO PHQ QUESTIONS 1-9: 16
SUM OF ALL RESPONSES TO PHQ QUESTIONS 1-9: 18
4. FEELING TIRED OR HAVING LITTLE ENERGY: 3
SUM OF ALL RESPONSES TO PHQ9 QUESTIONS 1 & 2: 3
9. THOUGHTS THAT YOU WOULD BE BETTER OFF DEAD, OR OF HURTING YOURSELF: 2
SUM OF ALL RESPONSES TO PHQ QUESTIONS 1-9: 18
1. LITTLE INTEREST OR PLEASURE IN DOING THINGS: 1
5. POOR APPETITE OR OVEREATING: 2
6. FEELING BAD ABOUT YOURSELF - OR THAT YOU ARE A FAILURE OR HAVE LET YOURSELF OR YOUR FAMILY DOWN: 3

## 2022-06-09 ASSESSMENT — ENCOUNTER SYMPTOMS
COUGH: 0
BLOOD IN STOOL: 0
DIARRHEA: 0
CONSTIPATION: 0
CHEST TIGHTNESS: 0
SHORTNESS OF BREATH: 0

## 2022-06-09 ASSESSMENT — ANXIETY QUESTIONNAIRES
3. WORRYING TOO MUCH ABOUT DIFFERENT THINGS: 3
2. NOT BEING ABLE TO STOP OR CONTROL WORRYING: 3
5. BEING SO RESTLESS THAT IT IS HARD TO SIT STILL: 1
7. FEELING AFRAID AS IF SOMETHING AWFUL MIGHT HAPPEN: 3
1. FEELING NERVOUS, ANXIOUS, OR ON EDGE: 3
6. BECOMING EASILY ANNOYED OR IRRITABLE: 3
GAD7 TOTAL SCORE: 19
4. TROUBLE RELAXING: 3

## 2022-06-09 ASSESSMENT — COLUMBIA-SUICIDE SEVERITY RATING SCALE - C-SSRS
2. HAVE YOU ACTUALLY HAD ANY THOUGHTS OF KILLING YOURSELF?: NO
6. HAVE YOU EVER DONE ANYTHING, STARTED TO DO ANYTHING, OR PREPARED TO DO ANYTHING TO END YOUR LIFE?: NO
1. WITHIN THE PAST MONTH, HAVE YOU WISHED YOU WERE DEAD OR WISHED YOU COULD GO TO SLEEP AND NOT WAKE UP?: YES

## 2022-06-09 NOTE — PROGRESS NOTES
6/9/2022    Chief Complaint   Patient presents with    Diabetes    Hypertension    Hyperlipidemia     fasting     Gastroesophageal Reflux    Congestive Heart Failure    Depression       Annie Moser is a 61 y.o. female, presents today for:      ASSESSMENT/PLAN:  1. Type 2 diabetes mellitus with other circulatory complication, without long-term current use of insulin (HCC)  Improving control since last OV but still not at goal.  8.3% today vs. 11.8% 0/2859  Continue Trulicity at 1.5 mg weekly. Increased to 3 mg 3/2022 to attempt to stop insulin however pt developed hypotension. Since that time patient has stopped Furosemide and Amlodipine without any additional hypotension episodes. May consider increasing back to 3 mg pending EP referral which is due next week. May also consider SGLT2 due to concurrent cardiac disease. Continue Metformin  Continue ACE/ statin  Encouraged annual eye exam.  - POCT glycosylated hemoglobin (Hb A1C)  - atorvastatin (LIPITOR) 40 MG tablet; TAKE ONE TABLET BY MOUTH DAILY AT NIGHT  Dispense: 30 tablet; Refill: 5  - insulin glargine (LANTUS SOLOSTAR) 100 UNIT/ML injection pen; Inject 7 Units into the skin nightly  Dispense: 5 pen; Refill: 3  - Dulaglutide (TRULICITY) 1.5 BJ/6.4LX SOPN; Inject 1.5 mg into the skin once a week  Dispense: 4 pen; Refill: 0    2. Severe episode of recurrent major depressive disorder, without psychotic features ([address]  Continues to be severe. Start Trintellix today given no potential cardiac side effects. Given cardiac history of CAD, CHF concern for possible arrhythmia with SSRI/ SNRI/ TCAs. Previously evaluated by Psych at [facility], declining to go back. Jose previously completed- in chart. Prior meds Cymbalta (didn't help), Paxil (no help) , Lexapro (no help). Has not taken Prozac.   - VORTIoxetine (TRINTELLIX) 5 MG tablet; Take 1 tablet by mouth daily  Dispense: 30 tablet; Refill: 2    3.  Coronary artery disease involving native coronary artery of native heart with angina pectoris ([facility] [address]  Encouraged to keep appt with EP next week. Stress Testing was normal.   Continue Lisinopril, Plavix, Atorvastatin, Amlodipine, ASA  Encouraged heart healthy diet  Encouraged 30 min activity daily  Encouraged weight loss  - aspirin 81 MG chewable tablet; Take 1 tablet by mouth daily  Dispense: 30 tablet; Refill: 11  - clopidogrel (PLAVIX) 75 MG tablet; Take 1 tablet by mouth daily  Dispense: 30 tablet; Refill: 5  - metFORMIN (GLUCOPHAGE) 1000 MG tablet; Take 1 tablet by mouth 2 times daily (with meals)  Dispense: 60 tablet; Refill: 5    4. Essential hypertension  See above  - atorvastatin (LIPITOR) 40 MG tablet; TAKE ONE TABLET BY MOUTH DAILY AT NIGHT  Dispense: 30 tablet; Refill: 5  - lisinopril (PRINIVIL;ZESTRIL) 20 MG tablet; Take 1 tablet by mouth daily  Dispense: 30 tablet; Refill: 5    5. Chronic bronchitis, unspecified chronic bronchitis type (HCC)  Stable today  Continue PRN Albuterol  - albuterol sulfate HFA (PROVENTIL HFA) 108 (90 Base) MCG/ACT inhaler; Inhale 2 puffs into the lungs every 6 hours as needed for Wheezing or Shortness of Breath  Dispense: 1 each; Refill: 2    6. 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 Gastroesophageal reflux disease without esophagitis  Stable today  Continue Pantoprazole, consider weaning next OV  - pantoprazole (PROTONIX) 40 MG tablet; TAKE ONE TABLET BY MOUTH 2 TIMES A DAY  Dispense: 60 tablet; Refill: 5    8. Hx TIA involving L-ICA  Asymptomatic today  Continue Plavix and Lipitor  Encouraged Cardiac diet     9. Allergic rhinitis due to other allergic trigger, unspecified seasonality  Stable today  Continue Flonase.   - fluticasone (FLONASE) 50 MCG/ACT nasal spray; 1 spray by Each Nostril route daily  Dispense: 1 each; Refill: 5    Return in about 4 weeks (around 7/7/2022) for trintellix. Presenting today for chronic disease follow up.     DM: Tolerating Trulicity well at 1.5 mg. No further episodes of severe dizziness since decreasing Trulicity from 3 to 1.5 mg and stopping Amlodipine/ Furosemide. Continuing to take Lantus 7 units daily with no hypoglycemic episodes reports. Is checking -140s. Watching diet. No structured exercise. Annual eye exams are not up to date. Is having some bilateral feet neuropathy, attempted Cymbalta in the past without relief of symptoms. HTN/ CP: Planning EP eval next week with arrythmias on Holter Monitor. No CP, SOB, leg swelling, orthopnea, PND. Tolerating meds well. Not checking BP at home. Watching diet intake. No structured physical activity. HLD: No focal sensory loss, leg myalgias. Tolerating atorvastatin. Depression: Continues to be severe. Difficulty sleeping, hard time shutting brain down,  Sometimes having problems making it to the bathroom due to fatigue and unwillingness to get out of bed. Previously evaluated at [facility], pt declining new referral today. Prior meds cymbalta (didn't help), paxil (no help) , lexapro (no help). Has not taken prozac. PHQ Scores 6/9/2022 6/10/2021   PHQ2 Score 3 4   PHQ9 Score 18 9     Interpretation of Total Score Depression Severity: 1-4 = Minimal depression, 5-9 = Mild depression, 10-14 = Moderate depression, 15-19 = Moderately severe depression, 20-27 = Severe depression    ENID 7 SCORE 6/9/2022   ENID-7 Total Score 19     Interpretation of ENID-7 score: 5-9 = mild anxiety, 10-14 = moderate anxiety, 15+ = severe anxiety. Recommend referral to behavioral health for scores 10 or greater.       Lab Results   Component Value Date    LABA1C 8.3 06/09/2022    LABA1C 11.8 03/04/2022    LABA1C 6.8 08/25/2021     Lab Results   Component Value Date    .0 03/04/2022        Lab Results   Component Value Date    LABMICR <1.20 03/04/2022        Lab Results   Component Value Date     03/28/2022    K 3.6 03/28/2022    CL 99 03/28/2022    CO2 24 03/28/2022    BUN 9 03/28/2022    CREATININE 0.7 03/28/2022    GLUCOSE 142 (H) 03/28/2022    CALCIUM 9.8 03/28/2022    PROT 6.9 03/28/2022    LABALBU 4.3 03/28/2022    BILITOT 1.0 03/28/2022    ALKPHOS 92 03/28/2022    AST 16 03/28/2022    ALT 20 03/28/2022    LABGLOM >60 03/28/2022    GFRAA >60 03/28/2022    AGRATIO 1.7 03/28/2022    GLOB 2.9 06/11/2021         Review of Systems   Constitutional: Negative. Respiratory: Negative for cough, chest tightness and shortness of breath. Cardiovascular: Positive for chest pain. Negative for palpitations and leg swelling. Gastrointestinal: Negative for blood in stool, constipation and diarrhea. Endocrine: Negative for polydipsia, polyphagia and polyuria. Genitourinary: Negative. Musculoskeletal: Negative. Skin: Negative. Neurological: Negative for dizziness, tremors, light-headedness and headaches. Psychiatric/Behavioral: Negative for decreased concentration, dysphoric mood, self-injury, sleep disturbance and suicidal ideas. The patient is not nervous/anxious. Current Outpatient Medications on File Prior to Visit   Medication Sig Dispense Refill    ondansetron (ZOFRAN-ODT) 4 MG disintegrating tablet Take 1 tablet by mouth 3 times daily as needed for Nausea or Vomiting 21 tablet 0    Insulin Pen Needle (TECHLITE PEN NEEDLES) 32G X 4 MM MISC USE AS DIRECTED FOR LANTUS 100 each 0    nitroGLYCERIN (NITROSTAT) 0.4 MG SL tablet up to max of 3 total doses. If no relief after 1 dose, call 911. 25 tablet 3    amLODIPine (NORVASC) 10 MG tablet Take 1 tablet by mouth daily (Patient not taking: Reported on 3/31/2022) 30 tablet 5    furosemide (LASIX) 20 MG tablet TAKE ONE TABLET BY MOUTH EVERY DAY (Patient not taking: Reported on 3/31/2022) 30 tablet 5     No current facility-administered medications on file prior to visit.      Allergies   Allergen Reactions    Penicillins Itching, Swelling and Rash    Arginine      Nausea and vomiting     Cymbalta Never Used   Substance Use Topics    Alcohol use: No     Alcohol/week: 0.0 standard drinks     Family History   Problem Relation Age of Onset    Emphysema Mother     Heart Failure Mother     Hypertension Mother     Heart Disease Mother     High Blood Pressure Mother     High Cholesterol Mother     Stroke Mother     Heart Failure Father     Hypertension Father     Heart Disease Father     High Blood Pressure Father     High Cholesterol Father     Hypertension Sister     High Blood Pressure Sister     Hypertension Brother     High Blood Pressure Brother     Cancer Brother     Hypertension Maternal Grandmother     High Blood Pressure Maternal Grandmother     Hypertension Maternal Grandfather     High Blood Pressure Maternal Grandfather     Hypertension Paternal Grandmother     High Blood Pressure Paternal Grandmother     Hypertension Paternal Grandfather     High Blood Pressure Paternal Grandfather     High Blood Pressure Sister     High Blood Pressure Sister     High Blood Pressure Sister     Heart Failure Maternal Aunt     Hypertension Maternal Aunt     Heart Failure Maternal Uncle     Hypertension Maternal Uncle     Cancer Other         nephew-colon, liver, lung    Heart Failure Other     Diabetes Other     Osteoarthritis Other     Hypertension Paternal Aunt     Hypertension Paternal Uncle     Asthma Neg Hx        Vitals:    06/09/22 1107   BP: 108/70   Pulse: 64   SpO2: 98%   Weight: 175 lb (79.4 kg)     Estimated body mass index is 33.07 kg/m² as calculated from the following:    Height as of 4/12/22: 5' 1\" (1.549 m). Weight as of this encounter: 175 lb (79.4 kg). Physical Exam  Vitals and nursing note reviewed. Constitutional:       Appearance: Normal appearance. HENT:      Head: Normocephalic.       Right Ear: Tympanic membrane, ear canal and external ear normal.      Left Ear: Tympanic membrane, ear canal and external ear normal.      Nose: Nose normal. Mouth/Throat:      Mouth: Mucous membranes are moist.   Eyes:      Extraocular Movements: Extraocular movements intact. Conjunctiva/sclera: Conjunctivae normal.      Pupils: Pupils are equal, round, and reactive to light. Neck:      Vascular: No carotid bruit. Cardiovascular:      Rate and Rhythm: Normal rate and regular rhythm. Pulses: Normal pulses. Carotid pulses are 2+ on the right side and 2+ on the left side. Heart sounds: Normal heart sounds. Pulmonary:      Effort: Pulmonary effort is normal.      Breath sounds: No wheezing or rhonchi. Abdominal:      General: Abdomen is flat. Palpations: Abdomen is soft. Tenderness: There is no abdominal tenderness. Hernia: No hernia is present. Musculoskeletal:      Cervical back: Normal range of motion. Right lower leg: No edema. Left lower leg: No edema. Lymphadenopathy:      Cervical: No cervical adenopathy. Skin:     General: Skin is warm and dry. Capillary Refill: Capillary refill takes less than 2 seconds. Neurological:      General: No focal deficit present. Mental Status: She is alert and oriented to person, place, and time. Cranial Nerves: No cranial nerve deficit. Sensory: No sensory deficit. Motor: No weakness. Coordination: Coordination normal.      Gait: Gait normal.      Deep Tendon Reflexes: Reflexes normal.   Psychiatric:         Mood and Affect: Mood normal.         Behavior: Behavior normal.         Thought Content: Thought content normal.         Judgment: Judgment normal.           Patient's questions answered and concerns addressed. Patient agrees to plan of care.         Electronically signed by GERMAN Jones CNP on 6/9/2022 at 1:42 PM

## 2022-06-10 NOTE — TELEPHONE ENCOUNTER
Message from Plan  Your PA request has been approved. Additional information will be provided in the approval communication.

## 2022-06-16 ENCOUNTER — TELEPHONE (OUTPATIENT)
Dept: CARDIOLOGY CLINIC | Age: 57
End: 2022-06-16

## 2022-06-16 ENCOUNTER — OFFICE VISIT (OUTPATIENT)
Dept: CARDIOLOGY CLINIC | Age: 57
End: 2022-06-16
Payer: MEDICAID

## 2022-06-16 VITALS
HEIGHT: 61 IN | DIASTOLIC BLOOD PRESSURE: 62 MMHG | OXYGEN SATURATION: 98 % | BODY MASS INDEX: 31.81 KG/M2 | SYSTOLIC BLOOD PRESSURE: 116 MMHG | WEIGHT: 168.5 LBS | HEART RATE: 73 BPM

## 2022-06-16 DIAGNOSIS — I49.9 IRREGULAR HEART RATE: Primary | ICD-10-CM

## 2022-06-16 DIAGNOSIS — R07.9 CHEST PAIN, UNSPECIFIED TYPE: Primary | ICD-10-CM

## 2022-06-16 PROCEDURE — G8417 CALC BMI ABV UP PARAM F/U: HCPCS | Performed by: INTERNAL MEDICINE

## 2022-06-16 PROCEDURE — 99204 OFFICE O/P NEW MOD 45 MIN: CPT | Performed by: INTERNAL MEDICINE

## 2022-06-16 PROCEDURE — 93000 ELECTROCARDIOGRAM COMPLETE: CPT | Performed by: INTERNAL MEDICINE

## 2022-06-16 PROCEDURE — G8427 DOCREV CUR MEDS BY ELIG CLIN: HCPCS | Performed by: INTERNAL MEDICINE

## 2022-06-16 NOTE — TELEPHONE ENCOUNTER
Spoke with pt regarding message from Mariely Galo. Pt states she wants to proceed with cath due to chest pain.  Please schedule pt for [facility]

## 2022-06-16 NOTE — PROGRESS NOTES
[address]   Electrophysiology Consult Note            Date: 6/16/22  Patient Name: Sundeep Arroyo  YOB: 1965    HISTORY OF PRESENT ILLNESS: Sundeep Arroyo is a 61 y.o. female who presents for evaluation for abnormal cardiac monitor results. The patient has a past medical history of CAD, chest pain, COPD, diabetes mellitus, Myocardial infarction, SOB,HTN, hyperlipidemia, Pulmonary nodule, and an enlarged heart. The patient was seen 7/30/2019 for chest pain and patient chose medication verses having a cath. On 8/6/2019, after continuous chest pain, the patient had a LHC showing restenosis at mid LAD stent around heavy angulated section of LAD 75%, HERO-2 ---> 0%, HERO 3 flow and Spasm of left main and also LCx that improved with NTG. On 4/12/2022 the patient had complaints of dizziness,swelling, hypotension, syncope, leg \"tightness,\" and heart \"fluttering. \" Patient wore a cardiac event monitor from 4/12/2022 to 4/25/2022 which demonstrated predominately SR with an average HR of 78 (). PAC burden 0.01%, PVC burden 1.45%. The monitor also demonstrated PSVT with the longest episode being 16 beats. Patient reported symptoms during these episodes. Today, 6/16/2022, ECG demonstrates SR (73). She reports that she is feeling palpitations frequently. She has chest discomfort and has taken as needed nitro recently. She feels like \"something isn't right\". She is taking her medications as prescribed. Patient denies current edema, sob, dizziness or syncope.      Past Medical History:   has a past medical history of Acute blood loss anemia, Asthma, CAD (coronary artery disease), Chest pain, COPD (chronic obstructive pulmonary disease) ([address] Coronary artery disease, Depression, Diabetes mellitus ([address] Enlarged heart, Fatigue, Generalized headaches, Hyperlipidemia, Hypertension, Migraine, Morning stiffness of joints, Myocardial infarction ([address] Numbness or tingling, OA (osteoarthritis) of knee, Obesity, Class I, BMI 30.0-34.9 (see actual BMI), Osteoporosis, Pulmonary nodule, S/P PTCA (percutaneous transluminal coronary angioplasty), and Shortness of breath. Past Surgical History:   has a past surgical history that includes Cholecystectomy; Hysterectomy; Diagnostic Cardiac Cath Lab Procedure (06/06/2013); Upper gastrointestinal endoscopy; Cardiac catheterization (04/01/2016); Cardiac catheterization (08/01/2012); Coronary artery bypass graft (06/13/2018); Coronary angioplasty with stent (05/13/2018); Percutaneous Transluminal Coronary Angio; Coronary angioplasty (08/06/2019); and Cardiac catheterization (11/01/2019). Allergies:  Penicillins, Arginine, Cymbalta [duloxetine hcl], Imdur [isosorbide dinitrate], Metoprolol, Nitroglycerin, and Cephalexin    Social History:   reports that she quit smoking about 4 years ago. Her smoking use included cigarettes. She has a 20.00 pack-year smoking history. She has never used smokeless tobacco. She reports that she does not drink alcohol and does not use drugs. Family History: family history includes Cancer in her brother and another family member; Diabetes in an other family member; Emphysema in her mother; Heart Disease in her father and mother; Heart Failure in her father, maternal aunt, maternal uncle, mother, and another family member; High Blood Pressure in her brother, father, maternal grandfather, maternal grandmother, mother, paternal grandfather, paternal grandmother, sister, sister, sister, and sister; High Cholesterol in her father and mother; Hypertension in her brother, father, maternal aunt, maternal grandfather, maternal grandmother, maternal uncle, mother, paternal aunt, paternal grandfather, paternal grandmother, paternal uncle, and sister; Osteoarthritis in an other family member; Stroke in her mother. Home Medications:    Prior to Admission medications    Medication Sig Start Date End Date Taking?  Authorizing Provider   ondansetron (ZOFRAN-ODT) 4 MG disintegrating tablet Take 1 tablet by mouth 3 times daily as needed for Nausea or Vomiting 6/9/22  Yes GERMAN Ibarra CNP   albuterol sulfate HFA (PROVENTIL HFA) 108 (90 Base) MCG/ACT inhaler Inhale 2 puffs into the lungs every 6 hours as needed for Wheezing or Shortness of Breath 6/9/22  GERMAN Amaro CNP   aspirin 81 MG chewable tablet Take 1 tablet by mouth daily 6/9/22  GERMAN Amaro CNP   atorvastatin (LIPITOR) 40 MG tablet TAKE ONE TABLET BY MOUTH DAILY AT NIGHT 6/9/22  GERMAN Amaro CNP   clopidogrel (PLAVIX) 75 MG tablet Take 1 tablet by mouth daily 6/9/22  GERMAN Amaro CNP   fluticasone (FLONASE) 50 MCG/ACT nasal spray 1 spray by Each Nostril route daily 6/9/22  GERMAN Amaro CNP   insulin glargine (LANTUS SOLOSTAR) 100 UNIT/ML injection pen Inject 7 Units into the skin nightly 6/9/22  GERMAN Amaro CNP   lisinopril (PRINIVIL;ZESTRIL) 20 MG tablet Take 1 tablet by mouth daily 6/9/22  GERMAN Amaro CNP   metFORMIN (GLUCOPHAGE) 1000 MG tablet Take 1 tablet by mouth 2 times daily (with meals) 6/9/22 7/9/22 GERMAN Amaro CNP   pantoprazole (PROTONIX) 40 MG tablet TAKE ONE TABLET BY MOUTH 2 TIMES A DAY 6/9/22  GERMAN Amaro CNP   Dulaglutide (TRULICITY) 1.5 UP/9.0VA SOPN Inject 1.5 mg into the skin once a week 6/9/22  GERMAN Amaro CNP   VORTIoxetine (TRINTELLIX) 5 MG tablet Take 1 tablet by mouth daily 6/9/22 7/9/22 GERMAN Amaro CNP   Insulin Pen Needle (TECHLITE PEN NEEDLES) 32G X 4 MM MISC USE AS DIRECTED FOR LANTUS 3/4/22  GERMAN Amaro CNP   nitroGLYCERIN (NITROSTAT) 0.4 MG SL tablet up to max of 3 total doses. If no relief after 1 dose, call 911. 7/8/21  Yes Nadja Herring MD       REVIEW OF SYSTEMS:    All 14-point review of systems are completed and  pertinent positives are mentioned in the history of present illness. Other  systems are reviewed and are negative. Physical Examination:    /62   Pulse 73   Ht 5' 1\" (1.549 m)   Wt 168 lb 8 oz (76.4 kg)   SpO2 98%   BMI 31.84 kg/m²      Constitutional and General Appearance:    alert, cooperative, no distress and appears stated age  [de-identified]:    PERRLA, no cervical lymphadenopathy. No masses palpable. Normal oral  mucosa  Respiratory:  · Normal excursion and expansion without use of accessory muscles  · Resp Auscultation: Normal breath sounds without dullness or wheezing  Cardiovascular:  · The apical impulse is not displaced  · RRR S1S2 w/o M/G/R  Abdomen:  · No masses or tenderness  · Bowel sounds present  Extremities:  ·  No Cyanosis or Clubbing  ·  Lower extremity edema: No  · Skin: Warm and dry  Neurological:  · Alert and oriented. · Moves all extremities well  · No abnormalities of mood, affect, memory, mentation, or behavior are noted    DATA:    ECG 6/16/22: Personally reviewed. Stress Test 4/22/2022  Summary  Low normal calculated LVEF 54% calculated EF is likely overestimated on this study. Inferolateral hypokinesis    Inferolateral scar with small amount of oral-infarct ischemia     IMPRESSION:    1. Palpitations. 6/16/2022  Patient with a history of ischemic cardiomyopathy status post CABG and PCI, hypertension, hyperlipidemia, diabetes mellitus type 2, COPD with ongoing tobacco use, depression, anxiety, and palpitations who presents from home to self-care. Patient wore a cardiac monitor which showed no significant arrhythmias. She describes mostly her chest pain which ever she had the button for symptoms. No clear etiology of her dizziness and palpitations. Discussed this in detail with patient and she understands and agrees. - No changes. May consider increasing BB in future. 2. Cerebral vascular disease.   6/16/2022  Patient with a history of cerebrovascular disease, cerebral aneurysm, hypertension, and diabetes mellitus type 2 who presents from [facility] care. Patient meets criteria for a loop monitor given no atrial fibrillation found on her cardiac monitoring system and a history of cerebrovascular disease. Discussed ILR in detail. She would like to move forward with a loop monitor. We will schedule. - Schedule loop monitor. 3. Ischemic cardiomyopathy. 6/16/2022  Patient with extensive ischemic cardiomyopathy history including CABG. She continues to have have chest pain. I discussed case with Dr. Deepika Pineda who has agreed to review case. - Follow up with Dr. Deepika Pineda. RECOMMENDATIONS:  1. Discussed loop monitor for long term monitoring due to stroke history. -patient would like to proceed. We will call you to set this up  2. Continue current medications, no changes at this time. 3. We will reach out to Dr Deepika Pineda regarding ongoing chest discomfort. 4. Follow up after procedure. QUALITY MEASURES  1. Tobacco Cessation Counseling: NA  2. Retake of BP if >140/90:   NA  3. Documentation to PCP/referring for new patient:  Sent to PCP at close of office visit  4. CAD patient on anti-platelet: Yes  5. CAD patient on STATIN therapy:  Yes  6. Patient with CHF and aFib on anticoagulation:  NA     All questions and concerns were addressed to the patient/family. Alternatives to my treatment were discussed. Gm Betancourt RN, am scribing for and in the presence of Dr. Janet Mcqueen. 06/16/22 12:13 PM   Darien Bullock RN    I reviewed with the resident the medical history and the resident's findings on the physical examination. I discussed with the resident the patient's diagnosis and concur with the plan. Dr. Darrel Diego MD  Electrophysiology  [facility]. [address] [place] [address]. [address]. [place], [address]  Phone: [phone]  Fax: [phone]     NOTE: This report was transcribed using voice recognition software.  Every effort was made to ensure accuracy, however, inadvertent computerized transcription errors may be present.

## 2022-06-16 NOTE — TELEPHONE ENCOUNTER
Spoke with patient. Patient is scheduled with Dr. Babar Burnette for Loop Implant with [employer] on 7/8/22 at [facility], arrival time of 9:30am to the Cath Lab. Please have patient arrive to the main entrance of [facility] and check in with the registration desk. Remind patient to be NPO after midnight (8 hours prior). Do not apply lotions/creams on skin the day of procedure.

## 2022-06-16 NOTE — PATIENT INSTRUCTIONS
RECOMMENDATIONS:  1. Discussed loop monitor for long term monitoring due to stroke history. -patient would like to proceed. We will call you to set this up  2. Continue current medications, no changes at this time. 3. We will reach out to Dr Dang Or regarding ongoing chest discomfort. 4. Follow up after procedure. Patient Education        Implantable Heart Monitor Placement: What to Expect at [address] [place] [address] heart monitor placement is surgery to put a small heart monitor under the skin of your chest. The doctor put the monitor there to record electrical signals from your heart. The monitor looks for an irregular heartbeat. It also looks for other heart rhythm problems. It can help your doctor find out what's causing your fainting, lightheadedness, or othersymptoms. Your chest may be sore where the doctor made the cut. You should be able to go home soon after surgery. And you should be able to getback to your usual activities. You'll need to take steps to safely use electronic devices. Some of these devices can stop your heart monitor from working right for a short time. Talk with your doctor about this. Learn what to avoid and what to keep a shortdistance away from your heart monitor. This care sheet gives you a general idea about how long it will take for you to recover. But each person recovers at a different pace. Follow the steps belowto get better as quickly as possible. How can you care for yourself at home? Activity     Rest when you feel tired.      You can do your normal activities when it feels okay to do so. Medicines     Ask your doctor if you can take an over-the-counter pain medicine, such as acetaminophen (Tylenol), ibuprofen (Advil, Motrin), or naproxen (Aleve). Be safe with medicines. Read and follow all instructions on the label.    Incision care     If you have strips of tape on the incision, leave the tape on for a week or until it falls off.     [facility] the area daily with warm water, and pat it dry. Don't use hydrogen peroxide or alcohol. They can slow healing.      You may cover the area with a gauze bandage if it oozes fluid or rubs against clothing.      You may shower 24 to 48 hours after surgery. Pat the incision dry. Don't swim or take a bath for the first 2 weeks, or until your doctor tells you it is okay. Other instructions     Keep a medical ID card with you at all times that says you have a heart monitor. The card should include the  and model information.      Your monitor may start recording on its own when it detects an abnormal heartbeat. Or you might use a handheld device to start the monitor when you have symptoms. Your doctor will explain which type of monitor you have and what you need to do. Follow-up care is a key part of your treatment and safety. Be sure to make and go to all appointments, and call your doctor if you are having problems. It's also a good idea to know your test results and keep alist of the medicines you take. When should you call for help? Call 911anytime you think you may need emergency care. For example, call if:   You passed out (lost consciousness). Call your doctor nowor seek immediate medical care if:   You have pain that does not get better after you take pain medicine.  You are bleeding from the incision.  You have symptoms of infection, such as:  ? Increased pain, swelling, warmth, or redness. ? Red streaks leading from the area. ? Pus draining from the area. ? A fever. Watch closely for changes in your health, and be sure to contact your doctor if:  Kanchan Montejo You have any problems with your heart monitor. Current as of: January 10, 2022               Content Version: 13.2  © 2006-2022 Healthwise, Incorporated. Care instructions adapted under license by [facility] ([facility]).  If you have questions about a medical condition or this instruction, always ask your healthcare professional. [employer] disclaims any warranty or liability for your use of this information. Patient Education        Learning About Implantable Heart Monitors  What is an implantable heart monitor? An implantable heart monitor is a small device placed under the skin of your chest. It records the electrical signals from your heart. A monitor is used to look for irregular heartbeats. It can help your doctor find out what is causing your fainting, lightheadedness, or other symptoms. It also can help your doctorcheck to see if treatment for an irregular heartbeat is working. The monitor may be placed near the middle of your chest. The monitor may beabout the size of a paper clip. These monitors are used if an irregular heart rhythm or your symptoms don't happen very often. They also help your doctor monitor your heart for a longtime. How is the monitor put in place? The monitor is put in during a short surgery. You will get medicine to numb the area of your chest where the monitor will be put in. You will be awake duringthe surgery, but you shouldn't feel any pain. Your doctor will make a small cut and place the monitor under your skin. Then he or she will close the cut with special tape or glue or with stitches thatwill dissolve. Then the doctor will place a bandage over the cut. The procedure will take about half an hour. You probably will be able to gohome soon after it's done. You may have some minor pain where the cut was made. You will get instructionsfrom your doctor on how to care for it at home. When your doctor says it's safe, you should be able to get back to your normalactivities. How is the monitor used? Your monitor may start recording on its own when it detects an abnormal heartbeat. Or you might use a handheld device to start the monitor when you have symptoms. Your doctor will explain which type of monitor you have and whatyou need to do.   Your doctor will tell you how often he or she will need to check your monitor. The information from your monitor may be sent to your doctor automatically. Or you may have to do something to send it. It may be sent over a phone line or online. You will get instructions from your doctor. Your information will stayprivate and secure. You will also have checkups in person. You may need to keep a symptom diary while you have the monitor. This means that you will write down the times you have symptoms and what you were doing when they started. Your doctor will let you know how to do this. He or she can then look at your heart monitor records to see if your heart rhythms changedwhen you had symptoms. After your doctor gets the information he or she needs, the monitor will beremoved from your chest.  What else should you know about living with a heart monitor? You will get a medical ID card with information about your heart monitor. Girish Moserin with you at all times. Tell all of your doctors that you have this monitor. Some electric devices have a strong electromagnetic field. This field can keep the heart monitor from working right for a short time. Many electric devices do not affect how the monitor works. You can use them safely. Examples include most office equipment and kitchen appliances. Try to keep things that are powered by electricity or batteries, such as a power tool or a cell phone, at least 6 inches away from the monitor. Tell your doctor if you work near [place] [address]. such as certain welding tools. Your doctor or the maker of your heart monitor can give you a full list ofthings to avoid. When should you call for help? Call your doctor now or seek immediate medical care if:   You have symptoms of infection, such as:  ? Increased pain, swelling, warmth, or redness around the cut.  ? Red streaks leading from the cut.  ? Pus draining from the cut.  ? A fever.   Watch closely for changes in your health, and be sure to contact your doctor if:   You have any problems with your heart monitor. Follow-up care is a key part of your treatment and safety. Be sure to make and go to all appointments, and call your doctor if you are having problems. It's also a good idea to know your test results and keep alist of the medicines you take. Where can you learn more? Go to [email or web address]://[email or web address] org and sign in to your Behalf account. Enter R155 in the Nujira box to learn more about \"Learning About Implantable Heart Monitors. \"     If you do not have an account, please click on the \"Sign Up Now\" link. Current as of: January 10, 2022               Content Version: 13.2  © 2006-2022 Healthwise, Incorporated. Care instructions adapted under license by [facility] ([facility]). If you have questions about a medical condition or this instruction, always ask your healthcare professional. [address] any warranty or liability for your use of this information.

## 2022-06-16 NOTE — TELEPHONE ENCOUNTER
Patient was seen in office 6/16/22. ILR implant discussed and agreed upon by [id number] and patient. Medications were discussed. Patient will not need covid testing prior. Thank you! Meds reviewed in clinic with patient, take all meds as scheduled.

## 2022-06-17 NOTE — TELEPHONE ENCOUNTER
Patient continues to have significant chest pain affecting quality of life. Her last stress test showed no ischemia but could be a false negative. However she did have a cardiac cath was negative in 2019 and 2020.   If this cath once again remains negative she will certainly need an evaluation for possible noncardiac causes of her chest discomfort

## 2022-06-21 ENCOUNTER — HOSPITAL ENCOUNTER (INPATIENT)
Dept: CARDIAC CATH/INVASIVE PROCEDURES | Age: 57
LOS: 1 days | Discharge: HOME | DRG: 175 | End: 2022-06-22
Attending: INTERNAL MEDICINE | Admitting: INTERNAL MEDICINE
Payer: MEDICAID

## 2022-06-21 PROBLEM — I25.10 CAD IN NATIVE ARTERY: Status: ACTIVE | Noted: 2022-06-21

## 2022-06-21 LAB
A/G RATIO: 1.9 (ref 1.1–2.2)
ALBUMIN SERPL-MCNC: 4.5 G/DL (ref 3.4–5)
ALP BLD-CCNC: 79 U/L (ref 40–129)
ALT SERPL-CCNC: 18 U/L (ref 10–40)
ANION GAP SERPL CALCULATED.3IONS-SCNC: 9 MMOL/L (ref 3–16)
AST SERPL-CCNC: 16 U/L (ref 15–37)
BILIRUB SERPL-MCNC: 0.5 MG/DL (ref 0–1)
BUN BLDV-MCNC: 12 MG/DL (ref 7–20)
CALCIUM SERPL-MCNC: 9.6 MG/DL (ref 8.3–10.6)
CHLORIDE BLD-SCNC: 103 MMOL/L (ref 99–110)
CHOLESTEROL, TOTAL: 152 MG/DL (ref 0–199)
CO2: 27 MMOL/L (ref 21–32)
CREAT SERPL-MCNC: 0.8 MG/DL (ref 0.6–1.1)
EKG ATRIAL RATE: 66 BPM
EKG ATRIAL RATE: 79 BPM
EKG DIAGNOSIS: NORMAL
EKG DIAGNOSIS: NORMAL
EKG P AXIS: 51 DEGREES
EKG P AXIS: 55 DEGREES
EKG P-R INTERVAL: 156 MS
EKG P-R INTERVAL: 158 MS
EKG Q-T INTERVAL: 414 MS
EKG Q-T INTERVAL: 432 MS
EKG QRS DURATION: 104 MS
EKG QRS DURATION: 104 MS
EKG QTC CALCULATION (BAZETT): 452 MS
EKG QTC CALCULATION (BAZETT): 474 MS
EKG R AXIS: -20 DEGREES
EKG R AXIS: -28 DEGREES
EKG T AXIS: 10 DEGREES
EKG T AXIS: 69 DEGREES
EKG VENTRICULAR RATE: 66 BPM
EKG VENTRICULAR RATE: 79 BPM
GFR AFRICAN AMERICAN: >60
GFR NON-AFRICAN AMERICAN: >60
GLUCOSE BLD-MCNC: 156 MG/DL (ref 70–99)
HCT VFR BLD CALC: 42 % (ref 36–48)
HDLC SERPL-MCNC: 40 MG/DL (ref 40–60)
HEMOGLOBIN: 14.1 G/DL (ref 12–16)
LDL CHOLESTEROL CALCULATED: 80 MG/DL
MCH RBC QN AUTO: 29.6 PG (ref 26–34)
MCHC RBC AUTO-ENTMCNC: 33.6 G/DL (ref 31–36)
MCV RBC AUTO: 88 FL (ref 80–100)
PDW BLD-RTO: 13.2 % (ref 12.4–15.4)
PLATELET # BLD: 182 K/UL (ref 135–450)
PMV BLD AUTO: 8.3 FL (ref 5–10.5)
POTASSIUM SERPL-SCNC: 3.8 MMOL/L (ref 3.5–5.1)
RBC # BLD: 4.78 M/UL (ref 4–5.2)
SODIUM BLD-SCNC: 139 MMOL/L (ref 136–145)
TOTAL PROTEIN: 6.9 G/DL (ref 6.4–8.2)
TRIGL SERPL-MCNC: 160 MG/DL (ref 0–150)
VLDLC SERPL CALC-MCNC: 32 MG/DL
WBC # BLD: 4.7 K/UL (ref 4–11)

## 2022-06-21 PROCEDURE — C1894 INTRO/SHEATH, NON-LASER: HCPCS

## 2022-06-21 PROCEDURE — C1885 CATH, TRANSLUMIN ANGIO LASER: HCPCS

## 2022-06-21 PROCEDURE — 4A023N7 MEASUREMENT OF CARDIAC SAMPLING AND PRESSURE, LEFT HEART, PERCUTANEOUS APPROACH: ICD-10-PCS | Performed by: INTERNAL MEDICINE

## 2022-06-21 PROCEDURE — 6360000002 HC RX W HCPCS: Performed by: INTERNAL MEDICINE

## 2022-06-21 PROCEDURE — 80053 COMPREHEN METABOLIC PANEL: CPT

## 2022-06-21 PROCEDURE — 99152 MOD SED SAME PHYS/QHP 5/>YRS: CPT | Performed by: INTERNAL MEDICINE

## 2022-06-21 PROCEDURE — B2151ZZ FLUOROSCOPY OF LEFT HEART USING LOW OSMOLAR CONTRAST: ICD-10-PCS | Performed by: INTERNAL MEDICINE

## 2022-06-21 PROCEDURE — 85027 COMPLETE CBC AUTOMATED: CPT

## 2022-06-21 PROCEDURE — B2131ZZ FLUOROSCOPY OF MULTIPLE CORONARY ARTERY BYPASS GRAFTS USING LOW OSMOLAR CONTRAST: ICD-10-PCS | Performed by: INTERNAL MEDICINE

## 2022-06-21 PROCEDURE — B2111ZZ FLUOROSCOPY OF MULTIPLE CORONARY ARTERIES USING LOW OSMOLAR CONTRAST: ICD-10-PCS | Performed by: INTERNAL MEDICINE

## 2022-06-21 PROCEDURE — 02703ZZ DILATION OF CORONARY ARTERY, ONE ARTERY, PERCUTANEOUS APPROACH: ICD-10-PCS | Performed by: INTERNAL MEDICINE

## 2022-06-21 PROCEDURE — 93005 ELECTROCARDIOGRAM TRACING: CPT | Performed by: INTERNAL MEDICINE

## 2022-06-21 PROCEDURE — 2500000003 HC RX 250 WO HCPCS

## 2022-06-21 PROCEDURE — 2060000000 HC ICU INTERMEDIATE R&B

## 2022-06-21 PROCEDURE — C1725 CATH, TRANSLUMIN NON-LASER: HCPCS

## 2022-06-21 PROCEDURE — 02C03ZZ EXTIRPATION OF MATTER FROM CORONARY ARTERY, ONE ARTERY, PERCUTANEOUS APPROACH: ICD-10-PCS | Performed by: INTERNAL MEDICINE

## 2022-06-21 PROCEDURE — 93458 L HRT ARTERY/VENTRICLE ANGIO: CPT | Performed by: INTERNAL MEDICINE

## 2022-06-21 PROCEDURE — C1887 CATHETER, GUIDING: HCPCS

## 2022-06-21 PROCEDURE — 6360000004 HC RX CONTRAST MEDICATION

## 2022-06-21 PROCEDURE — 2709999900 HC NON-CHARGEABLE SUPPLY

## 2022-06-21 PROCEDURE — 93459 L HRT ART/GRFT ANGIO: CPT

## 2022-06-21 PROCEDURE — 93010 ELECTROCARDIOGRAM REPORT: CPT | Performed by: INTERNAL MEDICINE

## 2022-06-21 PROCEDURE — 80061 LIPID PANEL: CPT

## 2022-06-21 PROCEDURE — 2580000003 HC RX 258: Performed by: INTERNAL MEDICINE

## 2022-06-21 PROCEDURE — C1769 GUIDE WIRE: HCPCS

## 2022-06-21 PROCEDURE — C9602 PERC D-E COR STENT ATHER S: HCPCS

## 2022-06-21 PROCEDURE — 6370000000 HC RX 637 (ALT 250 FOR IP): Performed by: INTERNAL MEDICINE

## 2022-06-21 PROCEDURE — 6370000000 HC RX 637 (ALT 250 FOR IP)

## 2022-06-21 PROCEDURE — 92924 PRQ TRLUML C ATHRC 1 ART&/BR: CPT | Performed by: INTERNAL MEDICINE

## 2022-06-21 PROCEDURE — 85347 COAGULATION TIME ACTIVATED: CPT

## 2022-06-21 PROCEDURE — 6360000002 HC RX W HCPCS

## 2022-06-21 RX ORDER — EPTIFIBATIDE 0.75 MG/ML
2 INJECTION, SOLUTION INTRAVENOUS CONTINUOUS
Status: DISPENSED | OUTPATIENT
Start: 2022-06-21 | End: 2022-06-21

## 2022-06-21 RX ORDER — PANTOPRAZOLE SODIUM 40 MG/1
40 TABLET, DELAYED RELEASE ORAL
Status: DISCONTINUED | OUTPATIENT
Start: 2022-06-22 | End: 2022-06-22 | Stop reason: HOSPADM

## 2022-06-21 RX ORDER — MIDAZOLAM HYDROCHLORIDE 1 MG/ML
INJECTION INTRAMUSCULAR; INTRAVENOUS
Status: COMPLETED | OUTPATIENT
Start: 2022-06-21 | End: 2022-06-21

## 2022-06-21 RX ORDER — LISINOPRIL 10 MG/1
20 TABLET ORAL DAILY
Status: DISCONTINUED | OUTPATIENT
Start: 2022-06-21 | End: 2022-06-22 | Stop reason: HOSPADM

## 2022-06-21 RX ORDER — SODIUM CHLORIDE 9 MG/ML
1000 INJECTION, SOLUTION INTRAVENOUS CONTINUOUS
Status: DISCONTINUED | OUTPATIENT
Start: 2022-06-21 | End: 2022-06-22 | Stop reason: HOSPADM

## 2022-06-21 RX ORDER — SODIUM CHLORIDE 9 MG/ML
INJECTION, SOLUTION INTRAVENOUS PRN
Status: DISCONTINUED | OUTPATIENT
Start: 2022-06-21 | End: 2022-06-22 | Stop reason: HOSPADM

## 2022-06-21 RX ORDER — FENTANYL CITRATE 50 UG/ML
INJECTION, SOLUTION INTRAMUSCULAR; INTRAVENOUS
Status: COMPLETED | OUTPATIENT
Start: 2022-06-21 | End: 2022-06-21

## 2022-06-21 RX ORDER — FENTANYL CITRATE 50 UG/ML
25 INJECTION, SOLUTION INTRAMUSCULAR; INTRAVENOUS
Status: COMPLETED | OUTPATIENT
Start: 2022-06-21 | End: 2022-06-21

## 2022-06-21 RX ORDER — SODIUM CHLORIDE 0.9 % (FLUSH) 0.9 %
5-40 SYRINGE (ML) INJECTION PRN
Status: DISCONTINUED | OUTPATIENT
Start: 2022-06-21 | End: 2022-06-22 | Stop reason: HOSPADM

## 2022-06-21 RX ORDER — ONDANSETRON 4 MG/1
4 TABLET, ORALLY DISINTEGRATING ORAL 3 TIMES DAILY PRN
Status: DISCONTINUED | OUTPATIENT
Start: 2022-06-21 | End: 2022-06-22 | Stop reason: HOSPADM

## 2022-06-21 RX ORDER — NITROGLYCERIN 0.4 MG/1
0.4 TABLET SUBLINGUAL EVERY 5 MIN PRN
Status: DISCONTINUED | OUTPATIENT
Start: 2022-06-21 | End: 2022-06-22 | Stop reason: HOSPADM

## 2022-06-21 RX ORDER — ATORVASTATIN CALCIUM 40 MG/1
40 TABLET, FILM COATED ORAL NIGHTLY
Status: DISCONTINUED | OUTPATIENT
Start: 2022-06-21 | End: 2022-06-22 | Stop reason: HOSPADM

## 2022-06-21 RX ORDER — MORPHINE SULFATE 4 MG/ML
2 INJECTION, SOLUTION INTRAMUSCULAR; INTRAVENOUS
Status: COMPLETED | OUTPATIENT
Start: 2022-06-21 | End: 2022-06-21

## 2022-06-21 RX ORDER — HEPARIN SODIUM 1000 [USP'U]/ML
INJECTION, SOLUTION INTRAVENOUS; SUBCUTANEOUS
Status: COMPLETED | OUTPATIENT
Start: 2022-06-21 | End: 2022-06-21

## 2022-06-21 RX ORDER — HYDRALAZINE HYDROCHLORIDE 20 MG/ML
INJECTION INTRAMUSCULAR; INTRAVENOUS
Status: COMPLETED | OUTPATIENT
Start: 2022-06-21 | End: 2022-06-21

## 2022-06-21 RX ORDER — CLOPIDOGREL 300 MG/1
TABLET, FILM COATED ORAL
Status: COMPLETED | OUTPATIENT
Start: 2022-06-21 | End: 2022-06-21

## 2022-06-21 RX ORDER — CLOPIDOGREL BISULFATE 75 MG/1
75 TABLET ORAL DAILY
Status: DISCONTINUED | OUTPATIENT
Start: 2022-06-21 | End: 2022-06-22 | Stop reason: HOSPADM

## 2022-06-21 RX ORDER — MIDAZOLAM HYDROCHLORIDE 1 MG/ML
2 INJECTION INTRAMUSCULAR; INTRAVENOUS
Status: ACTIVE | OUTPATIENT
Start: 2022-06-21 | End: 2022-06-21

## 2022-06-21 RX ORDER — SODIUM CHLORIDE 0.9 % (FLUSH) 0.9 %
5-40 SYRINGE (ML) INJECTION EVERY 12 HOURS SCHEDULED
Status: DISCONTINUED | OUTPATIENT
Start: 2022-06-21 | End: 2022-06-22 | Stop reason: HOSPADM

## 2022-06-21 RX ORDER — ASPIRIN 81 MG/1
81 TABLET, CHEWABLE ORAL DAILY
Status: DISCONTINUED | OUTPATIENT
Start: 2022-06-21 | End: 2022-06-22 | Stop reason: HOSPADM

## 2022-06-21 RX ADMIN — MIDAZOLAM HYDROCHLORIDE 1 MG: 1 INJECTION INTRAMUSCULAR; INTRAVENOUS at 09:44

## 2022-06-21 RX ADMIN — MORPHINE SULFATE 2 MG: 4 INJECTION, SOLUTION INTRAMUSCULAR; INTRAVENOUS at 14:35

## 2022-06-21 RX ADMIN — SODIUM CHLORIDE, PRESERVATIVE FREE 10 ML: 5 INJECTION INTRAVENOUS at 11:57

## 2022-06-21 RX ADMIN — FENTANYL CITRATE 50 MCG: 50 INJECTION, SOLUTION INTRAMUSCULAR; INTRAVENOUS at 09:44

## 2022-06-21 RX ADMIN — FENTANYL CITRATE 50 MCG: 50 INJECTION, SOLUTION INTRAMUSCULAR; INTRAVENOUS at 08:59

## 2022-06-21 RX ADMIN — MIDAZOLAM HYDROCHLORIDE 2 MG: 1 INJECTION INTRAMUSCULAR; INTRAVENOUS at 08:39

## 2022-06-21 RX ADMIN — MIDAZOLAM HYDROCHLORIDE 1 MG: 1 INJECTION INTRAMUSCULAR; INTRAVENOUS at 09:33

## 2022-06-21 RX ADMIN — MORPHINE SULFATE 2 MG: 4 INJECTION, SOLUTION INTRAMUSCULAR; INTRAVENOUS at 18:42

## 2022-06-21 RX ADMIN — FENTANYL CITRATE 50 MCG: 50 INJECTION, SOLUTION INTRAMUSCULAR; INTRAVENOUS at 09:19

## 2022-06-21 RX ADMIN — MIDAZOLAM HYDROCHLORIDE 1 MG: 1 INJECTION INTRAMUSCULAR; INTRAVENOUS at 09:19

## 2022-06-21 RX ADMIN — FENTANYL CITRATE 25 MCG: 50 INJECTION, SOLUTION INTRAMUSCULAR; INTRAVENOUS at 08:39

## 2022-06-21 RX ADMIN — HEPARIN SODIUM 5300 UNITS: 1000 INJECTION, SOLUTION INTRAVENOUS; SUBCUTANEOUS at 09:07

## 2022-06-21 RX ADMIN — EPTIFIBATIDE 2 MCG/KG/MIN: 0.75 INJECTION, SOLUTION INTRAVENOUS at 09:24

## 2022-06-21 RX ADMIN — MIDAZOLAM HYDROCHLORIDE 1 MG: 1 INJECTION INTRAMUSCULAR; INTRAVENOUS at 08:50

## 2022-06-21 RX ADMIN — CLOPIDOGREL 300 MG: 300 TABLET, FILM COATED ORAL at 09:49

## 2022-06-21 RX ADMIN — FENTANYL CITRATE 25 MCG: 50 INJECTION, SOLUTION INTRAMUSCULAR; INTRAVENOUS at 09:33

## 2022-06-21 RX ADMIN — HYDRALAZINE HYDROCHLORIDE 10 MG: 20 INJECTION INTRAMUSCULAR; INTRAVENOUS at 09:49

## 2022-06-21 RX ADMIN — HEPARIN SODIUM 1000 UNITS: 1000 INJECTION, SOLUTION INTRAVENOUS; SUBCUTANEOUS at 09:27

## 2022-06-21 RX ADMIN — SODIUM CHLORIDE 1000 ML: 9 INJECTION, SOLUTION INTRAVENOUS at 11:57

## 2022-06-21 RX ADMIN — ATORVASTATIN CALCIUM 40 MG: 40 TABLET, FILM COATED ORAL at 20:20

## 2022-06-21 RX ADMIN — FENTANYL CITRATE 25 MCG: 50 INJECTION INTRAMUSCULAR; INTRAVENOUS at 14:07

## 2022-06-21 RX ADMIN — MIDAZOLAM HYDROCHLORIDE 1 MG: 1 INJECTION INTRAMUSCULAR; INTRAVENOUS at 08:59

## 2022-06-21 ASSESSMENT — PAIN SCALES - GENERAL: PAINLEVEL_OUTOF10: 5

## 2022-06-21 ASSESSMENT — PAIN DESCRIPTION - LOCATION: LOCATION: GROIN

## 2022-06-21 ASSESSMENT — PAIN DESCRIPTION - ORIENTATION: ORIENTATION: RIGHT

## 2022-06-21 NOTE — CARE COORDINATION
Patient has requested for referral to be made for home care. Patient prefers [facility] or [facility], calls made to both. [facility] left for [facility] [place], [facility] doesn't have availability.  Electronically signed by ABHISHEK Khoury Student on 6/21/2022 at 1:41 PM Patient is up and has called his mother for discharge transportation.

## 2022-06-21 NOTE — H&P
Brief Pre-Op Note/Sedation Assessment      Lauren Nobles  1965  [id number]  7:53 AM    Planned Procedure: Cardiac Catheterization Procedure  Post Procedure Plan: Return to same level of care  Consent: I have discussed with the patient and/or the patient representative the indication, alternatives, and the possible risks and/or complications of the planned procedure and the anesthesia methods. The patient and/or patient representative appear to understand and agree to proceed. Chief Complaint:   Chest Pain/Pressure      Indications for Cath Procedure:  1. Presentation:  ACS > 24 hrs and Cardiac Arrythmia  2. Anginal Classification within 2 weeks:  CCS III - Symptoms with everyday living activities, i.e., moderate limitation  3. Angina Symptoms Assessment:  Atypical Chest Pain  4. Heart Failure Class within last 2 weeks:  No symptoms  5. Cardiovascular Instability:  Yes:  Persistent ischemic symptoms (CP, ST Elevation)    Prior Ischemic Workup/Eval:  1. Pre-Procedural Medications: Yes: ACE/ARB/ARNI, Aspirin, Beta Blockers and STATIN  2. Stress Test Completed? Yes:  Stress or Imaging Studies Performed (within ANY time period):   Type:  Stress Nuclear  Results:  Negative Extent of Ischemia:  Low Risk (<1% annual death or MI)    Does Patient need surgery? Cath Valve Surgery:  No    Pre-Procedure Medical History:  Vital Signs: There were no vitals taken for this visit. Allergies:     Allergies   Allergen Reactions    Penicillins Itching, Swelling and Rash    Arginine      Nausea and vomiting     Cymbalta [Duloxetine Hcl] Other (See Comments)     Severe headache, abnormal dreams    Imdur [Isosorbide Dinitrate] Swelling     nausea    Metoprolol      Headache, nausea     Nitroglycerin      Headache     Cephalexin Itching and Rash     Medications:    Current Facility-Administered Medications   Medication Dose Route Frequency Provider Last Rate Last Admin    0.9 % sodium chloride infusion 1,000 mL IntraVENous Continuous Whit Roberts MD           Past Medical History:    Past Medical History:   Diagnosis Date    Acute blood loss anemia     Asthma     CAD (coronary artery disease)     Chest pain     COPD (chronic obstructive pulmonary disease) (HCC)     Coronary artery disease 9/4/2012    Depression     Diabetes mellitus (HCC)     Enlarged heart     Fatigue     Generalized headaches     Hyperlipidemia     Hypertension     Migraine     Morning stiffness of joints     Myocardial infarction ([facility] [address] 05/13/2018    Numbness or tingling hands and feet    OA (osteoarthritis) of knee 1/27/2015    Obesity, Class I, BMI 30.0-34.9 (see actual BMI)     Osteoporosis     Pulmonary nodule     S/P PTCA (percutaneous transluminal coronary angioplasty) 2/6/2019    Shortness of breath        Surgical History:    Past Surgical History:   Procedure Laterality Date    CARDIAC CATHETERIZATION  04/01/2016    Dr. Catalina Delacruz. [facility] CARDIAC CATHETERIZATION  08/01/2012    Dr. Virginia Travis  11/01/2019    Non Obs CAD, medical management    CHOLECYSTECTOMY      CORONARY ANGIOPLASTY  08/06/2019    POBA of mid LAD, NC Balloon- 3.25 x 15    CORONARY ANGIOPLASTY WITH STENT PLACEMENT  05/13/2018    Dr. Clarisse Benedict - w/placement of IABP, Xience 3.25 x 18 mid Circ, POBA 2.5 x 12 to prox LAD    CORONARY ARTERY BYPASS GRAFT  06/13/2018    LIMA, SVG to OM2, SVG to [address] CATH LAB PROCEDURE  06/06/2013    Dr. Christi Yip - Non Obs CAD    HYSTERECTOMY (CERVIX STATUS UNKNOWN)      PTCA      UPPER GASTROINTESTINAL ENDOSCOPY               Pre-Sedation:  Pre-Sedation Documentation and Exam:  I have assessed the patient and reviewed the H&P on the chart.     Prior History of Anesthesia Complications:   none    Modified Mallampati:  III (soft palate, base of uvula visible)    ASA Classification:  Class 3 - A patient with severe systemic disease that limits activity but is not

## 2022-06-21 NOTE — OP NOTE
Operative Note      Patient: Elsa Hinds  YOB: 1965  MRN: [id number]      Cardiac Cath  Note      1. Pre-operative Diagnosis: unstable angina        Post-operative Diagnosis: Same  2. Surgeons/Assistants: Estella Perez MD, [facility]  3. Complications: None  4. Estimated Blood Loss: less than 50   5. Specimens: Were Not Obtained  6. Anesthesia: Local and Moderate Sedation  For sedation: Moderated sedation was achieved with Versed (7mg) and Fentanyl (200mcg). Monitoring of the patient's vital signs and respiratory status was provided by a trained independent observer nurse during the entire course of the procedure(s) and under my direct supervision and recorded in patient's medical record. The duration of sedation was 0835 to 0955. 7. Procedure(s) performed: Please see Xims chart for more detailed information on any catheter or equipment use. Further details on the procedure, sedation and proceedings of case  Moderate sedation. Left heart cath. Bilateral coronary artery. Left ventriculogram.  Balloon angioplasty to in-stent restenosis mid LAD. Laser atherectomy to in-stent restenosis mid LAD        Procedure Details:  Consent Access  site Bleed Risk Sedat US   Used*? Contrast Flouro TIme Fluoro  mGy   Yes   Ltfemoral artery  low MCSFC  yes  180 mL Isovue  7.9-minute  1391   *CPT 14832: If stated \"yes\", Ultrasound guidance was used to access right femoral artery using Seldinger technique after local infiltration of 1% lidocaine. Ultrasound(US) documented/visualized size, patency, pulsatility and exact location for puncture and determined ok to proceed. Real-time imaging used for needle entry into the vessel(s). Image was printed off [email or web address] and sent to medical records on a progress note.    *Sedation Note: MCSFC = minimal conscious sedation for comfort      Left Heart Cath:   Findings   LVEDP  11   LVEF  40%   LV wall motion  mild global hypokinesis   Gradient across AV  no   Mitral regurg positive but poorly visualized     Coronary Angiogram:  Artery Findings/Result   LM  luminal irregularities   LAD  long section of stenting from proximal all the way to mid over 50 mm of stent. In the more distal aspect with there is a sharp bend there does appear to be 2 sequential stenoses of about 75%. Question if this was the LIMA anastomosis site given the bend acuity and the disease is forming eccentrically due to the lack of laminar flow in this area. LCx  luminal regularities. Mild proximal luminal regularities. Distal small branch with 50% blockage that is too small to for intervention as vessel is less than 2 mm in size. There is a long concentric lesion from the circumflex into the OM 2 branch of about 70%. Just after this area is the graft anastomosis for a venous graft and there is some retrograde flow up the graft. The remainder of OM 2 has no significant disease and is a fairly large vessel. OM1 has a proximal 40% stenosis otherwise no significant disease. RI    RCA  dominant. Moderate diffuse disease with no reference segment for stenosis comparison but appears to be about 50% globally. There is 100% occlusion in the mid RCA just after a moderate RV marginal       Graft Angio     1. LIMA-LAD: Known to be atretic and not studied    2. SVG-OM2: Widely patent graft to vein valve noted but there is good flow to OM 2 that provides anterograde flow down OM 2 and some retrograde flow up into the left circumflex and into OM1. No focal stenosis    3. SVG- RCA: Widely patent graft touches down to PDA with anterograde flow down the PDA and some retrograde flow up a slight disease section until reaches the PLV.     Results of intervention     Lesion 1: Mid LAD  Aggressively balloon angioplastied using NC trek followed by [facility] cutting balloon 2.5 x 15 followed by laser arthrectomy and then high-pressure inflations using an NC Euphora 2.75 x 20 balloon inflated up to 2.83 m  Pre:   75% stenosis, HERO 3 flow  Post: 10% stenosis, HERO 3 flow        Assessment/Plan  1. Successful balloon angioplasty, cutting arthrectomy and laser atherectomy to the mid LAD for significant in-stent restenosis. Due to multiple stent layers and small size I did not place another stent. Of note this section of vessel does appear to have acute angulation in the Pashto caudal projection possibly the anastomosis site of the atretic LAD. Unfortunately that has led to an anatomical disfigurement that has allowed eccentric plaque to form in the acute angles and therefore restenosis. Did perform aggressive wound angioplasty and laser arthrectomy for debulking. If this recurs in the future we may need to consider brachytherapy versus drug balloon angioplasty if that becomes available. 2.  Aspirin 81 mg daily for life, Plavix and 5 mg daily for now. 3.  Better diabetic control. 4.  Patient is tolerating 40 mg Lipitor at this time will continue. Patient is intolerant to beta-blockers  5. Continue lisinopril uptitrate as possible.   6.  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

## 2022-06-22 VITALS
HEART RATE: 89 BPM | SYSTOLIC BLOOD PRESSURE: 153 MMHG | OXYGEN SATURATION: 98 % | TEMPERATURE: 97.7 F | WEIGHT: 171.1 LBS | DIASTOLIC BLOOD PRESSURE: 88 MMHG | BODY MASS INDEX: 32.33 KG/M2 | RESPIRATION RATE: 18 BRPM

## 2022-06-22 LAB
ANION GAP SERPL CALCULATED.3IONS-SCNC: 8 MMOL/L (ref 3–16)
BUN BLDV-MCNC: 9 MG/DL (ref 7–20)
CALCIUM SERPL-MCNC: 9.3 MG/DL (ref 8.3–10.6)
CHLORIDE BLD-SCNC: 102 MMOL/L (ref 99–110)
CO2: 27 MMOL/L (ref 21–32)
CREAT SERPL-MCNC: <0.5 MG/DL (ref 0.6–1.1)
EKG ATRIAL RATE: 65 BPM
EKG DIAGNOSIS: NORMAL
EKG P AXIS: 62 DEGREES
EKG P-R INTERVAL: 148 MS
EKG Q-T INTERVAL: 412 MS
EKG QRS DURATION: 100 MS
EKG QTC CALCULATION (BAZETT): 428 MS
EKG R AXIS: -24 DEGREES
EKG T AXIS: 78 DEGREES
EKG VENTRICULAR RATE: 65 BPM
GFR AFRICAN AMERICAN: >60
GFR NON-AFRICAN AMERICAN: >60
GLUCOSE BLD-MCNC: 186 MG/DL (ref 70–99)
HCT VFR BLD CALC: 38.6 % (ref 36–48)
HEMOGLOBIN: 13.2 G/DL (ref 12–16)
MCH RBC QN AUTO: 29.7 PG (ref 26–34)
MCHC RBC AUTO-ENTMCNC: 34.2 G/DL (ref 31–36)
MCV RBC AUTO: 87 FL (ref 80–100)
PDW BLD-RTO: 13.3 % (ref 12.4–15.4)
PLATELET # BLD: 166 K/UL (ref 135–450)
PMV BLD AUTO: 8.8 FL (ref 5–10.5)
POTASSIUM SERPL-SCNC: 5 MMOL/L (ref 3.5–5.1)
RBC # BLD: 4.44 M/UL (ref 4–5.2)
SODIUM BLD-SCNC: 137 MMOL/L (ref 136–145)
WBC # BLD: 5.8 K/UL (ref 4–11)

## 2022-06-22 PROCEDURE — 93010 ELECTROCARDIOGRAM REPORT: CPT | Performed by: INTERNAL MEDICINE

## 2022-06-22 PROCEDURE — 36415 COLL VENOUS BLD VENIPUNCTURE: CPT

## 2022-06-22 PROCEDURE — 99239 HOSP IP/OBS DSCHRG MGMT >30: CPT | Performed by: NURSE PRACTITIONER

## 2022-06-22 PROCEDURE — 93005 ELECTROCARDIOGRAM TRACING: CPT | Performed by: INTERNAL MEDICINE

## 2022-06-22 PROCEDURE — 80048 BASIC METABOLIC PNL TOTAL CA: CPT

## 2022-06-22 PROCEDURE — 85027 COMPLETE CBC AUTOMATED: CPT

## 2022-06-22 PROCEDURE — 6370000000 HC RX 637 (ALT 250 FOR IP): Performed by: INTERNAL MEDICINE

## 2022-06-22 PROCEDURE — 93308 TTE F-UP OR LMTD: CPT

## 2022-06-22 PROCEDURE — 6360000004 HC RX CONTRAST MEDICATION: Performed by: NURSE PRACTITIONER

## 2022-06-22 RX ADMIN — PERFLUTREN 1.65 MG: 6.52 INJECTION, SUSPENSION INTRAVENOUS at 14:00

## 2022-06-22 NOTE — PROGRESS NOTES
Pt A/Ox4, VSS, afebrile. sp02 WNL on RA. r groin site CDI, no hematoma. Distal pulses palpable. Pt on bedrest until 2000. IVF infusing. Will cont to monitor.

## 2022-06-22 NOTE — PROGRESS NOTES
Pt refused all her meds this morning states she takes them at night and will take them tonight. Pt has been on the phone twice with family. Vss. Right groin wnl. No pain.

## 2022-06-22 NOTE — DISCHARGE SUMMARY
Patient ID:  Frank Ramon  [id number]  78 y.o.  1965    Admit date: 6/21/2022    Discharge date and time: 6/22/2022    Admitting Physician: Adelfo Villagomez MD     Discharge NP: Renée Reyes CNP    Admission Diagnoses: CAD in native artery [I25.10]    Discharge Diagnoses: SAME    Admission Condition: fair    Discharged Condition: good    Hospital Course: Frank Ramon was admitted on 6/21/2022 and had an elective LHC with successful balloon angioplasty, cutting arthrectomy and laser arthrectomy to the mid LAD for significant in-stent restenosis. Post cath limited echo showed an EF of 50%. Rhythm has been sinus. She is anxious to go home. She is feeling well. Denies chest pain, shortness of breath, palpitations or dizziness.      ASSESSMENT:   CAD: stable, chest pain free   -s/p LHC successful balloon angioplasty, cutting arthrectomy and laser arthrectomy to the mid LAD for significant in-stent restenosis   -s/p multiple previous LHC and PCI    -s/p CABG 6/2018  Ischemic cardiomyopathy with recovered EF: stable    -EF 50% on echo 6/2022  History of TIA   -plan for ILR implant with Dr. Brandon Fuentes   HTN: controlled   HLD  DM      PLAN:   Continue ASA, Plavix, Lipitor and lisinopril  No beta blockers as she has not tolerated in the past  Post procedure instructions reviewed  Loop recorder implant on 7/8/2022  Follow up in office on 7/19/2022      Discharge Exam:  BP (!) 153/88   Pulse 89   Temp 97.7 °F (36.5 °C)   Resp 18   Wt 171 lb 1.6 oz (77.6 kg)   SpO2 98%   BMI 32.33 kg/m²     General Appearance:    Alert, cooperative, no distress, appears stated age   Head:    Normocephalic, without obvious abnormality, atraumatic   Eyes:    PERRL, conjunctiva/corneas clear, EOM's intact        Ears:    deferred   Nose:   Nares normal, septum midline, mucosa normal, no drainage    or sinus tenderness   Throat:   Lips, mucosa, and tongue normal; teeth and gums normal   Neck:   Supple, symmetrical, trachea midline, no adenopathy;        thyroid:  No enlargement/tenderness/nodules; no carotid    bruit or JVD   Back:     Symmetric, no curvature, ROM normal, no CVA tenderness   Lungs:     Clear to auscultation bilaterally, respirations unlabored   Chest wall:    No tenderness or deformity   Heart:    Regular rate and rhythm , S1 and S2 normal, no murmur, rub   or gallop   Abdomen:     Soft, non-tender, bowel sounds active all four quadrants,     no masses, no organomegaly   Genitalia:    deferred   Rectal:    deferred    Extremities:   Extremities normal, atraumatic, no cyanosis or edema;  femoral access site   Pulses:   2+ and symmetric all extremities   Skin:   Skin color, texture, turgor normal, no rashes or lesions   Lymph nodes:   Cervical, supraclavicular, and axillary nodes normal   Neurologic:   CNII-XII intact. Normal strength, sensation and reflexes       throughout     Disposition: home    Patient Instructions:      Medication List      CONTINUE taking these medications    albuterol sulfate  (90 Base) MCG/ACT inhaler  Commonly known as: Proventil HFA  Inhale 2 puffs into the lungs every 6 hours as needed for Wheezing or Shortness of Breath     aspirin 81 MG chewable tablet  Take 1 tablet by mouth daily     atorvastatin 40 MG tablet  Commonly known as: LIPITOR  TAKE ONE TABLET BY MOUTH DAILY AT NIGHT     clopidogrel 75 MG tablet  Commonly known as: PLAVIX  Take 1 tablet by mouth daily     fluticasone 50 MCG/ACT nasal spray  Commonly known as: FLONASE  1 spray by Each Nostril route daily     Lantus SoloStar 100 UNIT/ML injection pen  Generic drug: insulin glargine  Inject 7 Units into the skin nightly     lisinopril 20 MG tablet  Commonly known as: PRINIVIL;ZESTRIL  Take 1 tablet by mouth daily     metFORMIN 1000 MG tablet  Commonly known as: GLUCOPHAGE  Take 1 tablet by mouth 2 times daily (with meals)     nitroGLYCERIN 0.4 MG SL tablet  Commonly known as: NITROSTAT  up to max of 3 total doses.  If no relief after 1 dose, call 911. ondansetron 4 MG disintegrating tablet  Commonly known as: ZOFRAN-ODT  Take 1 tablet by mouth 3 times daily as needed for Nausea or Vomiting     pantoprazole 40 MG tablet  Commonly known as: PROTONIX  TAKE ONE TABLET BY MOUTH 2 TIMES A DAY     TechLite Pen Needles 32G X 4 MM Misc  Generic drug: Insulin Pen Needle  USE AS DIRECTED FOR LANTUS     Trulicity 1.5 DO/6.5KS Sopn  Generic drug: Dulaglutide  Inject 1.5 mg into the skin once a week     VORTIoxetine 5 MG tablet  Commonly known as: Trintellix  Take 1 tablet by mouth daily            Activity: as tolerated  Diet: cardiac diet    The patient is not on a beta-blocker  The patient is on an ace-i/ARB  The patient is on a statin  The patient is on antiplatelet therapy  The patient does not have history of AF, and is not on anticoagulation      Follow-up in office on 7/19/2022    GERMAN Goodman-CNP  [facility]  [phone]     Time spent on discharge of patient: 35 minutes, including plan of care, patient education, and care coordination.

## 2022-06-22 NOTE — PROGRESS NOTES
Post Cath Lab follow up completed by Cath Lab staff. Access site is right groin, clean dry intact. site assessed and WNL. Post Cath restrictions reviewed, no further questions.

## 2022-06-22 NOTE — PROGRESS NOTES
Reviewed avs with pt, she verbalized understanding and was able to teach back. Pt was assisted out by staff at 1600.

## 2022-06-23 ENCOUNTER — CARE COORDINATION (OUTPATIENT)
Dept: CASE MANAGEMENT | Age: 57
End: 2022-06-23

## 2022-06-23 DIAGNOSIS — I25.10 CAD IN NATIVE ARTERY: Primary | ICD-10-CM

## 2022-06-23 NOTE — CARE COORDINATION
[facility] Transitions Initial Follow Up Call    Call within 2 business days of discharge: Yes    Patient: Annie Moser Patient : 1965   MRN: [id number]  Reason for Admission: S/P Cardiac cath  Discharge Date: 22 RARS: Readmission Risk Score: 7.1 ( )      Last Discharge  [place] [address]       Complaint Diagnosis Description Type Department Provider    22   Admission (Discharged) from Marichuy Jones MD           Spoke with: Taina Brito  Patient reports she is tired today. She denies swelling, dizziness, weakness, fever, chills, cough, sob or leg tightness. Appetite and fluid intake is good. No bladder or bowel issues. No hhc needed. Medication reconciliation and 1111f completed. Patient agreeable to calls. No questions or immediate needs. Will continue to follow. Transitions of Care Initial Call    Was this an external facility discharge? No Discharge Facility: [facility]    Challenges to be reviewed by the provider   Additional needs identified to be addressed with provider: No  none             Method of communication with provider : none    Advance Care Planning:   Does patient have an Advance Directive: reviewed and current. LPN Care Coordinator contacted the patient by telephone to perform post hospital discharge assessment. Verified name and  with patient as identifiers. Provided introduction to self, and explanation of the LPN CC role. LPN CC reviewed discharge instructions, medical action plan and red flags with patient who verbalized understanding. Patient given an opportunity to ask questions and does not have any further questions or concerns at this time. Were discharge instructions available to patient? Yes. Reviewed appropriate site of care based on symptoms and resources available to patient including: PCP  Specialist  Urgent care clinics. The patient agrees to contact the PCP office for questions related to their healthcare.      Medication reconciliation was performed with patient, who verbalizes understanding of administration of home medications. Advised obtaining a 90-day supply of all daily and as-needed medications. Was patient discharged with a pulse oximeter? no    LPN CC provided contact information. Plan for follow-up call in 5-7 days based on severity of symptoms and risk factors.   Plan for next call: symptom management-pain        Non-face-to-face services provided:  Obtained and reviewed discharge summary and/or continuity of care documents    Care Transitions 24 Hour Call    Care Transitions Interventions         Follow Up  Future Appointments   Date Time Provider Franco Claudio   7/8/2022 11:00 AM SCHEDULE, [facility] CATH LAB ROOM 4 [facility] CATH [place] [facility]   7/12/2022 10:00 AM Pauleen Gain, APRN - CNP [facility] [place] [facility] [place]   7/19/2022  8:45 AM GERMAN Dickinson CNP [facility] Cower, LPN

## 2022-06-28 LAB
POC ACT LR: 250 SEC
POC ACT LR: 253 SEC

## 2022-06-30 ENCOUNTER — CARE COORDINATION (OUTPATIENT)
Dept: CASE MANAGEMENT | Age: 57
End: 2022-06-30

## 2022-07-07 ENCOUNTER — CARE COORDINATION (OUTPATIENT)
Dept: CASE MANAGEMENT | Age: 57
End: 2022-07-07

## 2022-07-07 NOTE — CARE COORDINATION
[facility] Transitions Follow Up Call    2022    Patient: Molly Zaman  Patient : 1965   MRN: [id number]  Reason for Admission: cp s/p cardiac cath   Discharge Date: 22 RARS: Readmission Risk Score: 7.1 ( )         Spoke with: Molly Zaman    CTN spoke with patient who reported she is doing ok continues to have sob and cp and reports she always has it. Patient denied any worsening symptoms. Patient is scheduled to have a loop recorder placed tomorrow at [facility]. Patient denied any questions or concerns at this time. CTN advised patient of use of urgent care or physicians 24 hr access line if assistance is needed after hours. Care Transitions Subsequent and Final Call    Subsequent and Final Calls  Do you have any ongoing symptoms?: Yes  Patient-reported symptoms: Shortness of Breath  Have your medications changed?: No  Do you have any questions related to your medications?: No  Do you currently have any active services?: No  Do you have any needs or concerns that I can assist you with?: No  Identified Barriers: None  Care Transitions Interventions  Other Interventions:            Follow Up  Future Appointments   Date Time Provider Franco Claudio   2022 11:00 AM SCHEDULE, [facility] CATH LAB ROOM 4 [facility] CATH [place]   2022 10:00 AM Apollo Spring, APRN - CNP [facility] [place] [facility] [place]   2022  8:45 AM GERMAN Wagner CNP TERES[facility] MCLEANN, RN, [facility]  Care Transition Nurse  [phone] [place]

## 2022-07-08 ENCOUNTER — NURSE ONLY (OUTPATIENT)
Dept: CARDIOLOGY CLINIC | Age: 57
End: 2022-07-08

## 2022-07-08 ENCOUNTER — HOSPITAL ENCOUNTER (OUTPATIENT)
Dept: CARDIAC CATH/INVASIVE PROCEDURES | Age: 57
Discharge: HOME | End: 2022-07-08
Attending: INTERNAL MEDICINE | Admitting: INTERNAL MEDICINE
Payer: MEDICAID

## 2022-07-08 VITALS — WEIGHT: 172 LBS | BODY MASS INDEX: 32.5 KG/M2

## 2022-07-08 DIAGNOSIS — R00.2 PALPITATIONS: ICD-10-CM

## 2022-07-08 DIAGNOSIS — I63.9 CEREBROVASCULAR ACCIDENT (CVA), UNSPECIFIED MECHANISM (HCC): Primary | ICD-10-CM

## 2022-07-08 DIAGNOSIS — Z45.09 ENCOUNTER FOR LOOP RECORDER CHECK: ICD-10-CM

## 2022-07-08 PROCEDURE — 33285 INSJ SUBQ CAR RHYTHM MNTR: CPT

## 2022-07-08 PROCEDURE — 33285 INSJ SUBQ CAR RHYTHM MNTR: CPT | Performed by: INTERNAL MEDICINE

## 2022-07-08 PROCEDURE — 2500000003 HC RX 250 WO HCPCS

## 2022-07-08 PROCEDURE — C1764 EVENT RECORDER, CARDIAC: HCPCS

## 2022-07-08 RX ORDER — SODIUM CHLORIDE 9 MG/ML
INJECTION, SOLUTION INTRAVENOUS PRN
Status: DISCONTINUED | OUTPATIENT
Start: 2022-07-08 | End: 2022-07-08 | Stop reason: HOSPADM

## 2022-07-08 RX ORDER — SODIUM CHLORIDE 0.9 % (FLUSH) 0.9 %
5-40 SYRINGE (ML) INJECTION PRN
Status: DISCONTINUED | OUTPATIENT
Start: 2022-07-08 | End: 2022-07-08 | Stop reason: HOSPADM

## 2022-07-08 RX ORDER — SODIUM CHLORIDE 0.9 % (FLUSH) 0.9 %
5-40 SYRINGE (ML) INJECTION EVERY 12 HOURS SCHEDULED
Status: DISCONTINUED | OUTPATIENT
Start: 2022-07-08 | End: 2022-07-08 | Stop reason: HOSPADM

## 2022-07-08 NOTE — PROCEDURES
Electrophysiology Procedure Report    Attending MD Anny Veras MD    Procedure Loop recorder implant  Indication   Palpitations and CVA. Complication None  EBL  <7AH    Description of Procedure  The patient was brought to the electrophysiology laboratory after informed consent was obtained. The patient was placed in the supine position and the left chest was prepared and draped in a sterile fashion. The electrocardiogram, blood pressure, and oxygenation were monitored intra- and post-procedure. After using buffered lidocaine 1% with epinephrine (1/100,000) for local anesthesia to the left parasternal subcutaneous area, the Medtronic Linq insertion tool was used to make a 1cm incision. The subcutaneous tissues were dissected using the Linq insertion tool and the device was deployed using the insertion tool. Subcutaneous tissue was approximated using vicryl sutures. Poly-cyanoacrylate solution was applied to the site and a sterile dressing was applied. The patient tolerated the procedure well and there were no complications. The attending physician, Anny Veras MD, was present for the entire procedure and for interpretation of data.     Device Information  Monitor Model # Manfacturer Serial # Location   Reveal Linq [id number] Medtronic [id number] left pectoral, subcutaneous     Settings  Zone Detection ECG Recording Interval (rate) Duration   Tachy On On 370 ms (162 bpm) 16 beats   Asystole On On n/a 3 sec   Galo On On 2000 ms (30 bpm) 4 beats     Sensing - 0.15    Contrast  0 cc  Fluoro time  0 minutes    Anny Veras MD  Cardiac Electrophysiology  [address] [place] [address]  [phone] [facility]

## 2022-07-11 ENCOUNTER — CARE COORDINATION (OUTPATIENT)
Dept: CASE MANAGEMENT | Age: 57
End: 2022-07-11

## 2022-07-11 PROBLEM — Z45.09 ENCOUNTER FOR LOOP RECORDER CHECK: Status: ACTIVE | Noted: 2022-07-11

## 2022-07-11 NOTE — CARE COORDINATION
[facility] Transitions Follow Up Call    2022    Patient: Kristopher Hall  Patient : 1965   MRN: [id number]  Reason for Admission: cp s/p cardiac cath loop recorder placed    Discharge Date: 22 RARS: Readmission Risk Score: 7.1 ( )         Spoke with: Kristopher Hall    CTN spoke with patient who reported she is doing the same. Patient reported she had loop recorder placed on  and is tolerating fine. Patient reported incision site is CD&I. Patient has apt with PCP  and cardio on . Denies any acute needs at present time. Agreeable to f/u calls. Educated on the use of urgent care or physicians 24 hr access line if assistance is needed after hours. Care Transitions Subsequent and Final Call    Subsequent and Final Calls  Do you have any ongoing symptoms?: No  Have your medications changed?: No  Do you have any questions related to your medications?: No  Do you currently have any active services?: No  Do you have any needs or concerns that I can assist you with?: No  Identified Barriers: None  Care Transitions Interventions  Other Interventions:            Follow Up  Future Appointments   Date Time Provider Franco Claudio   2022 10:00 AM GERMAN Goetz CNP   2022  8:30 AM SCHEDULE, [facility] Rashad Chaney [facility]   2022  8:45 AM GERMAN Morales CNP [facility] CAR [facility]   2022  7:05 AM SCHEDULE, Ronnette Galt REMOTE Hortencia Olszewski [facility]   2022  7:05 AM SCHEDULE, Ronnette Galt REMOTE Hortencia Olszewski [facility]   10/5/2022  7:05 AM SCHEDULE, Ronnette Galt REMOTE Hortencia Olszewski [facility]   10/7/2022  9:15 AM GERMAN Arzimendi CNP [facility]   2022  7:05 AM SCHEDULE, Ronnette Galt REMOTE Hortencia Olszewski [facility]   2022  7:05 AM SCHEDULE, Ronnette Galt REMOTE Hortencia Olszewski [facility]   2023  7:05 AM SCHEDULE, Mary Payne REMOTE TRANSMISSION Rashad Chaney [facility]   2023  7:05 AM SCHEDULE, [address]      Stef MCLEANN, RN, [facility]  Care Transition Nurse  [phone] mobile

## 2022-07-12 ENCOUNTER — SCHEDULED TELEPHONE ENCOUNTER (OUTPATIENT)
Dept: FAMILY MEDICINE CLINIC | Age: 57
End: 2022-07-12
Payer: MEDICAID

## 2022-07-12 DIAGNOSIS — F33.2 SEVERE EPISODE OF RECURRENT MAJOR DEPRESSIVE DISORDER, WITHOUT PSYCHOTIC FEATURES (HCC): Primary | ICD-10-CM

## 2022-07-12 PROCEDURE — 99441 PR PHYS/QHP TELEPHONE EVALUATION 5-10 MIN: CPT | Performed by: NURSE PRACTITIONER

## 2022-07-12 NOTE — PROGRESS NOTES
2022    TELEHEALTH EVALUATION -- Audio/Visual (During UDSNM-54 public health emergency)    HPI:    Dania Turcios (:  1965) has requested an audio/video evaluation for the following concern(s):    Presenting today for follow up on depression. Recently had Loop Recorder placed and successful LHC with successful balloon angioplasty, cutting arthrectomy and laser arthrectomy to the mid LAD for significant in-stent restenosis. Has f/u appt scheduled with Cardio. Depression: HPI 22 Continues to be severe. Difficulty sleeping, hard time shutting brain down,  Sometimes having problems making it to the bathroom due to fatigue and unwillingness to get out of bed. Previously evaluated at [facility], pt declining new referral today. Prior meds cymbalta (didn't help), paxil (no help) , lexapro (no help). Has not taken prozac.      Update 22: Does not like trintellix. Continues to be very edgy. Has a lot going on in her head and will sometimes have trouble falling asleep at night. More energy now after cardiac procedure than prior. Would prefer to not start new medication for right now. Review of Systems   Constitutional: Positive for fatigue. Negative for activity change, appetite change, chills, diaphoresis, fever and unexpected weight change. Respiratory: Negative. Cardiovascular: Negative. Gastrointestinal: Negative for constipation, diarrhea and nausea. Psychiatric/Behavioral: Positive for dysphoric mood and sleep disturbance. Negative for self-injury and suicidal ideas. Prior to Visit Medications    Medication Sig Taking?  Authorizing Provider   albuterol sulfate HFA (PROVENTIL HFA) 108 (90 Base) MCG/ACT inhaler Inhale 2 puffs into the lungs every 6 hours as needed for Wheezing or Shortness of Breath Yes Janet Yu APRN - CNP   aspirin 81 MG chewable tablet Take 1 tablet by mouth daily Yes Janet Yu APRN - CNP   atorvastatin (LIPITOR) 40 MG tablet TAKE ONE TABLET BY MOUTH DAILY AT NIGHT Yes GERMAN Weber CNP   clopidogrel (PLAVIX) 75 MG tablet Take 1 tablet by mouth daily Yes GERMAN Weber CNP   fluticasone (FLONASE) 50 MCG/ACT nasal spray 1 spray by Each Nostril route daily Yes GERMAN Weber CNP   insulin glargine (LANTUS SOLOSTAR) 100 UNIT/ML injection pen Inject 7 Units into the skin nightly Yes GERMAN Weber CNP   lisinopril (PRINIVIL;ZESTRIL) 20 MG tablet Take 1 tablet by mouth daily Yes GERMAN Weber CNP   pantoprazole (PROTONIX) 40 MG tablet TAKE ONE TABLET BY MOUTH 2 TIMES A DAY Yes GERMAN Weber CNP   Insulin Pen Needle (TECHLITE PEN NEEDLES) 32G X 4 MM MISC USE AS DIRECTED FOR LANTUS Yes GERMAN Weber CNP   nitroGLYCERIN (NITROSTAT) 0.4 MG SL tablet up to max of 3 total doses. If no relief after 1 dose, call 911.  Yes Deny Yates MD   metFORMIN (GLUCOPHAGE) 1000 MG tablet Take 1 tablet by mouth 2 times daily (with meals)  GERMAN Weber CNP   Dulaglutide (TRULICITY) 1.5 DU/2.4YG SOPN Inject 1.5 mg into the skin once a week  Patient not taking: Reported on 2022  GERMAN Weber CNP       Social History     Tobacco Use    Smoking status: Former Smoker     Packs/day: 1.00     Years: 20.00     Pack years: 20.00     Types: Cigarettes     Quit date: 2018     Years since quittin.2    Smokeless tobacco: Never Used   Vaping Use    Vaping Use: Never used   Substance Use Topics    Alcohol use: No     Alcohol/week: 0.0 standard drinks    Drug use: No        Allergies   Allergen Reactions    Penicillins Itching, Swelling and Rash    Arginine      Nausea and vomiting     Cymbalta [Duloxetine Hcl] Other (See Comments)     Severe headache, abnormal dreams    Imdur [Isosorbide Dinitrate] Swelling     nausea    Metoprolol      Headache, nausea     Cephalexin Itching and Rash   ,   Past Medical History:   Diagnosis Date    Acute blood loss anemia     Asthma     CAD (coronary artery disease)     Chest pain     COPD (chronic obstructive pulmonary disease) (HCC)     Coronary artery disease 2012    Depression     Diabetes mellitus (HCC)     Enlarged heart     Fatigue     Generalized headaches     Hyperlipidemia     Hypertension     Migraine     Morning stiffness of joints     Myocardial infarction ([facility] [address] 2018    Numbness or tingling hands and feet    OA (osteoarthritis) of knee 2015    Obesity, Class I, BMI 30.0-34.9 (see actual BMI)     Osteoporosis     Pulmonary nodule     S/P PTCA (percutaneous transluminal coronary angioplasty) 2019    Shortness of breath    ,   Past Surgical History:   Procedure Laterality Date    CARDIAC CATHETERIZATION  2016    Dr. Stephon Scott. Bianca Lackeyoner  2012    Dr. Michael Troncoso  2019    Non Obs CAD, medical management    CHOLECYSTECTOMY      CORONARY ANGIOPLASTY  2019    POBA of mid LAD, NC Balloon- 3.25 x 15    CORONARY ANGIOPLASTY WITH STENT PLACEMENT  2018    Dr. Indira Trujillo - w/placement of IABP, Xience 3.25 x 18 mid Circ, POBA 2.5 x 12 to prox LAD    CORONARY ARTERY BYPASS GRAFT  2018    LIMA, SVG to OM2, SVG to rPDA    DIAGNOSTIC CARDIAC CATH LAB PROCEDURE  2013    Dr. Katharine Russ - Non Obs CAD    HYSTERECTOMY (CERVIX STATUS UNKNOWN)      PTCA      UPPER GASTROINTESTINAL ENDOSCOPY     ,   Social History     Tobacco Use    Smoking status: Former Smoker     Packs/day: 1.00     Years: 20.00     Pack years: 20.00     Types: Cigarettes     Quit date: 2018     Years since quittin.2    Smokeless tobacco: Never Used   Vaping Use    Vaping Use: Never used   Substance Use Topics    Alcohol use: No     Alcohol/week: 0.0 standard drinks    Drug use: No   ,   Family History   Problem Relation Age of Onset    Emphysema Mother     Heart Failure Mother     Hypertension Mother     Heart Disease Mother     High Blood Pressure Mother     High Cholesterol Mother     Stroke Mother     Heart Failure Father     Hypertension Father     Heart Disease Father     High Blood Pressure Father     High Cholesterol Father     Hypertension Sister     High Blood Pressure Sister     Hypertension Brother     High Blood Pressure Brother     Cancer Brother     Hypertension Maternal Grandmother     High Blood Pressure Maternal Grandmother     Hypertension Maternal Grandfather     High Blood Pressure Maternal Grandfather     Hypertension Paternal Grandmother     High Blood Pressure Paternal Grandmother     Hypertension Paternal Grandfather     High Blood Pressure Paternal Grandfather     High Blood Pressure Sister     High Blood Pressure Sister     High Blood Pressure Sister     Heart Failure Maternal Aunt     Hypertension Maternal Aunt     Heart Failure Maternal Uncle     Hypertension Maternal Uncle     Cancer Other         nephew-colon, liver, lung    Heart Failure Other     Diabetes Other     Osteoarthritis Other     Hypertension Paternal Aunt     Hypertension Paternal Uncle     Asthma Neg Hx    ,   Immunization History   Administered Date(s) Administered    Influenza Virus Vaccine 03/12/2016    Pneumococcal Polysaccharide ([id number]) 09/22/2015    Tdap (Boostrix, Adacel) 02/09/2015   ,   Health Maintenance   Topic Date Due    COVID-19 Vaccine (1) Never done    HIV screen  Never done    Diabetic retinal exam  Never done    Hepatitis C screen  Never done    Hepatitis B vaccine (1 of 3 - Risk 3-dose series) Never done    Colorectal Cancer Screen  Never done    Shingles vaccine (1 of 2) Never done    Low dose CT lung screening  Never done    Pneumococcal 0-64 years Vaccine (2 - PCV) 09/22/2016    Diabetic foot exam  06/10/2022    Flu vaccine (1) 09/01/2022    Breast cancer screen  11/04/2022    Diabetic microalbuminuria test  03/04/2023    A1C test (Diabetic or Prediabetic)  06/09/2023    Depression Monitoring  06/09/2023    Lipids  06/21/2023    DTaP/Tdap/Td vaccine (2 - Td or Tdap) 02/09/2025    Hepatitis A vaccine  Aged Out    Hib vaccine  Aged Out    Meningococcal (ACWY) vaccine  Aged Out       PHYSICAL EXAMINATION:  [ INSTRUCTIONS:  \"[x]\" Indicates a positive item  \"[]\" Indicates a negative item      Vital Signs: (As obtained by patient/caregiver or practitioner observation)    Blood pressure-  Heart rate-    Respiratory rate-    Temperature-  Pulse oximetry-     UNABLE TO PERFORM AS AUDIO/TELEPHONE VISIT ONLY       ASSESSMENT/PLAN:  Lissette Peck was seen today for follow-up and diabetes. Diagnoses and all orders for this visit:    Severe episode of recurrent major depressive disorder, without psychotic features ([address]  Continues to be severe. Did not feel Trintellix helped. Will stop all meds per her preference today. Given cardiac history of CAD, CHF concern for possible arrhythmia with SSRI/ SNRI/ TCAs. ILR placed 7/2022  Previously evaluated by Psych at [facility], declining to go back. [facility]- in chart. Prior meds Cymbalta (didn't help), Paxil (no help) , Lexapro (no help). Has not taken Prozac. Return for After September 9 for DM follow up. Faisal Rodriguez is a 61 y.o. female being evaluated by a Virtual Visit (video visit) encounter to address concerns as mentioned above. A caregiver was present when appropriate. Due to this being a TeleHealth encounter (During ZPJJK-61 public health emergency), evaluation of the following organ systems was limited: Vitals/Constitutional/EENT/Resp/CV/GI//MS/Neuro/Skin/Heme-Lymph-Imm. The patient (or guardian if applicable) is aware that this is a billable service, which includes applicable co-pays. This Virtual Visit was conducted with patient's (and/or legal guardian's) consent.  The visit was conducted pursuant to the emergency declaration under the [facility] [place], 1902 waiver authority and the [employer] and [employer] Act. Patient identification was verified, and a caregiver was present when appropriate. The patient was located in a state where the provider was licensed to provide care. Total time spent on this encounter: 5    Services were provided through a video synchronous discussion virtually to substitute for in-person clinic visit. Patient and provider were located at their individual homes. --Lora Tolentino, APRN - CNP on 7/14/2022 at 8:05 PM    An electronic signature was used to authenticate this note. Patient should call the office immediately with new or ongoing signs or symptoms or worsening, or proceed to the emergency room. All entries in chief complaint and history of present illness are reviewed and validated by me. No changes in past medical history, past surgical history, social history, or family history were noted during the patient encounter unless specifically listed above. All updates of past medical history, past surgical history, social history, or family history were reviewed personally by me during the office visit. All problems listed in the assessment are stable unless noted otherwise. Medication profile reviewed personally by me during the office visit. Medication side effects and possible impairments from medications were discussed as applicable. Every effort has been made to assure accurate transcription by this voice recognition software. However, mistakes in transcription may still occur    You are being started on a new medication. All medications have the potential for adverse effects. All medications effect each person differently. Please read and review provided information related to medication.  If the medication that you have been prescribed has the potential to cause sedation, do not drive or operate car, truck, or heavy machinery until you know how the medication will effect you. If you experience any adverse effects from the medication, please call the office or report to the emergency department.

## 2022-07-14 ASSESSMENT — ENCOUNTER SYMPTOMS
DIARRHEA: 0
NAUSEA: 0
RESPIRATORY NEGATIVE: 1
CONSTIPATION: 0

## 2022-07-20 ENCOUNTER — CARE COORDINATION (OUTPATIENT)
Dept: CASE MANAGEMENT | Age: 57
End: 2022-07-20

## 2022-07-20 NOTE — CARE COORDINATION
[facility] Transitions Follow Up Call    2022    Patient: Sheela Ward  Patient : 1965   MRN: [id number]  Reason for Admission: cad   Discharge Date: 22 RARS: Readmission Risk Score: 7.1 ( )         Spoke with: Sheela Ward    CTN spoke with patient who reported she is doing ok and feels her symptoms are back to baseline. Patient denied any changes to plan of care at this time and PCP stopped Trintelix medications at this time. Patient has apt with cardiologist on  and denied any further issues or concerns. Care Transitions Subsequent and Final Call    Subsequent and Final Calls  Have your medications changed?: No  Do you have any questions related to your medications?: No  Do you currently have any active services?: No  Do you have any needs or concerns that I can assist you with?: No  Identified Barriers: None  Care Transitions Interventions  Other Interventions:              Follow Up  Future Appointments   Date Time Provider Franco Claudio   2022 11:00 AM GERMAN Haddad CNP P CLER CAR [facility]   2022  7:05 AM SCHEDULE, [facility] Vassie Curd [facility]   2022  7:05 [facility]   10/5/2022  7:05 [facility]   10/7/2022  9:15 AM GERMAN Waller CNP [facility]   2022  7:05 [facility]   2022  7:05 [facility]   2023  7:05 AM SCHEDULE, [facility] Vassie Curd [facility]   2023  7:05 AM SCHEDULE, [facility] TRANSMISSION Dagoberto Roblero [facility]     Amy MCLEANN, RN, [facility]  Care Transition Nurse  [phone] mobile

## 2022-07-27 ENCOUNTER — NURSE ONLY (OUTPATIENT)
Dept: CARDIOLOGY CLINIC | Age: 57
End: 2022-07-27
Payer: MEDICAID

## 2022-07-27 DIAGNOSIS — I63.9 CEREBROVASCULAR ACCIDENT (CVA), UNSPECIFIED MECHANISM (HCC): ICD-10-CM

## 2022-07-27 DIAGNOSIS — R00.2 PALPITATIONS: ICD-10-CM

## 2022-07-27 DIAGNOSIS — Z45.09 ENCOUNTER FOR LOOP RECORDER CHECK: ICD-10-CM

## 2022-07-27 PROCEDURE — 93298 REM INTERROG DEV EVAL SCRMS: CPT | Performed by: INTERNAL MEDICINE

## 2022-07-27 PROCEDURE — G2066 INTER DEVC REMOTE 30D: HCPCS | Performed by: INTERNAL MEDICINE

## 2022-07-29 NOTE — PROGRESS NOTES
Remote transmission received for patients ILR. EP physician will review. See interrogation under the cardiology tab in the [address] [place] [address] [address] field for more details. Will continue to monitor remotely. LINQ 2 implanted for CVA and palpitations. End of 31-day monitoring period 7/27/22. PVCs (% beats) 0.2%  Symptom events show NSR.

## 2022-08-11 DIAGNOSIS — E11.59 TYPE 2 DIABETES MELLITUS WITH OTHER CIRCULATORY COMPLICATION, WITHOUT LONG-TERM CURRENT USE OF INSULIN (HCC): ICD-10-CM

## 2022-08-11 RX ORDER — DULAGLUTIDE 1.5 MG/.5ML
1.5 INJECTION, SOLUTION SUBCUTANEOUS WEEKLY
Qty: 2 ML | Refills: 0 | Status: SHIPPED | OUTPATIENT
Start: 2022-08-11 | End: 2022-09-12 | Stop reason: DRUGHIGH

## 2022-08-31 ENCOUNTER — NURSE ONLY (OUTPATIENT)
Dept: CARDIOLOGY CLINIC | Age: 57
End: 2022-08-31
Payer: MEDICAID

## 2022-08-31 DIAGNOSIS — I63.9 CEREBROVASCULAR ACCIDENT (CVA), UNSPECIFIED MECHANISM (HCC): ICD-10-CM

## 2022-08-31 DIAGNOSIS — R00.2 PALPITATIONS: ICD-10-CM

## 2022-08-31 DIAGNOSIS — Z45.09 ENCOUNTER FOR LOOP RECORDER CHECK: ICD-10-CM

## 2022-08-31 PROCEDURE — G2066 INTER DEVC REMOTE 30D: HCPCS | Performed by: INTERNAL MEDICINE

## 2022-08-31 PROCEDURE — 93298 REM INTERROG DEV EVAL SCRMS: CPT | Performed by: INTERNAL MEDICINE

## 2022-09-01 NOTE — PROGRESS NOTES
We received a remote transmission from patient's monitor at home. Remote Linq report shows no arrhythmias. EP physician to review. We will continue to monitor remotely. Implanted for CVA and palpitations. End of 31-day monitoring period 8-31-22.

## 2022-09-08 ENCOUNTER — TELEPHONE (OUTPATIENT)
Dept: FAMILY MEDICINE CLINIC | Age: 57
End: 2022-09-08

## 2022-09-08 NOTE — TELEPHONE ENCOUNTER
Patient called to let Nabila Foster know that she will be receiving a order from [facility] urology for depends for her urinary incontinence.

## 2022-09-08 NOTE — TELEPHONE ENCOUNTER
We have not discussed incontinence before (or it has not been coded specifically for it). She will need a note in order for me to have insurance pay for it.

## 2022-09-12 ENCOUNTER — OFFICE VISIT (OUTPATIENT)
Dept: FAMILY MEDICINE CLINIC | Age: 57
End: 2022-09-12

## 2022-09-12 VITALS
TEMPERATURE: 97.2 F | WEIGHT: 172 LBS | DIASTOLIC BLOOD PRESSURE: 62 MMHG | BODY MASS INDEX: 32.5 KG/M2 | SYSTOLIC BLOOD PRESSURE: 118 MMHG | OXYGEN SATURATION: 96 % | HEART RATE: 91 BPM

## 2022-09-12 DIAGNOSIS — Z87.891 PERSONAL HISTORY OF TOBACCO USE: ICD-10-CM

## 2022-09-12 DIAGNOSIS — Z12.31 SCREENING MAMMOGRAM, ENCOUNTER FOR: ICD-10-CM

## 2022-09-12 DIAGNOSIS — Z95.1 S/P CABG X 3: Chronic | ICD-10-CM

## 2022-09-12 DIAGNOSIS — N39.46 MIXED STRESS AND URGE INCONTINENCE: ICD-10-CM

## 2022-09-12 DIAGNOSIS — N30.00 ACUTE CYSTITIS WITHOUT HEMATURIA: ICD-10-CM

## 2022-09-12 DIAGNOSIS — Z23 NEED FOR STREPTOCOCCUS PNEUMONIAE VACCINATION: ICD-10-CM

## 2022-09-12 DIAGNOSIS — E11.59 TYPE 2 DIABETES MELLITUS WITH OTHER CIRCULATORY COMPLICATION, WITHOUT LONG-TERM CURRENT USE OF INSULIN (HCC): Primary | ICD-10-CM

## 2022-09-12 LAB — HBA1C MFR BLD: 7.3 %

## 2022-09-12 RX ORDER — DULAGLUTIDE 3 MG/.5ML
3 INJECTION, SOLUTION SUBCUTANEOUS WEEKLY
Qty: 12 ADJUSTABLE DOSE PRE-FILLED PEN SYRINGE | Refills: 1 | Status: SHIPPED | OUTPATIENT
Start: 2022-09-12

## 2022-09-12 RX ORDER — DULAGLUTIDE 1.5 MG/.5ML
1.5 INJECTION, SOLUTION SUBCUTANEOUS WEEKLY
Qty: 2 ML | Refills: 3 | Status: CANCELLED | OUTPATIENT
Start: 2022-09-12

## 2022-09-12 ASSESSMENT — PATIENT HEALTH QUESTIONNAIRE - PHQ9
7. TROUBLE CONCENTRATING ON THINGS, SUCH AS READING THE NEWSPAPER OR WATCHING TELEVISION: 1
8. MOVING OR SPEAKING SO SLOWLY THAT OTHER PEOPLE COULD HAVE NOTICED. OR THE OPPOSITE, BEING SO FIGETY OR RESTLESS THAT YOU HAVE BEEN MOVING AROUND A LOT MORE THAN USUAL: 2
SUM OF ALL RESPONSES TO PHQ QUESTIONS 1-9: 15
4. FEELING TIRED OR HAVING LITTLE ENERGY: 2
5. POOR APPETITE OR OVEREATING: 2
SUM OF ALL RESPONSES TO PHQ QUESTIONS 1-9: 15
9. THOUGHTS THAT YOU WOULD BE BETTER OFF DEAD, OR OF HURTING YOURSELF: 1
6. FEELING BAD ABOUT YOURSELF - OR THAT YOU ARE A FAILURE OR HAVE LET YOURSELF OR YOUR FAMILY DOWN: 1
SUM OF ALL RESPONSES TO PHQ9 QUESTIONS 1 & 2: 3
10. IF YOU CHECKED OFF ANY PROBLEMS, HOW DIFFICULT HAVE THESE PROBLEMS MADE IT FOR YOU TO DO YOUR WORK, TAKE CARE OF THINGS AT HOME, OR GET ALONG WITH OTHER PEOPLE: 1
SUM OF ALL RESPONSES TO PHQ QUESTIONS 1-9: 14
3. TROUBLE FALLING OR STAYING ASLEEP: 3
1. LITTLE INTEREST OR PLEASURE IN DOING THINGS: 1
SUM OF ALL RESPONSES TO PHQ QUESTIONS 1-9: 15
2. FEELING DOWN, DEPRESSED OR HOPELESS: 2

## 2022-09-12 ASSESSMENT — ENCOUNTER SYMPTOMS
COUGH: 0
CHEST TIGHTNESS: 0
BLOOD IN STOOL: 0
DIARRHEA: 0
CONSTIPATION: 0
SHORTNESS OF BREATH: 0

## 2022-09-12 ASSESSMENT — COLUMBIA-SUICIDE SEVERITY RATING SCALE - C-SSRS
2. HAVE YOU ACTUALLY HAD ANY THOUGHTS OF KILLING YOURSELF?: NO
6. HAVE YOU EVER DONE ANYTHING, STARTED TO DO ANYTHING, OR PREPARED TO DO ANYTHING TO END YOUR LIFE?: NO
1. WITHIN THE PAST MONTH, HAVE YOU WISHED YOU WERE DEAD OR WISHED YOU COULD GO TO SLEEP AND NOT WAKE UP?: NO

## 2022-09-12 NOTE — PROGRESS NOTES
9/12/2022    Chief Complaint   Patient presents with    Diabetes     Checks bs 2 times a week     Hypertension    Hyperlipidemia     Not fasting     Congestive Heart Failure    Depression    Coronary Artery Disease       Ethan Tran is a 61 y.o. female, presents today for:      ASSESSMENT/PLAN:  1. Type 2 diabetes mellitus with other circulatory complication, without long-term current use of insulin (HCC)  Improving control since last OV but still not at goal., A1C 7.3%  Attempt to increase Trulicity again to 3 mg today. When last increase to 3 mg 3/2022 pt possibly developed hypotension. Since that time patient has stopped Furosemide and Amlodipine without any additional hypotension episodes. May also consider SGLT2 due to concurrent cardiac disease. Continue Metformin  Continue ACE/ statin  Encouraged annual eye exam.  - POCT glycosylated hemoglobin (Hb A1C)  - Dulaglutide (TRULICITY) 3 SG/2.2LL SOPN; Inject 3 mg into the skin once a week  Dispense: 12 Adjustable Dose Pre-filled Pen Syringe; Refill: 1  - Diabetic Foot Exam    2. Mixed stress and urge incontinence  Aeroflow Incontinence pads signed today  Urine testing done today  Discussed referral to UroGYN, pt declining  - Urinary Tract Infection Organism and Resistance ID by PCR    3. Personal history of tobacco use  Encouraged cessation, pt attempting to decrease use  Agreeable to LDCT, ordered today  - [place] VISIT TO DISCUSS LUNG CA SCREEN W LDCT  - CT Lung Screen (Annual); Future    4. CAD s/p CABGx3 (June 2018) & stents (May 2018)  Improving today, continue care with Cardio  Encouraged to keep appt with EP next week. Stress Testing was normal.   Continue Lisinopril, Plavix, Atorvastatin, Amlodipine, ASA  Encouraged heart healthy diet  Encouraged 30 min activity daily  Encouraged weight loss    5. Screening mammogram, encounter for  Mammogram ordered today  - REY DIGITAL SCREEN W OR WO CAD BILATERAL; Future    6.  Need for Streptococcus pneumoniae vaccination  Prevnar 20 ordered today. - Pneumococcal, PCV20, PREVNAR 20, (age 25 yrs+), IM, PF    7. Acute cystitis without hematuria  ADDENDUM 9/15/22 UA positive for E. Coli. Bactrim sent today. - sulfamethoxazole-trimethoprim (BACTRIM DS;SEPTRA DS) 800-160 MG per tablet; Take 1 tablet by mouth 2 times daily for 5 days  Dispense: 10 tablet; Refill: 0      Return in about 3 months (around 12/12/2022). Presenting today for chronic disease follow up. Concerned about worsening migraines- occurring 2-3x/ week for several days. Hx cerebral aneurysm in 2018 but no follow up imaging since that time. Previously following with Neurology at [address] [facility], has not followed with them for several years. Intermittent dizziness, migraines. Top of head, worse with light, sound, smells. Has attempted ibuprofen with little relief. Tylenol does not help. Eyes will hurt but vision will not be blurry. Previously on imitrex with little to moderate relief. DM:  Tolerating Trulicity well at 1.5 mg. No further episodes of severe dizziness since decreasing Trulicity from 3 to 1.5 mg and stopping Amlodipine/ Furosemide. Continuing to take Lantus 7 units daily with no hypoglycemic episodes reports. Is checking -140s. Watching diet. No structured exercise. Annual eye exams are not up to date. Is having some bilateral feet neuropathy, attempted Cymbalta in the past without relief of symptoms. HTN/ CP:  No CP, SOB, leg swelling, orthopnea, PND. Tolerating meds well. Not checking BP at home. Watching diet intake. No structured physical activity. HLD: No focal sensory loss, leg myalgias. Tolerating atorvastatin. Depression: Continues to be severe. Difficulty sleeping, hard time shutting brain down,  Sometimes having problems making it to the bathroom due to fatigue and unwillingness to get out of bed. Previously evaluated at [facility], pt declining new referral today.   Prior meds cymbalta Lab Results   Component Value Date    .0 03/04/2022        Lab Results   Component Value Date    LABMICR <1.20 03/04/2022        Lab Results   Component Value Date     06/22/2022    K 5.0 06/22/2022     06/22/2022    CO2 27 06/22/2022    BUN 9 06/22/2022    CREATININE <0.5 (L) 06/22/2022    GLUCOSE 186 (H) 06/22/2022    CALCIUM 9.3 06/22/2022    PROT 6.9 06/21/2022    LABALBU 4.5 06/21/2022    BILITOT 0.5 06/21/2022    ALKPHOS 79 06/21/2022    AST 16 06/21/2022    ALT 18 06/21/2022    LABGLOM >60 06/22/2022    GFRAA >60 06/22/2022    AGRATIO 1.9 06/21/2022    GLOB 2.9 06/11/2021         Review of Systems   Constitutional: Negative. Respiratory:  Negative for cough, chest tightness and shortness of breath. Cardiovascular:  Positive for chest pain. Negative for palpitations and leg swelling. Gastrointestinal:  Negative for blood in stool, constipation and diarrhea. Endocrine: Negative for polydipsia, polyphagia and polyuria. Genitourinary: Negative. Musculoskeletal: Negative. Skin: Negative. Neurological:  Negative for dizziness, tremors, light-headedness and headaches. Psychiatric/Behavioral:  Negative for decreased concentration, dysphoric mood, self-injury, sleep disturbance and suicidal ideas. The patient is not nervous/anxious.       Current Outpatient Medications on File Prior to Visit   Medication Sig Dispense Refill    albuterol sulfate HFA (PROVENTIL HFA) 108 (90 Base) MCG/ACT inhaler Inhale 2 puffs into the lungs every 6 hours as needed for Wheezing or Shortness of Breath 1 each 2    aspirin 81 MG chewable tablet Take 1 tablet by mouth daily 30 tablet 11    atorvastatin (LIPITOR) 40 MG tablet TAKE ONE TABLET BY MOUTH DAILY AT NIGHT 30 tablet 5    clopidogrel (PLAVIX) 75 MG tablet Take 1 tablet by mouth daily 30 tablet 5    fluticasone (FLONASE) 50 MCG/ACT nasal spray 1 spray by Each Nostril route daily 1 each 5    insulin glargine (LANTUS SOLOSTAR) 100 UNIT/ML injection pen Inject 7 Units into the skin nightly 5 pen 3    lisinopril (PRINIVIL;ZESTRIL) 20 MG tablet Take 1 tablet by mouth daily 30 tablet 5    metFORMIN (GLUCOPHAGE) 1000 MG tablet Take 1 tablet by mouth 2 times daily (with meals) 60 tablet 5    pantoprazole (PROTONIX) 40 MG tablet TAKE ONE TABLET BY MOUTH 2 TIMES A DAY (Patient taking differently: Take 40 mg by mouth daily) 60 tablet 5    Insulin Pen Needle (TECHLITE PEN NEEDLES) 32G X 4 MM MISC USE AS DIRECTED FOR LANTUS 100 each 0    nitroGLYCERIN (NITROSTAT) 0.4 MG SL tablet up to max of 3 total doses. If no relief after 1 dose, call 911. 25 tablet 3     No current facility-administered medications on file prior to visit. Allergies   Allergen Reactions    Penicillins Itching, Swelling and Rash    Arginine      Nausea and vomiting     Cymbalta [Duloxetine Hcl] Other (See Comments)     Severe headache, abnormal dreams    Imdur [Isosorbide Dinitrate] Swelling     nausea    Metoprolol      Headache, nausea     Cephalexin Itching and Rash     Past Medical History:   Diagnosis Date    Acute blood loss anemia     Asthma     CAD (coronary artery disease)     Chest pain     COPD (chronic obstructive pulmonary disease) ([facility])     Coronary artery disease 9/4/2012    Depression     Diabetes mellitus ([facility])     Enlarged heart     Fatigue     Generalized headaches     Hyperlipidemia     Hypertension     Migraine     Morning stiffness of joints     Myocardial infarction ([facility] [address] 05/13/2018    Numbness or tingling hands and feet    OA (osteoarthritis) of knee 1/27/2015    Obesity, Class I, BMI 30.0-34.9 (see actual BMI)     Osteoporosis     Pulmonary nodule     S/P PTCA (percutaneous transluminal coronary angioplasty) 2/6/2019    Shortness of breath      Past Surgical History:   Procedure Laterality Date    CARDIAC CATHETERIZATION  04/01/2016    Dr. Larisa Hernandez. David Ax  08/01/2012    Dr. Manav Thurman  11/01/2019    Non Obs CAD, medical management    CHOLECYSTECTOMY      CORONARY ANGIOPLASTY  2019    POBA of mid LAD, NC Balloon- 3.25 x 15    CORONARY ANGIOPLASTY WITH STENT PLACEMENT  2018    Dr. Mani Ruffin - w/placement of IABP, Xience 3.25 x 18 mid Circ, POBA 2.5 x 12 to prox LAD    CORONARY ARTERY BYPASS GRAFT  2018    LIMA, SVG to OM2, SVG to [address] CATH LAB PROCEDURE  2013    Dr. Paxton Ulrich - Non Obs CAD    HYSTERECTOMY (CERVIX STATUS UNKNOWN)      PTCA      UPPER GASTROINTESTINAL ENDOSCOPY       Social History     Tobacco Use    Smoking status: Former     Packs/day: 1.00     Years: 20.00     Pack years: 20.00     Types: Cigarettes     Quit date: 2018     Years since quittin.3    Smokeless tobacco: Never   Substance Use Topics    Alcohol use: No     Alcohol/week: 0.0 standard drinks     Family History   Problem Relation Age of Onset    Emphysema Mother     Heart Failure Mother     Hypertension Mother     Heart Disease Mother     High Blood Pressure Mother     High Cholesterol Mother     Stroke Mother     Heart Failure Father     Hypertension Father     Heart Disease Father     High Blood Pressure Father     High Cholesterol Father     Hypertension Sister     High Blood Pressure Sister     Hypertension Brother     High Blood Pressure Brother     Cancer Brother     Hypertension Maternal Grandmother     High Blood Pressure Maternal Grandmother     Hypertension Maternal Grandfather     High Blood Pressure Maternal Grandfather     Hypertension Paternal Grandmother     High Blood Pressure Paternal Grandmother     Hypertension Paternal Grandfather     High Blood Pressure Paternal Grandfather     High Blood Pressure Sister     High Blood Pressure Sister     High Blood Pressure Sister     Heart Failure Maternal Aunt     Hypertension Maternal Aunt     Heart Failure Maternal Uncle     Hypertension Maternal Uncle     Cancer Other         nephew-colon, liver, lung    Heart Failure Other     Diabetes Other     Osteoarthritis Other     Hypertension Paternal Aunt     Hypertension Paternal Uncle     Asthma Neg Hx        Vitals:    09/12/22 1307   BP: 118/62   Pulse: 91   Temp: 97.2 °F (36.2 °C)   TempSrc: Infrared   SpO2: 96%   Weight: 172 lb (78 kg)     Estimated body mass index is 32.5 kg/m² as calculated from the following:    Height as of 6/16/22: 5' 1\" (1.549 m). Weight as of this encounter: 172 lb (78 kg). Physical Exam  Vitals and nursing note reviewed. Constitutional:       Appearance: Normal appearance. HENT:      Head: Normocephalic. Right Ear: Tympanic membrane, ear canal and external ear normal.      Left Ear: Tympanic membrane, ear canal and external ear normal.      Nose: Nose normal.      Mouth/Throat:      Mouth: Mucous membranes are moist.   Eyes:      Extraocular Movements: Extraocular movements intact. Conjunctiva/sclera: Conjunctivae normal.      Pupils: Pupils are equal, round, and reactive to light. Neck:      Vascular: No carotid bruit. Cardiovascular:      Rate and Rhythm: Normal rate and regular rhythm. Pulses: Normal pulses. Carotid pulses are 2+ on the right side and 2+ on the left side. Dorsalis pedis pulses are 2+ on the right side and 2+ on the left side. Posterior tibial pulses are 2+ on the right side and 2+ on the left side. Heart sounds: Normal heart sounds. Pulmonary:      Effort: Pulmonary effort is normal.      Breath sounds: No wheezing or rhonchi. Abdominal:      General: Abdomen is flat. Palpations: Abdomen is soft. Tenderness: There is no abdominal tenderness. Hernia: No hernia is present. Musculoskeletal:      Cervical back: Normal range of motion. Right lower leg: No edema. Left lower leg: No edema. Right foot: Normal range of motion. No deformity, bunion, Charcot foot, foot drop or prominent metatarsal heads. Left foot: Normal range of motion.  No deformity, bunion, Charcot foot, foot drop or prominent metatarsal heads. Feet:      Right foot:      Protective Sensation: 10 sites tested. 10 sites sensed. Skin integrity: Skin integrity normal.      Toenail Condition: Right toenails are normal.      Left foot:      Protective Sensation: 10 sites tested. 10 sites sensed. Skin integrity: Skin integrity normal.      Toenail Condition: Left toenails are normal.   Lymphadenopathy:      Cervical: No cervical adenopathy. Skin:     General: Skin is warm and dry. Capillary Refill: Capillary refill takes less than 2 seconds. Neurological:      General: No focal deficit present. Mental Status: She is alert and oriented to person, place, and time. Cranial Nerves: No cranial nerve deficit. Sensory: No sensory deficit. Motor: No weakness. Coordination: Coordination normal.      Gait: Gait normal.      Deep Tendon Reflexes: Reflexes normal.   Psychiatric:         Mood and Affect: Mood normal.         Behavior: Behavior normal.         Thought Content: Thought content normal.         Judgment: Judgment normal.         Patient's questions answered and concerns addressed. Patient agrees to plan of care. Electronically signed by GERMAN Martínez CNP on 9/16/2022 at 1:15 PM      Low Dose CT (LDCT) Lung Screening criteria met:     Age 50-77(Medicare) or 50-80 (USPST)   Pack year smoking >20   Still smoking or less than 15 year since quit   No sign or symptoms of lung cancer   > 11 months since last LDCT     Risks and benefits of lung cancer screening with LDCT scans discussed:    Significance of positive screen - False-positive LDCT results often occur. 95% of all positive results do not lead to a diagnosis of cancer. Usually further imaging can resolve most false-positive results; however, some patients may require invasive procedures.     Over diagnosis risk - 10% to 12% of screen-detected lung cancer cases are over diagnosed--that is, the cancer would not have been detected in the patient's lifetime without the screening. Need for follow up screens annually to continue lung cancer screening effectiveness     Risks associated with radiation from annual LDCT- Radiation exposure is about the same as for a mammogram, which is about 1/3 of the annual background radiation exposure from everyday life. Starting screening at age 54 is not likely to increase cancer risk from radiation exposure. Patients with comorbidities resulting in life expectancy of < 10 years, or that would preclude treatment of an abnormality identified on CT, should not be screened due to lack of benefit.     To obtain maximal benefit from this screening, smoking cessation and long-term abstinence from smoking is critical

## 2022-09-12 NOTE — PATIENT INSTRUCTIONS
SUPPLEMENT SHEET  [employer] Ultimate Omega with Lemon (FISH OIL) 1-2 capsules/day  Co-Enzyme Q10 - 100-200mg/day  Turmeric/Curcumin- 1-2 tablets/day  Magnesium- 400mg/day  Vitamin D3- 6216-8138 units/day  Glucosamine Chondroitin (Osteo Biflex)/MSM  CBD cream   Diclofenac Gel 1% (Voltaren Gel) - 2x per day- rub into area well for 60-90 seconds- wash hands well after each use     AVOID 90% OF THE TIME!!  DAIRY- Examples: Milk, Cheese, Ice cream   SUGAR- Examples: Cookies, Cakes, Pies, Frosting, Soda etc.     What is lung cancer screening? Lung cancer screening is a way in which doctors check the lungs for early signs of cancer in people who have no symptoms of lung cancer. A low-dose CT scan uses much less radiation than a normal CT scan and shows a more detailed image of the lungs than a standard X-ray. The goal of lung cancer screening is to find cancer early, before it has a chance to grow, spread, or cause problems. One large study found that smokers who were screened with low-dose CT scans were less likely to die of lung cancer than those who were screened with standard X-ray. Below is a summary of the things you need to know regarding screening for lung cancer with low-dose computed tomography (LDCT). This is a screening program that involves routine annual screening with LDCT studies of the lung. The LDCTs are done using low-dose radiation that is not thought to increase your cancer risk. If you have other serious medical conditions (other cancers, congestive heart failure) that limit your life expectancy to less than 10 years, you should not undergo lung cancer screening with LDCT. The chance is 20%-60% that the LDCT result will show abnormalities. This would require additional testing which could include repeat imaging or even invasive procedures. Most (about 95%) of \"abnormal\" LDCT results are false in the sense that no lung cancer is ultimately found.   Additionally, some (about 10%) of the cancers found would not affect your life expectancy, even if undetected and untreated. If you are still smoking, the single most important thing that you can do to reduce your risk of dying of lung cancer is to quit. For this screening to be covered by Medicare and most other insurers, strict criteria must be met. If you do not meet these criteria, but still wish to undergo LDCT testing, you will be required to sign a waiver indicating your willingness to pay for the scan.

## 2022-09-13 LAB
ACINETOBACTER BAUMANNII BY PCR: NOT DETECTED
BACTEROIDES FRAGILIS BY PCR: NOT DETECTED
CARBAPENEM RESISTANCE OXA-48 GENE BY PCR: NOT DETECTED
CEPHALOSPORIN RESISTANCE AMPC GENE: NOT DETECTED
CITROBACTER FREUNDII: NOT DETECTED
ENTEROBACTER CLOACAE: NOT DETECTED
ENTEROCOCCUS SPP. (E. FAECALIS, E. FAECIUM): NOT DETECTED
ESBL RESISTANCE: NOT DETECTED
ESCHERICHIA COLI BY PCR: ABNORMAL
KLEBSIELLA OXYTOCA BY PCR: NOT DETECTED
KLEBSIELLA PNEUMONIAE: NOT DETECTED
MACROLIDE RESISTANCE: ABNORMAL
METHICILLIN RESISTANCE: ABNORMAL
MORGANELLA MORGANII: NOT DETECTED
PROTEUS SPP (PROTEUS MIRABILIS, PROTEUS VULGARIS): NOT DETECTED
PROVIDENCIA STUARTII: NOT DETECTED
PSEUDOMONAS AERUGINOSA BY PCR: NOT DETECTED
QUINOLONE AND FLUOROQUINOLONE RESISTANCE: NOT DETECTED
SERRATIA MARCESCENS BY PCR: NOT DETECTED
STAPHYLOCOCCUS AUREUS BY PCR: NOT DETECTED
STAPHYLOCOCCUS SAPROPHYTICUS: NOT DETECTED
STREPTOCOCCUS AGALACTIAE BY PCR: NOT DETECTED
STREPTOCOCCUS PYOGENES  BY PCR: NOT DETECTED
TETRACYCLINE RESISTANCE: ABNORMAL
TRIMETHOPRIM/SULFONAMIDE RESISTANCE: NOT DETECTED

## 2022-09-15 ENCOUNTER — TELEPHONE (OUTPATIENT)
Dept: FAMILY MEDICINE CLINIC | Age: 57
End: 2022-09-15

## 2022-09-15 ENCOUNTER — OFFICE VISIT (OUTPATIENT)
Dept: FAMILY MEDICINE CLINIC | Age: 57
End: 2022-09-15
Payer: MEDICAID

## 2022-09-15 VITALS
SYSTOLIC BLOOD PRESSURE: 132 MMHG | TEMPERATURE: 98.2 F | DIASTOLIC BLOOD PRESSURE: 80 MMHG | WEIGHT: 173 LBS | HEART RATE: 68 BPM | OXYGEN SATURATION: 97 % | BODY MASS INDEX: 32.69 KG/M2

## 2022-09-15 DIAGNOSIS — I50.42 CHRONIC COMBINED SYSTOLIC AND DIASTOLIC CHF (CONGESTIVE HEART FAILURE) (HCC): Chronic | ICD-10-CM

## 2022-09-15 DIAGNOSIS — G43.709 CHRONIC MIGRAINE W/O AURA W/O STATUS MIGRAINOSUS, NOT INTRACTABLE: Primary | ICD-10-CM

## 2022-09-15 DIAGNOSIS — G45.1 TIA INVOLVING LEFT INTERNAL CAROTID ARTERY: Chronic | ICD-10-CM

## 2022-09-15 DIAGNOSIS — I67.1 CEREBRAL ANEURYSM, NONRUPTURED: ICD-10-CM

## 2022-09-15 DIAGNOSIS — E11.59 TYPE 2 DIABETES MELLITUS WITH OTHER CIRCULATORY COMPLICATION, WITHOUT LONG-TERM CURRENT USE OF INSULIN (HCC): ICD-10-CM

## 2022-09-15 PROCEDURE — G8427 DOCREV CUR MEDS BY ELIG CLIN: HCPCS | Performed by: NURSE PRACTITIONER

## 2022-09-15 PROCEDURE — G8417 CALC BMI ABV UP PARAM F/U: HCPCS | Performed by: NURSE PRACTITIONER

## 2022-09-15 PROCEDURE — 3051F HG A1C>EQUAL 7.0%<8.0%: CPT | Performed by: NURSE PRACTITIONER

## 2022-09-15 PROCEDURE — 99213 OFFICE O/P EST LOW 20 MIN: CPT | Performed by: NURSE PRACTITIONER

## 2022-09-15 PROCEDURE — 3017F COLORECTAL CA SCREEN DOC REV: CPT | Performed by: NURSE PRACTITIONER

## 2022-09-15 PROCEDURE — 2022F DILAT RTA XM EVC RTNOPTHY: CPT | Performed by: NURSE PRACTITIONER

## 2022-09-15 PROCEDURE — 1036F TOBACCO NON-USER: CPT | Performed by: NURSE PRACTITIONER

## 2022-09-15 RX ORDER — KETOROLAC TROMETHAMINE 30 MG/ML
60 INJECTION, SOLUTION INTRAMUSCULAR; INTRAVENOUS ONCE
Status: COMPLETED | OUTPATIENT
Start: 2022-09-15 | End: 2022-09-15

## 2022-09-15 RX ORDER — SULFAMETHOXAZOLE AND TRIMETHOPRIM 800; 160 MG/1; MG/1
1 TABLET ORAL 2 TIMES DAILY
Qty: 10 TABLET | Refills: 0 | Status: SHIPPED | OUTPATIENT
Start: 2022-09-15 | End: 2022-09-20

## 2022-09-15 RX ORDER — PROMETHAZINE HYDROCHLORIDE 12.5 MG/1
12.5 TABLET ORAL 3 TIMES DAILY PRN
Qty: 30 TABLET | Refills: 5 | Status: SHIPPED | OUTPATIENT
Start: 2022-09-15 | End: 2022-10-15

## 2022-09-15 RX ADMIN — KETOROLAC TROMETHAMINE 60 MG: 30 INJECTION, SOLUTION INTRAMUSCULAR; INTRAVENOUS at 15:04

## 2022-09-15 NOTE — RESULT ENCOUNTER NOTE
Urine testing showing UTI. I have sent an antibiotic (Bactrim) for you. This will hopefully help you to not have as many urinary issues.   I have sent it to [address] [place] [address]

## 2022-09-15 NOTE — TELEPHONE ENCOUNTER
Can she come to the office today? I think we still have some nurtec/ ubrelvy samples we can give her? We also need to give her the orders for the head imaging. Dr. Yanet Lay said he can do her migraines along with her other issues.

## 2022-09-15 NOTE — TELEPHONE ENCOUNTER
Patient states migraine started yesterday and she has been using tylenol and ibuprofen intermittently with little relief. States that during last OV you mentioned reaching out to her neurologist? Would like to know if you had had the chance to do that? Also, if something can be sent to [address] [place] [address] for the migraine today? Please advise.

## 2022-09-15 NOTE — PATIENT INSTRUCTIONS
[address] [facility] - [place], [address], [address] [address]  [address] [facility] [address], [address] [facility]  Phone- [phone]

## 2022-09-15 NOTE — PROGRESS NOTES
9/15/2022    Chief Complaint   Patient presents with    Migraine       Sheela Ward is a 61 y.o. female, presents today for:      ASSESSMENT/PLAN:    1. Chronic migraine w/o aura w/o status migrainosus, not intractable  Toradol given today due to acute migraine  Start PRN Phenergan for migraines  Referral to Neurology due to CV disease, cerebral aneurysm  Nurtec sample given today, will give if effective. Triptans contraindicated due to concurrent CVD  ADDENDUM 9/16/22: CTA Head showing moderate- severe stenosis of V4 segment of right vertebral artery. Due to worsening headaches and co-morbidities will refer to  for opinion on care. Needing tertiary level care of given comorbidities. - promethazine (PHENERGAN) 12.5 MG tablet; Take 1 tablet by mouth 3 times daily as needed for Nausea  Dispense: 30 tablet; Refill: 5  - ketorolac (TORADOL) injection 60 mg  - Fernanda Martinez MD, Neurology, [facility]  - Rimegepant Sulfate (NURTEC) 75 MG TBDP; Lot# [id number] exp 2/28/25  Dispense: 4 tablet; Refill: 0    2. Cerebral aneurysm, nonruptured  CTA ordered for surveillance, worsening and increased intensity of migraines over the past several months. Referral to Neuro for surveillance  - CTA HEAD NECK W WO CONTRAST; Future  - Fernanda Martinez MD, Neurology, [facility]    3. Hx TIA involving L-ICA  Continue ASA, Atorvastatin, Plavix  Continue care with Cardiology  - Creatinine; Future    4. Hx of combined systolic (EF 71-93%) & diastolic (grade 1 LVDD) CHF  Stable today  Continue care with Cardiology  - Creatinine; Future    5. Type 2 diabetes mellitus with other circulatory complication, without long-term current use of insulin (HCC)  Continue current medication.    - Creatinine; Future      Return for keep December. Migraine   This is a chronic (worse today) problem. The current episode started today. The problem has been rapidly worsening.  The pain is located in the Temporal and left unilateral region. The pain does not radiate. The pain quality is similar to prior headaches (but worse). The quality of the pain is described as throbbing and aching. The pain is at a severity of 9/10. The pain is severe. Associated symptoms include nausea. Pertinent negatives include no abdominal pain, abnormal behavior, anorexia, back pain, blurred vision, coughing, dizziness, drainage, ear pain, eye pain, eye redness, eye watering, facial sweating, fever, hearing loss, insomnia, loss of balance, muscle aches, neck pain, numbness, phonophobia, photophobia, rhinorrhea, scalp tenderness, seizures, sinus pressure, sore throat, swollen glands, tingling, tinnitus, visual change, vomiting, weakness or weight loss. The symptoms are aggravated by activity, bright light, noise and weather changes. She has tried acetaminophen and darkened room for the symptoms. The treatment provided no relief. Her past medical history is significant for hypertension, migraine headaches and obesity. There is no history of cancer, cluster headaches, immunosuppression, migraines in the family, pseudotumor cerebri, recent head traumas, sinus disease or TMJ. Hx cerebral aneurysm 2 mm from left ICA. Hx CVA, CHF. Attempted triptans in past which helped symptoms. No prior preventative medication use. Now having migraines more frequently than prior 2-3x/ week lasting several hours. Will not wake up with migraines. No caffein use. No worsening stress in home. No phenergan to help with symptoms.         PHQ Scores 9/12/2022 6/9/2022 6/10/2021   PHQ2 Score 3 3 4   PHQ9 Score 15 18 9     Interpretation of Total Score Depression Severity: 1-4 = Minimal depression, 5-9 = Mild depression, 10-14 = Moderate depression, 15-19 = Moderately severe depression, 20-27 = Severe depression      Lab Results   Component Value Date     06/22/2022    K 5.0 06/22/2022     06/22/2022    CO2 27 06/22/2022    BUN 9 06/22/2022    CREATININE 0.7 09/16/2022    GLUCOSE 186 (H) 06/22/2022    CALCIUM 9.3 06/22/2022    PROT 6.9 06/21/2022    LABALBU 4.5 06/21/2022    BILITOT 0.5 06/21/2022    ALKPHOS 79 06/21/2022    AST 16 06/21/2022    ALT 18 06/21/2022    LABGLOM >60 09/16/2022    GFRAA >60 09/16/2022    AGRATIO 1.9 06/21/2022    GLOB 2.9 06/11/2021         Review of Systems   Constitutional:  Negative for fever and weight loss. HENT:  Negative for ear pain, hearing loss, rhinorrhea, sinus pressure, sore throat and tinnitus. Eyes:  Negative for blurred vision, photophobia, pain and redness. Respiratory:  Negative for cough. Gastrointestinal:  Positive for nausea. Negative for abdominal pain, anorexia and vomiting. Musculoskeletal:  Negative for back pain and neck pain. Neurological:  Negative for dizziness, tingling, seizures, weakness, numbness and loss of balance. Psychiatric/Behavioral:  The patient does not have insomnia.       Current Outpatient Medications on File Prior to Visit   Medication Sig Dispense Refill    sulfamethoxazole-trimethoprim (BACTRIM DS;SEPTRA DS) 800-160 MG per tablet Take 1 tablet by mouth 2 times daily for 5 days 10 tablet 0    Dulaglutide (TRULICITY) 3 DD/5.2UR SOPN Inject 3 mg into the skin once a week 12 Adjustable Dose Pre-filled Pen Syringe 1    albuterol sulfate HFA (PROVENTIL HFA) 108 (90 Base) MCG/ACT inhaler Inhale 2 puffs into the lungs every 6 hours as needed for Wheezing or Shortness of Breath 1 each 2    aspirin 81 MG chewable tablet Take 1 tablet by mouth daily 30 tablet 11    atorvastatin (LIPITOR) 40 MG tablet TAKE ONE TABLET BY MOUTH DAILY AT NIGHT 30 tablet 5    clopidogrel (PLAVIX) 75 MG tablet Take 1 tablet by mouth daily 30 tablet 5    fluticasone (FLONASE) 50 MCG/ACT nasal spray 1 spray by Each Nostril route daily 1 each 5    insulin glargine (LANTUS SOLOSTAR) 100 UNIT/ML injection pen Inject 7 Units into the skin nightly 5 pen 3    lisinopril (PRINIVIL;ZESTRIL) 20 MG tablet Take 1 tablet by mouth daily 30 tablet 5    metFORMIN (GLUCOPHAGE) 1000 MG tablet Take 1 tablet by mouth 2 times daily (with meals) 60 tablet 5    pantoprazole (PROTONIX) 40 MG tablet TAKE ONE TABLET BY MOUTH 2 TIMES A DAY (Patient taking differently: Take 40 mg by mouth daily) 60 tablet 5    Insulin Pen Needle (TECHLITE PEN NEEDLES) 32G X 4 MM MISC USE AS DIRECTED FOR LANTUS 100 each 0    nitroGLYCERIN (NITROSTAT) 0.4 MG SL tablet up to max of 3 total doses. If no relief after 1 dose, call 911. 25 tablet 3     No current facility-administered medications on file prior to visit. Allergies   Allergen Reactions    Penicillins Itching, Swelling and Rash    Arginine      Nausea and vomiting     Cymbalta [Duloxetine Hcl] Other (See Comments)     Severe headache, abnormal dreams    Imdur [Isosorbide Dinitrate] Swelling     nausea    Metoprolol      Headache, nausea     Cephalexin Itching and Rash     Past Medical History:   Diagnosis Date    Acute blood loss anemia     Asthma     CAD (coronary artery disease)     Chest pain     COPD (chronic obstructive pulmonary disease) ([facility])     Coronary artery disease 9/4/2012    Depression     Diabetes mellitus ([facility])     Enlarged heart     Fatigue     Generalized headaches     Hyperlipidemia     Hypertension     Migraine     Morning stiffness of joints     Myocardial infarction ([facility] [address] 05/13/2018    Numbness or tingling hands and feet    OA (osteoarthritis) of knee 1/27/2015    Obesity, Class I, BMI 30.0-34.9 (see actual BMI)     Osteoporosis     Pulmonary nodule     S/P PTCA (percutaneous transluminal coronary angioplasty) 2/6/2019    Shortness of breath      Past Surgical History:   Procedure Laterality Date    CARDIAC CATHETERIZATION  04/01/2016    Dr. Estefany Casillas. Leonardo Racer CATHETERIZATION  08/01/2012    Dr. Maulik Diamond  11/01/2019    Non Obs CAD, medical management    CHOLECYSTECTOMY      CORONARY ANGIOPLASTY  08/06/2019    POBA of mid LAD, [place] Balloon- 3.25 x 15 CORONARY ANGIOPLASTY WITH STENT PLACEMENT  2018    Dr. Georgiana Licona - w/placement of IABP, Xience 3.25 x 18 mid Circ, POBA 2.5 x 12 to prox LAD    CORONARY ARTERY BYPASS GRAFT  2018    LIMA, SVG to OM2, SVG to [address] CATH LAB PROCEDURE  2013    Dr. Josi Centeno - Non Obs CAD    HYSTERECTOMY (CERVIX STATUS UNKNOWN)      PTCA      UPPER GASTROINTESTINAL ENDOSCOPY       Social History     Tobacco Use    Smoking status: Former     Packs/day: 1.00     Years: 20.00     Pack years: 20.00     Types: Cigarettes     Quit date: 2018     Years since quittin.3    Smokeless tobacco: Never   Substance Use Topics    Alcohol use: No     Alcohol/week: 0.0 standard drinks     Family History   Problem Relation Age of Onset    Emphysema Mother     Heart Failure Mother     Hypertension Mother     Heart Disease Mother     High Blood Pressure Mother     High Cholesterol Mother     Stroke Mother     Heart Failure Father     Hypertension Father     Heart Disease Father     High Blood Pressure Father     High Cholesterol Father     Hypertension Sister     High Blood Pressure Sister     Hypertension Brother     High Blood Pressure Brother     Cancer Brother     Hypertension Maternal Grandmother     High Blood Pressure Maternal Grandmother     Hypertension Maternal Grandfather     High Blood Pressure Maternal Grandfather     Hypertension Paternal Grandmother     High Blood Pressure Paternal Grandmother     Hypertension Paternal Grandfather     High Blood Pressure Paternal Grandfather     High Blood Pressure Sister     High Blood Pressure Sister     High Blood Pressure Sister     Heart Failure Maternal Aunt     Hypertension Maternal Aunt     Heart Failure Maternal Uncle     Hypertension Maternal Uncle     Cancer Other         nephew-colon, liver, lung    Heart Failure Other     Diabetes Other     Osteoarthritis Other     Hypertension Paternal Aunt     Hypertension Paternal Uncle     Asthma Neg Hx Vitals:    09/15/22 1440   BP: 132/80   Pulse: 68   Temp: 98.2 °F (36.8 °C)   TempSrc: Infrared   SpO2: 97%   Weight: 173 lb (78.5 kg)     Estimated body mass index is 32.69 kg/m² as calculated from the following:    Height as of 6/16/22: 5' 1\" (1.549 m). Weight as of this encounter: 173 lb (78.5 kg). CTA Head/ Neck with Contrast 3/8/2018  FINDINGS:   CTA NECK:       AORTIC ARCH/ARCH VESSELS: There is a normal branch pattern of the aortic   arch. No significant stenosis is seen of the innominate artery or subclavian   arteries. CAROTID ARTERIES: The common carotid arteries are normal in appearance   without evidence of a flow limiting stenosis. Calcified and noncalcified atherosclerotic plaque of the right carotid   bifurcation and proximal right internal carotid artery. There is mild, less   than 50%, stenosis of the right internal carotid artery by NASCET criteria. Predominantly noncalcified atherosclerotic plaque of the proximal left   internal carotid artery. There is mild, less than 50%, stenosis of the left   internal carotid artery by NASCET criteria. No dissection or arterial injury is seen. VERTEBRAL ARTERIES: The vertebral arteries both arise from the subclavian   arteries and are normal in caliber without evidence of flow limiting stenosis   or dissection. SOFT TISSUES: The lung apices are clear. No cervical or superior mediastinal   lymphadenopathy. The visualized portion of the larynx and pharynx appear   unremarkable. The parotid, submandibular and thyroid glands demonstrate no   acute abnormality. BONES: The visualized osseous structures appear unremarkable. CTA HEAD:       ANTERIOR CIRCULATION: Atherosclerotic calcification of the cavernous and   supraclinoid internal carotid arteries contributes to unchanged, mild   multifocal narrowing. An anterior communicating artery is visualized.   The   anterior cerebral and middle cerebral arteries demonstrate no focal stenosis. Superiorly projecting, 2 mm outpouching arising from the supraclinoid left   internal carotid artery (series 2, image 184) is not significantly changed. Previously identified, possible laterally projecting aneurysm arising from   the left supraclinoid internal carotid is not visualized on the current   examination. POSTERIOR CIRCULATION: Atherosclerotic calcification of the intracranial   vertebral arteries with unchanged moderate narrowing on the right and mild   narrowing on the left. No flow-limiting stenosis of the basilar or bilateral   posterior cerebral arteries. BRAIN: No mass effect or midline shift. No abnormal extra-axial fluid   collection. The gray-white differentiation appears grossly maintained. CT Head  3/8/18   Impression   Mild, less than 50%, stenosis of the cervical internal carotid arteries by   NASCET criteria. Mild, multifocal narrowing of the cavernous and supraclinoid internal carotid   arteries is similar to prior. Moderate focal narrowing of the right   vertebral artery and mild focal narrowing of the left vertebral artery is   unchanged. No significant change in 2 mm superiorly projecting aneurysm arising from the   supraclinoid left internal carotid artery. Previously identified possible aneurysm at the lateral aspect of the proximal   supraclinoid segment of the left internal carotid artery is not visualized. FINDINGS:   BRAIN/VENTRICLES: There is no acute intracranial hemorrhage, mass effect or   midline shift. No abnormal extra-axial fluid collection. The gray-white   differentiation is maintained without evidence of an acute infarct. There is   no evidence of hydrocephalus. ORBITS: The visualized portion of the orbits demonstrate no acute abnormality. SINUSES: The visualized paranasal sinuses and mastoid air cells demonstrate   no acute abnormality.        SOFT TISSUES/SKULL: No acute abnormality of the visualized skull or soft   tissues. Impression   No acute intracranial abnormality. Physical Exam  Vitals reviewed. Constitutional:       Appearance: Normal appearance. HENT:      Head: Normocephalic. Right Ear: Tympanic membrane, ear canal and external ear normal.      Left Ear: Tympanic membrane, ear canal and external ear normal.      Nose: Nose normal.      Mouth/Throat:      Mouth: Mucous membranes are moist.   Eyes:      Extraocular Movements: Extraocular movements intact. Conjunctiva/sclera: Conjunctivae normal.      Pupils: Pupils are equal, round, and reactive to light. Neck:      Vascular: No carotid bruit. Cardiovascular:      Rate and Rhythm: Normal rate and regular rhythm. Pulses: Normal pulses. Carotid pulses are 2+ on the right side and 2+ on the left side. Dorsalis pedis pulses are 2+ on the right side and 2+ on the left side. Posterior tibial pulses are 2+ on the right side and 2+ on the left side. Heart sounds: Normal heart sounds. No murmur heard. No gallop. Pulmonary:      Effort: Pulmonary effort is normal.      Breath sounds: Normal breath sounds. Abdominal:      General: Abdomen is flat. Palpations: Abdomen is soft. Musculoskeletal:         General: Normal range of motion. Cervical back: Normal range of motion. Right lower leg: No edema. Left lower leg: No edema. Lymphadenopathy:      Cervical: No cervical adenopathy. Skin:     General: Skin is warm and dry. Capillary Refill: Capillary refill takes less than 2 seconds. Neurological:      General: No focal deficit present. Mental Status: She is alert and oriented to person, place, and time. GCS: GCS eye subscore is 4. GCS verbal subscore is 5. GCS motor subscore is 6. Cranial Nerves: Cranial nerves are intact. Sensory: Sensation is intact. Motor: Motor function is intact.

## 2022-09-16 ENCOUNTER — HOSPITAL ENCOUNTER (OUTPATIENT)
Dept: CT IMAGING | Age: 57
Discharge: HOME | End: 2022-09-16
Payer: MEDICAID

## 2022-09-16 ENCOUNTER — TELEPHONE (OUTPATIENT)
Dept: FAMILY MEDICINE CLINIC | Age: 57
End: 2022-09-16

## 2022-09-16 ENCOUNTER — HOSPITAL ENCOUNTER (OUTPATIENT)
Dept: MAMMOGRAPHY | Age: 57
Discharge: HOME | End: 2022-09-16
Payer: MEDICAID

## 2022-09-16 ENCOUNTER — TELEPHONE (OUTPATIENT)
Dept: ADMINISTRATIVE | Age: 57
End: 2022-09-16

## 2022-09-16 ENCOUNTER — HOSPITAL ENCOUNTER (OUTPATIENT)
Age: 57
Discharge: HOME | End: 2022-09-16
Payer: MEDICAID

## 2022-09-16 DIAGNOSIS — Z87.891 PERSONAL HISTORY OF TOBACCO USE: ICD-10-CM

## 2022-09-16 DIAGNOSIS — I67.1 CEREBRAL ANEURYSM, NONRUPTURED: ICD-10-CM

## 2022-09-16 DIAGNOSIS — Z12.31 BREAST CANCER SCREENING BY MAMMOGRAM: ICD-10-CM

## 2022-09-16 DIAGNOSIS — Z86.73 HISTORY OF CARDIOEMBOLIC CEREBROVASCULAR ACCIDENT (CVA): ICD-10-CM

## 2022-09-16 DIAGNOSIS — G45.1 TIA INVOLVING LEFT INTERNAL CAROTID ARTERY: Chronic | ICD-10-CM

## 2022-09-16 DIAGNOSIS — Z12.31 SCREENING MAMMOGRAM, ENCOUNTER FOR: ICD-10-CM

## 2022-09-16 DIAGNOSIS — I50.42 CHRONIC COMBINED SYSTOLIC AND DIASTOLIC CHF (CONGESTIVE HEART FAILURE) (HCC): Chronic | ICD-10-CM

## 2022-09-16 DIAGNOSIS — E11.59 TYPE 2 DIABETES MELLITUS WITH OTHER CIRCULATORY COMPLICATION, WITHOUT LONG-TERM CURRENT USE OF INSULIN (HCC): ICD-10-CM

## 2022-09-16 LAB
CREAT SERPL-MCNC: 0.7 MG/DL (ref 0.6–1.1)
GFR AFRICAN AMERICAN: >60
GFR NON-AFRICAN AMERICAN: >60

## 2022-09-16 PROCEDURE — 36415 COLL VENOUS BLD VENIPUNCTURE: CPT

## 2022-09-16 PROCEDURE — 6360000004 HC RX CONTRAST MEDICATION: Performed by: NURSE PRACTITIONER

## 2022-09-16 PROCEDURE — 71271 CT THORAX LUNG CANCER SCR C-: CPT

## 2022-09-16 PROCEDURE — 77063 BREAST TOMOSYNTHESIS BI: CPT

## 2022-09-16 PROCEDURE — 82565 ASSAY OF CREATININE: CPT

## 2022-09-16 PROCEDURE — 70496 CT ANGIOGRAPHY HEAD: CPT

## 2022-09-16 RX ORDER — RIMEGEPANT SULFATE 75 MG/75MG
TABLET, ORALLY DISINTEGRATING ORAL
Qty: 4 TABLET | Refills: 0 | COMMUNITY
Start: 2022-09-16

## 2022-09-16 RX ADMIN — IOPAMIDOL 75 ML: 755 INJECTION, SOLUTION INTRAVENOUS at 14:32

## 2022-09-16 ASSESSMENT — ENCOUNTER SYMPTOMS
COUGH: 0
PHOTOPHOBIA: 0
SORE THROAT: 0
RHINORRHEA: 0
BLURRED VISION: 0
EYE PAIN: 0
FACIAL SWEATING: 0
SINUS PRESSURE: 0
SWOLLEN GLANDS: 0
ABDOMINAL PAIN: 0
VOMITING: 0
EYE WATERING: 0
VISUAL CHANGE: 0
NAUSEA: 1
SCALP TENDERNESS: 0
EYE REDNESS: 0
BACK PAIN: 0

## 2022-09-16 NOTE — TELEPHONE ENCOUNTER
Patient is scheduled for CTA Head Neck and I need the OV from 9/15/22 completed so I can obtain authorization through [employer].

## 2022-09-16 NOTE — TELEPHONE ENCOUNTER
Patient notified. She will call and find out who is covered under her insurance and give us a call back on Monday.

## 2022-09-16 NOTE — RESULT ENCOUNTER NOTE
CTA showing more narrowing of arteries in brain. Given your worsening headaches, we are going to try to send you to  to see if anything else needs to be done. I have also sent this result to Dr. Talisha Wills just to keep him up to date on your care. Dr. Talisha Wills- this is FYI for you. Didn't know if we should go ahead and increase her Atorvastatin to 80 given the above noted changes. Thanks.

## 2022-09-16 NOTE — TELEPHONE ENCOUNTER
Patient was referred to Dr. Brady Dickerson, when she called to make an appointment she was told they could not see her until March. Wants to know if she should wait until then, or try to get in somewhere else? Please advise.

## 2022-09-16 NOTE — TELEPHONE ENCOUNTER
I would prefer something sooner but I do not know who her insurance would cover.   Would it be possible for her to find out who would be covered/

## 2022-09-18 ENCOUNTER — PATIENT MESSAGE (OUTPATIENT)
Dept: FAMILY MEDICINE CLINIC | Age: 57
End: 2022-09-18

## 2022-09-19 ENCOUNTER — TELEPHONE (OUTPATIENT)
Dept: MAMMOGRAPHY | Age: 57
End: 2022-09-19

## 2022-09-19 DIAGNOSIS — E11.59 TYPE 2 DIABETES MELLITUS WITH OTHER CIRCULATORY COMPLICATION, WITHOUT LONG-TERM CURRENT USE OF INSULIN (HCC): ICD-10-CM

## 2022-09-19 DIAGNOSIS — I10 ESSENTIAL HYPERTENSION: ICD-10-CM

## 2022-09-19 RX ORDER — ATORVASTATIN CALCIUM 80 MG/1
TABLET, FILM COATED ORAL
Qty: 30 TABLET | Refills: 3 | Status: SHIPPED | OUTPATIENT
Start: 2022-09-19

## 2022-09-19 RX ORDER — ATOGEPANT 30 MG/1
TABLET ORAL
Qty: 8 TABLET | Refills: 0 | COMMUNITY
Start: 2022-09-19

## 2022-09-19 NOTE — TELEPHONE ENCOUNTER
Spoke with Magdalena relayed message per Kailee Villagomez. Pt would like an updated rx sent to [facility].

## 2022-09-19 NOTE — TELEPHONE ENCOUNTER
Please give samples Rachael Ball. Sitting on [email or web address]. Sent back to MA desk for Lot # can be placed on Rx. Pt also needs to schedule with Neurology. Some of the medications I will be unable to give- they will make her go to Neurology for it because of her insurance.

## 2022-09-19 NOTE — TELEPHONE ENCOUNTER
----- Message from Jose Romero MD sent at 9/16/2022  6:24 PM EDT -----    Thanks for this message. Her cholesterol numbers are not horrible and I wonder how much of this is more anatomic as for the reason for increasing stenosis but is worth a shot. I will have my office CCed on this message and asked her to increase her Lipitor to 80 mg p.o. nightly. Please have the patient call if any side effects or myalgia start to occur    ----- Message -----  From: GERMAN Thurman - CNP  Sent: 9/16/2022   4:04 PM EDT  To: Jose Romero MD, #    CTA showing more narrowing of arteries in brain. Given your worsening headaches, we are going to try to send you to  to see if anything else needs to be done. I have also sent this result to Dr. Carmelita Vasquez just to keep him up to date on your care. Dr. Carmelita Vasquez- this is FYI for you. Didn't know if we should go ahead and increase her Atorvastatin to 80 given the above noted changes. Thanks.

## 2022-09-20 NOTE — TELEPHONE ENCOUNTER
Per Mohini, patient is being referred to [facility] and Ilan Walsh is working on this.

## 2022-10-05 ENCOUNTER — NURSE ONLY (OUTPATIENT)
Dept: CARDIOLOGY CLINIC | Age: 57
End: 2022-10-05

## 2022-10-05 DIAGNOSIS — Z45.09 ENCOUNTER FOR LOOP RECORDER CHECK: ICD-10-CM

## 2022-10-05 DIAGNOSIS — I63.9 CEREBROVASCULAR ACCIDENT (CVA), UNSPECIFIED MECHANISM (HCC): Primary | ICD-10-CM

## 2022-10-05 NOTE — PROGRESS NOTES
End of 31-day monitoring period 10/5. Time in AT/AF 0.0% ---  PVCs (% beats) 0.0%   No arrhythmias/events recorded. Remote transmission received for patients ILR. EP physician will review. See interrogation under the cardiology tab in the [address] [place] [address] [address] field for more details. Will continue to monitor remotely. LINQ 2 implanted for CVA and palpitations. No AFib recorded to date.

## 2022-10-31 ENCOUNTER — TELEMEDICINE (OUTPATIENT)
Dept: FAMILY MEDICINE CLINIC | Age: 57
End: 2022-10-31
Payer: MEDICAID

## 2022-10-31 DIAGNOSIS — R11.0 NAUSEA: ICD-10-CM

## 2022-10-31 DIAGNOSIS — R43.2 ABNORMAL SENSE OF TASTE: Primary | ICD-10-CM

## 2022-10-31 DIAGNOSIS — R52 BODY ACHES: ICD-10-CM

## 2022-10-31 DIAGNOSIS — R53.83 FATIGUE, UNSPECIFIED TYPE: ICD-10-CM

## 2022-10-31 DIAGNOSIS — H92.09 EARACHE: ICD-10-CM

## 2022-10-31 PROCEDURE — G8427 DOCREV CUR MEDS BY ELIG CLIN: HCPCS

## 2022-10-31 PROCEDURE — 1036F TOBACCO NON-USER: CPT

## 2022-10-31 PROCEDURE — 99213 OFFICE O/P EST LOW 20 MIN: CPT

## 2022-10-31 PROCEDURE — 3017F COLORECTAL CA SCREEN DOC REV: CPT

## 2022-10-31 PROCEDURE — G8484 FLU IMMUNIZE NO ADMIN: HCPCS

## 2022-10-31 PROCEDURE — G8417 CALC BMI ABV UP PARAM F/U: HCPCS

## 2022-10-31 RX ORDER — ONDANSETRON 4 MG/1
4 TABLET, ORALLY DISINTEGRATING ORAL 3 TIMES DAILY PRN
Qty: 21 TABLET | Refills: 0 | Status: SHIPPED | OUTPATIENT
Start: 2022-10-31

## 2022-10-31 RX ORDER — METHYLPREDNISOLONE 4 MG/1
TABLET ORAL
Qty: 1 KIT | Refills: 0 | Status: SHIPPED | OUTPATIENT
Start: 2022-10-31 | End: 2022-11-06

## 2022-10-31 ASSESSMENT — ENCOUNTER SYMPTOMS
ABDOMINAL PAIN: 0
COUGH: 0
CHANGE IN BOWEL HABIT: 0
SORE THROAT: 1

## 2022-10-31 NOTE — PROGRESS NOTES
Kelsi Saucedo (:  1965) is a Established patient, here for evaluation of the following:    Assessment & Plan   Below is the assessment and plan developed based on review of pertinent history, physical exam, labs, studies, and medications. 1. Abnormal sense of taste  -Patient was encouraged to obtain home test  -COVID test  -     COVID-19; Future  2. Fatigue, unspecified type  -Encouraged to push fluids and electrolytes to avoid dehydration    -     COVID-19; Future  3. Earache  -Medrol Dosepak  -No sinus pressure or drainage  -COVID test  -     methylPREDNISolone (MEDROL DOSEPACK) 4 MG tablet; Take by mouth., Disp-1 kit, R-0Normal  4. Body aches  -COVID test  -     COVID-19; Future  5. Nausea  -Zofran as needed  -     ondansetron (ZOFRAN-ODT) 4 MG disintegrating tablet; Take 1 tablet by mouth 3 times daily as needed for Nausea or Vomiting, Disp-21 tablet, R-0Normal  Return if symptoms worsen or fail to improve. Subjective   Patient seen virtually today for chief complaint of earache, headache and fatigue that began on 10/29. She states that symptoms were progressive over 10/30 and into 10/31. Today she stated she woke up with an abnormal sense of taste has a hard time tasting anything. She also reports new symptom of nausea. Denies any diarrhea. She does report mild shortness of breath but does not have to use her inhaler any more often than normal.  She has not tested for COVID at home. She does report some chills and body aches but no fever. She states that she does feel overly fatigued and wants to lay down majority of the day. Denies any new chest pain, cough or hemoptysis    Pharyngitis  This is a new problem. The current episode started yesterday. The problem occurs constantly. The problem has been unchanged. Associated symptoms include chills, fatigue, headaches, a sore throat and weakness.  Pertinent negatives include no abdominal pain, anorexia, change in bowel habit, congestion, coughing, fever or rash. Nothing aggravates the symptoms. She has tried NSAIDs for the symptoms. The treatment provided no relief. Otalgia   There is pain in both ears. This is a new problem. The current episode started yesterday. The problem occurs constantly. The problem has been unchanged. There has been no fever. The pain is moderate. Associated symptoms include headaches and a sore throat. Pertinent negatives include no abdominal pain, coughing or rash. She has tried nothing for the symptoms. The treatment provided no relief. There is no history of a chronic ear infection, hearing loss or a tympanostomy tube. Fatigue  This is a new problem. The current episode started in the past 7 days. The problem occurs constantly. The problem has been unchanged. Associated symptoms include chills, fatigue, headaches, a sore throat and weakness. Pertinent negatives include no abdominal pain, anorexia, change in bowel habit, congestion, coughing, fever or rash. Nothing aggravates the symptoms. She has tried nothing for the symptoms. The treatment provided no relief. Review of Systems   Constitutional:  Positive for chills and fatigue. Negative for fever. HENT:  Positive for ear pain and sore throat. Negative for congestion. Respiratory:  Negative for cough. Gastrointestinal:  Negative for abdominal pain, anorexia and change in bowel habit. Skin:  Negative for rash. Neurological:  Positive for weakness and headaches. Objective   Patient-Reported Vitals  No data recorded     Physical Exam         On this date 10/31/2022 I have spent 20 minutes reviewing previous notes, test results and face to face (virtual) with the patient discussing the diagnosis and importance of compliance with the treatment plan as well as documenting on the day of the visit. Mariusz Mack, was evaluated through a synchronous (real-time) audio-video encounter.  The patient (or guardian if applicable) is aware that this is a billable service, which includes applicable co-pays. This Virtual Visit was conducted with patient's (and/or legal guardian's) consent. The visit was conducted pursuant to the emergency declaration under the [facility], [address] [place] [address] authority and the [employer] Act. Patient identification was verified, and a caregiver was present when appropriate. The patient was located at Home: [address] [place] [address] [address] [address]. Provider was located at [facility] (Appt Dept): [address] ([facility]),  [address] [place] [address].         --Vilma Mason, GERMAN - CNP

## 2022-11-03 ENCOUNTER — PATIENT MESSAGE (OUTPATIENT)
Dept: FAMILY MEDICINE CLINIC | Age: 57
End: 2022-11-03

## 2022-11-03 NOTE — TELEPHONE ENCOUNTER
From: Vinod Lee  To: Ralph Kaushal  Sent: 11/3/2022 5:40 PM EDT  Subject: Still not feeling well     Good evening, Nasir Raya. I spoke to Dr Jose Manuel Romero Monday. I wasn't feeling well. I took 2 covid test which were negative. I'm still not feeling well. Sore throat ear ache left ear nauseous no taste and very tired. Could you call me something in? Thank you.

## 2022-11-09 ENCOUNTER — NURSE ONLY (OUTPATIENT)
Dept: CARDIOLOGY CLINIC | Age: 57
End: 2022-11-09
Payer: MEDICAID

## 2022-11-09 DIAGNOSIS — I63.9 CEREBROVASCULAR ACCIDENT (CVA), UNSPECIFIED MECHANISM (HCC): ICD-10-CM

## 2022-11-09 DIAGNOSIS — Z45.09 ENCOUNTER FOR LOOP RECORDER CHECK: ICD-10-CM

## 2022-11-09 DIAGNOSIS — R00.2 PALPITATIONS: ICD-10-CM

## 2022-11-09 PROCEDURE — 93298 REM INTERROG DEV EVAL SCRMS: CPT | Performed by: INTERNAL MEDICINE

## 2022-11-09 PROCEDURE — G2066 INTER DEVC REMOTE 30D: HCPCS | Performed by: INTERNAL MEDICINE

## 2022-11-10 NOTE — PROGRESS NOTES
End of 31-day monitoring period 11/9. Time in AT/AF 0.0% ---  PVCs (% beats) 0.1%  No new arrhythmias/events recorded. Remote transmission received for patients ILR. EP physician will review. See interrogation under the cardiology tab in the [address] [place] [address] [address] field for more details. Will continue to monitor remotely. LINQ 2 implanted for CVA and palpitations. No AFib recorded to date.

## 2022-12-13 ENCOUNTER — TELEMEDICINE (OUTPATIENT)
Dept: FAMILY MEDICINE CLINIC | Age: 57
End: 2022-12-13
Payer: MEDICAID

## 2022-12-13 DIAGNOSIS — Z87.891 FORMER SMOKER: Chronic | ICD-10-CM

## 2022-12-13 DIAGNOSIS — R11.0 NAUSEA: ICD-10-CM

## 2022-12-13 DIAGNOSIS — J06.9 VIRAL URI: Primary | ICD-10-CM

## 2022-12-13 DIAGNOSIS — I25.10 CAD IN NATIVE ARTERY: ICD-10-CM

## 2022-12-13 DIAGNOSIS — J42 CHRONIC BRONCHITIS, UNSPECIFIED CHRONIC BRONCHITIS TYPE (HCC): ICD-10-CM

## 2022-12-13 PROCEDURE — 99441 PR PHYS/QHP TELEPHONE EVALUATION 5-10 MIN: CPT | Performed by: NURSE PRACTITIONER

## 2022-12-13 RX ORDER — BENZONATATE 100 MG/1
100 CAPSULE ORAL 3 TIMES DAILY PRN
Qty: 30 CAPSULE | Refills: 0 | Status: SHIPPED | OUTPATIENT
Start: 2022-12-13 | End: 2022-12-20

## 2022-12-13 RX ORDER — ONDANSETRON 4 MG/1
4 TABLET, FILM COATED ORAL 3 TIMES DAILY PRN
Qty: 30 TABLET | Refills: 0 | Status: SHIPPED | OUTPATIENT
Start: 2022-12-13

## 2022-12-13 NOTE — PROGRESS NOTES
GERMAN Lundberg CNP   aspirin 81 MG chewable tablet Take 1 tablet by mouth daily Yes GERMAN Correia CNP   clopidogrel (PLAVIX) 75 MG tablet Take 1 tablet by mouth daily Yes GERMAN Correia CNP   fluticasone (FLONASE) 50 MCG/ACT nasal spray 1 spray by Each Nostril route daily Yes GERMAN Correia CNP   insulin glargine (LANTUS SOLOSTAR) 100 UNIT/ML injection pen Inject 7 Units into the skin nightly Yes GERMAN Correia CNP   lisinopril (PRINIVIL;ZESTRIL) 20 MG tablet Take 1 tablet by mouth daily Yes GERMAN Correia CNP   metFORMIN (GLUCOPHAGE) 1000 MG tablet Take 1 tablet by mouth 2 times daily (with meals) Yes GERMAN Correia CNP   pantoprazole (PROTONIX) 40 MG tablet TAKE ONE TABLET BY MOUTH 2 TIMES A DAY  Patient taking differently: Take 40 mg by mouth daily Yes GERMAN Correia CNP   Insulin Pen Needle (TECHLITE PEN NEEDLES) 32G X 4 MM MISC USE AS DIRECTED FOR LANTUS Yes GERMAN Correia CNP   nitroGLYCERIN (NITROSTAT) 0.4 MG SL tablet up to max of 3 total doses. If no relief after 1 dose, call 911.  Yes Xin Reaves MD       Social History     Tobacco Use    Smoking status: Former     Packs/day: 1.00     Years: 20.00     Pack years: 20.00     Types: Cigarettes     Quit date: 2018     Years since quittin.6    Smokeless tobacco: Never   Vaping Use    Vaping Use: Never used   Substance Use Topics    Alcohol use: No     Alcohol/week: 0.0 standard drinks    Drug use: No        Allergies   Allergen Reactions    Penicillins Itching, Swelling and Rash    Arginine      Nausea and vomiting     Cymbalta [Duloxetine Hcl] Other (See Comments)     Severe headache, abnormal dreams    Imdur [Isosorbide Dinitrate] Swelling     nausea    Metoprolol      Headache, nausea     Cephalexin Itching and Rash   ,   Past Medical History:   Diagnosis Date    Acute blood loss anemia     Asthma     CAD (coronary artery disease)     Chest pain     COPD (chronic obstructive pulmonary disease) ([facility] [address]     Coronary artery disease 2012    Depression     Diabetes mellitus (HCC)     Enlarged heart     Fatigue     Generalized headaches     Hyperlipidemia     Hypertension     Migraine     Morning stiffness of joints     Myocardial infarction (HCC) 2018    Numbness or tingling hands and feet    OA (osteoarthritis) of knee 2015    Obesity, Class I, BMI 30.0-34.9 (see actual BMI)     Osteoporosis     Pulmonary nodule     S/P PTCA (percutaneous transluminal coronary angioplasty) 2019    Shortness of breath    ,   Past Surgical History:   Procedure Laterality Date    CARDIAC CATHETERIZATION  2016    Dr. Rashida Dumas. Kendal Breeding  2012    Dr. Diana Pryor  2019    Non Obs CAD, medical management    CHOLECYSTECTOMY      CORONARY ANGIOPLASTY  2019    POBA of mid LAD, NC Balloon- 3.25 x 15    CORONARY ANGIOPLASTY WITH STENT PLACEMENT  2018    Dr. Roslyn Arvizu - w/placement of IABP, Xience 3.25 x 18 mid Circ, POBA 2.5 x 12 to prox LAD    CORONARY ARTERY BYPASS GRAFT  2018    LIMA, SVG to OM2, SVG to rPDA    DIAGNOSTIC CARDIAC CATH LAB PROCEDURE  2013    Dr. Wells Boxer - Non Obs CAD    HYSTERECTOMY (CERVIX STATUS UNKNOWN)      PTCA      UPPER GASTROINTESTINAL ENDOSCOPY     ,   Social History     Tobacco Use    Smoking status: Former     Packs/day: 1.00     Years: 20.00     Pack years: 20.00     Types: Cigarettes     Quit date: 2018     Years since quittin.6    Smokeless tobacco: Never   Vaping Use    Vaping Use: Never used   Substance Use Topics    Alcohol use: No     Alcohol/week: 0.0 standard drinks    Drug use: No   ,   Family History   Problem Relation Age of Onset    Emphysema Mother     Heart Failure Mother     Hypertension Mother     Heart Disease Mother     High Blood Pressure Mother     High Cholesterol Mother     Stroke Mother     Heart Failure Father     Hypertension Father     Heart Disease Father     High Blood Pressure Father     High Cholesterol Father     Hypertension Sister     High Blood Pressure Sister     Hypertension Brother     High Blood Pressure Brother     Cancer Brother     Hypertension Maternal Grandmother     High Blood Pressure Maternal Grandmother     Hypertension Maternal Grandfather     High Blood Pressure Maternal Grandfather     Hypertension Paternal Grandmother     High Blood Pressure Paternal Grandmother     Hypertension Paternal Grandfather     High Blood Pressure Paternal Grandfather     High Blood Pressure Sister     High Blood Pressure Sister     High Blood Pressure Sister     Heart Failure Maternal Aunt     Hypertension Maternal Aunt     Heart Failure Maternal Uncle     Hypertension Maternal Uncle     Cancer Other         nephew-colon, liver, lung    Heart Failure Other     Diabetes Other     Osteoarthritis Other     Hypertension Paternal Aunt     Hypertension Paternal Uncle     Asthma Neg Hx    ,   Immunization History   Administered Date(s) Administered    Influenza Virus Vaccine 03/12/2016    Pneumococcal Polysaccharide ([id number]) 09/22/2015    Pneumococcal conjugate PCV20, PF (Prevnar 20) 09/12/2022    Tdap (Boostrix, Adacel) 02/09/2015   ,   Health Maintenance   Topic Date Due    COVID-19 Vaccine (1) Never done    HIV screen  Never done    Hepatitis C screen  Never done    Hepatitis B vaccine (1 of 3 - Risk 3-dose series) Never done    Colorectal Cancer Screen  Never done    Shingles vaccine (1 of 2) Never done    Flu vaccine (1) 08/01/2022    Diabetic retinal exam  09/20/2022    Diabetic microalbuminuria test  03/04/2023    Lipids  06/21/2023    Diabetic foot exam  09/12/2023    A1C test (Diabetic or Prediabetic)  09/12/2023    Depression Monitoring  09/12/2023    Low dose CT lung screening  09/16/2023    Breast cancer screen  09/16/2024    DTaP/Tdap/Td vaccine (2 - Td or Tdap) 02/09/2025    Pneumococcal 0-64 years Vaccine  Completed    Hepatitis A vaccine  Aged Out    Hib vaccine  Aged Out    Meningococcal (ACWY) vaccine  Aged Out       PHYSICAL EXAMINATION:  [ INSTRUCTIONS:  \"[x]\" Indicates a positive item  \"[]\" Indicates a negative item      Vital Signs: (As obtained by patient/caregiver or practitioner observation)    Blood pressure-  Heart rate-    Respiratory rate-    Temperature-  Pulse oximetry-     UNABLE TO PERFORM AS AUDIO/TELEPHONE VISIT ONLY       ASSESSMENT/PLAN:  Oj Rinaldi was seen today for otalgia, headache, pharyngitis, chills and fatigue. Diagnoses and all orders for this visit:    Viral URI  Supportive care for now. Start Zofran/ Tessalon  Encouraged OTC cough/ cold medication, Tylenol/ Ibuprofen PRN pain  Discussed non pharmacologic measures: Encouraged frequent water intake. Encouraged Gatorade/ Powerade if not drinking fluids, intake of honey/ lemon tea for sore throat, Humidifier at night for cough, hot showers, Netti Pot if needed for congestion. If no improvement, encouraged pt to call for F/U labs/ imaging    -     benzonatate (TESSALON) 100 MG capsule; Take 1 capsule by mouth 3 times daily as needed for Cough    Chronic bronchitis, unspecified chronic bronchitis type ([address]  Continue Albuterol inhaler    Former smoker  Encouraged to continue cessation    CAD in native artery  Continue current medication    Nausea  See above  -     ondansetron (ZOFRAN) 4 MG tablet; Take 1 tablet by mouth 3 times daily as needed for Nausea or Vomiting      Return in about 3 months (around 3/13/2023). Pearl Garrett is a 61 y.o. female being evaluated by a Virtual Visit (video visit) encounter to address concerns as mentioned above. A caregiver was present when appropriate. Due to this being a TeleHealth encounter ([facility]), evaluation of the following organ systems was limited: Vitals/Constitutional/EENT/Resp/CV/GI//MS/Neuro/Skin/Heme-Lymph-Imm.   The patient (or guardian if applicable) is aware that this is a billable service, which includes applicable co-pays. This Virtual Visit was conducted with patient's (and/or legal guardian's) consent. The visit was conducted pursuant to the emergency declaration under the [address] [place] [address] authority and the [employer] and [employer] General Act. Patient identification was verified, and a caregiver was present when appropriate. The patient was located in a state where the provider was licensed to provide care. Total time spent on this encounter:  5    Services were provided through a video synchronous discussion virtually to substitute for in-person clinic visit. Patient and provider were located at their individual homes. --GERMAN Mena - CNP on 12/13/2022 at 11:47 AM    An electronic signature was used to authenticate this note. Patient should call the office immediately with new or ongoing signs or symptoms or worsening, or proceed to the emergency room. All entries in chief complaint and history of present illness are reviewed and validated by me. No changes in past medical history, past surgical history, social history, or family history were noted during the patient encounter unless specifically listed above. All updates of past medical history, past surgical history, social history, or family history were reviewed personally by me during the office visit. All problems listed in the assessment are stable unless noted otherwise. Medication profile reviewed personally by me during the office visit. Medication side effects and possible impairments from medications were discussed as applicable. Every effort has been made to assure accurate transcription by this voice recognition software. However, mistakes in transcription may still occur    You are being started on a new medication. All medications have the potential for adverse effects. All medications effect each person differently. Please read and review provided information related to medication. If the medication that you have been prescribed has the potential to cause sedation, do not drive or operate car, truck, or heavy machinery until you know how the medication will effect you. If you experience any adverse effects from the medication, please call the office or report to the emergency department.

## 2022-12-14 ENCOUNTER — NURSE ONLY (OUTPATIENT)
Dept: CARDIOLOGY CLINIC | Age: 57
End: 2022-12-14
Payer: MEDICAID

## 2022-12-14 DIAGNOSIS — R00.2 PALPITATIONS: ICD-10-CM

## 2022-12-14 DIAGNOSIS — I63.9 CEREBROVASCULAR ACCIDENT (CVA), UNSPECIFIED MECHANISM (HCC): ICD-10-CM

## 2022-12-14 DIAGNOSIS — Z45.09 ENCOUNTER FOR LOOP RECORDER CHECK: ICD-10-CM

## 2022-12-14 PROCEDURE — G2066 INTER DEVC REMOTE 30D: HCPCS | Performed by: INTERNAL MEDICINE

## 2022-12-14 PROCEDURE — 93298 REM INTERROG DEV EVAL SCRMS: CPT | Performed by: INTERNAL MEDICINE

## 2023-01-04 DIAGNOSIS — K21.9 GASTROESOPHAGEAL REFLUX DISEASE WITHOUT ESOPHAGITIS: ICD-10-CM

## 2023-01-04 RX ORDER — PANTOPRAZOLE SODIUM 40 MG/1
TABLET, DELAYED RELEASE ORAL
Qty: 60 TABLET | Refills: 0 | Status: SHIPPED | OUTPATIENT
Start: 2023-01-04

## 2023-01-09 ENCOUNTER — TELEPHONE (OUTPATIENT)
Dept: FAMILY MEDICINE CLINIC | Age: 58
End: 2023-01-09

## 2023-01-09 DIAGNOSIS — E11.59 TYPE 2 DIABETES MELLITUS WITH OTHER CIRCULATORY COMPLICATION, WITHOUT LONG-TERM CURRENT USE OF INSULIN (HCC): Primary | ICD-10-CM

## 2023-01-09 RX ORDER — SEMAGLUTIDE 1.34 MG/ML
0.25 INJECTION, SOLUTION SUBCUTANEOUS WEEKLY
Qty: 1.5 ML | Refills: 1 | Status: SHIPPED | OUTPATIENT
Start: 2023-01-09

## 2023-01-18 ENCOUNTER — NURSE ONLY (OUTPATIENT)
Dept: CARDIOLOGY CLINIC | Age: 58
End: 2023-01-18
Payer: MEDICAID

## 2023-01-18 DIAGNOSIS — I63.9 CEREBROVASCULAR ACCIDENT (CVA), UNSPECIFIED MECHANISM (HCC): ICD-10-CM

## 2023-01-18 DIAGNOSIS — Z45.09 ENCOUNTER FOR LOOP RECORDER CHECK: ICD-10-CM

## 2023-01-18 DIAGNOSIS — R00.2 PALPITATIONS: ICD-10-CM

## 2023-01-18 PROCEDURE — G2066 INTER DEVC REMOTE 30D: HCPCS | Performed by: INTERNAL MEDICINE

## 2023-01-18 PROCEDURE — 93298 REM INTERROG DEV EVAL SCRMS: CPT | Performed by: INTERNAL MEDICINE

## 2023-01-19 NOTE — PROGRESS NOTES
We received a remote transmission from patient's monitor at home. Remote Linq report shows no arrhythmias. EP physician to review. We will continue to monitor remotely. Implanted for CVA and palpitations. End of 31-day monitoring period 1-18-23.

## 2023-02-07 DIAGNOSIS — K21.9 GASTROESOPHAGEAL REFLUX DISEASE WITHOUT ESOPHAGITIS: ICD-10-CM

## 2023-02-07 DIAGNOSIS — I10 ESSENTIAL HYPERTENSION: ICD-10-CM

## 2023-02-07 DIAGNOSIS — I25.119 CORONARY ARTERY DISEASE INVOLVING NATIVE CORONARY ARTERY OF NATIVE HEART WITH ANGINA PECTORIS (HCC): ICD-10-CM

## 2023-02-07 RX ORDER — CLOPIDOGREL BISULFATE 75 MG/1
TABLET ORAL
Qty: 30 TABLET | Refills: 1 | Status: SHIPPED | OUTPATIENT
Start: 2023-02-07 | End: 2023-03-01 | Stop reason: SDUPTHER

## 2023-02-07 RX ORDER — PANTOPRAZOLE SODIUM 40 MG/1
TABLET, DELAYED RELEASE ORAL
Qty: 60 TABLET | Refills: 1 | Status: SHIPPED | OUTPATIENT
Start: 2023-02-07 | End: 2023-03-27

## 2023-02-07 RX ORDER — NITROGLYCERIN 0.4 MG/1
TABLET SUBLINGUAL
Qty: 25 TABLET | Refills: 3 | Status: SHIPPED | OUTPATIENT
Start: 2023-02-07

## 2023-02-07 RX ORDER — LISINOPRIL 20 MG/1
TABLET ORAL
Qty: 30 TABLET | Refills: 1 | Status: SHIPPED | OUTPATIENT
Start: 2023-02-07 | End: 2023-03-01 | Stop reason: SDUPTHER

## 2023-02-07 NOTE — TELEPHONE ENCOUNTER
[phone] (Home Phone)  called and scheduled next follow up appt with pt who stated she also will need a refill on her nitro stating her other rx has .          Future appt scheduled 2023                   Last appt 2022      Last Written     pantoprazole (PROTONIX) 40 MG tablet  2023  #60  0 RF     clopidogrel (PLAVIX) 75 MG tablet  2022  #30  5 RF     metFORMIN (GLUCOPHAGE) 1000 MG tablet  2022  #60  5 RF     lisinopril (PRINIVIL;ZESTRIL) 20 MG tablet  2022  #30  5 RF     nitroGLYCERIN (NITROSTAT) 0.4 MG SL tablet  2021  #25  3 RF

## 2023-02-13 ENCOUNTER — HOSPITAL ENCOUNTER (EMERGENCY)
Age: 58
Discharge: LEFT BEFORE DISCHARGE | End: 2023-02-13
Attending: EMERGENCY MEDICINE
Payer: MEDICAID

## 2023-02-13 ENCOUNTER — APPOINTMENT (OUTPATIENT)
Dept: GENERAL RADIOLOGY | Age: 58
End: 2023-02-13
Payer: MEDICAID

## 2023-02-13 ENCOUNTER — TELEPHONE (OUTPATIENT)
Dept: CARDIOLOGY CLINIC | Age: 58
End: 2023-02-13

## 2023-02-13 VITALS
DIASTOLIC BLOOD PRESSURE: 73 MMHG | SYSTOLIC BLOOD PRESSURE: 153 MMHG | TEMPERATURE: 98.1 F | HEIGHT: 61 IN | WEIGHT: 173 LBS | OXYGEN SATURATION: 98 % | RESPIRATION RATE: 18 BRPM | BODY MASS INDEX: 32.66 KG/M2 | HEART RATE: 66 BPM

## 2023-02-13 DIAGNOSIS — R07.9 CHEST PAIN, UNSPECIFIED TYPE: Primary | ICD-10-CM

## 2023-02-13 LAB
A/G RATIO: 1.4 (ref 1.1–2.2)
ALBUMIN SERPL-MCNC: 4.1 G/DL (ref 3.4–5)
ALP BLD-CCNC: 72 U/L (ref 40–129)
ALT SERPL-CCNC: 19 U/L (ref 10–40)
ANION GAP SERPL CALCULATED.3IONS-SCNC: 11 MMOL/L (ref 3–16)
AST SERPL-CCNC: 14 U/L (ref 15–37)
BASOPHILS ABSOLUTE: 0 K/UL (ref 0–0.2)
BASOPHILS RELATIVE PERCENT: 0.4 %
BILIRUB SERPL-MCNC: 0.6 MG/DL (ref 0–1)
BUN BLDV-MCNC: 11 MG/DL (ref 7–20)
CALCIUM SERPL-MCNC: 9.4 MG/DL (ref 8.3–10.6)
CHLORIDE BLD-SCNC: 102 MMOL/L (ref 99–110)
CO2: 24 MMOL/L (ref 21–32)
CREAT SERPL-MCNC: 0.6 MG/DL (ref 0.6–1.1)
EKG ATRIAL RATE: 70 BPM
EKG DIAGNOSIS: NORMAL
EKG P AXIS: 51 DEGREES
EKG P-R INTERVAL: 142 MS
EKG Q-T INTERVAL: 424 MS
EKG QRS DURATION: 104 MS
EKG QTC CALCULATION (BAZETT): 457 MS
EKG R AXIS: -12 DEGREES
EKG T AXIS: 65 DEGREES
EKG VENTRICULAR RATE: 70 BPM
EOSINOPHILS ABSOLUTE: 0.1 K/UL (ref 0–0.6)
EOSINOPHILS RELATIVE PERCENT: 1.4 %
GFR SERPL CREATININE-BSD FRML MDRD: >60 ML/MIN/{1.73_M2}
GLUCOSE BLD-MCNC: 277 MG/DL (ref 70–99)
HCT VFR BLD CALC: 45.2 % (ref 36–48)
HEMOGLOBIN: 15.2 G/DL (ref 12–16)
LYMPHOCYTES ABSOLUTE: 1.7 K/UL (ref 1–5.1)
LYMPHOCYTES RELATIVE PERCENT: 30.5 %
MCH RBC QN AUTO: 29.8 PG (ref 26–34)
MCHC RBC AUTO-ENTMCNC: 33.6 G/DL (ref 31–36)
MCV RBC AUTO: 88.8 FL (ref 80–100)
MONOCYTES ABSOLUTE: 0.3 K/UL (ref 0–1.3)
MONOCYTES RELATIVE PERCENT: 5.9 %
NEUTROPHILS ABSOLUTE: 3.4 K/UL (ref 1.7–7.7)
NEUTROPHILS RELATIVE PERCENT: 61.8 %
PDW BLD-RTO: 13.2 % (ref 12.4–15.4)
PLATELET # BLD: 171 K/UL (ref 135–450)
PMV BLD AUTO: 8.5 FL (ref 5–10.5)
POTASSIUM REFLEX MAGNESIUM: 3.9 MMOL/L (ref 3.5–5.1)
RBC # BLD: 5.09 M/UL (ref 4–5.2)
SODIUM BLD-SCNC: 137 MMOL/L (ref 136–145)
TOTAL PROTEIN: 7.1 G/DL (ref 6.4–8.2)
TROPONIN: <0.01 NG/ML
WBC # BLD: 5.5 K/UL (ref 4–11)

## 2023-02-13 PROCEDURE — 84484 ASSAY OF TROPONIN QUANT: CPT

## 2023-02-13 PROCEDURE — 4500000002 HC ER NO CHARGE

## 2023-02-13 PROCEDURE — 36415 COLL VENOUS BLD VENIPUNCTURE: CPT

## 2023-02-13 PROCEDURE — 6370000000 HC RX 637 (ALT 250 FOR IP): Performed by: EMERGENCY MEDICINE

## 2023-02-13 PROCEDURE — 93010 ELECTROCARDIOGRAM REPORT: CPT | Performed by: INTERNAL MEDICINE

## 2023-02-13 PROCEDURE — 96374 THER/PROPH/DIAG INJ IV PUSH: CPT

## 2023-02-13 PROCEDURE — 93005 ELECTROCARDIOGRAM TRACING: CPT | Performed by: EMERGENCY MEDICINE

## 2023-02-13 PROCEDURE — 71045 X-RAY EXAM CHEST 1 VIEW: CPT

## 2023-02-13 PROCEDURE — 85025 COMPLETE CBC W/AUTO DIFF WBC: CPT

## 2023-02-13 PROCEDURE — 6360000002 HC RX W HCPCS: Performed by: EMERGENCY MEDICINE

## 2023-02-13 PROCEDURE — 80053 COMPREHEN METABOLIC PANEL: CPT

## 2023-02-13 RX ORDER — ASPIRIN 81 MG/1
162 TABLET, CHEWABLE ORAL ONCE
Status: COMPLETED | OUTPATIENT
Start: 2023-02-13 | End: 2023-02-13

## 2023-02-13 RX ORDER — ONDANSETRON 2 MG/ML
4 INJECTION INTRAMUSCULAR; INTRAVENOUS ONCE
Status: COMPLETED | OUTPATIENT
Start: 2023-02-13 | End: 2023-02-13

## 2023-02-13 RX ADMIN — ONDANSETRON 4 MG: 2 INJECTION INTRAMUSCULAR; INTRAVENOUS at 11:47

## 2023-02-13 RX ADMIN — ASPIRIN 162 MG: 81 TABLET, CHEWABLE ORAL at 11:13

## 2023-02-13 ASSESSMENT — LIFESTYLE VARIABLES: HOW OFTEN DO YOU HAVE A DRINK CONTAINING ALCOHOL: NEVER

## 2023-02-13 ASSESSMENT — PAIN DESCRIPTION - PAIN TYPE: TYPE: ACUTE PAIN

## 2023-02-13 ASSESSMENT — PAIN DESCRIPTION - ONSET: ONSET: ON-GOING

## 2023-02-13 ASSESSMENT — PAIN SCALES - GENERAL
PAINLEVEL_OUTOF10: 5
PAINLEVEL_OUTOF10: 3

## 2023-02-13 ASSESSMENT — PAIN - FUNCTIONAL ASSESSMENT
PAIN_FUNCTIONAL_ASSESSMENT: ACTIVITIES ARE NOT PREVENTED
PAIN_FUNCTIONAL_ASSESSMENT: 0-10

## 2023-02-13 ASSESSMENT — PAIN DESCRIPTION - DESCRIPTORS
DESCRIPTORS: DULL
DESCRIPTORS: SHARP

## 2023-02-13 ASSESSMENT — PAIN DESCRIPTION - ORIENTATION
ORIENTATION: MID
ORIENTATION: MID

## 2023-02-13 ASSESSMENT — PAIN DESCRIPTION - FREQUENCY: FREQUENCY: CONTINUOUS

## 2023-02-13 ASSESSMENT — PAIN DESCRIPTION - LOCATION
LOCATION: CHEST
LOCATION: CHEST

## 2023-02-13 NOTE — TELEPHONE ENCOUNTER
Patient was seen in ED for CP however left AMA. See below for most recent update from bedside monitor.

## 2023-02-13 NOTE — TELEPHONE ENCOUNTER
Pt called into office and stated she is having shoulder pain in her left shoulder, having heaviness in her chest and some slight sob. Pt requesting to come into office and have an ekg, informed pt she should send in a transmission but pt stated she just needed to talk to medical staff and does not want to have to do that. Please advise. Pt also stated she does not want to go to hospital and wants to be treated without going to ER.

## 2023-02-13 NOTE — ED PROVIDER NOTES
[address] [facility]. [facility] Emergency Department      CHIEF COMPLAINT  Chest Pain      HISTORY OF PRESENT ILLNESS  Kristopher Hall is a 61 y.o. female with a history of multiple medical problems including coronary artery disease status post bypass surgery with multiple subsequent stents. Also with a history of diabetes and COPD. She presents with discomfort in her chest that started this morning. She states she tried to call her cardiologist but they told her to come to the ER. She reports mild shortness of breath. The pain is substernal.  No radiation of pain. She reports mild nausea but no vomiting. She historically has been followed by Dr. Tamir Robins and states that she plans to follow him now that he is moving to  but if she cannot get in with him there she will continue to stay with [id number] cardiology. .  She states the symptoms just started this morning and she knows that she is \"not having a heart attack\" but still thought that she should get checked out. She denies fevers, cough or recent illness. No abdominal pain. No back pain. No other complaints, modifying factors or associated symptoms. History obtained from the patient. I have reviewed the following from the nursing documentation.     Past Medical History:   Diagnosis Date    Acute blood loss anemia     Asthma     CAD (coronary artery disease)     Chest pain     COPD (chronic obstructive pulmonary disease) (HCC)     Coronary artery disease 9/4/2012    Depression     Diabetes mellitus (HCC)     Enlarged heart     Fatigue     Generalized headaches     Hyperlipidemia     Hypertension     Migraine     Morning stiffness of joints     Myocardial infarction ([facility] [address] 05/13/2018    Numbness or tingling hands and feet    OA (osteoarthritis) of knee 1/27/2015    Obesity, Class I, BMI 30.0-34.9 (see actual BMI)     Osteoporosis     Pulmonary nodule     S/P PTCA (percutaneous transluminal coronary angioplasty) 2/6/2019    Shortness of breath      Past Surgical History:   Procedure Laterality Date    CARDIAC CATHETERIZATION  04/01/2016    Dr. Alonzo De La Torre. Michael Minor  08/01/2012    Dr. Allen Reyna  11/01/2019    Non Obs CAD, medical management    CHOLECYSTECTOMY      CORONARY ANGIOPLASTY  08/06/2019    POBA of mid LAD, NC Balloon- 3.25 x 15    CORONARY ANGIOPLASTY WITH STENT PLACEMENT  05/13/2018    Dr. Ritter Host - w/placement of IABP, Xience 3.25 x 18 mid Circ, POBA 2.5 x 12 to prox LAD    CORONARY ARTERY BYPASS GRAFT  06/13/2018    LIMA, SVG to OM2, SVG to [address] CATH LAB PROCEDURE  06/06/2013    Dr. Rowena Zavala - Non Obs CAD    HYSTERECTOMY (CERVIX STATUS UNKNOWN)      PTCA      UPPER GASTROINTESTINAL ENDOSCOPY       Family History   Problem Relation Age of Onset    Emphysema Mother     Heart Failure Mother     Hypertension Mother     Heart Disease Mother     High Blood Pressure Mother     High Cholesterol Mother     Stroke Mother     Heart Failure Father     Hypertension Father     Heart Disease Father     High Blood Pressure Father     High Cholesterol Father     Hypertension Sister     High Blood Pressure Sister     Hypertension Brother     High Blood Pressure Brother     Cancer Brother     Hypertension Maternal Grandmother     High Blood Pressure Maternal Grandmother     Hypertension Maternal Grandfather     High Blood Pressure Maternal Grandfather     Hypertension Paternal Grandmother     High Blood Pressure Paternal Grandmother     Hypertension Paternal Grandfather     High Blood Pressure Paternal Grandfather     High Blood Pressure Sister     High Blood Pressure Sister     High Blood Pressure Sister     Heart Failure Maternal Aunt     Hypertension Maternal Aunt     Heart Failure Maternal Uncle     Hypertension Maternal Uncle     Cancer Other         nephew-colon, liver, lung    Heart Failure Other     Diabetes Other     Osteoarthritis Other     Hypertension Paternal Aunt     Hypertension Paternal Uncle Asthma Neg Hx      Social History     Socioeconomic History    Marital status:      Spouse name: Not on file    Number of children: Not on file    Years of education: Not on file    Highest education level: Not on file   Occupational History    Occupation: computer work   Tobacco Use    Smoking status: Former     Packs/day: 1.00     Years: 20.00     Pack years: 20.00     Types: Cigarettes     Quit date: 2018     Years since quittin.7    Smokeless tobacco: Never   Vaping Use    Vaping Use: Never used   Substance and Sexual Activity    Alcohol use: No     Alcohol/week: 0.0 standard drinks    Drug use: No    Sexual activity: Not Currently     Partners: Male   Other Topics Concern    Not on file   Social History Narrative    Not on file     Social Determinants of Health     Financial Resource Strain: Not on file   Food Insecurity: Not on file   Transportation Needs: Not on file   Physical Activity: Not on file   Stress: Not on file   Social Connections: Not on file   Intimate Partner Violence: Not on file   Housing Stability: Not on file     No current facility-administered medications for this encounter. Current Outpatient Medications   Medication Sig Dispense Refill    lisinopril (PRINIVIL;ZESTRIL) 20 MG tablet TAKE ONE TABLET BY MOUTH EVERY DAY 30 tablet 1    metFORMIN (GLUCOPHAGE) 1000 MG tablet TAKE ONE TABLET BY MOUTH 2 TIMES A DAY WITH MEALS 60 tablet 1    clopidogrel (PLAVIX) 75 MG tablet TAKE ONE TABLET BYMOUTH EVERY DAY 30 tablet 1    pantoprazole (PROTONIX) 40 MG tablet TAKE ONE TABLET BY MOUTH 2 TIMES A DAY 60 tablet 1    nitroGLYCERIN (NITROSTAT) 0.4 MG SL tablet up to max of 3 total doses. If no relief after 1 dose, call 911. 25 tablet 3    Semaglutide,0.25 or 0.5MG/DOS, (OZEMPIC, 0.25 OR 0.5 MG/DOSE,) 2 MG/1.5ML SOPN Inject 0.25 mg into the skin once a week For 4 weeks then increase to 0.5 mg weekly.  1.5 mL 1    ondansetron (ZOFRAN) 4 MG tablet Take 1 tablet by mouth 3 times daily as needed for Nausea or Vomiting 30 tablet 0    ondansetron (ZOFRAN-ODT) 4 MG disintegrating tablet Take 1 tablet by mouth 3 times daily as needed for Nausea or Vomiting 21 tablet 0    Atogepant (QULIPTA) 30 MG TABS Samples given Lot # [id number] EXP 11/30/23 8 tablet 0    atorvastatin (LIPITOR) 80 MG tablet TAKE ONE TABLET BY MOUTH DAILY AT NIGHT 30 tablet 3    Rimegepant Sulfate (NURTEC) 75 MG TBDP Lot# [id number] exp 2/28/25 4 tablet 0    Dulaglutide (TRULICITY) 3 CP/2.8UJ SOPN Inject 3 mg into the skin once a week 12 Adjustable Dose Pre-filled Pen Syringe 1    albuterol sulfate HFA (PROVENTIL HFA) 108 (90 Base) MCG/ACT inhaler Inhale 2 puffs into the lungs every 6 hours as needed for Wheezing or Shortness of Breath 1 each 2    aspirin 81 MG chewable tablet Take 1 tablet by mouth daily 30 tablet 11    fluticasone (FLONASE) 50 MCG/ACT nasal spray 1 spray by Each Nostril route daily 1 each 5    insulin glargine (LANTUS SOLOSTAR) 100 UNIT/ML injection pen Inject 7 Units into the skin nightly 5 pen 3    Insulin Pen Needle ([employer] PEN NEEDLES) 32G X 4 MM MISC USE AS DIRECTED FOR LANTUS 100 each 0     Allergies   Allergen Reactions    Penicillins Itching, Swelling and Rash    Arginine      Nausea and vomiting     Cymbalta [Duloxetine Hcl] Other (See Comments)     Severe headache, abnormal dreams    Imdur [Isosorbide Dinitrate] Swelling     nausea    Metoprolol      Headache, nausea     Cephalexin Itching and Rash       REVIEW OF SYSTEMS    Unless otherwise stated in this report, this patient's positive and negative responses for review of systems (constitutional, eyes, ENT, cardiovascular, respiratory, gastrointestinal, neurological, genitourinary, musculoskeletal, integument systems and systems related to the presenting problem) are either stated in the preceding paragraph, were not pertinent or were negative for the symptoms and/or complaints related to the medical problem.     PHYSICAL EXAM  BP (!) 153/73   Pulse 66   Temp 98.1 °F (36.7 °C) (Oral)   Resp 18   Ht 5' 1\" (1.549 m)   Wt 173 lb (78.5 kg)   SpO2 98%   BMI 32.69 kg/m²   GENERAL APPEARANCE: Awake and alert. Cooperative. No acute distress. HEAD: Normocephalic. Atraumatic. EYES: PERRL. EOM's grossly intact. ENT: Mucous membranes are moist.   NECK: Supple, trachea midline. HEART: RRR. LUNGS: Respirations unlabored. CTAB. Good air exchange. No wheezes, rales, or rhonchi. Speaking comfortably in full sentences. ABDOMEN:  Soft. Non-distended. Non-tender. No guarding or rebound. EXTREMITIES: No peripheral edema. MAEE. No acute deformities. SKIN: Warm, dry and intact. No acute rashes. NEUROLOGICAL: Alert and oriented X 3. CN II-XII grossly intact. Strength 5/5, sensation intact. PSYCHIATRIC: Normal mood and affect. LABS  I have reviewed all labs for this visit.    Results for orders placed or performed during the hospital encounter of 02/13/23   Troponin   Result Value Ref Range    Troponin <0.01 <0.01 ng/mL   Comprehensive Metabolic Panel w/ Reflex to MG   Result Value Ref Range    Sodium 137 136 - 145 mmol/L    Potassium reflex Magnesium 3.9 3.5 - 5.1 mmol/L    Chloride 102 99 - 110 mmol/L    CO2 24 21 - 32 mmol/L    Anion Gap 11 3 - 16    Glucose 277 (H) 70 - 99 mg/dL    BUN 11 7 - 20 mg/dL    Creatinine 0.6 0.6 - 1.1 mg/dL    Est, Glom Filt Rate >60 >60    Calcium 9.4 8.3 - 10.6 mg/dL    Total Protein 7.1 6.4 - 8.2 g/dL    Albumin 4.1 3.4 - 5.0 g/dL    Albumin/Globulin Ratio 1.4 1.1 - 2.2    Total Bilirubin 0.6 0.0 - 1.0 mg/dL    Alkaline Phosphatase 72 40 - 129 U/L    ALT 19 10 - 40 U/L    AST 14 (L) 15 - 37 U/L   CBC with Auto Differential   Result Value Ref Range    WBC 5.5 4.0 - 11.0 K/uL    RBC 5.09 4.00 - 5.20 M/uL    Hemoglobin 15.2 12.0 - 16.0 g/dL    Hematocrit 45.2 36.0 - 48.0 %    MCV 88.8 80.0 - 100.0 fL    MCH 29.8 26.0 - 34.0 pg    MCHC 33.6 31.0 - 36.0 g/dL    RDW 13.2 12.4 - 15.4 %    Platelets 126 342 - 958 K/uL    MPV 8.5 5.0 - 10.5 fL Neutrophils % 61.8 %    Lymphocytes % 30.5 %    Monocytes % 5.9 %    Eosinophils % 1.4 %    Basophils % 0.4 %    Neutrophils Absolute 3.4 1.7 - 7.7 K/uL    Lymphocytes Absolute 1.7 1.0 - 5.1 K/uL    Monocytes Absolute 0.3 0.0 - 1.3 K/uL    Eosinophils Absolute 0.1 0.0 - 0.6 K/uL    Basophils Absolute 0.0 0.0 - 0.2 K/uL   EKG 12 Lead   Result Value Ref Range    Ventricular Rate 70 BPM    Atrial Rate 70 BPM    P-R Interval 142 ms    QRS Duration 104 ms    Q-T Interval 424 ms    QTc Calculation (Bazett) 457 ms    P Axis 51 degrees    R Axis -12 degrees    T Axis 65 degrees    Diagnosis       Normal sinus rhythmST & T wave abnormality, consider anterior ischemia old inferior wall infarction. Abnormal ECGWhen compared with ECG of 22-JUN-2022 05:52,Incomplete right bundle branch block is no longer PresentConfirmed by Alex Dickey MD, [address] ([id number]) on 2/13/2023 12:13:43 PM         EKG  The Ekg interpreted as interpreted by me:  normal sinus rhythm with a rate of 70  Axis is   Normal  QTc is  normal  Intervals and Durations are unremarkable. Anterior T wave inversions  No evidence of acute ischemia. No significant change from prior EKG dated June 22, 2022    Cardiac Monitoring: The cardiac monitor revealed normal sinus rhythm as interpreted by me. The cardiac monitor was ordered secondary to the patient's complaint of chest pain and to monitor the patient for dysrhythmia. RADIOLOGY  X-RAYS: ALL IMAGES INCLUDING PLAIN FILMS, CT, ULTRASOUND AND MRI HAVE BEEN READ BY THE RADIOLOGIST. XR CHEST PORTABLE   Final Result   No acute cardiopulmonary process demonstrated. Clear lungs bilaterally. Borderline cardiac size with evidence of previous CABG. No evidence of   cardiac decompensation. I have personally reviewed Xray and Ultrasound images and radiology confirms the interpretation:  Chest x-ray with no pneumonia or pneumothorax. Medications administered.  (Dose and Route):  Zofran 4 mg IV, aspirin 162 mg by mouth. Sepsis:  Is this patient to be included in the SEP-1 Core Measure due to severe sepsis or septic shock? No   Exclusion criteria - the patient is NOT to be included for SEP-1 Core Measure due to: Infection is not suspected     Heart Score: HEART SCORE:  History:+2 high suspicion, +1 moderate, 0 low  EKG: +2 significant ST depression, +1 nonspec changes, 0 normal  Age: +2 >65, +1 45-65, 0 <45  Risk factors: +2 >3 or known CAD, +1 1-2, 0 none (factors = HLD, HTN, DM, tobacco, obesity, FHx)  Troponin: +2 >3x normal limit, +1 1-3x normal limit, 0 neg    Heart score: 5      ED COURSE/MDM:  Patient seen and evaluated. Here the patient is afebrile with normal vitals signs. Old records reviewed: The patient's most recent left heart cath performed in June of this year by Dr. Ev Andujar  She had balloon angioplasty performed. She has significant coronary artery disease. Diagnoses affirmatively addressed, consideration of tests, treatments & admission, including shared decision making:    The patient is in no acute distress. EKG with some nonspecific T wave inversions that appear chronic. She is not in distress. She already took 2 baby aspirin at home so she was given an additional 2 here. She is nauseated was given Zofran. Lab work all appears to be baseline other than mild hyperglycemia. Troponin is negative and chest x-ray is unremarkable. She has a heart score of 5 and is certainly high risk given her significant coronary history. She is not short of breath suspect PE. No radiation of pain to the back or neurologic symptoms and I do not suspect aortic dissection. My biggest concern is for ACS. I am recommending admission but the patient is adamant that she is not having a heart attack and she does not want to be admitted to the hospital.  She will not even agree to stay in the ER for a 3-hour repeat troponin. She states she is feeling better and wants to go home.   I tried to speak with her at length and explained the risks with her but she is adamant that she wants to go home. She states she will follow-up with her cardiologist as an outpatient. She is opting to sign out [address] Consultation summary: None. Social determinants of health: None. Labs and imaging reviewed and results discussed with patient. Patient was reassessed as noted above . Plan of care discussed with patient. I have recommended admission to the hospital, but Sandeep Lozada refuses. The risks (including but not limited to suffering and death) as well as the benefits were explained to the patient. Questions were sought and answered, the patient voiced understanding and accepts these risks. I have encouraged the patient to return to have their evaluation completed as we are glad to do so. I have also instructed Sandeep Lozada on the importance of follow-up and to return for any worsening or worrisome concerns. Sandeep Lozada appears to have capacity to make medical decisions at this time. AMA form signed and placed on the chart. CLINICAL IMPRESSION  1. Chest pain, unspecified type        Blood pressure (!) 153/73, pulse 66, temperature 98.1 °F (36.7 °C), temperature source Oral, resp. rate 18, height 5' 1\" (1.549 m), weight 173 lb (78.5 kg), SpO2 98 %, not currently breastfeeding. Mita Parson signed out [address] Monique Hernandez MD am the primary clinician of record.     (Please note this note was completed with a voice recognition program.  Efforts were made to edit the dictations but occasionally words are mis-transcribed.)        Gennette Merlin, MD  02/15/23 [id number]

## 2023-02-13 NOTE — ED NOTES
AMA paperwork provided, reviewed, and signed; patient verbalized understanding in all. AVS provided and reviewed with the patient. The patient verbalized understanding of care at home, follow up care, and emergent symptoms to return for. No questions or concerns verbalized at this time. The patient is alert, oriented, stable, and ambulatory out of the department at the time of discharge.        Gabino Peraza RN  02/13/23 [id number]

## 2023-02-13 NOTE — DISCHARGE INSTRUCTIONS
We were recommending admission to the hospital.  At the very least we were recommending a repeat set of cardiac enzymes. You have declined. The lab work we did get is normal and your chest x-ray is normal.  I am concerned given your cardiac history. You have declined any further work-up or admission and want to go home. Please if you feel like you are worsening at any point do not ignore your symptoms- return to the ER immediately. I would like you to call your cardiologist for a follow-up appointment as soon as possible.

## 2023-02-13 NOTE — TELEPHONE ENCOUNTER
[facility] last updated Current EC2023 09:02:20. No new arrhythmias/events recorded. Will await LIS transmission if performed in ED.

## 2023-02-13 NOTE — TELEPHONE ENCOUNTER
Spoke with patient. She used symptom indicator for ILR while we were on the phone. I advised that she should also proceed to ED given her symptoms and angioplasty with Eddi Nelson in 6/2022. She V/U and with go to ED.

## 2023-02-14 NOTE — TELEPHONE ENCOUNTER
No updates on carelink-Last Data Send:13-Feb-2023. The chio may not be open on her phone. Quick Look Data From: 09-Nov-2022 12:29 to 13-Feb-2023 09:02    MONITOR STATUS  Transmission Received  [facility] MODEL  Smart Phone or Tablet  EMAIL  [email or web address]  Verified: 08-Jul-2022 11:21 AM  PHONE  Patient must verify mobile phone number  MESSAGE PREFERENCES  Last update:   08-Jul-2022 11:19 AM  Email:   ON  Text Message:   OFF  Chio Notifications:   ON

## 2023-02-22 ENCOUNTER — NURSE ONLY (OUTPATIENT)
Dept: CARDIOLOGY CLINIC | Age: 58
End: 2023-02-22
Payer: MEDICAID

## 2023-02-22 DIAGNOSIS — E11.59 TYPE 2 DIABETES MELLITUS WITH OTHER CIRCULATORY COMPLICATION, WITHOUT LONG-TERM CURRENT USE OF INSULIN (HCC): ICD-10-CM

## 2023-02-22 DIAGNOSIS — I63.9 CEREBROVASCULAR ACCIDENT (CVA), UNSPECIFIED MECHANISM (HCC): ICD-10-CM

## 2023-02-22 DIAGNOSIS — I10 ESSENTIAL HYPERTENSION: ICD-10-CM

## 2023-02-22 DIAGNOSIS — Z45.09 ENCOUNTER FOR LOOP RECORDER CHECK: ICD-10-CM

## 2023-02-22 DIAGNOSIS — R00.2 PALPITATIONS: ICD-10-CM

## 2023-02-22 PROCEDURE — 93298 REM INTERROG DEV EVAL SCRMS: CPT | Performed by: INTERNAL MEDICINE

## 2023-02-22 PROCEDURE — G2066 INTER DEVC REMOTE 30D: HCPCS | Performed by: INTERNAL MEDICINE

## 2023-02-22 RX ORDER — ATORVASTATIN CALCIUM 80 MG/1
TABLET, FILM COATED ORAL
Qty: 30 TABLET | Refills: 0 | Status: SHIPPED | OUTPATIENT
Start: 2023-02-22

## 2023-02-23 NOTE — PROGRESS NOTES
We received a remote transmission from patient's monitor at home. Remote Linq report shows no arrhythmias. EP physician to review. We will continue to monitor remotely. Implanted for CVA and palpitations. End of 31-day monitoring period 2-22-23.

## 2023-03-01 ENCOUNTER — OFFICE VISIT (OUTPATIENT)
Dept: CARDIOLOGY CLINIC | Age: 58
End: 2023-03-01
Payer: MEDICAID

## 2023-03-01 VITALS
DIASTOLIC BLOOD PRESSURE: 80 MMHG | HEART RATE: 88 BPM | HEIGHT: 61 IN | SYSTOLIC BLOOD PRESSURE: 140 MMHG | OXYGEN SATURATION: 96 % | WEIGHT: 178.2 LBS | TEMPERATURE: 98.6 F | BODY MASS INDEX: 33.64 KG/M2

## 2023-03-01 DIAGNOSIS — I25.118 CORONARY ARTERY DISEASE OF NATIVE ARTERY OF NATIVE HEART WITH STABLE ANGINA PECTORIS (HCC): Primary | ICD-10-CM

## 2023-03-01 DIAGNOSIS — E78.2 MIXED HYPERLIPIDEMIA: ICD-10-CM

## 2023-03-01 DIAGNOSIS — I10 ESSENTIAL HYPERTENSION: Chronic | ICD-10-CM

## 2023-03-01 DIAGNOSIS — Z95.1 S/P CABG X 3: ICD-10-CM

## 2023-03-01 PROCEDURE — 1036F TOBACCO NON-USER: CPT | Performed by: INTERNAL MEDICINE

## 2023-03-01 PROCEDURE — 3077F SYST BP >= 140 MM HG: CPT | Performed by: INTERNAL MEDICINE

## 2023-03-01 PROCEDURE — G8417 CALC BMI ABV UP PARAM F/U: HCPCS | Performed by: INTERNAL MEDICINE

## 2023-03-01 PROCEDURE — 3079F DIAST BP 80-89 MM HG: CPT | Performed by: INTERNAL MEDICINE

## 2023-03-01 PROCEDURE — 99215 OFFICE O/P EST HI 40 MIN: CPT | Performed by: INTERNAL MEDICINE

## 2023-03-01 PROCEDURE — G8427 DOCREV CUR MEDS BY ELIG CLIN: HCPCS | Performed by: INTERNAL MEDICINE

## 2023-03-01 PROCEDURE — 3017F COLORECTAL CA SCREEN DOC REV: CPT | Performed by: INTERNAL MEDICINE

## 2023-03-01 PROCEDURE — G8484 FLU IMMUNIZE NO ADMIN: HCPCS | Performed by: INTERNAL MEDICINE

## 2023-03-01 RX ORDER — ATORVASTATIN CALCIUM 80 MG/1
TABLET, FILM COATED ORAL
Qty: 30 TABLET | Refills: 11 | Status: SHIPPED | OUTPATIENT
Start: 2023-03-01

## 2023-03-01 RX ORDER — LISINOPRIL 20 MG/1
TABLET ORAL
Qty: 30 TABLET | Refills: 11 | Status: SHIPPED | OUTPATIENT
Start: 2023-03-01

## 2023-03-01 RX ORDER — CLOPIDOGREL BISULFATE 75 MG/1
TABLET ORAL
Qty: 30 TABLET | Refills: 11 | Status: SHIPPED | OUTPATIENT
Start: 2023-03-01

## 2023-03-01 NOTE — PATIENT INSTRUCTIONS
Plan:  1. Discussed angina management with referral to ECCP (enhanced external counterpulsation)   ~ performed as a non-invasive treatment to lower the number and intensity of angina episodes. 2. Discussed referral to cardiac rehabilitation   3. Recommend following with Reina Singleton for second opinion on angina treatment. 4. I recommend that the patient continue their currently prescribed medications. Their drug modifiable risk factors appear to be well controlled. I will continue to address the need/dosing of medications in future visits. 5. Discussed if symptoms progress to seek emergency room for prompt evaluation.   6. Follow up after visit with Monika Hein

## 2023-03-01 NOTE — PROGRESS NOTES
[address] [facility]   Cardiovascular Evaluation    PATIENT: Letha Delgado  DATE: 3/1/2023  MRN: [id number]  CSN: [id number]  : 1965    Primary Care Doctor/Referring provider: GERMAN Webb CNP, No admitting provider for patient encounter. Reason for evaluation/Chief complaint:   Follow-Up from Hospital (History of CABG*3), Coronary Artery Disease, Hypertension, Hyperlipidemia, and Other (Chest pressure, shortness of breath, nauseous, pain on the left side that radiates into the jaw and down the arm, fatigue)      Subjective:    History of present illness on initial date of evaluation:   Letha Delgado is a 61 y.o. female patient who presents for cardiology follow up. She previously followed with Maykel Eddy last office visit 2022. She has a past medical history including diabetes mellitus, obesity, former tobacco abuse, hypertension, hyperlipidemia, CVA, TIA, coronary artery disease, s/p CABG, combined systolic and diastolic heart failure, and coronary spasm. She underwent LHC with  2016 that revealed nonobstructive CAD. She underwent urgent LHC 05/3/2018 in setting of STEMI that revealed severe multivessel CAD. She underwent PCI mid Cx with MARCOS and POBA LAD. She underwent CABG x 3 on 2018 with . She returned 2019 underwent LHC with Caterina Verdugo which showed patent vein grafts but LIMA graft atretic and 2 native LAD stenoses. On 2019 she underwent staged PCI with 2 MARCOS to prox/mid LAD and POBA of distal LAD with . LHC 19 showed ISR at mid LAD stent with POBA only; spasm noted of left main and LCx that improved with NTG. LHC 2019 with Caterina Verdugo patent stent in mid LAD, microvascular disease, and ischemic cardiomyopathy. She had a cardiac MRI 2019 infarction, mild cardiomegaly. LHC with Dr. Quick Stage 01/10/20 patent LAD stents, unchanged.  She had reported heart fluttering and a 2 week RHODA was ordered 04/12/2022-04/25/2022 SR with rare PAC's, brief PAT, rare PVC's. She underwent left heart cath 06/21/2022 with successful balloon angioplasty, cutting arthrectomy and laser atherectomy to the mid LAD for significant in-stent restenosis. Due to multiple stent layers and small size no stent placed. She followed with Marjorie Kohler and had a loop recorder implantation for history of CVA on 07/08/2022. Last device interrogation on 02/22/2023 no arrhythmias. Today she reports she has ongoing chest pain. She would like to proceed with angiogram for treatment. Patient currently denies any weight gain, edema, palpitations, dizziness, and syncope. She is taking medications as prescribed, tolerating well. Patient Active Problem List   Diagnosis    Coronary artery disease involving native heart with angina pectoris ([address]    Essential hypertension    Mixed hyperlipidemia    Cerebral aneurysm, nonruptured    Diabetes mellitus ([address]    Hx TIA involving L-ICA    Former smoker    Chronic tension type headache    GERD (gastroesophageal reflux disease)    Obesity (BMI 30-39. 9)    CAD s/p CABGx3 (June 2018) & stents (May 2018)    Hx of combined systolic (EF 25-70%) & diastolic (grade 1 LVDD) CHF    Coronary artery spasm (HCC)    Fatigue    Severe episode of recurrent major depressive disorder, without psychotic features (HCC)    Chronic bronchitis, unspecified chronic bronchitis type ([address]    CAD in native artery    Unstable angina ([address]    Cerebrovascular accident (CVA) [facility])    MEDTRONIC LINQ 2         Cardiac Testing: I have reviewed the findings below.   EKG:  ECHO:   STRESS TEST:  CATH:  BYPASS:  VASCULAR:    Past Medical History:   has a past medical history of Acute blood loss anemia, Asthma, CAD (coronary artery disease), Chest pain, COPD (chronic obstructive pulmonary disease) ([address] Coronary artery disease, Depression, Diabetes mellitus ([address] Enlarged heart, Fatigue, Generalized headaches, Hyperlipidemia, Hypertension, Migraine, Morning stiffness of joints, Myocardial infarction (HCC), Numbness or tingling, OA (osteoarthritis) of knee, Obesity, Class I, BMI 30.0-34.9 (see actual BMI), Osteoporosis, Pulmonary nodule, S/P PTCA (percutaneous transluminal coronary angioplasty), and Shortness of breath. Surgical History:   has a past surgical history that includes Cholecystectomy; Hysterectomy; Diagnostic Cardiac Cath Lab Procedure (06/06/2013); Upper gastrointestinal endoscopy; Cardiac catheterization (04/01/2016); Cardiac catheterization (08/01/2012); Coronary artery bypass graft (06/13/2018); Coronary angioplasty with stent (05/13/2018); Percutaneous Transluminal Coronary Angio; Coronary angioplasty (08/06/2019); and Cardiac catheterization (11/01/2019). Social History:   reports that she quit smoking about 4 years ago. Her smoking use included cigarettes. She has a 20.00 pack-year smoking history. She has never used smokeless tobacco. She reports that she does not drink alcohol and does not use drugs. Family History:  No evidence for sudden cardiac death or premature CAD    Medications:  Reviewed and are listed in nursing record. and/or listed below  Outpatient Medications:  Prior to Admission medications    Medication Sig Start Date End Date Taking? Authorizing Provider   atorvastatin (LIPITOR) 80 MG tablet Take one tablet by mouth nightly 3/1/23  Yes Mishel Muller MD   clopidogrel (PLAVIX) 75 MG tablet TAKE ONE TABLET BY MOUTH DAILY 3/1/23  Yes Mishel Muller MD   lisinopril (PRINIVIL;ZESTRIL) 20 MG tablet TAKE ONE TABLET BY MOUTH EVERY DAY 3/1/23  Yes Mishel Muller MD   metFORMIN (GLUCOPHAGE) 1000 MG tablet TAKE ONE TABLET BY MOUTH 2 TIMES A DAY WITH MEALS 2/7/23  Yes GERMAN Dan CNP   pantoprazole (PROTONIX) 40 MG tablet TAKE ONE TABLET BY MOUTH 2 TIMES A DAY 2/7/23  Yes GERMAN Dan CNP   nitroGLYCERIN (NITROSTAT) 0.4 MG SL tablet up to max of 3 total doses.  If no relief after 1 dose, call 911. 2/7/23  Yes GERMAN Hernandez CNP   Semaglutide,0.25 or 0.5MG/DOS, (OZEMPIC, 0.25 OR 0.5 MG/DOSE,) 2 MG/1.5ML SOPN Inject 0.25 mg into the skin once a week For 4 weeks then increase to 0.5 mg weekly. 1/9/23  Yes GERMAN Hernandez - CNP   ondansetron (ZOFRAN) 4 MG tablet Take 1 tablet by mouth 3 times daily as needed for Nausea or Vomiting 12/13/22  Yes GERMAN Hernandez - CNP   ondansetron (ZOFRAN-ODT) 4 MG disintegrating tablet Take 1 tablet by mouth 3 times daily as needed for Nausea or Vomiting 10/31/22  Yes Maria Esther LabrumGERMAN - CNP   Atogepant (QULIPTA) 30 MG TABS Samples given Lot # [id number] EXP 11/30/23 9/19/22  Yes GERMAN Hernandez - CNP   Rimegepant Sulfate (NURTEC) 75 MG TBDP Lot# [id number] exp 2/28/25 9/16/22  Yes GERMAN Hernandez - CNP   Dulaglutide (TRULICITY) 3 VL/1.1ZG SOPN Inject 3 mg into the skin once a week 9/12/22  Yes GERMAN Hernandez CNP   albuterol sulfate HFA (PROVENTIL HFA) 108 (90 Base) MCG/ACT inhaler Inhale 2 puffs into the lungs every 6 hours as needed for Wheezing or Shortness of Breath 6/9/22  Yes GERMAN Hernandez - CNP   aspirin 81 MG chewable tablet Take 1 tablet by mouth daily 6/9/22  Yes GERMAN Hernandez - CNP   fluticasone [facility]) 50 MCG/ACT nasal spray 1 spray by Each Nostril route daily 6/9/22  Yes GERMAN Hernandez CNP   insulin glargine (LANTUS SOLOSTAR) 100 UNIT/ML injection pen Inject 7 Units into the skin nightly 6/9/22  Yes GERMAN Hernandez - CNP   Insulin Pen Needle (TECHLITE PEN NEEDLES) 32G X 4 MM MISC USE AS DIRECTED FOR LANTUS 3/4/22  Yes GERMAN Hernandez CNP       In-patient schedule medications:        Infusion Medications: Allergies:  Penicillins, Arginine, Cymbalta [duloxetine hcl], Imdur [isosorbide dinitrate], Metoprolol, and Cephalexin     Review of Systems:   All 14 point review of symptoms completed. Pertinent positives identified in the HPI, all other review of symptoms findings as below. Review of Systems - History obtained from the patient  General ROS: negative for - chills, fever or night sweats  Psychological ROS: negative for - disorientation or hallucinations  Ophthalmic ROS: negative for - dry eyes, eye pain or loss of vision  ENT ROS: negative for - nasal discharge or sore throat  Allergy and Immunology ROS: negative for - hives or itchy/watery eyes  Hematological and Lymphatic ROS: negative for - jaundice or night sweats  Endocrine ROS: negative for - mood swings or temperature intolerance  Breast ROS: deferred  Respiratory ROS: negative for - hemoptysis or stridor  Gastrointestinal ROS: no abdominal pain, change in bowel habits, or black or bloody stools  Genito-Urinary ROS: no dysuria, trouble voiding, or hematuria  Musculoskeletal ROS: negative for - gait disturbance, joint pain or joint stiffness  Neurological ROS: negative for - seizures or speech problems  Dermatological ROS: negative for - rash or skin lesion changes      Physical Examination:    BP (!) 140/80   Pulse 88   Temp 98.6 °F (37 °C)   Ht 5' 1\" (1.549 m)   Wt 178 lb 3.2 oz (80.8 kg)   SpO2 96%   BMI 33.67 kg/m²   BP (!) 140/80   Pulse 88   Temp 98.6 °F (37 °C)   Ht 5' 1\" (1.549 m)   Wt 178 lb 3.2 oz (80.8 kg)   SpO2 96%   BMI 33.67 kg/m²    Weight: 178 lb 3.2 oz (80.8 kg)     Wt Readings from Last 3 Encounters:   03/01/23 178 lb 3.2 oz (80.8 kg)   02/13/23 173 lb (78.5 kg)   09/15/22 173 lb (78.5 kg)     No intake or output data in the 24 hours ending 03/01/23 1212    General Appearance:  Alert, cooperative, no distress, appears stated age   Head:  Normocephalic, without obvious abnormality, atraumatic   Eyes:  PERRL, conjunctiva/corneas clear       Nose: Nares normal, no drainage or sinus tenderness   Throat: Lips, mucosa, and tongue normal   Neck: Supple, symmetrical, trachea midline, no adenopathy, thyroid: not enlarged, symmetric, no tenderness/mass/nodules, no carotid bruit or JVD       Lungs:   Clear to auscultation bilaterally, respirations unlabored   Chest Wall:  No tenderness or deformity   Heart:  Regular rhythm and normal rate; S1, S2 are normal; no murmur noted; no rub or gallop   Abdomen:   Soft, non-tender, bowel sounds active all four quadrants,  no masses, no organomegaly           Extremities: Extremities normal, atraumatic, no cyanosis or edema   Pulses: 2+ and symmetric   Skin: Skin color, texture, turgor normal, no rashes or lesions   Pysch: Normal mood and affect   Neurologic: Normal gross motor and sensory exam.         Labs  No results for input(s): WBC, HGB, HCT, MCV, PLT in the last 72 hours. No results for input(s): CREATININE, BUN, NA, K, CL, CO2 in the last 72 hours. No results for input(s): INR, PROTIME in the last 72 hours. No results for input(s): TROPONINI in the last 72 hours. Invalid input(s): PRO-BNP  No results for input(s): CHOL, LDL, HDL in the last 72 hours. Invalid input(s): TG      Imaging:  I have reviewed the below testing personally and my interpretation is below. EKG:  CXR:      Assessment:  61 y.o. patient with:  Active Problems:    * No active hospital problems. *  Resolved Problems:    * No resolved hospital problems. *      Problem List Items Addressed This Visit       Essential hypertension (Chronic)    Relevant Medications    atorvastatin (LIPITOR) 80 MG tablet    lisinopril (PRINIVIL;ZESTRIL) 20 MG tablet    Mixed hyperlipidemia (Chronic)    Relevant Medications    atorvastatin (LIPITOR) 80 MG tablet    lisinopril (PRINIVIL;ZESTRIL) 20 MG tablet    CAD s/p CABGx3 (June 2018) & stents (May 2018) (Chronic)    Coronary artery disease involving native heart with angina pectoris (HCC) - Primary    Relevant Medications    atorvastatin (LIPITOR) 80 MG tablet    clopidogrel (PLAVIX) 75 MG tablet    lisinopril (PRINIVIL;ZESTRIL) 20 MG tablet       Plan:  1.  Discussed angina management with referral to ECCP (enhanced external counterpulsation)   ~ performed as a non-invasive treatment to lower the number and intensity of angina episodes. 2. Discussed referral to cardiac rehabilitation   3. Recommend following with [facility] for consistent opinion on angina treatment. 4. I recommend that the patient continue their currently prescribed medications. Their drug modifiable risk factors appear to be well controlled. I will continue to address the need/dosing of medications in future visits. 5. Discussed if symptoms progress to seek emergency room for prompt evaluation. 6. Also should consider referral to the [facility] or [facility] for advanced CAD management. Scribe's attestation: This note was scribed in the presence of Mannie Garrison by Valery Juárez RN     I, Dr. Wenceslao Eli, personally performed the services described in this documentation, as scribed by the above signed scribe in my presence. It is both accurate and complete to my knowledge. I agree with the details independently gathered by the clinical support staff, while the remaining scribed note accurately describes my personal service to the patient. Medical Decision Making: The following items were considered in medical decision making:  Independent review of images  Review / order clinical lab tests  Review / order radiology tests  Decision to obtain old records  Review and summation of old records as accessed through [facility] (a summary of my findings in these old records)      Time Based Itemization  A total of 45 minutes was spent on today's patient encounter. If applicable, non-patient-facing activities:  (X)Preparing to see the patient and reviewing records  (X) Individual interpretation of results  ( ) Discussion or coordination of care with other health care professionals  () Ordering of unique tests, medications, or procedures  (X) Documentation within the EHR             All questions and concerns were addressed to the patient/family.  Alternatives to my treatment were discussed. The note was completed using EMR. Every effort was made to ensure accuracy; however, inadvertent computerized transcription errors may be present.     Cristiano De La Cruz MD, [address], [facility], [address] [place] [address]  [phone] [facility]  3/1/2023  12:12 PM

## 2023-03-07 ENCOUNTER — SCHEDULED TELEPHONE ENCOUNTER (OUTPATIENT)
Dept: FAMILY MEDICINE CLINIC | Age: 58
End: 2023-03-07
Payer: MEDICAID

## 2023-03-07 DIAGNOSIS — J06.9 UPPER RESPIRATORY TRACT INFECTION, UNSPECIFIED TYPE: Primary | ICD-10-CM

## 2023-03-07 DIAGNOSIS — R68.83 CHILLS: ICD-10-CM

## 2023-03-07 DIAGNOSIS — H93.8X3 CONGESTION OF BOTH EARS: ICD-10-CM

## 2023-03-07 DIAGNOSIS — R05.1 ACUTE COUGH: ICD-10-CM

## 2023-03-07 PROCEDURE — 99213 OFFICE O/P EST LOW 20 MIN: CPT

## 2023-03-07 RX ORDER — AZITHROMYCIN 250 MG/1
250 TABLET, FILM COATED ORAL SEE ADMIN INSTRUCTIONS
Qty: 6 TABLET | Refills: 0 | Status: SHIPPED | OUTPATIENT
Start: 2023-03-07 | End: 2023-03-12

## 2023-03-07 RX ORDER — METHYLPREDNISOLONE 4 MG/1
TABLET ORAL
Qty: 1 KIT | Refills: 0 | Status: SHIPPED | OUTPATIENT
Start: 2023-03-07 | End: 2023-03-13

## 2023-03-07 RX ORDER — DEXTROMETHORPHAN HYDROBROMIDE AND PROMETHAZINE HYDROCHLORIDE 15; 6.25 MG/5ML; MG/5ML
5 SYRUP ORAL 4 TIMES DAILY PRN
Qty: 120 ML | Refills: 0 | Status: SHIPPED | OUTPATIENT
Start: 2023-03-07 | End: 2023-03-14

## 2023-03-07 SDOH — ECONOMIC STABILITY: FOOD INSECURITY: WITHIN THE PAST 12 MONTHS, YOU WORRIED THAT YOUR FOOD WOULD RUN OUT BEFORE YOU GOT MONEY TO BUY MORE.: NEVER TRUE

## 2023-03-07 SDOH — ECONOMIC STABILITY: FOOD INSECURITY: WITHIN THE PAST 12 MONTHS, THE FOOD YOU BOUGHT JUST DIDN'T LAST AND YOU DIDN'T HAVE MONEY TO GET MORE.: NEVER TRUE

## 2023-03-07 SDOH — ECONOMIC STABILITY: HOUSING INSECURITY
IN THE LAST 12 MONTHS, WAS THERE A TIME WHEN YOU DID NOT HAVE A STEADY PLACE TO SLEEP OR SLEPT IN A SHELTER (INCLUDING NOW)?: NO

## 2023-03-07 SDOH — ECONOMIC STABILITY: INCOME INSECURITY: HOW HARD IS IT FOR YOU TO PAY FOR THE VERY BASICS LIKE FOOD, HOUSING, MEDICAL CARE, AND HEATING?: NOT HARD AT ALL

## 2023-03-07 ASSESSMENT — ENCOUNTER SYMPTOMS
ALLERGIC/IMMUNOLOGIC NEGATIVE: 1
RHINORRHEA: 1
COUGH: 1
SORE THROAT: 1
GASTROINTESTINAL NEGATIVE: 1
EYES NEGATIVE: 1

## 2023-03-07 ASSESSMENT — PATIENT HEALTH QUESTIONNAIRE - PHQ9
SUM OF ALL RESPONSES TO PHQ QUESTIONS 1-9: 0
SUM OF ALL RESPONSES TO PHQ9 QUESTIONS 1 & 2: 0
3. TROUBLE FALLING OR STAYING ASLEEP: 0
1. LITTLE INTEREST OR PLEASURE IN DOING THINGS: 0
SUM OF ALL RESPONSES TO PHQ QUESTIONS 1-9: 0
SUM OF ALL RESPONSES TO PHQ QUESTIONS 1-9: 0
4. FEELING TIRED OR HAVING LITTLE ENERGY: 0
8. MOVING OR SPEAKING SO SLOWLY THAT OTHER PEOPLE COULD HAVE NOTICED. OR THE OPPOSITE, BEING SO FIGETY OR RESTLESS THAT YOU HAVE BEEN MOVING AROUND A LOT MORE THAN USUAL: 0
5. POOR APPETITE OR OVEREATING: 0
7. TROUBLE CONCENTRATING ON THINGS, SUCH AS READING THE NEWSPAPER OR WATCHING TELEVISION: 0
2. FEELING DOWN, DEPRESSED OR HOPELESS: 0
10. IF YOU CHECKED OFF ANY PROBLEMS, HOW DIFFICULT HAVE THESE PROBLEMS MADE IT FOR YOU TO DO YOUR WORK, TAKE CARE OF THINGS AT HOME, OR GET ALONG WITH OTHER PEOPLE: 0
6. FEELING BAD ABOUT YOURSELF - OR THAT YOU ARE A FAILURE OR HAVE LET YOURSELF OR YOUR FAMILY DOWN: 0
9. THOUGHTS THAT YOU WOULD BE BETTER OFF DEAD, OR OF HURTING YOURSELF: 0
SUM OF ALL RESPONSES TO PHQ QUESTIONS 1-9: 0

## 2023-03-07 NOTE — PROGRESS NOTES
Cisco Garzon is a 61 y.o. female evaluated via telephone on 3/7/2023 for Sinus Problem (Pt took covid test this past Sunday and it was negative /Sx started Friday ), Cough (Dry non productive ), Headache, and Fever (Gone as long as she is taking tylenol )  . Assessment & Plan   1. Upper respiratory tract infection, unspecified type  -Increase fluid intake to help thin secretions  -Guaifenesin over-the-counter with no dextromethorphan  -Phenergan DM for cough  -Medrol Dosepak and Z-Vignesh  -Treating with antibiotics due to comorbidities of diabetes and congestive heart failure  -     methylPREDNISolone (MEDROL DOSEPACK) 4 MG tablet; Take by mouth., Disp-1 kit, R-0Normal  -     azithromycin (ZITHROMAX) 250 MG tablet; Take 1 tablet by mouth See Admin Instructions for 5 days 500mg on day 1 followed by 250mg on days 2 - 5, Disp-6 tablet, R-0Normal  2. Acute cough  -     promethazine-dextromethorphan (PROMETHAZINE-DM) 6.25-15 MG/5ML syrup; Take 5 mLs by mouth 4 times daily as needed for Cough, Disp-120 mL, R-0Normal  3. Chills  4. Congestion of both ears  -     methylPREDNISolone (MEDROL DOSEPACK) 4 MG tablet; Take by mouth., Disp-1 kit, R-0Normal  Return if symptoms worsen or fail to improve. Subjective   Prior to Visit Medications    Medication Sig Taking? Authorizing Provider   methylPREDNISolone (MEDROL DOSEPACK) 4 MG tablet Take by mouth.  Yes GERMAN Chisholm CNP   azithromycin (ZITHROMAX) 250 MG tablet Take 1 tablet by mouth See Admin Instructions for 5 days 500mg on day 1 followed by 250mg on days 2 - 5 Yes GERMAN Chisholm CNP   promethazine-dextromethorphan (PROMETHAZINE-DM) 6.25-15 MG/5ML syrup Take 5 mLs by mouth 4 times daily as needed for Cough Yes GERMAN Chisholm CNP   atorvastatin (LIPITOR) 80 MG tablet Take one tablet by mouth nightly Yes Chauncey Bates MD   clopidogrel (PLAVIX) 75 MG tablet TAKE ONE TABLET BY MOUTH DAILY Yes Chauncey Bates MD   lisinopril (PRINIVIL;ZESTRIL) 20 MG tablet TAKE ONE TABLET BY MOUTH EVERY DAY Yes Quintin Zayas MD   metFORMIN (GLUCOPHAGE) 1000 MG tablet TAKE ONE TABLET BY MOUTH 2 TIMES A DAY WITH MEALS Yes GERMAN Bowen CNP   pantoprazole (PROTONIX) 40 MG tablet TAKE ONE TABLET BY MOUTH 2 TIMES A DAY Yes GERMAN Bowen CNP   nitroGLYCERIN (NITROSTAT) 0.4 MG SL tablet up to max of 3 total doses. If no relief after 1 dose, call 911. Yes GERMAN Bowen CNP   Semaglutide,0.25 or 0.5MG/DOS, (OZEMPIC, 0.25 OR 0.5 MG/DOSE,) 2 MG/1.5ML SOPN Inject 0.25 mg into the skin once a week For 4 weeks then increase to 0.5 mg weekly.  Yes GERMAN Bowen CNP   ondansetron (ZOFRAN) 4 MG tablet Take 1 tablet by mouth 3 times daily as needed for Nausea or Vomiting Yes GERMAN Bowen CNP   ondansetron (ZOFRAN-ODT) 4 MG disintegrating tablet Take 1 tablet by mouth 3 times daily as needed for Nausea or Vomiting Yes GERMAN Bay CNP   Atogepant (QULIPTA) 30 MG TABS Samples given Lot # [id number] EXP 11/30/23 Yes GERMAN Bowen CNP   Rimegepant Sulfate (NURTEC) 75 MG TBDP Lot# [id number] exp 2/28/25 Yes GERMAN Bowen CNP   Dulaglutide (TRULICITY) 3 VE/5.7ZX SOPN Inject 3 mg into the skin once a week Yes GERMAN Bowen CNP   albuterol sulfate HFA (PROVENTIL HFA) 108 (90 Base) MCG/ACT inhaler Inhale 2 puffs into the lungs every 6 hours as needed for Wheezing or Shortness of Breath Yes GERMAN Bowen CNP   aspirin 81 MG chewable tablet Take 1 tablet by mouth daily Yes GERMAN Bowen CNP   fluticasone (FLONASE) 50 MCG/ACT nasal spray 1 spray by Each Nostril route daily Yes GERMAN Bowen CNP   insulin glargine (LANTUS SOLOSTAR) 100 UNIT/ML injection pen Inject 7 Units into the skin nightly Yes GERMAN Bowen CNP   Insulin Pen Needle (TECHLITE PEN NEEDLES) 32G X 4 MM MISC USE AS DIRECTED FOR LANT Yes GERMAN Bowen CNP     HPI    Patient seen virtually today for chief complaint of tickle in the throat that started on Friday. It did progressed to chills, body aches, low-grade fever as high as 99.5. She does report that she is having sweats when she does break the chills. She also reports headache and bilateral earache. She states that she is using Tylenol and ibuprofen over-the-counter to help with body aches, chills and fever. She does report moderate relief in symptoms. She also has a dry hacking cough with no production of sputum. Denies shortness of breath or chest pain. Took a COVID test on Sunday and it was negative. Review of Systems   Constitutional:  Positive for chills, diaphoresis, fatigue and fever. HENT:  Positive for ear discharge, ear pain, postnasal drip, rhinorrhea and sore throat. Eyes: Negative. Respiratory:  Positive for cough. Cardiovascular: Negative. Gastrointestinal: Negative. Endocrine: Negative. Genitourinary: Negative. Musculoskeletal: Negative. Allergic/Immunologic: Negative. Neurological:  Positive for headaches. Hematological: Negative. Psychiatric/Behavioral: Negative. Objective   Patient-Reported Vitals  No data recorded       Total Time: minutes: 11-20 minutes     Sheela Ward was evaluated through a synchronous (real-time) audio only encounter. Patient identification was verified at the start of the visit. She (or guardian if applicable) is aware that this is a billable service, which includes applicable co-pays. This visit was conducted with patient's (and/or legal guardian's) verbal consent. She has not had a related appointment within my department in the past 7 days or scheduled within the next 24 hours. The patient was located at Home: [address] [place] [address] [address] [facility] [address]. The provider was located at [facility] (Appt Dept): [address].      Jeremy Gutierrez, APRN - CNP

## 2023-03-10 ENCOUNTER — TELEPHONE (OUTPATIENT)
Dept: FAMILY MEDICINE CLINIC | Age: 58
End: 2023-03-10

## 2023-03-10 NOTE — TELEPHONE ENCOUNTER
Patient had a phone visit with Vinod Serna CNP on 3/7/23. She says she is not feeling any better at all. Weak,not able to eat,vomiting,cough,sore throat, headache,earache,nausea,diarrhea,chills,temp [phone]. Asking if Vinod Serna CNP might want to prescribe something else.  [facility]

## 2023-03-10 NOTE — TELEPHONE ENCOUNTER
I would not want to prescribe anything different at this time. For diarrhea she can take over-the-counter Imodium.

## 2023-03-27 ENCOUNTER — OFFICE VISIT (OUTPATIENT)
Dept: FAMILY MEDICINE CLINIC | Age: 58
End: 2023-03-27

## 2023-03-27 VITALS
OXYGEN SATURATION: 97 % | BODY MASS INDEX: 33.82 KG/M2 | WEIGHT: 179 LBS | SYSTOLIC BLOOD PRESSURE: 124 MMHG | HEART RATE: 79 BPM | DIASTOLIC BLOOD PRESSURE: 78 MMHG | TEMPERATURE: 98.2 F

## 2023-03-27 DIAGNOSIS — I10 ESSENTIAL HYPERTENSION: ICD-10-CM

## 2023-03-27 DIAGNOSIS — E78.2 MIXED HYPERLIPIDEMIA: Chronic | ICD-10-CM

## 2023-03-27 DIAGNOSIS — G45.1 TIA INVOLVING LEFT INTERNAL CAROTID ARTERY: ICD-10-CM

## 2023-03-27 DIAGNOSIS — K21.9 GASTROESOPHAGEAL REFLUX DISEASE WITHOUT ESOPHAGITIS: ICD-10-CM

## 2023-03-27 DIAGNOSIS — I67.1 CEREBRAL ANEURYSM, NONRUPTURED: ICD-10-CM

## 2023-03-27 DIAGNOSIS — I20.1 CORONARY ARTERY SPASM (HCC): ICD-10-CM

## 2023-03-27 DIAGNOSIS — E11.59 TYPE 2 DIABETES MELLITUS WITH OTHER CIRCULATORY COMPLICATION, WITHOUT LONG-TERM CURRENT USE OF INSULIN (HCC): ICD-10-CM

## 2023-03-27 DIAGNOSIS — E11.59 TYPE 2 DIABETES MELLITUS WITH OTHER CIRCULATORY COMPLICATION, WITHOUT LONG-TERM CURRENT USE OF INSULIN (HCC): Primary | ICD-10-CM

## 2023-03-27 DIAGNOSIS — Z87.891 FORMER SMOKER: Chronic | ICD-10-CM

## 2023-03-27 DIAGNOSIS — F33.2 SEVERE EPISODE OF RECURRENT MAJOR DEPRESSIVE DISORDER, WITHOUT PSYCHOTIC FEATURES (HCC): ICD-10-CM

## 2023-03-27 DIAGNOSIS — I50.42 CHRONIC COMBINED SYSTOLIC AND DIASTOLIC CHF (CONGESTIVE HEART FAILURE) (HCC): ICD-10-CM

## 2023-03-27 DIAGNOSIS — I25.119 CORONARY ARTERY DISEASE INVOLVING NATIVE CORONARY ARTERY OF NATIVE HEART WITH ANGINA PECTORIS (HCC): ICD-10-CM

## 2023-03-27 DIAGNOSIS — N39.46 MIXED STRESS AND URGE INCONTINENCE: ICD-10-CM

## 2023-03-27 DIAGNOSIS — G43.709 CHRONIC MIGRAINE W/O AURA W/O STATUS MIGRAINOSUS, NOT INTRACTABLE: ICD-10-CM

## 2023-03-27 DIAGNOSIS — E66.9 OBESITY (BMI 30-39.9): Chronic | ICD-10-CM

## 2023-03-27 DIAGNOSIS — J42 CHRONIC BRONCHITIS, UNSPECIFIED CHRONIC BRONCHITIS TYPE (HCC): ICD-10-CM

## 2023-03-27 DIAGNOSIS — K21.9 GASTROESOPHAGEAL REFLUX DISEASE WITHOUT ESOPHAGITIS: Chronic | ICD-10-CM

## 2023-03-27 RX ORDER — PANTOPRAZOLE SODIUM 40 MG/1
TABLET, DELAYED RELEASE ORAL
Qty: 60 TABLET | Refills: 6 | Status: SHIPPED | OUTPATIENT
Start: 2023-03-27

## 2023-03-27 RX ORDER — SEMAGLUTIDE 1.34 MG/ML
0.25 INJECTION, SOLUTION SUBCUTANEOUS WEEKLY
Qty: 1.5 ML | Refills: 0 | OUTPATIENT
Start: 2023-03-27

## 2023-03-27 RX ORDER — SEMAGLUTIDE 1.34 MG/ML
1 INJECTION, SOLUTION SUBCUTANEOUS WEEKLY
Qty: 3 ML | Refills: 1 | Status: SHIPPED | OUTPATIENT
Start: 2023-03-27

## 2023-03-27 ASSESSMENT — PATIENT HEALTH QUESTIONNAIRE - PHQ9
3. TROUBLE FALLING OR STAYING ASLEEP: 2
SUM OF ALL RESPONSES TO PHQ QUESTIONS 1-9: 12
5. POOR APPETITE OR OVEREATING: 2
SUM OF ALL RESPONSES TO PHQ QUESTIONS 1-9: 12
6. FEELING BAD ABOUT YOURSELF - OR THAT YOU ARE A FAILURE OR HAVE LET YOURSELF OR YOUR FAMILY DOWN: 2
1. LITTLE INTEREST OR PLEASURE IN DOING THINGS: 1
SUM OF ALL RESPONSES TO PHQ QUESTIONS 1-9: 12
9. THOUGHTS THAT YOU WOULD BE BETTER OFF DEAD, OR OF HURTING YOURSELF: 1
4. FEELING TIRED OR HAVING LITTLE ENERGY: 1
2. FEELING DOWN, DEPRESSED OR HOPELESS: 1
10. IF YOU CHECKED OFF ANY PROBLEMS, HOW DIFFICULT HAVE THESE PROBLEMS MADE IT FOR YOU TO DO YOUR WORK, TAKE CARE OF THINGS AT HOME, OR GET ALONG WITH OTHER PEOPLE: 1
8. MOVING OR SPEAKING SO SLOWLY THAT OTHER PEOPLE COULD HAVE NOTICED. OR THE OPPOSITE, BEING SO FIGETY OR RESTLESS THAT YOU HAVE BEEN MOVING AROUND A LOT MORE THAN USUAL: 1
SUM OF ALL RESPONSES TO PHQ QUESTIONS 1-9: 11
SUM OF ALL RESPONSES TO PHQ9 QUESTIONS 1 & 2: 2
7. TROUBLE CONCENTRATING ON THINGS, SUCH AS READING THE NEWSPAPER OR WATCHING TELEVISION: 1

## 2023-03-28 LAB
CHOLEST SERPL-MCNC: 141 MG/DL (ref 0–199)
CRP SERPL-MCNC: <3 MG/L (ref 0–5.1)
EST. AVERAGE GLUCOSE BLD GHB EST-MCNC: 228.8 MG/DL
HBA1C MFR BLD: 9.6 %
HDLC SERPL-MCNC: 53 MG/DL (ref 40–60)
LDLC SERPL CALC-MCNC: 61 MG/DL
TRIGL SERPL-MCNC: 135 MG/DL (ref 0–150)
VLDLC SERPL CALC-MCNC: 27 MG/DL

## 2023-03-29 ENCOUNTER — NURSE ONLY (OUTPATIENT)
Dept: CARDIOLOGY CLINIC | Age: 58
End: 2023-03-29

## 2023-03-29 DIAGNOSIS — Z45.09 ENCOUNTER FOR LOOP RECORDER CHECK: ICD-10-CM

## 2023-03-29 DIAGNOSIS — G45.1 TIA INVOLVING LEFT INTERNAL CAROTID ARTERY: Chronic | ICD-10-CM

## 2023-03-29 DIAGNOSIS — I67.1 CEREBRAL ANEURYSM, NONRUPTURED: Primary | Chronic | ICD-10-CM

## 2023-03-29 NOTE — PROGRESS NOTES
End of 31-day monitoring period 3/29. Time in AT/AF 0.0% ---  PVCs (% beats) 1.0%  Symptom events show NSR w/ rare PVC's. Remote transmission received for patients ILR. EP physician will review. See interrogation under the cardiology tab in the [address] [place] [address] [address] field for more details. Will continue to monitor remotely. LINQ 2 implanted for CVA and palpitations. No AFib recorded to date.

## 2023-04-03 DIAGNOSIS — E11.59 TYPE 2 DIABETES MELLITUS WITH OTHER CIRCULATORY COMPLICATION, WITHOUT LONG-TERM CURRENT USE OF INSULIN (HCC): ICD-10-CM

## 2023-04-03 RX ORDER — SEMAGLUTIDE 1.34 MG/ML
0.25 INJECTION, SOLUTION SUBCUTANEOUS WEEKLY
Qty: 1.5 ML | Refills: 0 | OUTPATIENT
Start: 2023-04-03

## 2023-05-03 ENCOUNTER — NURSE ONLY (OUTPATIENT)
Dept: CARDIOLOGY CLINIC | Age: 58
End: 2023-05-03

## 2023-05-03 DIAGNOSIS — Z45.09 ENCOUNTER FOR LOOP RECORDER CHECK: Primary | ICD-10-CM

## 2023-05-03 DIAGNOSIS — G45.1 TIA INVOLVING LEFT INTERNAL CAROTID ARTERY: Chronic | ICD-10-CM

## 2023-05-16 NOTE — PROGRESS NOTES
We received a remote transmission from patient's monitor at home. Remote Linq report shows no arrhythmias. EP physician to review. We will continue to monitor remotely. Implanted for CVA and palpitations. End of 31-day monitoring period 5-3-23.

## 2023-06-07 ENCOUNTER — NURSE ONLY (OUTPATIENT)
Dept: CARDIOLOGY CLINIC | Age: 58
End: 2023-06-07
Payer: MEDICAID

## 2023-06-07 DIAGNOSIS — I63.9 CEREBROVASCULAR ACCIDENT (CVA), UNSPECIFIED MECHANISM (HCC): ICD-10-CM

## 2023-06-07 DIAGNOSIS — Z45.09 ENCOUNTER FOR LOOP RECORDER CHECK: ICD-10-CM

## 2023-06-07 DIAGNOSIS — R00.2 PALPITATIONS: ICD-10-CM

## 2023-06-07 PROCEDURE — 93298 REM INTERROG DEV EVAL SCRMS: CPT | Performed by: INTERNAL MEDICINE

## 2023-06-07 PROCEDURE — G2066 INTER DEVC REMOTE 30D: HCPCS | Performed by: INTERNAL MEDICINE

## 2023-06-07 NOTE — PROGRESS NOTES
We received a remote transmission from patient's monitor at home. Remote Linq report shows no arrhythmias. EP physician to review. We will continue to monitor remotely. Implanted for CVA and palpitations. End of 31-day monitoring period 6-7-23.

## 2023-06-26 DIAGNOSIS — I50.42 CHRONIC COMBINED SYSTOLIC AND DIASTOLIC CHF (CONGESTIVE HEART FAILURE) (HCC): ICD-10-CM

## 2023-06-26 DIAGNOSIS — E11.59 TYPE 2 DIABETES MELLITUS WITH OTHER CIRCULATORY COMPLICATION, WITHOUT LONG-TERM CURRENT USE OF INSULIN (HCC): ICD-10-CM

## 2023-06-26 DIAGNOSIS — I25.119 CORONARY ARTERY DISEASE INVOLVING NATIVE CORONARY ARTERY OF NATIVE HEART WITH ANGINA PECTORIS (HCC): ICD-10-CM

## 2023-06-26 RX ORDER — EMPAGLIFLOZIN 10 MG/1
TABLET, FILM COATED ORAL
Qty: 90 TABLET | Refills: 1 | Status: SHIPPED | OUTPATIENT
Start: 2023-06-26 | End: 2023-07-05 | Stop reason: SDUPTHER

## 2023-06-26 RX ORDER — ASPIRIN 81 MG
TABLET,CHEWABLE ORAL
Qty: 30 TABLET | Refills: 0 | Status: SHIPPED | OUTPATIENT
Start: 2023-06-26 | End: 2023-06-26

## 2023-06-26 RX ORDER — EMPAGLIFLOZIN 10 MG/1
TABLET, FILM COATED ORAL
Qty: 30 TABLET | Refills: 0 | Status: SHIPPED | OUTPATIENT
Start: 2023-06-26 | End: 2023-06-26

## 2023-06-26 RX ORDER — ASPIRIN 81 MG
TABLET,CHEWABLE ORAL
Qty: 90 TABLET | Refills: 1 | Status: SHIPPED | OUTPATIENT
Start: 2023-06-26

## 2023-06-29 DIAGNOSIS — E11.59 TYPE 2 DIABETES MELLITUS WITH OTHER CIRCULATORY COMPLICATION, WITHOUT LONG-TERM CURRENT USE OF INSULIN (HCC): ICD-10-CM

## 2023-06-29 RX ORDER — SEMAGLUTIDE 1.34 MG/ML
1 INJECTION, SOLUTION SUBCUTANEOUS WEEKLY
Qty: 3 ML | Refills: 1 | Status: SHIPPED | OUTPATIENT
Start: 2023-06-29 | End: 2023-06-30 | Stop reason: SDUPTHER

## 2023-06-30 RX ORDER — SEMAGLUTIDE 1.34 MG/ML
1 INJECTION, SOLUTION SUBCUTANEOUS WEEKLY
Qty: 3 ML | Refills: 1 | Status: SHIPPED | OUTPATIENT
Start: 2023-06-30

## 2023-07-05 ENCOUNTER — OFFICE VISIT (OUTPATIENT)
Dept: FAMILY MEDICINE CLINIC | Age: 58
End: 2023-07-05
Payer: MEDICAID

## 2023-07-05 VITALS
BODY MASS INDEX: 32.12 KG/M2 | SYSTOLIC BLOOD PRESSURE: 122 MMHG | TEMPERATURE: 98.3 F | WEIGHT: 170 LBS | OXYGEN SATURATION: 98 % | DIASTOLIC BLOOD PRESSURE: 78 MMHG | HEART RATE: 78 BPM

## 2023-07-05 DIAGNOSIS — E11.59 TYPE 2 DIABETES MELLITUS WITH OTHER CIRCULATORY COMPLICATION, WITHOUT LONG-TERM CURRENT USE OF INSULIN (HCC): Primary | ICD-10-CM

## 2023-07-05 DIAGNOSIS — Z12.31 SCREENING MAMMOGRAM, ENCOUNTER FOR: ICD-10-CM

## 2023-07-05 DIAGNOSIS — Z12.11 COLON CANCER SCREENING: ICD-10-CM

## 2023-07-05 DIAGNOSIS — Z87.891 PERSONAL HISTORY OF TOBACCO USE: ICD-10-CM

## 2023-07-05 DIAGNOSIS — I50.42 CHRONIC COMBINED SYSTOLIC AND DIASTOLIC CHF (CONGESTIVE HEART FAILURE) (HCC): ICD-10-CM

## 2023-07-05 DIAGNOSIS — G43.709 CHRONIC MIGRAINE W/O AURA W/O STATUS MIGRAINOSUS, NOT INTRACTABLE: ICD-10-CM

## 2023-07-05 LAB — HBA1C MFR BLD: 7.8 %

## 2023-07-05 PROCEDURE — G8417 CALC BMI ABV UP PARAM F/U: HCPCS | Performed by: NURSE PRACTITIONER

## 2023-07-05 PROCEDURE — 2022F DILAT RTA XM EVC RTNOPTHY: CPT | Performed by: NURSE PRACTITIONER

## 2023-07-05 PROCEDURE — 83037 HB GLYCOSYLATED A1C HOME DEV: CPT | Performed by: NURSE PRACTITIONER

## 2023-07-05 PROCEDURE — 99214 OFFICE O/P EST MOD 30 MIN: CPT | Performed by: NURSE PRACTITIONER

## 2023-07-05 PROCEDURE — G8427 DOCREV CUR MEDS BY ELIG CLIN: HCPCS | Performed by: NURSE PRACTITIONER

## 2023-07-05 PROCEDURE — 3017F COLORECTAL CA SCREEN DOC REV: CPT | Performed by: NURSE PRACTITIONER

## 2023-07-05 PROCEDURE — 3074F SYST BP LT 130 MM HG: CPT | Performed by: NURSE PRACTITIONER

## 2023-07-05 PROCEDURE — 3078F DIAST BP <80 MM HG: CPT | Performed by: NURSE PRACTITIONER

## 2023-07-05 PROCEDURE — 3051F HG A1C>EQUAL 7.0%<8.0%: CPT | Performed by: NURSE PRACTITIONER

## 2023-07-05 PROCEDURE — G0296 VISIT TO DETERM LDCT ELIG: HCPCS | Performed by: NURSE PRACTITIONER

## 2023-07-05 PROCEDURE — 1036F TOBACCO NON-USER: CPT | Performed by: NURSE PRACTITIONER

## 2023-07-05 RX ORDER — INSULIN GLARGINE 100 [IU]/ML
7 INJECTION, SOLUTION SUBCUTANEOUS NIGHTLY
Qty: 3 ML | Refills: 3 | Status: SHIPPED | OUTPATIENT
Start: 2023-07-05

## 2023-07-05 RX ORDER — ATOGEPANT 30 MG/1
30 TABLET ORAL DAILY
Qty: 30 TABLET | Refills: 2 | Status: SHIPPED | OUTPATIENT
Start: 2023-07-05

## 2023-07-05 ASSESSMENT — ENCOUNTER SYMPTOMS
ABDOMINAL PAIN: 0
SINUS PRESSURE: 0
COUGH: 0
BACK PAIN: 0
EYE REDNESS: 0
EYE PAIN: 0
RHINORRHEA: 0
NAUSEA: 1
VOMITING: 0
SORE THROAT: 0
PHOTOPHOBIA: 0

## 2023-07-05 NOTE — PROGRESS NOTES
7/5/2023    Chief Complaint   Patient presents with    Diabetes     Not checking bs but she will if she feels off     Gastroesophageal Reflux    Hypertension     Fasting     Coronary Artery Disease       Farrukh Yap is a 61 y.o. female, presents today for:      ASSESSMENT/PLAN:  1. Type 2 diabetes mellitus with other circulatory complication, without long-term current use of insulin ([facility])  Better today, A1C 7.8% but still now at goal.   Continue Ozempic 1 mg, increase Jardiance 25 mg with CHF/ CAD hx, Metformin 1000 BID. Continue Lantus 7 daily for now- hopefully with increase of Jardiance she may no longer need insulin. Discussed risk for yeast infections with Jardiance and the need to drink a lot of water for prevention. Continue ACE/ statin  Encouraged annual eye exam.  Will do foot exam next OV  Goal A1C < 7.0 due to heart disease, goal /90  - POCT glycosylated hemoglobin (Hb A1C)  - empagliflozin (JARDIANCE) 25 MG tablet; Take 1 tablet by mouth daily diabetes  Dispense: 90 tablet; Refill: 1  - Microalbumin / Creatinine Urine Ratio; Future  -  DIABETES FOOT EXAM  - insulin glargine (LANTUS SOLOSTAR) 100 UNIT/ML injection pen; Inject 7 Units into the skin nightly  Dispense: 3 mL; Refill: 3  - Microalbumin / Creatinine Urine Ratio    2. Chronic combined systolic and diastolic CHF (congestive heart failure) ([address])  Reviewed alternative causes for chest pain- currently undergoing annual LDCT for smoking. Possibly GERD related but pt unwilling to obtain GI referral and feels pain is different than her normal heartburn. NYHA Class 2 per last Cardio note   Prior ECHO (6/22/2022) w/ LVEF 50%  Continue Lisinopril 20   Continue Plavix, Atorvastatin 80, ASA 81, Jardiance 25  Previously given Handicap placard given JESSICA 9/2022  - empagliflozin (JARDIANCE) 25 MG tablet; Take 1 tablet by mouth daily diabetes  Dispense: 90 tablet; Refill: 1    3.  Chronic migraine w/o aura w/o status migrainosus, not

## 2023-07-06 LAB
CREAT UR-MCNC: 141.3 MG/DL (ref 28–259)
MICROALBUMIN UR DL<=1MG/L-MCNC: <1.2 MG/DL
MICROALBUMIN/CREAT UR: NORMAL MG/G (ref 0–30)

## 2023-07-12 ENCOUNTER — NURSE ONLY (OUTPATIENT)
Dept: CARDIOLOGY CLINIC | Age: 58
End: 2023-07-12

## 2023-07-20 ENCOUNTER — TELEPHONE (OUTPATIENT)
Dept: FAMILY MEDICINE CLINIC | Age: 58
End: 2023-07-20

## 2023-07-20 NOTE — TELEPHONE ENCOUNTER
Prescription for THE [facility] faxed  to [facility] [phone], I called them and they said they will try to get the med for her at a cheaper price they needed ov note ins and ins car

## 2023-07-20 NOTE — TELEPHONE ENCOUNTER
DJ w/ [employer] called and stated they received the office notes, however will need the Rx sent to them for the PA for the [place]. I called pt who was confused as why this was going to [address] vs her [address] I told her Id call back with any more questions.

## 2023-07-21 ENCOUNTER — TELEPHONE (OUTPATIENT)
Dept: FAMILY MEDICINE CLINIC | Age: 58
End: 2023-07-21

## 2023-07-21 DIAGNOSIS — G43.709 CHRONIC MIGRAINE W/O AURA W/O STATUS MIGRAINOSUS, NOT INTRACTABLE: ICD-10-CM

## 2023-07-21 NOTE — TELEPHONE ENCOUNTER
[address] [facility] back and spoke to Joana, who stated yes, they can get it. They just have to order ir for Monday. They states just send it on over.

## 2023-07-21 NOTE — TELEPHONE ENCOUNTER
I didn't know [address] [facility] carried it? It was my understanding it had to go through a special pharmacy so that's where I thought it went. If [facility] does carry it I can send it there. 0

## 2023-07-21 NOTE — TELEPHONE ENCOUNTER
Katrina Tinajero at [address] [place] [address] called and said the pt is there waiting on an RX that was suppose to be sent over there a week ago. Rx was for quilipta. Please advise.  Thank You

## 2023-07-24 ENCOUNTER — TELEPHONE (OUTPATIENT)
Dept: ORTHOPEDIC SURGERY | Age: 58
End: 2023-07-24

## 2023-07-24 RX ORDER — ATOGEPANT 30 MG/1
30 TABLET ORAL DAILY
Qty: 30 TABLET | Refills: 2 | Status: SHIPPED | OUTPATIENT
Start: 2023-07-24

## 2023-07-24 NOTE — TELEPHONE ENCOUNTER
This RX was sent to the specialty pharmacy because Freda Loya told me to send it there because it would be cheaper.  I can call today to see which one is cheaper

## 2023-07-24 NOTE — TELEPHONE ENCOUNTER
I sent the [place] over to [facility]. Please let patient know.   Will likely need PA

## 2023-07-24 NOTE — TELEPHONE ENCOUNTER
Spoke to the specialty pharmacy and they said since she has Jennifer Resendiz that she would not be able to get use the discount coupon they use.     I did receive a PA request from [facility] [address] so I am sending this to the PA pool   Patient was notified that we will have to wait till the PA dept gets this done and then go from what the PA says

## 2023-07-24 NOTE — TELEPHONE ENCOUNTER
Submitted PA for Qulipta 30MG tablets  [facility]ia [facility] Key: [id number] STATUS: PENDING. Follow up done daily; if no response in three days we will refax for status check. If another three days goes by with no response we will call the insurance for status.

## 2023-08-04 NOTE — TELEPHONE ENCOUNTER
Called [facility] and spoke to [facility] who said that they did not receive the PA for Qulipta 30MG tablets. So I have resent the PA and marked it as Urgent.

## 2023-08-04 NOTE — TELEPHONE ENCOUNTER
New encounter was opened for this PA, so I will close this one not more than one encounter is opened for the same PA

## 2023-08-07 NOTE — TELEPHONE ENCOUNTER
Received DENIAL for Qulipta 30MG tablets; denial letter attached. If this requires a response please respond to the pool ( P [facility]X [address]). Thank you please advise patient.

## 2023-08-08 NOTE — TELEPHONE ENCOUNTER
Please notify pt that her pharmacy plan wants us to try Aimovig or Ajovy. Both are good options for her migraines. Probably best to schedule an appt because we are going to have to send a new PA so I will have to rewrite out plan and also discuss the medication options with her.

## 2023-08-09 NOTE — TELEPHONE ENCOUNTER
Patient notified. States she will have to call back to schedule appointment because she is in the process of moving.

## 2023-08-26 PROCEDURE — G2066 INTER DEVC REMOTE 30D: HCPCS | Performed by: INTERNAL MEDICINE

## 2023-08-26 PROCEDURE — 93298 REM INTERROG DEV EVAL SCRMS: CPT | Performed by: INTERNAL MEDICINE

## 2023-08-28 NOTE — TELEPHONE ENCOUNTER
Again difficult to say if this is a cardiac complaint or not. Patient is already gone through cardiac rehab lets go ahead and order a cardiac pulmonary test.  This is a CPX test that is to be done at [facility]. This let us know her cardiac function and VO2 max.   May help guide us towards why she is having her symptoms Melolabial Interpolation Flap Division And Inset Text: Division and inset of the melolabial interpolation flap was performed to achieve optimal aesthetic result, restore normal anatomic appearance and avoid distortion of normal anatomy, expedite and facilitate wound healing, achieve optimal functional result and because linear closure either not possible or would produce suboptimal result. The patient was prepped and draped in the usual manner. The pedicle was infiltrated with local anesthesia. The pedicle was sectioned with a #15 blade. The pedicle was de-bulked and trimmed to match the shape of the defect. Hemostasis was achieved. The flap donor site and free margin of the flap were secured with deep buried sutures and the wound edges were re-approximated.

## 2023-09-11 DIAGNOSIS — E11.59 TYPE 2 DIABETES MELLITUS WITH OTHER CIRCULATORY COMPLICATION, WITHOUT LONG-TERM CURRENT USE OF INSULIN (HCC): ICD-10-CM

## 2023-09-11 RX ORDER — SEMAGLUTIDE 1.34 MG/ML
INJECTION, SOLUTION SUBCUTANEOUS
Qty: 3 ML | Refills: 1 | Status: SHIPPED | OUTPATIENT
Start: 2023-09-11

## 2023-09-21 PROCEDURE — G2066 INTER DEVC REMOTE 30D: HCPCS | Performed by: INTERNAL MEDICINE

## 2023-09-21 PROCEDURE — 93298 REM INTERROG DEV EVAL SCRMS: CPT | Performed by: INTERNAL MEDICINE

## 2023-09-25 DIAGNOSIS — I25.119 CORONARY ARTERY DISEASE INVOLVING NATIVE CORONARY ARTERY OF NATIVE HEART WITH ANGINA PECTORIS (HCC): ICD-10-CM

## 2023-09-26 RX ORDER — ASPIRIN 81 MG
81 TABLET,CHEWABLE ORAL DAILY
Qty: 90 TABLET | Refills: 1 | Status: SHIPPED | OUTPATIENT
Start: 2023-09-26

## 2023-10-08 ENCOUNTER — TELEPHONE (OUTPATIENT)
Dept: TELEMETRY | Age: 58
End: 2023-10-08

## 2023-10-24 ENCOUNTER — COMMUNITY OUTREACH (OUTPATIENT)
Dept: FAMILY MEDICINE CLINIC | Age: 58
End: 2023-10-24

## 2023-10-26 PROCEDURE — G2066 INTER DEVC REMOTE 30D: HCPCS | Performed by: INTERNAL MEDICINE

## 2023-10-26 PROCEDURE — 93298 REM INTERROG DEV EVAL SCRMS: CPT | Performed by: INTERNAL MEDICINE

## 2023-10-27 ENCOUNTER — TELEPHONE (OUTPATIENT)
Dept: FAMILY MEDICINE CLINIC | Age: 58
End: 2023-10-27

## 2023-10-27 ENCOUNTER — OFFICE VISIT (OUTPATIENT)
Dept: FAMILY MEDICINE CLINIC | Age: 58
End: 2023-10-27
Payer: MEDICAID

## 2023-10-27 VITALS
HEART RATE: 70 BPM | DIASTOLIC BLOOD PRESSURE: 70 MMHG | OXYGEN SATURATION: 98 % | BODY MASS INDEX: 31.18 KG/M2 | WEIGHT: 165 LBS | SYSTOLIC BLOOD PRESSURE: 132 MMHG

## 2023-10-27 DIAGNOSIS — G43.709 CHRONIC MIGRAINE W/O AURA W/O STATUS MIGRAINOSUS, NOT INTRACTABLE: Primary | ICD-10-CM

## 2023-10-27 DIAGNOSIS — I25.119 CORONARY ARTERY DISEASE INVOLVING NATIVE CORONARY ARTERY OF NATIVE HEART WITH ANGINA PECTORIS (HCC): ICD-10-CM

## 2023-10-27 DIAGNOSIS — I50.42 CHRONIC COMBINED SYSTOLIC AND DIASTOLIC CHF (CONGESTIVE HEART FAILURE) (HCC): ICD-10-CM

## 2023-10-27 DIAGNOSIS — E11.59 TYPE 2 DIABETES MELLITUS WITH OTHER CIRCULATORY COMPLICATION, WITHOUT LONG-TERM CURRENT USE OF INSULIN (HCC): ICD-10-CM

## 2023-10-27 LAB — HBA1C MFR BLD: 7.3 %

## 2023-10-27 PROCEDURE — G8417 CALC BMI ABV UP PARAM F/U: HCPCS | Performed by: NURSE PRACTITIONER

## 2023-10-27 PROCEDURE — 3051F HG A1C>EQUAL 7.0%<8.0%: CPT | Performed by: NURSE PRACTITIONER

## 2023-10-27 PROCEDURE — 3017F COLORECTAL CA SCREEN DOC REV: CPT | Performed by: NURSE PRACTITIONER

## 2023-10-27 PROCEDURE — 99214 OFFICE O/P EST MOD 30 MIN: CPT | Performed by: NURSE PRACTITIONER

## 2023-10-27 PROCEDURE — 1036F TOBACCO NON-USER: CPT | Performed by: NURSE PRACTITIONER

## 2023-10-27 PROCEDURE — 3078F DIAST BP <80 MM HG: CPT | Performed by: NURSE PRACTITIONER

## 2023-10-27 PROCEDURE — G8484 FLU IMMUNIZE NO ADMIN: HCPCS | Performed by: NURSE PRACTITIONER

## 2023-10-27 PROCEDURE — 2022F DILAT RTA XM EVC RTNOPTHY: CPT | Performed by: NURSE PRACTITIONER

## 2023-10-27 PROCEDURE — G8427 DOCREV CUR MEDS BY ELIG CLIN: HCPCS | Performed by: NURSE PRACTITIONER

## 2023-10-27 PROCEDURE — 83036 HEMOGLOBIN GLYCOSYLATED A1C: CPT | Performed by: NURSE PRACTITIONER

## 2023-10-27 PROCEDURE — 3074F SYST BP LT 130 MM HG: CPT | Performed by: NURSE PRACTITIONER

## 2023-10-27 RX ORDER — GALCANEZUMAB 120 MG/ML
240 INJECTION, SOLUTION SUBCUTANEOUS ONCE
Qty: 1.12 ML | Refills: 0 | Status: SHIPPED | OUTPATIENT
Start: 2023-10-27 | End: 2023-10-31

## 2023-10-27 RX ORDER — UBROGEPANT 50 MG/1
50 TABLET ORAL PRN
Qty: 15 TABLET | Refills: 0 | Status: SHIPPED | OUTPATIENT
Start: 2023-10-27 | End: 2023-10-31 | Stop reason: CLARIF

## 2023-10-27 RX ORDER — GALCANEZUMAB 120 MG/ML
120 INJECTION, SOLUTION SUBCUTANEOUS
Qty: 1.12 ML | Refills: 5 | Status: SHIPPED | OUTPATIENT
Start: 2023-11-27 | End: 2023-10-31 | Stop reason: CLARIF

## 2023-10-27 RX ORDER — PROMETHAZINE HYDROCHLORIDE 12.5 MG/1
12.5 TABLET ORAL EVERY 8 HOURS PRN
Qty: 30 TABLET | Refills: 0 | Status: SHIPPED | OUTPATIENT
Start: 2023-10-27 | End: 2023-11-26

## 2023-10-27 NOTE — PROGRESS NOTES
10/27/2023    Chief Complaint   Patient presents with    Migraine     Pt said her migraine more frequent   2-3 times a week       Fiorella Blount is a 60 y.o. female, presents today for:      ASSESSMENT/PLAN:    1. Chronic migraine w/o aura w/o status migrainosus, not intractable  Suboptimal control  Will attempt to send Emgality due to almost daily headaches and Ubrelvy for abortive therapy  Previously improved with Sp Brandon but no longer covered. Has not taken Topamax, Nurtec ODT  Prior Meds: Amitripytline (no help), Sp Brandon (helped but she did not take consistently), Cymbalta (which is an SNRI and cause severe headaches), Nurtec (in samples from PCP office) which were not effective  Meds which are contraindicated for migraines due to CAD with angina: Triptans, Ajovy, Amitriptyline at higher doses, Propranolol (contraindicated per Cardio due to bradycardia). Pt has hx of CAD with angina with unclear etiology   Continue PRN Phenergan for migraines  Prior referral to Neurology due to CV disease, cerebral aneurysm without intervention being indicated. CTA 2022 showing moderate- severe stenosis of V4 segment of right vertebral artery     - galcanezumab-gnlm (EMGALITY) 120 mg/mL SOSY injection; Inject 2 mLs into the skin once for 1 dose  Dispense: 1.12 mL; Refill: 0  - galcanezumab-gnlm (EMGALITY) 120 mg/mL SOSY injection; Inject 1 mL into the skin every 30 days migraine  Dispense: 1.12 mL; Refill: 5  - Ubrogepant (UBRELVY) 50 MG TABS; Take 50 mg by mouth as needed (migraine) Take at onset of migraine, may repeat in 2 hours if no improvement. Dispense: 15 tablet; Refill: 0  - promethazine (PHENERGAN) 12.5 MG tablet; Take 1 tablet by mouth every 8 hours as needed for Nausea (migraine)  Dispense: 30 tablet; Refill: 0    2.  Coronary artery disease involving native coronary artery of native heart with angina pectoris (HCC)  NYHA Class 2 per last Cardio ([facility])  Prior ECHO (6/22/2022) w/ LVEF 50%  Continue

## 2023-10-27 NOTE — TELEPHONE ENCOUNTER
Submitted PA for Ubrelvy 50MG tablets  Via [facility] Key: [id number] STATUS: PENDING. Follow up done daily; if no response in three days we will refax for status check. If another three days goes by with no response we will call the insurance for status.

## 2023-10-30 ENCOUNTER — TELEPHONE (OUTPATIENT)
Dept: FAMILY MEDICINE CLINIC | Age: 58
End: 2023-10-30

## 2023-10-30 NOTE — TELEPHONE ENCOUNTER
DENIAL for Ubrelvy 50MG tablets; letter attached. If this requires a response please respond to the pool ( P [facility]X [address]). Thank you please advise patient.

## 2023-10-30 NOTE — TELEPHONE ENCOUNTER
Submitted PA for Emgality 120MG/ML syringes (migraine)  [facility]ia [facility] Key: [id number]  STATUS: PENDING. Follow up done daily; if no response in three days we will refax for status check. If another three days goes by with no response we will call the insurance for status.

## 2023-10-31 ASSESSMENT — ENCOUNTER SYMPTOMS
RHINORRHEA: 0
EYE PAIN: 0
EYE REDNESS: 0
SORE THROAT: 0
NAUSEA: 1
COUGH: 0
BACK PAIN: 0
ABDOMINAL PAIN: 0
SINUS PRESSURE: 0
VOMITING: 0
PHOTOPHOBIA: 0

## 2023-10-31 NOTE — TELEPHONE ENCOUNTER
Spoke to the pharmacy and the [facility] will need a PA   I spoke to the patient and she said the nurtec samples she had in the past did not do anything so she does not want to even have it prescribed

## 2023-10-31 NOTE — TELEPHONE ENCOUNTER
DENIAL for Emgality 120MG/ML syringes (migraine); letter attached. If this requires a response please respond to the pool ( P [facility] [address]). Thank you please advise patient.

## 2023-10-31 NOTE — TELEPHONE ENCOUNTER
Please call patient. I had to order Nurtec instead of the Emgality/ Ubrelvy. This will likely have to go to the PA pool as well. It is a pill she will take every other day if it is approved.

## 2023-11-01 ENCOUNTER — TELEPHONE (OUTPATIENT)
Dept: ADMINISTRATIVE | Age: 58
End: 2023-11-01

## 2023-11-01 RX ORDER — ERENUMAB-AOOE 70 MG/ML
70 INJECTION SUBCUTANEOUS
Qty: 1 ADJUSTABLE DOSE PRE-FILLED PEN SYRINGE | Refills: 2 | Status: SHIPPED | OUTPATIENT
Start: 2023-11-01

## 2023-11-01 NOTE — TELEPHONE ENCOUNTER
Noted.  I sent Randal Arvizu which is a monthly injection as well. It will need a PA. We will see what happens with this one.

## 2023-11-01 NOTE — TELEPHONE ENCOUNTER
Submitted PA for Aimovig 70MG/ML auto-injectors  Via [facility] Key: [id number] STATUS: PENDING. Follow up done daily; if no response in three days we will refax for status check. If another three days goes by with no response we will call the insurance for status.

## 2023-11-02 NOTE — TELEPHONE ENCOUNTER
APPROVAL for Aimovig 70MG/ML auto-injectors 11/01/23-04/28/24; letter attached. If this requires a response please respond to the pool ( P [facility]X [address]). Thank you please advise patient.

## 2023-11-06 DIAGNOSIS — K21.9 GASTROESOPHAGEAL REFLUX DISEASE WITHOUT ESOPHAGITIS: ICD-10-CM

## 2023-11-06 DIAGNOSIS — I25.119 CORONARY ARTERY DISEASE INVOLVING NATIVE CORONARY ARTERY OF NATIVE HEART WITH ANGINA PECTORIS (HCC): ICD-10-CM

## 2023-11-06 RX ORDER — PANTOPRAZOLE SODIUM 40 MG/1
TABLET, DELAYED RELEASE ORAL
Qty: 60 TABLET | Refills: 11 | Status: SHIPPED | OUTPATIENT
Start: 2023-11-06

## 2023-11-07 NOTE — TELEPHONE ENCOUNTER
Patient states that she's completely out
Normal vision: sees adequately in most situations; can see medication labels, newsprint

## 2023-11-15 ENCOUNTER — TELEPHONE (OUTPATIENT)
Dept: FAMILY MEDICINE CLINIC | Age: 58
End: 2023-11-15

## 2023-11-15 NOTE — TELEPHONE ENCOUNTER
Can we schedule a visit to discuss this please? I know we have mentioned it already and I have already done an exam but I want to make sure Alexandria Mahmood and I are on the same page regarding this. Did she get her migraine medication?

## 2023-11-15 NOTE — TELEPHONE ENCOUNTER
----- Message from Tyrell Owens sent at 11/15/2023  8:50 AM EST -----  Subject: Message to Provider    QUESTIONS  Information for Provider? Patient is calling in wanted a message sent to   PCP that whenever she eats she gets nauseous, patient has no appetite   started a couple weeks ago. Patient said no other symptoms. ---------------------------------------------------------------------------  --------------  Raquel Mejia [facility]  [phone]; OK to leave message on voicemail  ---------------------------------------------------------------------------  --------------  SCRIPT ANSWERS  Relationship to Patient?  Self

## 2023-11-16 ENCOUNTER — SCHEDULED TELEPHONE ENCOUNTER (OUTPATIENT)
Dept: FAMILY MEDICINE CLINIC | Age: 58
End: 2023-11-16

## 2023-11-16 DIAGNOSIS — R63.0 APPETITE LOSS: Primary | ICD-10-CM

## 2023-11-16 DIAGNOSIS — R63.4 UNINTENTIONAL WEIGHT LOSS: ICD-10-CM

## 2023-11-16 NOTE — PROGRESS NOTES
Robin Burch is a 60 y.o. female evaluated via telephone on 11/16/2023. Consent:  She and/or health care decision maker is aware that that she may receive a bill for this telephone service, depending on her insurance coverage, and has provided verbal consent to proceed: {YES/NO/NA-Consent obtained within past 12 months:44342}      Documentation:  I communicated with the patient and/or health care decision maker about ***. Details of this discussion including any medical advice provided: ***      I {AFFIRM/DO NOT AFFIRM:89748::\"affirm\"} this is a Patient Initiated Episode with an Established Patient who has not had a related appointment within my department in the past 7 days or scheduled within the next 24 hours. Total Time: {minutes:45482::\"5-10 minutes\"}    Note: not billable if this call serves to triage the patient into an appointment for the relevant concern      Sumi Denis, [address] [place] [address], was evaluated through a synchronous (real-time) audio-video encounter. The patient (or guardian if applicable) is aware that this is a billable service, which includes applicable co-pays. This Virtual Visit was conducted with patient's (and/or legal guardian's) consent. Patient identification was verified, and a caregiver was present when appropriate. The patient was located at {[employer] - Patient:[id number]}  Provider was located at {[employer] - Provider:[id number]}  {STOP! Confirm you are appropriately licensed, registered, or certified to deliver care in the state where the patient is located as indicated above. If you are not or unsure, please re-schedule the visit. (Optional):79516}       Total time spent for this encounter: {Time Spent:***}    --Sumi Denis MA on 11/16/2023 at 10:37 AM    An electronic signature was used to authenticate this note.

## 2023-11-16 NOTE — PROGRESS NOTES
Total Time: minutes: 5-10 minutes     Daniel Schmidt was evaluated through a synchronous (real-time) audio encounter. Patient identification was verified at the start of the visit. She (or guardian if applicable) is aware that this is a billable service, which includes applicable co-pays. This visit was conducted with the patient's (and/or legal guardian's) verbal consent. She has not had a related appointment within my department in the past 7 days or scheduled within the next 24 hours. The patient was located at Home: [address] [place] [address]. The provider was located at [facility] (Appt Dept): [address] [place] [address],  [address] [place] [address]. Note: not billable if this call serves to triage the patient into an appointment for the relevant concern    Daniel Schmidt is a 60 y.o. female evaluated via telephone on 11/16/2023 for Nausea (Loss of appetite x 2 weeks )  . Assessment & Plan   1. Appetite loss  Unclear cause for symptoms, esophageal stricture/ hiatal hernia? Patient came to office after televisit- physical exam benign. Will obtain labs today to r/o organic causes. Possibly related to medications- Ozempic? Consider GI referral, may consider imaging  Noted weight loss of 10 lbs since 3/2023.   -     TSH with Reflex to FT4  -     CBC with Auto Differential  -     Comprehensive Metabolic Panel  -     HIV Screen  -     Hepatitis C Antibody  -     Sedimentation Rate  -     C-Reactive Protein  2. Unintentional weight loss  See above  -     TSH with Reflex to FT4  -     CBC with Auto Differential  -     Comprehensive Metabolic Panel  -     HIV Screen  -     Hepatitis C Antibody  -     Sedimentation Rate  -     C-Reactive Protein    Return for keep jan. Subjective   Prior to Visit Medications    Medication Sig Taking?  Authorizing Provider   metFORMIN (GLUCOPHAGE) 1000 MG tablet TAKE ONE TABLET BY MOUTH TWO (2) TIMES A DAY WITH MEALS Yes GERMAN Miller - CNP   pantoprazole (PROTONIX) 40

## 2023-11-17 ENCOUNTER — TELEPHONE (OUTPATIENT)
Dept: TELEMETRY | Age: 58
End: 2023-11-17

## 2023-11-17 LAB
ALBUMIN SERPL-MCNC: 4.2 G/DL (ref 3.4–5)
ALBUMIN/GLOB SERPL: 1.9 {RATIO} (ref 1.1–2.2)
ALP SERPL-CCNC: 81 U/L (ref 40–129)
ALT SERPL-CCNC: 12 U/L (ref 10–40)
ANION GAP SERPL CALCULATED.3IONS-SCNC: 13 MMOL/L (ref 3–16)
AST SERPL-CCNC: 13 U/L (ref 15–37)
BASOPHILS # BLD: 0 K/UL (ref 0–0.2)
BASOPHILS NFR BLD: 0.7 %
BILIRUB SERPL-MCNC: 0.7 MG/DL (ref 0–1)
BUN SERPL-MCNC: 11 MG/DL (ref 7–20)
CALCIUM SERPL-MCNC: 9.4 MG/DL (ref 8.3–10.6)
CHLORIDE SERPL-SCNC: 102 MMOL/L (ref 99–110)
CO2 SERPL-SCNC: 26 MMOL/L (ref 21–32)
CREAT SERPL-MCNC: 0.7 MG/DL (ref 0.6–1.1)
CRP SERPL-MCNC: <3 MG/L (ref 0–5.1)
DEPRECATED RDW RBC AUTO: 13.4 % (ref 12.4–15.4)
EOSINOPHIL # BLD: 0.1 K/UL (ref 0–0.6)
EOSINOPHIL NFR BLD: 1 %
ERYTHROCYTE [SEDIMENTATION RATE] IN BLOOD BY WESTERGREN METHOD: 10 MM/HR (ref 0–30)
GFR SERPLBLD CREATININE-BSD FMLA CKD-EPI: >60 ML/MIN/{1.73_M2}
GLUCOSE SERPL-MCNC: 113 MG/DL (ref 70–99)
HCT VFR BLD AUTO: 46.5 % (ref 36–48)
HCV AB SERPL QL IA: NORMAL
HGB BLD-MCNC: 15.7 G/DL (ref 12–16)
HIV 1+2 AB+HIV1 P24 AG SERPL QL IA: NORMAL
HIV 2 AB SERPL QL IA: NORMAL
HIV1 AB SERPL QL IA: NORMAL
HIV1 P24 AG SERPL QL IA: NORMAL
LYMPHOCYTES # BLD: 1.5 K/UL (ref 1–5.1)
LYMPHOCYTES NFR BLD: 26.1 %
MCH RBC QN AUTO: 29.3 PG (ref 26–34)
MCHC RBC AUTO-ENTMCNC: 33.8 G/DL (ref 31–36)
MCV RBC AUTO: 86.7 FL (ref 80–100)
MONOCYTES # BLD: 0.3 K/UL (ref 0–1.3)
MONOCYTES NFR BLD: 5.8 %
NEUTROPHILS # BLD: 3.9 K/UL (ref 1.7–7.7)
NEUTROPHILS NFR BLD: 66.4 %
PLATELET # BLD AUTO: 204 K/UL (ref 135–450)
PMV BLD AUTO: 9.6 FL (ref 5–10.5)
POTASSIUM SERPL-SCNC: 4.7 MMOL/L (ref 3.5–5.1)
PROT SERPL-MCNC: 6.4 G/DL (ref 6.4–8.2)
RBC # BLD AUTO: 5.36 M/UL (ref 4–5.2)
SODIUM SERPL-SCNC: 141 MMOL/L (ref 136–145)
TSH SERPL DL<=0.005 MIU/L-ACNC: 0.72 UIU/ML (ref 0.27–4.2)
WBC # BLD AUTO: 5.8 K/UL (ref 4–11)

## 2023-11-17 NOTE — TELEPHONE ENCOUNTER
Patient due for annual CT Lung Screening. Reminder letter mailed. Scan already ordered. Central Scheduling number provided.     Angelique Hung RN

## 2023-11-17 NOTE — RESULT ENCOUNTER NOTE
No anemia/ infection. Normal kidney/liver function. Normal thyroid function. Hep C testing negative. Negative inflammator markers. Still unclear for why your abdomen is hurting but I am still leaning toward a hernia.

## 2023-12-01 PROCEDURE — G2066 INTER DEVC REMOTE 30D: HCPCS | Performed by: INTERNAL MEDICINE

## 2023-12-01 PROCEDURE — 93298 REM INTERROG DEV EVAL SCRMS: CPT | Performed by: INTERNAL MEDICINE

## 2024-01-08 PROCEDURE — 93298 REM INTERROG DEV EVAL SCRMS: CPT | Performed by: INTERNAL MEDICINE

## 2024-01-15 DIAGNOSIS — I25.119 CORONARY ARTERY DISEASE INVOLVING NATIVE CORONARY ARTERY OF NATIVE HEART WITH ANGINA PECTORIS (HCC): ICD-10-CM

## 2024-01-15 DIAGNOSIS — E11.59 TYPE 2 DIABETES MELLITUS WITH OTHER CIRCULATORY COMPLICATION, WITHOUT LONG-TERM CURRENT USE OF INSULIN (HCC): ICD-10-CM

## 2024-01-15 RX ORDER — ASPIRIN 81 MG
81 TABLET,CHEWABLE ORAL DAILY
Qty: 90 TABLET | Refills: 1 | Status: SHIPPED | OUTPATIENT
Start: 2024-01-15

## 2024-01-15 RX ORDER — SEMAGLUTIDE 1.34 MG/ML
INJECTION, SOLUTION SUBCUTANEOUS
Qty: 3 ML | Refills: 1 | Status: SHIPPED | OUTPATIENT
Start: 2024-01-15

## 2024-01-15 NOTE — TELEPHONE ENCOUNTER
Refill Request     CONFIRM preferrred pharmacy with the patient.    If Mail Order Rx - Pend for 90 day refill.      Last Seen: Last Seen Department: 11/16/2023  Last Seen by PCP: 11/16/2023    Last Written: 9-    If no future appointment scheduled, route STAFF MESSAGE with patient name to the  Pool for scheduling.      Next Appointment:   Future Appointments   Date Time Provider Department Center   1/29/2024 10:00 AM Senia Dior APRN - [facility]       Message sent to  to schedule appt with patient?  N/A      Requested Prescriptions     Pending Prescriptions Disp Refills    OZEMPIC, 1 MG/DOSE, 4 MG/3ML SOPN [Pharmacy Med Name: OZEMPIC 4MG/3ML SOLN PEN-INJ] 3 mL 1     Sig: INJECT ONE (1) MG INTO THE SKIN ONCE A WEEK DIABETES    ASPIRIN LOW DOSE 81 MG chewable tablet [Pharmacy Med Name: ASPIRIN LOW DOSE 81MG TABLET CHEWABLE] 90 tablet 1     Sig: TAKE ONE (1) TABLET BY MOUTH DAILY

## 2024-01-29 ENCOUNTER — OFFICE VISIT (OUTPATIENT)
Dept: FAMILY MEDICINE CLINIC | Age: 59
End: 2024-01-29
Payer: MEDICAID

## 2024-01-29 VITALS
HEART RATE: 60 BPM | OXYGEN SATURATION: 99 % | SYSTOLIC BLOOD PRESSURE: 118 MMHG | BODY MASS INDEX: 30.8 KG/M2 | DIASTOLIC BLOOD PRESSURE: 64 MMHG | TEMPERATURE: 97.4 F | WEIGHT: 163 LBS

## 2024-01-29 DIAGNOSIS — J42 CHRONIC BRONCHITIS, UNSPECIFIED CHRONIC BRONCHITIS TYPE (HCC): ICD-10-CM

## 2024-01-29 DIAGNOSIS — F33.2 SEVERE EPISODE OF RECURRENT MAJOR DEPRESSIVE DISORDER, WITHOUT PSYCHOTIC FEATURES (HCC): ICD-10-CM

## 2024-01-29 DIAGNOSIS — I50.42 CHRONIC COMBINED SYSTOLIC AND DIASTOLIC CHF (CONGESTIVE HEART FAILURE) (HCC): ICD-10-CM

## 2024-01-29 DIAGNOSIS — E11.59 TYPE 2 DIABETES MELLITUS WITH OTHER CIRCULATORY COMPLICATION, WITHOUT LONG-TERM CURRENT USE OF INSULIN (HCC): ICD-10-CM

## 2024-01-29 DIAGNOSIS — G43.709 CHRONIC MIGRAINE W/O AURA W/O STATUS MIGRAINOSUS, NOT INTRACTABLE: Primary | ICD-10-CM

## 2024-01-29 DIAGNOSIS — I67.1 CEREBRAL ANEURYSM, NONRUPTURED: ICD-10-CM

## 2024-01-29 LAB — HBA1C MFR BLD: 7 %

## 2024-01-29 PROCEDURE — 3078F DIAST BP <80 MM HG: CPT | Performed by: NURSE PRACTITIONER

## 2024-01-29 PROCEDURE — 3017F COLORECTAL CA SCREEN DOC REV: CPT | Performed by: NURSE PRACTITIONER

## 2024-01-29 PROCEDURE — 2022F DILAT RTA XM EVC RTNOPTHY: CPT | Performed by: NURSE PRACTITIONER

## 2024-01-29 PROCEDURE — 3023F SPIROM DOC REV: CPT | Performed by: NURSE PRACTITIONER

## 2024-01-29 PROCEDURE — G8417 CALC BMI ABV UP PARAM F/U: HCPCS | Performed by: NURSE PRACTITIONER

## 2024-01-29 PROCEDURE — 3051F HG A1C>EQUAL 7.0%<8.0%: CPT | Performed by: NURSE PRACTITIONER

## 2024-01-29 PROCEDURE — 1036F TOBACCO NON-USER: CPT | Performed by: NURSE PRACTITIONER

## 2024-01-29 PROCEDURE — G8427 DOCREV CUR MEDS BY ELIG CLIN: HCPCS | Performed by: NURSE PRACTITIONER

## 2024-01-29 PROCEDURE — G8484 FLU IMMUNIZE NO ADMIN: HCPCS | Performed by: NURSE PRACTITIONER

## 2024-01-29 PROCEDURE — 83036 HEMOGLOBIN GLYCOSYLATED A1C: CPT | Performed by: NURSE PRACTITIONER

## 2024-01-29 PROCEDURE — 3074F SYST BP LT 130 MM HG: CPT | Performed by: NURSE PRACTITIONER

## 2024-01-29 PROCEDURE — 99214 OFFICE O/P EST MOD 30 MIN: CPT | Performed by: NURSE PRACTITIONER

## 2024-01-29 RX ORDER — UBROGEPANT 50 MG/1
1 TABLET ORAL PRN
Qty: 30 TABLET | Refills: 0 | Status: SHIPPED | OUTPATIENT
Start: 2024-01-29

## 2024-01-29 ASSESSMENT — PATIENT HEALTH QUESTIONNAIRE - PHQ9
7. TROUBLE CONCENTRATING ON THINGS, SUCH AS READING THE NEWSPAPER OR WATCHING TELEVISION: 1
4. FEELING TIRED OR HAVING LITTLE ENERGY: 3
1. LITTLE INTEREST OR PLEASURE IN DOING THINGS: 3
SUM OF ALL RESPONSES TO PHQ QUESTIONS 1-9: 14
SUM OF ALL RESPONSES TO PHQ QUESTIONS 1-9: 14
2. FEELING DOWN, DEPRESSED OR HOPELESS: 3
SUM OF ALL RESPONSES TO PHQ9 QUESTIONS 1 & 2: 6
8. MOVING OR SPEAKING SO SLOWLY THAT OTHER PEOPLE COULD HAVE NOTICED. OR THE OPPOSITE, BEING SO FIGETY OR RESTLESS THAT YOU HAVE BEEN MOVING AROUND A LOT MORE THAN USUAL: 0
SUM OF ALL RESPONSES TO PHQ QUESTIONS 1-9: 14
SUM OF ALL RESPONSES TO PHQ QUESTIONS 1-9: 14
5. POOR APPETITE OR OVEREATING: 1
9. THOUGHTS THAT YOU WOULD BE BETTER OFF DEAD, OR OF HURTING YOURSELF: 0
10. IF YOU CHECKED OFF ANY PROBLEMS, HOW DIFFICULT HAVE THESE PROBLEMS MADE IT FOR YOU TO DO YOUR WORK, TAKE CARE OF THINGS AT HOME, OR GET ALONG WITH OTHER PEOPLE: 1
3. TROUBLE FALLING OR STAYING ASLEEP: 3
6. FEELING BAD ABOUT YOURSELF - OR THAT YOU ARE A FAILURE OR HAVE LET YOURSELF OR YOUR FAMILY DOWN: 0

## 2024-01-29 ASSESSMENT — ENCOUNTER SYMPTOMS
EYE PAIN: 0
RHINORRHEA: 0
NAUSEA: 1
BACK PAIN: 0
COUGH: 0
EYE REDNESS: 0
VOMITING: 0
ABDOMINAL PAIN: 0
SINUS PRESSURE: 0
PHOTOPHOBIA: 0
SORE THROAT: 0

## 2024-01-29 NOTE — PROGRESS NOTES
Constitutional:       Appearance: Normal appearance.   HENT:      Head: Normocephalic.      Right Ear: Tympanic membrane, ear canal and external ear normal.      Left Ear: Tympanic membrane, ear canal and external ear normal.      Nose: Nose normal.      Mouth/Throat:      Mouth: Mucous membranes are moist.   Eyes:      Extraocular Movements: Extraocular movements intact.      Conjunctiva/sclera: Conjunctivae normal.      Pupils: Pupils are equal, round, and reactive to light.   Neck:      Vascular: No carotid bruit.   Cardiovascular:      Rate and Rhythm: Normal rate and regular rhythm.      Pulses: Normal pulses.           Carotid pulses are 2+ on the right side and 2+ on the left side.       Dorsalis pedis pulses are 2+ on the right side and 2+ on the left side.        Posterior tibial pulses are 2+ on the right side and 2+ on the left side.      Heart sounds: Normal heart sounds.   Pulmonary:      Effort: Pulmonary effort is normal.      Breath sounds: No wheezing or rhonchi.   Abdominal:      General: Abdomen is flat.      Palpations: Abdomen is soft.      Tenderness: There is no abdominal tenderness.      Hernia: No hernia is present.   Musculoskeletal:      Cervical back: Normal range of motion.      Right lower leg: No edema.      Left lower leg: No edema.   Lymphadenopathy:      Cervical: No cervical adenopathy.   Skin:     General: Skin is warm and dry.      Capillary Refill: Capillary refill takes less than 2 seconds.   Neurological:      General: No focal deficit present.      Mental Status: She is alert and oriented to person, place, and time.      Cranial Nerves: No cranial nerve deficit.      Sensory: No sensory deficit.      Motor: No weakness.      Coordination: Coordination normal.      Gait: Gait normal.      Deep Tendon Reflexes: Reflexes normal.   Psychiatric:         Mood and Affect: Mood normal.         Behavior: Behavior normal.         Thought Content: Thought content normal.

## 2024-01-30 ENCOUNTER — TELEPHONE (OUTPATIENT)
Dept: FAMILY MEDICINE CLINIC | Age: 59
End: 2024-01-30

## 2024-01-30 DIAGNOSIS — G43.709 CHRONIC MIGRAINE W/O AURA W/O STATUS MIGRAINOSUS, NOT INTRACTABLE: Primary | ICD-10-CM

## 2024-01-30 NOTE — TELEPHONE ENCOUNTER
PA needed for Ubrelvy   Since it is a new year Senia wants a new PA done on this Ubrelvy and since she has tried other medications that they requested patient to try and has failed them then she is hoping they may approve it this time

## 2024-01-31 NOTE — TELEPHONE ENCOUNTER
Submitted PA for Ubrelvy 50MG tablets  Via [facility] (Key: [id number]) STATUS: NOT SENT    OV Note pending. Will need to submit with PA.     If this requires a response please respond to the pool ( P MHCX PSC MEDICATION PRE-AUTH).      Thank you please advise patient.

## 2024-02-01 DIAGNOSIS — G43.709 CHRONIC MIGRAINE W/O AURA W/O STATUS MIGRAINOSUS, NOT INTRACTABLE: ICD-10-CM

## 2024-02-01 NOTE — TELEPHONE ENCOUNTER
Refill Request     CONFIRM preferrred pharmacy with the patient.    If Mail Order Rx - Pend for 90 day refill.      Last Seen: Last Seen Department: 1/29/2024  Last Seen by PCP: 1/29/2024    Last Written: 11/1/23    If no future appointment scheduled, route STAFF MESSAGE with patient name to the  Pool for scheduling.      Next Appointment:   Future Appointments   Date Time Provider Department Center   4/30/2024 10:00 AM Senia Dior APRN - [facility]       Message sent to  to schedule appt with patient?  N/A      Requested Prescriptions     Pending Prescriptions Disp Refills    AIMOVIG 70 MG/ML SOAJ [Pharmacy Med Name: AIMOVIG 70MG/ML SOLN AUTO-INJ]  2     Sig: Inject 70 mg into the skin every 30 days For migraines

## 2024-02-02 RX ORDER — ERENUMAB-AOOE 70 MG/ML
70 INJECTION SUBCUTANEOUS
Qty: 1 ML | Refills: 0 | Status: SHIPPED | OUTPATIENT
Start: 2024-02-02

## 2024-02-05 NOTE — TELEPHONE ENCOUNTER
DENIAL for Ubrelvy 50MG tablets; letter attached.    If this requires a response please respond to the pool ( P MHCX PSC MEDICATION PRE-AUTH).      Thank you please advise patient.

## 2024-02-05 NOTE — TELEPHONE ENCOUNTER
Submitted PA for Ubrelvy 50MG tablets  Via [facility] (Key: [id number])  STATUS: PENDING.    Follow up done daily; if no decision with in three days we will refax.  If another three days goes by with no decision will call the insurance for status.

## 2024-02-06 ENCOUNTER — TELEPHONE (OUTPATIENT)
Dept: FAMILY MEDICINE CLINIC | Age: 59
End: 2024-02-06

## 2024-02-06 DIAGNOSIS — G43.709 CHRONIC MIGRAINE W/O AURA W/O STATUS MIGRAINOSUS, NOT INTRACTABLE: ICD-10-CM

## 2024-02-08 NOTE — TELEPHONE ENCOUNTER
Submitted PA for Nurtec 75MG dispersible tablets  Via [facility]  (Key: [id number]) STATUS: PENDING.    Follow up done daily; if no decision with in three days we will refax.  If another three days goes by with no decision will call the insurance for status.

## 2024-02-09 NOTE — TELEPHONE ENCOUNTER
DENIAL for Nurtec 75MG dispersible tablets; letter attached.    If this requires a response please respond to the pool ( P MHCX PSC MEDICATION PRE-AUTH).      Thank you please advise patient.

## 2024-02-12 PROCEDURE — 93298 REM INTERROG DEV EVAL SCRMS: CPT | Performed by: INTERNAL MEDICINE

## 2024-02-13 NOTE — TELEPHONE ENCOUNTER
Insurance will only approve 8 tablets instead of the count I sent.  I changed the count for the insurance company and resent.

## 2024-02-14 DIAGNOSIS — J42 CHRONIC BRONCHITIS, UNSPECIFIED CHRONIC BRONCHITIS TYPE (HCC): ICD-10-CM

## 2024-02-14 RX ORDER — ALBUTEROL SULFATE 90 UG/1
2 AEROSOL, METERED RESPIRATORY (INHALATION) EVERY 6 HOURS PRN
Qty: 1 EACH | Refills: 2 | Status: SHIPPED | OUTPATIENT
Start: 2024-02-14

## 2024-02-14 NOTE — TELEPHONE ENCOUNTER
I called the pharmacy and they said it will not go through with the 8 tablets either they said it said something about the step therapy of meds that needed tried for 14 days, I said she could not take triptans due to the CAD, she said she could have Maria C look at it tomorrow but she said it may need an appeal done to get it to go through

## 2024-02-14 NOTE — TELEPHONE ENCOUNTER
Pharmacy told patient that her insurance will not cover the nurtec 8 tablets without a prior authorization

## 2024-03-04 DIAGNOSIS — G43.709 CHRONIC MIGRAINE W/O AURA W/O STATUS MIGRAINOSUS, NOT INTRACTABLE: ICD-10-CM

## 2024-03-05 RX ORDER — ERENUMAB-AOOE 70 MG/ML
70 INJECTION SUBCUTANEOUS
Qty: 1 ADJUSTABLE DOSE PRE-FILLED PEN SYRINGE | Refills: 5 | Status: SHIPPED | OUTPATIENT
Start: 2024-03-05

## 2024-03-06 DIAGNOSIS — E11.59 TYPE 2 DIABETES MELLITUS WITH OTHER CIRCULATORY COMPLICATION, WITHOUT LONG-TERM CURRENT USE OF INSULIN (HCC): ICD-10-CM

## 2024-03-06 RX ORDER — INSULIN GLARGINE 100 [IU]/ML
7 INJECTION, SOLUTION SUBCUTANEOUS NIGHTLY
Qty: 15 ML | Refills: 3 | Status: SHIPPED | OUTPATIENT
Start: 2024-03-06

## 2024-03-06 NOTE — TELEPHONE ENCOUNTER
Noted regarding the Nurtec.  I was aware that it likely would not work for her but we had to give it a 1 month try.  Once she runs out have her make a follow up and then we will order the medicine that I am hoping they will now give us.

## 2024-03-06 NOTE — TELEPHONE ENCOUNTER
Pt called in this morning to let you know that the nurtec is not working for her migraines   Pt said she has taken ibuprofen with it   Pt had the migraine for 3 days

## 2024-03-14 DIAGNOSIS — E11.59 TYPE 2 DIABETES MELLITUS WITH OTHER CIRCULATORY COMPLICATION, WITHOUT LONG-TERM CURRENT USE OF INSULIN (HCC): ICD-10-CM

## 2024-03-14 DIAGNOSIS — I10 ESSENTIAL HYPERTENSION: Chronic | ICD-10-CM

## 2024-03-14 DIAGNOSIS — I50.42 CHRONIC COMBINED SYSTOLIC AND DIASTOLIC CHF (CONGESTIVE HEART FAILURE) (HCC): ICD-10-CM

## 2024-03-14 DIAGNOSIS — I25.118 CORONARY ARTERY DISEASE OF NATIVE ARTERY OF NATIVE HEART WITH STABLE ANGINA PECTORIS (HCC): ICD-10-CM

## 2024-03-14 RX ORDER — ATORVASTATIN CALCIUM 80 MG/1
80 TABLET, FILM COATED ORAL NIGHTLY
Qty: 30 TABLET | Refills: 0 | OUTPATIENT
Start: 2024-03-14

## 2024-03-14 RX ORDER — ATORVASTATIN CALCIUM 80 MG/1
TABLET, FILM COATED ORAL
Qty: 30 TABLET | Refills: 0 | Status: SHIPPED | OUTPATIENT
Start: 2024-03-14

## 2024-03-14 RX ORDER — LISINOPRIL 20 MG/1
20 TABLET ORAL DAILY
Qty: 30 TABLET | Refills: 0 | OUTPATIENT
Start: 2024-03-14

## 2024-03-14 RX ORDER — LISINOPRIL 20 MG/1
TABLET ORAL
Qty: 30 TABLET | Refills: 0 | Status: SHIPPED | OUTPATIENT
Start: 2024-03-14

## 2024-03-14 RX ORDER — EMPAGLIFLOZIN 25 MG/1
TABLET, FILM COATED ORAL
Qty: 90 TABLET | Refills: 1 | Status: SHIPPED | OUTPATIENT
Start: 2024-03-14

## 2024-03-14 RX ORDER — CLOPIDOGREL BISULFATE 75 MG/1
TABLET ORAL
Qty: 30 TABLET | Refills: 0 | Status: SHIPPED | OUTPATIENT
Start: 2024-03-14

## 2024-03-14 NOTE — TELEPHONE ENCOUNTER
There is another encounter regarding medication refills for lisinopril and plavix. I called pt @[phone]. Pt stated she follows YOEL at  and will reach out to her PCP for further refills.

## 2024-03-14 NOTE — TELEPHONE ENCOUNTER
Clopidogrel 75 MG    Lisinopril 20 MG    [facility]      Last OV- 1/29/24 3m     Next OV- 4/30/24

## 2024-03-14 NOTE — TELEPHONE ENCOUNTER
Refill Request     CONFIRM preferrred pharmacy with the patient.    If Mail Order Rx - Pend for 90 day refill.      Last Seen: Last Seen Department: 1/29/2024  Last Seen by PCP: 1/29/2024    Last Written:      If no future appointment scheduled, route STAFF MESSAGE with patient name to the  Pool for scheduling.      Next Appointment:   Future Appointments   Date Time Provider Department Center   4/30/2024 10:00 AM Senia Dior APRN - [facility]       Message sent to  to schedule appt with patient?  NO      Requested Prescriptions     Pending Prescriptions Disp Refills    lisinopril (PRINIVIL;ZESTRIL) 20 MG tablet 30 tablet 0     Sig: Take 1 tablet by mouth daily    atorvastatin (LIPITOR) 80 MG tablet 30 tablet 0     Sig: Take 1 tablet by mouth nightly

## 2024-03-14 NOTE — TELEPHONE ENCOUNTER
Refill Request     CONFIRM preferrred pharmacy with the patient.    If Mail Order Rx - Pend for 90 day refill.      Last Seen: Last Seen Department: 1/29/2024  Last Seen by PCP: 1/29/2024    Last Written: 7/5/23    If no future appointment scheduled, route STAFF MESSAGE with patient name to the  Pool for scheduling.      Next Appointment:   Future Appointments   Date Time Provider Department Center   4/30/2024 10:00 AM Senia Dior APRN - [facility]       Message sent to  to schedule appt with patient?  NO      Requested Prescriptions     Pending Prescriptions Disp Refills    JARDIANCE 25 MG tablet [Pharmacy Med Name: JARDIANCE 25MG TABLET] 90 tablet 1     Sig: TAKE ONE (1) TABLET BY MOUTH DAILY DIABETES

## 2024-03-14 NOTE — TELEPHONE ENCOUNTER
3/14- called pt @[phone]. I spoke with pt and she stated that she now follows JJP at . I informed pt of the med request we received for lisinopril and plavix and pt stated that she'd reach out to her PCP for further refills.

## 2024-03-19 PROCEDURE — 93298 REM INTERROG DEV EVAL SCRMS: CPT | Performed by: INTERNAL MEDICINE

## 2024-04-05 DIAGNOSIS — J30.89 ALLERGIC RHINITIS DUE TO OTHER ALLERGIC TRIGGER, UNSPECIFIED SEASONALITY: ICD-10-CM

## 2024-04-05 RX ORDER — FLUTICASONE PROPIONATE 50 MCG
SPRAY, SUSPENSION (ML) NASAL
Qty: 1 EACH | Refills: 5 | Status: SHIPPED | OUTPATIENT
Start: 2024-04-05

## 2024-04-09 DIAGNOSIS — E11.59 TYPE 2 DIABETES MELLITUS WITH OTHER CIRCULATORY COMPLICATION, WITHOUT LONG-TERM CURRENT USE OF INSULIN (HCC): ICD-10-CM

## 2024-04-09 RX ORDER — SEMAGLUTIDE 1.34 MG/ML
INJECTION, SOLUTION SUBCUTANEOUS
Qty: 3 ML | Refills: 1 | Status: SHIPPED | OUTPATIENT
Start: 2024-04-09

## 2024-04-13 DIAGNOSIS — I25.118 CORONARY ARTERY DISEASE OF NATIVE ARTERY OF NATIVE HEART WITH STABLE ANGINA PECTORIS (HCC): ICD-10-CM

## 2024-04-13 DIAGNOSIS — I10 ESSENTIAL HYPERTENSION: Chronic | ICD-10-CM

## 2024-04-15 DIAGNOSIS — I10 ESSENTIAL HYPERTENSION: Chronic | ICD-10-CM

## 2024-04-15 DIAGNOSIS — I25.118 CORONARY ARTERY DISEASE OF NATIVE ARTERY OF NATIVE HEART WITH STABLE ANGINA PECTORIS (HCC): ICD-10-CM

## 2024-04-15 RX ORDER — CLOPIDOGREL BISULFATE 75 MG/1
TABLET ORAL
Qty: 30 TABLET | Refills: 0 | OUTPATIENT
Start: 2024-04-15

## 2024-04-15 RX ORDER — LISINOPRIL 20 MG/1
TABLET ORAL
Qty: 30 TABLET | Refills: 0 | OUTPATIENT
Start: 2024-04-15

## 2024-04-15 RX ORDER — ATORVASTATIN CALCIUM 80 MG/1
TABLET, FILM COATED ORAL
Qty: 30 TABLET | Refills: 0 | OUTPATIENT
Start: 2024-04-15

## 2024-04-15 RX ORDER — LISINOPRIL 20 MG/1
20 TABLET ORAL DAILY
Qty: 30 TABLET | Refills: 0 | OUTPATIENT
Start: 2024-04-15

## 2024-04-15 NOTE — TELEPHONE ENCOUNTER
4/19/2023 JJP  No upcoming cardiology appt.  11/16/2023 cmp, cbc  Please contact pt for yearly appt/med refills appt

## 2024-04-15 NOTE — TELEPHONE ENCOUNTER
Last OV 3/1/23  No upcoming OV  CMP 11/16/23  CBC 11/16/23        Plan:  1. Discussed angina management with referral to ECCP (enhanced external counterpulsation)   ~ performed as a non-invasive treatment to lower the number and intensity of angina episodes.  2. Discussed referral to cardiac rehabilitation   3. Recommend following with Dr.Jason Vitale for consistent opinion on angina treatment.   4. I recommend that the patient continue their currently prescribed medications. Their drug modifiable risk factors appear to be well controlled. I will continue to address the need/dosing of medications in future visits.   5. Discussed if symptoms progress to seek emergency room for prompt evaluation.  6. Also should consider referral to the [facility] or [facility] for advanced CAD management.      Front- please schedule pt appt.

## 2024-04-15 NOTE — TELEPHONE ENCOUNTER
4/15- called pt @[phone]. Pt stated she no longer sees [facility] anymore and that she followed [facility] with [facility]. Pt stated she will reach out to her PCP regarding further refills.    3/1/2023 [facility]

## 2024-04-19 DIAGNOSIS — I25.118 CORONARY ARTERY DISEASE OF NATIVE ARTERY OF NATIVE HEART WITH STABLE ANGINA PECTORIS (HCC): ICD-10-CM

## 2024-04-19 DIAGNOSIS — I10 ESSENTIAL HYPERTENSION: Chronic | ICD-10-CM

## 2024-04-19 RX ORDER — ATORVASTATIN CALCIUM 80 MG/1
TABLET, FILM COATED ORAL
Qty: 30 TABLET | Refills: 0 | OUTPATIENT
Start: 2024-04-19

## 2024-04-19 RX ORDER — CLOPIDOGREL BISULFATE 75 MG/1
TABLET ORAL
Qty: 30 TABLET | Refills: 0 | OUTPATIENT
Start: 2024-04-19

## 2024-04-19 RX ORDER — LISINOPRIL 20 MG/1
TABLET ORAL
Qty: 30 TABLET | Refills: 0 | OUTPATIENT
Start: 2024-04-19

## 2024-04-23 ENCOUNTER — OFFICE VISIT (OUTPATIENT)
Dept: FAMILY MEDICINE CLINIC | Age: 59
End: 2024-04-23
Payer: MEDICAID

## 2024-04-23 VITALS
BODY MASS INDEX: 31.55 KG/M2 | TEMPERATURE: 97.4 F | SYSTOLIC BLOOD PRESSURE: 122 MMHG | HEART RATE: 67 BPM | OXYGEN SATURATION: 98 % | WEIGHT: 167 LBS | DIASTOLIC BLOOD PRESSURE: 82 MMHG

## 2024-04-23 DIAGNOSIS — I10 ESSENTIAL HYPERTENSION: Chronic | ICD-10-CM

## 2024-04-23 DIAGNOSIS — G43.709 CHRONIC MIGRAINE W/O AURA W/O STATUS MIGRAINOSUS, NOT INTRACTABLE: Primary | ICD-10-CM

## 2024-04-23 DIAGNOSIS — F33.2 SEVERE EPISODE OF RECURRENT MAJOR DEPRESSIVE DISORDER, WITHOUT PSYCHOTIC FEATURES (HCC): ICD-10-CM

## 2024-04-23 DIAGNOSIS — K21.9 GASTROESOPHAGEAL REFLUX DISEASE WITHOUT ESOPHAGITIS: Chronic | ICD-10-CM

## 2024-04-23 DIAGNOSIS — I25.118 CORONARY ARTERY DISEASE OF NATIVE ARTERY OF NATIVE HEART WITH STABLE ANGINA PECTORIS (HCC): ICD-10-CM

## 2024-04-23 DIAGNOSIS — E11.59 TYPE 2 DIABETES MELLITUS WITH OTHER CIRCULATORY COMPLICATION, WITHOUT LONG-TERM CURRENT USE OF INSULIN (HCC): ICD-10-CM

## 2024-04-23 PROCEDURE — 1036F TOBACCO NON-USER: CPT | Performed by: NURSE PRACTITIONER

## 2024-04-23 PROCEDURE — G8427 DOCREV CUR MEDS BY ELIG CLIN: HCPCS | Performed by: NURSE PRACTITIONER

## 2024-04-23 PROCEDURE — 93298 REM INTERROG DEV EVAL SCRMS: CPT | Performed by: INTERNAL MEDICINE

## 2024-04-23 PROCEDURE — 3017F COLORECTAL CA SCREEN DOC REV: CPT | Performed by: NURSE PRACTITIONER

## 2024-04-23 PROCEDURE — 3051F HG A1C>EQUAL 7.0%<8.0%: CPT | Performed by: NURSE PRACTITIONER

## 2024-04-23 PROCEDURE — 99214 OFFICE O/P EST MOD 30 MIN: CPT | Performed by: NURSE PRACTITIONER

## 2024-04-23 PROCEDURE — 3079F DIAST BP 80-89 MM HG: CPT | Performed by: NURSE PRACTITIONER

## 2024-04-23 PROCEDURE — 3074F SYST BP LT 130 MM HG: CPT | Performed by: NURSE PRACTITIONER

## 2024-04-23 PROCEDURE — G8417 CALC BMI ABV UP PARAM F/U: HCPCS | Performed by: NURSE PRACTITIONER

## 2024-04-23 PROCEDURE — 2022F DILAT RTA XM EVC RTNOPTHY: CPT | Performed by: NURSE PRACTITIONER

## 2024-04-23 RX ORDER — LISINOPRIL 20 MG/1
20 TABLET ORAL DAILY
Qty: 30 TABLET | Refills: 11 | Status: SHIPPED | OUTPATIENT
Start: 2024-04-23

## 2024-04-23 RX ORDER — CLOPIDOGREL BISULFATE 75 MG/1
75 TABLET ORAL DAILY
Qty: 30 TABLET | Refills: 11 | Status: SHIPPED | OUTPATIENT
Start: 2024-04-23

## 2024-04-23 RX ORDER — PROMETHAZINE HYDROCHLORIDE 12.5 MG/1
12.5 TABLET ORAL EVERY 6 HOURS PRN
COMMUNITY
End: 2024-04-23 | Stop reason: SDUPTHER

## 2024-04-23 RX ORDER — PROMETHAZINE HYDROCHLORIDE 12.5 MG/1
12.5 TABLET ORAL EVERY 6 HOURS PRN
Qty: 30 TABLET | Refills: 11 | Status: SHIPPED | OUTPATIENT
Start: 2024-04-23

## 2024-04-23 RX ORDER — BUSPIRONE HYDROCHLORIDE 5 MG/1
5 TABLET ORAL 2 TIMES DAILY
Qty: 60 TABLET | Refills: 0 | Status: SHIPPED | OUTPATIENT
Start: 2024-04-23 | End: 2024-05-23

## 2024-04-23 RX ORDER — ASPIRIN 81 MG/1
81 TABLET, CHEWABLE ORAL DAILY
Qty: 90 TABLET | Refills: 3 | Status: SHIPPED | OUTPATIENT
Start: 2024-04-23

## 2024-04-23 RX ORDER — ATORVASTATIN CALCIUM 40 MG/1
40 TABLET, FILM COATED ORAL DAILY
Qty: 30 TABLET | Refills: 11 | Status: SHIPPED | OUTPATIENT
Start: 2024-04-23

## 2024-04-23 RX ORDER — ATORVASTATIN CALCIUM 40 MG/1
40 TABLET, FILM COATED ORAL DAILY
COMMUNITY
End: 2024-04-23 | Stop reason: SDUPTHER

## 2024-04-23 SDOH — ECONOMIC STABILITY: FOOD INSECURITY: WITHIN THE PAST 12 MONTHS, YOU WORRIED THAT YOUR FOOD WOULD RUN OUT BEFORE YOU GOT MONEY TO BUY MORE.: NEVER TRUE

## 2024-04-23 SDOH — ECONOMIC STABILITY: FOOD INSECURITY: WITHIN THE PAST 12 MONTHS, THE FOOD YOU BOUGHT JUST DIDN'T LAST AND YOU DIDN'T HAVE MONEY TO GET MORE.: NEVER TRUE

## 2024-04-23 SDOH — ECONOMIC STABILITY: INCOME INSECURITY: HOW HARD IS IT FOR YOU TO PAY FOR THE VERY BASICS LIKE FOOD, HOUSING, MEDICAL CARE, AND HEATING?: NOT HARD AT ALL

## 2024-04-23 NOTE — PROGRESS NOTES
Psychiatric:         Mood and Affect: Mood normal.         Behavior: Behavior normal.         Thought Content: Thought content normal.         Judgment: Judgment normal.           Patient's questions answered and concerns addressed. Patient agrees to plan of care.        Electronically signed by GERMAN Velazquez CNP on 4/29/2024 at 8:34 AM

## 2024-04-25 ENCOUNTER — TELEPHONE (OUTPATIENT)
Dept: FAMILY MEDICINE CLINIC | Age: 59
End: 2024-04-25

## 2024-04-25 NOTE — TELEPHONE ENCOUNTER
Pt called.   States that she hasn't gotten any calls from the pharmacy for migraine medication.   Pls advise.

## 2024-04-29 ENCOUNTER — TELEPHONE (OUTPATIENT)
Dept: FAMILY MEDICINE CLINIC | Age: 59
End: 2024-04-29

## 2024-04-29 RX ORDER — UBROGEPANT 50 MG/1
50 TABLET ORAL DAILY
Qty: 30 TABLET | Refills: 0 | Status: SHIPPED | OUTPATIENT
Start: 2024-04-29

## 2024-04-29 RX ORDER — ATOGEPANT 30 MG/1
30 TABLET ORAL DAILY
Qty: 30 TABLET | Refills: 3 | Status: SHIPPED | OUTPATIENT
Start: 2024-04-29

## 2024-04-29 NOTE — TELEPHONE ENCOUNTER
Note was completed today. They should be starting on the PA now and she should hopefully hear something within the next 2 weeks.

## 2024-04-29 NOTE — CARE COORDINATION
[facility] Transitions Follow Up Call    2022    Patient: Mason Reyes  Patient : 1965   MRN: [id number]  Reason for Admission: s/p cardiac cath   Discharge Date: 22 RARS: Readmission Risk Score: 7.1 ( )         Spoke with: Mason Reyes    Spoke with patient who reported she is doing well. Patient denied any sob or cp. Patient reported her cardiac cath site looks good and denied any concerns at this time. CTN advised patient of use of urgent care or physicians 24 hr access line if assistance is needed after hours. Care Transitions Subsequent and Final Call    Subsequent and Final Calls  Do you have any ongoing symptoms?: No  Have your medications changed?: No  Do you have any questions related to your medications?: No  Do you currently have any active services?: No  Do you have any needs or concerns that I can assist you with?: No  Identified Barriers: None  Care Transitions Interventions  Other Interventions:            Follow Up  Future Appointments   Date Time Provider Franco Claudio   2022 11:00 AM SCHEDULE, [facility] CATH LAB ROOM 4 [facility] CATH Thressa Pleasure   2022 10:00 AM Lindia Messier, APRN - CNP [facility] [place] - [facility]   2022  8:45 AM GERMAN Rees CNP P [facility] CAR MMA Jacques Hashimoto BSN, RN, [facility]  Care Transition Nurse  [phone] mobile
15

## 2024-04-30 ENCOUNTER — TELEPHONE (OUTPATIENT)
Dept: ADMINISTRATIVE | Age: 59
End: 2024-04-30

## 2024-04-30 DIAGNOSIS — G44.221 CHRONIC TENSION-TYPE HEADACHE, INTRACTABLE: Primary | Chronic | ICD-10-CM

## 2024-04-30 NOTE — TELEPHONE ENCOUNTER
Submitted PA for Qulipta 30MG tablets  Via [facility] [id number] STATUS: PENDING.    Follow up done daily; if no decision with in three days we will refax.  If another three days goes by with no decision will call the insurance for status.

## 2024-04-30 NOTE — TELEPHONE ENCOUNTER
Submitted PA for Ubrelvy 50MG tablets  Via [facility] Key: [id number]  STATUS: PENDING.    Follow up done daily; if no decision with in three days we will refax.  If another three days goes by with no decision will call the insurance for status.

## 2024-05-01 RX ORDER — TOPIRAMATE 25 MG/1
25 TABLET ORAL NIGHTLY
Qty: 30 TABLET | Refills: 0 | Status: SHIPPED | OUTPATIENT
Start: 2024-05-01 | End: 2024-05-02 | Stop reason: SDUPTHER

## 2024-05-01 NOTE — TELEPHONE ENCOUNTER
Message from Plan on CMM: Your PA request for Ubrelvy 50MG tablets was approved for 180 days. The PA# assigned is [id number].     If this requires a response please respond to the pool ( P MHCX PSC MEDICATION PRE-AUTH).      Thank you please advise patient.

## 2024-05-01 NOTE — TELEPHONE ENCOUNTER
DENIAL for Qulipta 30MG tablets. Coverage is provided when the member meets the following requirements:1. The member has a history of at least 30 days of therapy with three preferred (medication covered by the Plan) medications, which include but are not limited to: tricyclic antidepressants (such as: amitriptyline and nortriptyline), anticonvulsants (such as: topiramate), and/or serotonin-norepinephrine reuptake inhibitors (such as: venlafaxine ER (extended release) capsules and duloxetine capsules (20, 30 and 60 mg).     If this requires a response please respond to the pool ( P MHCX PSC MEDICATION PRE-AUTH).      Thank you please advise patient.

## 2024-05-01 NOTE — TELEPHONE ENCOUNTER
--Prior Meds: Aimovig (no help 2024), Nurtec (No help 2024), Amitripytline (no help), Quilipta (helped but she did not take consistently), Cymbalta (which is an SNRI and cause severe headaches), Nurtec (in samples from PCP office) which were not effective       Called patient and agreeable to trial of Topamax. 30 day supply sent to nakul.

## 2024-05-02 ENCOUNTER — TELEPHONE (OUTPATIENT)
Dept: FAMILY MEDICINE CLINIC | Age: 59
End: 2024-05-02

## 2024-05-02 DIAGNOSIS — G44.221 CHRONIC TENSION-TYPE HEADACHE, INTRACTABLE: Chronic | ICD-10-CM

## 2024-05-02 RX ORDER — TOPIRAMATE 25 MG/1
25 TABLET ORAL NIGHTLY
Qty: 30 TABLET | Refills: 0 | Status: SHIPPED | OUTPATIENT
Start: 2024-05-02

## 2024-05-07 DIAGNOSIS — E11.59 TYPE 2 DIABETES MELLITUS WITH OTHER CIRCULATORY COMPLICATION, WITHOUT LONG-TERM CURRENT USE OF INSULIN (HCC): ICD-10-CM

## 2024-05-07 RX ORDER — SEMAGLUTIDE 1.34 MG/ML
INJECTION, SOLUTION SUBCUTANEOUS
Qty: 3 ML | Refills: 2 | Status: SHIPPED | OUTPATIENT
Start: 2024-05-07

## 2024-05-21 DIAGNOSIS — F33.2 SEVERE EPISODE OF RECURRENT MAJOR DEPRESSIVE DISORDER, WITHOUT PSYCHOTIC FEATURES (HCC): ICD-10-CM

## 2024-05-21 RX ORDER — BUSPIRONE HYDROCHLORIDE 5 MG/1
TABLET ORAL
Qty: 60 TABLET | Refills: 2 | Status: SHIPPED | OUTPATIENT
Start: 2024-05-21

## 2024-05-29 PROCEDURE — 93298 REM INTERROG DEV EVAL SCRMS: CPT | Performed by: INTERNAL MEDICINE

## 2024-06-01 DIAGNOSIS — G44.221 CHRONIC TENSION-TYPE HEADACHE, INTRACTABLE: Chronic | ICD-10-CM

## 2024-06-04 RX ORDER — TOPIRAMATE 25 MG/1
TABLET ORAL
Qty: 30 TABLET | Refills: 0 | OUTPATIENT
Start: 2024-06-04

## 2024-06-05 NOTE — TELEPHONE ENCOUNTER
Topamax discontinued.  Lets see if Qulipta will go through this time.  If not we need to send to Neuro.  I sent the Qulipta already

## 2024-06-07 ENCOUNTER — TELEPHONE (OUTPATIENT)
Dept: ADMINISTRATIVE | Age: 59
End: 2024-06-07

## 2024-06-07 NOTE — TELEPHONE ENCOUNTER
Submitted PA for Qulipta 30MG tablets  Via [facility] Key: [id number] STATUS: PENDING.    Follow up done daily; if no decision with in three days we will refax.  If another three days goes by with no decision will call the insurance for status.

## 2024-06-12 NOTE — TELEPHONE ENCOUNTER
Pharmacy notified, they said they have to order it in, it will be tomorrow before they can have it ready for her  Left a msg for patient to let her know this

## 2024-06-12 NOTE — TELEPHONE ENCOUNTER
The medication is APPROVED.    Outcome  Approved on June 7  Your PA request for [id number] was approved for 180 days. The PA# assigned is [id number].  Authorization Expiration Date: 12/2/2024    If this requires a response please respond to the pool ( P MHCX PSC MEDICATION PRE-AUTH).      Thank you please advise patient.

## 2024-06-13 NOTE — TELEPHONE ENCOUNTER
"Daily Note     Today's date: 2024  Patient name: Aldo Rodriguez  : 1942  MRN: [id number]  Referring provider: Callie Caro CRNP  Dx:   Encounter Diagnosis     ICD-10-CM    1. Difficulty walking  R26.2       2. Physical deconditioning  R53.81       3. Muscle weakness (generalized)  M62.81       4. Ambulatory dysfunction  R26.2       5. Muscular deconditioning  R29.898       6. Parkinsonism, unspecified Parkinsonism type  G20.C                        Subjective: Aldo denies muscle soreness following yesterday's PT session. He notes \"I feel pretty good\" upon arrival.       Objective: See treatment diary below      Assessment: Aldo tolerated PT treatment well. Vcs provided on proper sequencing with exercises.  Advanced DB weight with OH press, fatigued fairly quickly at 15 repetitions. Patient fatigues during sit to stands and requires extra attempts with reps and verbal cues provided to increase hip extension in standing. Patient benefits from continued skilled PT services to improve functional mobility and safety.           Plan: Continue per plan of care.      Precautions: fall risk, dementia, hx of CVA/TIA    HEP: TBA    Specialty Daily Treatment Diary       Manual     STM; TPR B UT, LS        STM/TPR B knees                Knee flexion stretch        Knee Ext stretch                Exercise Diary         UBE (retro)        RB        Laps                 HR 15 20 20 20    TR 15 20 20 20    Mini Squats        Chair squats No foam: 10x No foam: 15x  2 foam no UEA   X10  2 foam no UEA: 2x5    St H' ABD                Slant board gastroc stretch        MERLINE (over high counter top with pillow behind)                 Marching Seated: 0# 2 sec; 3x10 ea Seated: 0# 2 sec; 3x10 ea  Seated: 0# 2 sec; 3x10 ea  Seated: 0# 2 sec; 3x10 ea     Standing H' ABD NV? nv      Standing H' Ext                Floor -> Airex NBOS        Floor -> Airex Tandem        Floor -> Airex SLS              " LV 10/27/2020   1 cone tap        2 cone tap                Stepping over hurdles forward        Laterally stepping over hurdles                Stepping on random pads                Bicep Curls U/L: NV?  B/L: 5# 30  U/L: 3# 3x10;   B/L: 5# 30x  U/L: 3# 3x10;   B/L: 5# 30x  5# B/L 3x10    OH Press  B/L: 3# 3x10 B/L: 3# 3x10  B/L: 3# 3x10  B/L 5# 2x10     TB Scap Retraction NV? nv      TB B S' Ext                Low pec stretch        Mid pec stretch                Seated Heel slides using slider (knee flexion and extension stretch) MH: 5 sec x 20 ea MH: 5 sec x 20 ea MH: 5 sec x 20 ea MH: 5 sec x 20 ea    Seated HS stretch        Seated 2 way piriformis stretch        Chin tucks        SNAGs Ext 2 sec; 2x10 2 sec; 2x10  2 sec; 2x10  2 sec; 2x10     Seated Thoracic Extension over half foam 2x10 (half foam) 2x10 (half foam)  2x10 (half foam)  2x10 (half foam)                             LAQ 0# 2 sec; 3x10 ea 0# 2 sec; 3x10 ea  0# 2 sec; 3x10 ea  0# 2 sec; 3x10 ea             SLR (Flex)                Ball Squeezes Black: 5 sec x 30 Black: 5 sec x 30  Black: 5 sec x 30  Black: 5 sec x 30     Ball c Bridge                Hooklying TB B Clamshells Seated: GTB 5 sec x 30 Seated: GTB 5 sec x 30  Seated: GTB 5 sec x 30  Seated: GTB 5 sec x 30     TB Bridges                Sidelying H' ABD                                HEP/EDU        Modalities        MH 5 mins with B knees 5 mins with B knees  5 mins with B knees  5 mins with B knees                        Skin checks performed pre and post application: intact

## 2024-07-12 NOTE — PROGRESS NOTES
Patient requesting incontinence brief.  Noted from 4/2024 addended today.  Forms signed and given to Lina

## 2024-07-20 DIAGNOSIS — F33.2 SEVERE EPISODE OF RECURRENT MAJOR DEPRESSIVE DISORDER, WITHOUT PSYCHOTIC FEATURES (HCC): ICD-10-CM

## 2024-07-22 RX ORDER — BUSPIRONE HYDROCHLORIDE 5 MG/1
TABLET ORAL
Qty: 60 TABLET | Refills: 2 | Status: SHIPPED | OUTPATIENT
Start: 2024-07-22

## 2024-08-07 ENCOUNTER — OFFICE VISIT (OUTPATIENT)
Dept: FAMILY MEDICINE CLINIC | Age: 59
End: 2024-08-07
Payer: MEDICAID

## 2024-08-07 ENCOUNTER — TELEPHONE (OUTPATIENT)
Dept: FAMILY MEDICINE CLINIC | Age: 59
End: 2024-08-07

## 2024-08-07 VITALS
HEART RATE: 73 BPM | WEIGHT: 171 LBS | SYSTOLIC BLOOD PRESSURE: 122 MMHG | DIASTOLIC BLOOD PRESSURE: 72 MMHG | BODY MASS INDEX: 32.31 KG/M2 | OXYGEN SATURATION: 98 % | TEMPERATURE: 98.2 F

## 2024-08-07 DIAGNOSIS — G89.29 CHRONIC LEFT SHOULDER PAIN: Primary | ICD-10-CM

## 2024-08-07 DIAGNOSIS — E11.59 TYPE 2 DIABETES MELLITUS WITH OTHER CIRCULATORY COMPLICATION, WITHOUT LONG-TERM CURRENT USE OF INSULIN (HCC): ICD-10-CM

## 2024-08-07 DIAGNOSIS — M25.512 CHRONIC LEFT SHOULDER PAIN: Primary | ICD-10-CM

## 2024-08-07 DIAGNOSIS — M79.671 RIGHT FOOT PAIN: ICD-10-CM

## 2024-08-07 PROCEDURE — 1036F TOBACCO NON-USER: CPT | Performed by: NURSE PRACTITIONER

## 2024-08-07 PROCEDURE — 2022F DILAT RTA XM EVC RTNOPTHY: CPT | Performed by: NURSE PRACTITIONER

## 2024-08-07 PROCEDURE — G8427 DOCREV CUR MEDS BY ELIG CLIN: HCPCS | Performed by: NURSE PRACTITIONER

## 2024-08-07 PROCEDURE — 3078F DIAST BP <80 MM HG: CPT | Performed by: NURSE PRACTITIONER

## 2024-08-07 PROCEDURE — 3074F SYST BP LT 130 MM HG: CPT | Performed by: NURSE PRACTITIONER

## 2024-08-07 PROCEDURE — 3051F HG A1C>EQUAL 7.0%<8.0%: CPT | Performed by: NURSE PRACTITIONER

## 2024-08-07 PROCEDURE — G8417 CALC BMI ABV UP PARAM F/U: HCPCS | Performed by: NURSE PRACTITIONER

## 2024-08-07 PROCEDURE — 3017F COLORECTAL CA SCREEN DOC REV: CPT | Performed by: NURSE PRACTITIONER

## 2024-08-07 PROCEDURE — 99213 OFFICE O/P EST LOW 20 MIN: CPT | Performed by: NURSE PRACTITIONER

## 2024-08-07 RX ORDER — DILTIAZEM HYDROCHLORIDE 120 MG/1
120 CAPSULE, COATED, EXTENDED RELEASE ORAL DAILY
COMMUNITY
Start: 2024-08-02

## 2024-08-07 RX ORDER — TRAMADOL HYDROCHLORIDE 50 MG/1
50 TABLET ORAL EVERY 8 HOURS PRN
Qty: 14 TABLET | Refills: 0 | Status: SHIPPED | OUTPATIENT
Start: 2024-08-07 | End: 2024-08-14

## 2024-08-07 ASSESSMENT — PATIENT HEALTH QUESTIONNAIRE - PHQ9
SUM OF ALL RESPONSES TO PHQ QUESTIONS 1-9: 0
SUM OF ALL RESPONSES TO PHQ QUESTIONS 1-9: 0
SUM OF ALL RESPONSES TO PHQ9 QUESTIONS 1 & 2: 0
3. TROUBLE FALLING OR STAYING ASLEEP: NOT AT ALL
4. FEELING TIRED OR HAVING LITTLE ENERGY: NOT AT ALL
7. TROUBLE CONCENTRATING ON THINGS, SUCH AS READING THE NEWSPAPER OR WATCHING TELEVISION: NOT AT ALL
SUM OF ALL RESPONSES TO PHQ QUESTIONS 1-9: 0
9. THOUGHTS THAT YOU WOULD BE BETTER OFF DEAD, OR OF HURTING YOURSELF: NOT AT ALL
10. IF YOU CHECKED OFF ANY PROBLEMS, HOW DIFFICULT HAVE THESE PROBLEMS MADE IT FOR YOU TO DO YOUR WORK, TAKE CARE OF THINGS AT HOME, OR GET ALONG WITH OTHER PEOPLE: NOT DIFFICULT AT ALL
5. POOR APPETITE OR OVEREATING: NOT AT ALL
SUM OF ALL RESPONSES TO PHQ QUESTIONS 1-9: 0
8. MOVING OR SPEAKING SO SLOWLY THAT OTHER PEOPLE COULD HAVE NOTICED. OR THE OPPOSITE, BEING SO FIGETY OR RESTLESS THAT YOU HAVE BEEN MOVING AROUND A LOT MORE THAN USUAL: NOT AT ALL
1. LITTLE INTEREST OR PLEASURE IN DOING THINGS: NOT AT ALL
6. FEELING BAD ABOUT YOURSELF - OR THAT YOU ARE A FAILURE OR HAVE LET YOURSELF OR YOUR FAMILY DOWN: NOT AT ALL
2. FEELING DOWN, DEPRESSED OR HOPELESS: NOT AT ALL

## 2024-08-07 NOTE — TELEPHONE ENCOUNTER
Pt called from the pharmacy looking for a prescription that she said you were sending over for her pain.   Pharmacy told her that they don't have anything yet.   I advised patient that you are not finished with her charting.

## 2024-08-07 NOTE — PROGRESS NOTES
8/7/2024    Chief Complaint   Patient presents with    Joint Pain     Multiple joints hurt     Foot Pain     Right foot     Edema       Diane Ibarra is a 59 y.o. female, presents today for:      ASSESSMENT/PLAN:    1. Chronic left shoulder pain  Likely impingement/ arthritis/ tendonitis.  Recommend Ortho evaluation for steroid injection.  Will Tramadol due to severity of pain.    - Amb External Referral To Orthopedic Surgery  - traMADol (ULTRAM) 50 MG tablet; Take 1 tablet by mouth every 8 hours as needed for Pain for up to 7 days. Take lowest dose possible to manage pain Max Daily Amount: 150 mg  Dispense: 14 tablet; Refill: 0    2. Right foot pain  Referral to Podiatry for evaluation  - [facility] - Pasha Ruiz DPM, Podiatry, [facility]    3. Type 2 diabetes mellitus with other circulatory complication, without long-term current use of insulin (HCC)  Updated labs ordered today to be obtained when she is fasting.   - Hemoglobin A1C; Future  - Hepatic Function Panel; Future  - CBC with Auto Differential; Future  - Microalbumin / Creatinine Urine Ratio; Future      Return for keep 8/20 or 3 months.      Presenting today due to multi joint pain.    Woke up about a month ago and had severe anterior shoulder pain from sleeping.  Attempted hand held massage without improve.  Now cannot reach behind self to get.  No parasthesias.  Patient is left handed.  Unable to lay on it, move it too much.      Right foot pain.  Sometimes having difficulty walking.  Thinks have knot on it.  Knee feel like they are giving out.      Sciatica is messed up.      Stress test 8/19.  Recently started diltiazem.  Will likely need referral to [facility] for chest pain     Review of Systems   Constitutional: Negative.    Respiratory:  Negative for cough, chest tightness and shortness of breath.    Cardiovascular: Negative.    Gastrointestinal:  Negative for blood in stool, constipation and diarrhea.   Musculoskeletal:  Positive for

## 2024-08-09 DIAGNOSIS — I50.42 CHRONIC COMBINED SYSTOLIC AND DIASTOLIC CHF (CONGESTIVE HEART FAILURE) (HCC): ICD-10-CM

## 2024-08-09 DIAGNOSIS — E11.59 TYPE 2 DIABETES MELLITUS WITH OTHER CIRCULATORY COMPLICATION, WITHOUT LONG-TERM CURRENT USE OF INSULIN (HCC): ICD-10-CM

## 2024-08-09 PROCEDURE — 93298 REM INTERROG DEV EVAL SCRMS: CPT | Performed by: INTERNAL MEDICINE

## 2024-08-12 ENCOUNTER — TELEPHONE (OUTPATIENT)
Dept: FAMILY MEDICINE CLINIC | Age: 59
End: 2024-08-12

## 2024-08-12 RX ORDER — EMPAGLIFLOZIN 25 MG/1
TABLET, FILM COATED ORAL
Qty: 90 TABLET | Refills: 3 | Status: SHIPPED | OUTPATIENT
Start: 2024-08-12

## 2024-08-12 NOTE — TELEPHONE ENCOUNTER
abs about similar to prior.  A1C control at 7.2%.  No change in meds for now.  Anemia about the same.  Kidney function is normal.   ----- Message -----   From: Miguel Rolon MA   Sent: 8/8/2024   1:13 PM EDT   To: GERMAN Velazquez - CNP   Subject: Edit                                              The scan below was edited by Miguel Rolon MA on 08/08/2024 at 13:13; it is attached to the following: the 08/07/2024 LAB RESULT [[id number]]

## 2024-08-13 ASSESSMENT — ENCOUNTER SYMPTOMS
COUGH: 0
DIARRHEA: 0
SHORTNESS OF BREATH: 0
CONSTIPATION: 0
BLOOD IN STOOL: 0
CHEST TIGHTNESS: 0

## 2024-09-05 DIAGNOSIS — E11.59 TYPE 2 DIABETES MELLITUS WITH OTHER CIRCULATORY COMPLICATION, WITHOUT LONG-TERM CURRENT USE OF INSULIN (HCC): ICD-10-CM

## 2024-09-06 DIAGNOSIS — E11.59 TYPE 2 DIABETES MELLITUS WITH OTHER CIRCULATORY COMPLICATION, WITHOUT LONG-TERM CURRENT USE OF INSULIN (HCC): ICD-10-CM

## 2024-09-06 RX ORDER — PEN NEEDLE, DIABETIC 32GX 5/32"
NEEDLE, DISPOSABLE MISCELLANEOUS
Qty: 100 EACH | Refills: 3 | Status: SHIPPED | OUTPATIENT
Start: 2024-09-06

## 2024-09-06 RX ORDER — PEN NEEDLE, DIABETIC 32GX 5/32"
NEEDLE, DISPOSABLE MISCELLANEOUS
Qty: 100 EACH | Refills: 0 | OUTPATIENT
Start: 2024-09-06

## 2024-09-26 DIAGNOSIS — E11.59 TYPE 2 DIABETES MELLITUS WITH OTHER CIRCULATORY COMPLICATION, WITHOUT LONG-TERM CURRENT USE OF INSULIN (HCC): ICD-10-CM

## 2024-09-26 RX ORDER — PEN NEEDLE, DIABETIC 32GX 5/32"
NEEDLE, DISPOSABLE MISCELLANEOUS
Qty: 100 EACH | Refills: 0 | Status: SHIPPED | OUTPATIENT
Start: 2024-09-26

## 2024-09-26 RX ORDER — SEMAGLUTIDE 1.34 MG/ML
INJECTION, SOLUTION SUBCUTANEOUS
Qty: 3 ML | Refills: 2 | Status: SHIPPED | OUTPATIENT
Start: 2024-09-26

## 2024-10-02 ENCOUNTER — HOSPITAL ENCOUNTER (OUTPATIENT)
Age: 59
Discharge: HOME | End: 2024-10-02
Payer: MEDICAID

## 2024-10-02 LAB
ANION GAP SERPL CALCULATED.3IONS-SCNC: 7 MMOL/L (ref 3–16)
BUN SERPL-MCNC: 17 MG/DL (ref 7–20)
CALCIUM SERPL-MCNC: 10.1 MG/DL (ref 8.3–10.6)
CHLORIDE SERPL-SCNC: 100 MMOL/L (ref 99–110)
CHOLEST SERPL-MCNC: 174 MG/DL (ref 0–199)
CO2 SERPL-SCNC: 31 MMOL/L (ref 21–32)
CREAT SERPL-MCNC: 0.6 MG/DL (ref 0.6–1.2)
DEPRECATED RDW RBC AUTO: 13.7 % (ref 12.4–15.4)
GFR SERPLBLD CREATININE-BSD FMLA CKD-EPI: >90 ML/MIN/{1.73_M2}
GLUCOSE P FAST SERPL-MCNC: 286 MG/DL (ref 70–99)
HCT VFR BLD AUTO: 45 % (ref 36–48)
HDLC SERPL-MCNC: 64 MG/DL (ref 40–60)
HGB BLD-MCNC: 15.2 G/DL (ref 12–16)
INR PPP: 0.93 (ref 0.85–1.15)
LDL CHOLESTEROL: 87 MG/DL
MCH RBC QN AUTO: 30.2 PG (ref 26–34)
MCHC RBC AUTO-ENTMCNC: 33.8 G/DL (ref 31–36)
MCV RBC AUTO: 89.4 FL (ref 80–100)
PLATELET # BLD AUTO: 190 K/UL (ref 135–450)
PMV BLD AUTO: 8.4 FL (ref 5–10.5)
POTASSIUM SERPL-SCNC: 4.2 MMOL/L (ref 3.5–5.1)
PROTHROMBIN TIME: 12.7 SEC (ref 11.9–14.9)
RBC # BLD AUTO: 5.04 M/UL (ref 4–5.2)
SODIUM SERPL-SCNC: 138 MMOL/L (ref 136–145)
TRIGL SERPL-MCNC: 115 MG/DL (ref 0–150)
VLDLC SERPL CALC-MCNC: 23 MG/DL
WBC # BLD AUTO: 5.9 K/UL (ref 4–11)

## 2024-10-02 PROCEDURE — 80061 LIPID PANEL: CPT

## 2024-10-02 PROCEDURE — 85027 COMPLETE CBC AUTOMATED: CPT

## 2024-10-02 PROCEDURE — 80048 BASIC METABOLIC PNL TOTAL CA: CPT

## 2024-10-02 PROCEDURE — 85610 PROTHROMBIN TIME: CPT

## 2024-10-15 ENCOUNTER — TELEPHONE (OUTPATIENT)
Dept: ADMINISTRATIVE | Age: 59
End: 2024-10-15

## 2024-10-15 NOTE — TELEPHONE ENCOUNTER
Submitted PA for Ubrelvy 50MG tablets   Via [facility] Key: [id number]  STATUS: PENDING.    Follow up done daily; if no decision with in three days we will refax.  If another three days goes by with no decision will call the insurance for status.

## 2024-10-16 ENCOUNTER — TELEPHONE (OUTPATIENT)
Dept: CARDIAC REHAB | Age: 59
End: 2024-10-16

## 2024-10-16 NOTE — TELEPHONE ENCOUNTER
Called patient regarding cardiac rehab referral. Patient has not been feeling well since procedure. Patient reports having a CABG and it was easier than the stent. Patient has a follow up with cardiologist on 10/29. Patient would like cardiac rehab to call back after that visit to schedule cardiac rehab.

## 2024-10-16 NOTE — TELEPHONE ENCOUNTER
Coverage for reauthorization is provided when your provider has submitted documentation (for example: chart notes) showing positive clinical response (outcome) to use of the requested medication as well as documentation (for example: chart notes) that support ongoing safety monitoring while using the requested medication. The [facility] Policy for Medical Necessity as posted on the [facility] website and [facility] Preferred Drug List criteria were reviewed and per [place] Administrative Code Rule 5160-1-01 (C) and 5160-26-03 (B), a medically necessary service must include: generally accepted standards of medical practice, be clinically appropriate in administration, treatment and outcome and be the lowest cost alternative to effectively treat the condition. Please contact your provider to assist you with other treatment options that might be covered under your benefit package, or other services that might be available through the community.

## 2024-10-17 NOTE — TELEPHONE ENCOUNTER
We started the Ubrelvy 4/2024.  Does she feel she is benefitting from it?  How often is she needing to use it?

## 2024-10-18 NOTE — TELEPHONE ENCOUNTER
Patient states she does not do the Ubrelvy anymore  you have her on the QULIPTA now, you only had her on the ubrelvy till we could get the Qulipta approved. And she has been taking Qulipta for awhile now

## 2024-10-18 NOTE — TELEPHONE ENCOUNTER
That's fine.  The Ubrelvy is for if she has an acute migraine.  The Qulipta is for prevention of a headache

## 2024-10-23 PROCEDURE — 93298 REM INTERROG DEV EVAL SCRMS: CPT | Performed by: INTERNAL MEDICINE

## 2024-11-15 ENCOUNTER — TELEPHONE (OUTPATIENT)
Dept: FAMILY MEDICINE CLINIC | Age: 59
End: 2024-11-15

## 2024-11-15 DIAGNOSIS — I67.1 CEREBRAL ANEURYSM, NONRUPTURED: ICD-10-CM

## 2024-11-15 DIAGNOSIS — R20.2 FACIAL PARESTHESIA: Primary | ICD-10-CM

## 2024-11-15 DIAGNOSIS — I10 ESSENTIAL HYPERTENSION: Chronic | ICD-10-CM

## 2024-11-15 DIAGNOSIS — I25.118 CORONARY ARTERY DISEASE OF NATIVE ARTERY OF NATIVE HEART WITH STABLE ANGINA PECTORIS (HCC): ICD-10-CM

## 2024-11-15 NOTE — TELEPHONE ENCOUNTER
Patient c/o numbness on right side of face since Oct. This started about 4 days after having cardiac procedure on Oct 10th.  She also c/o feeling foggy in her head.  She did have follow up appt with cardiologist on the 29th, she said they advised her to follow up with her PCP regarding symptoms.  Patient states she is due for f/u testing for aneurysm, she wanted to know if you wanted her to have this done and then follow up with you or if you wanted to see her first.

## 2024-11-18 RX ORDER — ATORVASTATIN CALCIUM 40 MG/1
40 TABLET, FILM COATED ORAL DAILY
Qty: 30 TABLET | Refills: 0 | Status: SHIPPED | OUTPATIENT
Start: 2024-11-18

## 2024-11-18 NOTE — TELEPHONE ENCOUNTER
Needs OV for her DM please.  Thank you. Last A1C 9.6% 10/2024.  So we can do a virtual if she would like.  If she wants to do after the New Year she will need in person.

## 2024-11-18 NOTE — TELEPHONE ENCOUNTER
Patient called back again this morning, she is still with all these symptoms, 4 days after surgery while she was laying down she developed an extreme pain in her head and that's when the numbness in the right side of her face started, she is having trouble with thought process. This has been going on 4 week and is not better but has not gotten worse. You have no appts this week, do you want to her to see Abdiaziz or do you want to ordering testing? She said she is due for her testing on her aneurysm.

## 2024-11-18 NOTE — TELEPHONE ENCOUNTER
Spoke with patient, states that in September 2022, Senia was the one who ordered the scan and would like PCP to order follow up imaging. Please advise.

## 2024-11-18 NOTE — TELEPHONE ENCOUNTER
We will need to do 2 separate tests- a CTA which will check for blood flow and an MRI of her brain due to the numbness.  Both tests have been put in.

## 2024-11-21 DIAGNOSIS — E11.59 TYPE 2 DIABETES MELLITUS WITH OTHER CIRCULATORY COMPLICATION, WITHOUT LONG-TERM CURRENT USE OF INSULIN (HCC): ICD-10-CM

## 2024-11-21 RX ORDER — SEMAGLUTIDE 1.34 MG/ML
INJECTION, SOLUTION SUBCUTANEOUS
Qty: 3 ML | Refills: 2 | Status: SHIPPED | OUTPATIENT
Start: 2024-11-21

## 2024-11-21 NOTE — TELEPHONE ENCOUNTER
[phone] (Home Phone) Called pt back who states provider already sent the Rx in for her so she is going to wait to schedule any more appointments right now until she gets her CTA and MRI completed. And then she will call back to schedule  FYI

## 2024-11-27 ENCOUNTER — TELEPHONE (OUTPATIENT)
Dept: CARDIAC REHAB | Age: 59
End: 2024-11-27

## 2024-11-27 PROCEDURE — 93298 REM INTERROG DEV EVAL SCRMS: CPT | Performed by: INTERNAL MEDICINE

## 2024-11-27 NOTE — TELEPHONE ENCOUNTER
Patient reports she is only willing to come to cardiac rehab once per week. Explained to patient it is a 2 session per week minimum requirement. Patient states \"that is not for me then\". Referral closed.

## 2024-12-03 ENCOUNTER — HOSPITAL ENCOUNTER (OUTPATIENT)
Dept: MRI IMAGING | Age: 59
Discharge: HOME | End: 2024-12-03
Payer: MEDICAID

## 2024-12-03 ENCOUNTER — HOSPITAL ENCOUNTER (OUTPATIENT)
Dept: CT IMAGING | Age: 59
Discharge: HOME | End: 2024-12-03
Payer: MEDICAID

## 2024-12-03 DIAGNOSIS — R20.2 FACIAL PARESTHESIA: ICD-10-CM

## 2024-12-03 DIAGNOSIS — I67.1 CEREBRAL ANEURYSM, NONRUPTURED: ICD-10-CM

## 2024-12-03 LAB
PERFORMED ON: NORMAL
POC CREATININE: 0.6 MG/DL (ref 0.6–1.2)
POC SAMPLE TYPE: NORMAL

## 2024-12-03 PROCEDURE — 6360000004 HC RX CONTRAST MEDICATION: Performed by: NURSE PRACTITIONER

## 2024-12-03 PROCEDURE — 82565 ASSAY OF CREATININE: CPT

## 2024-12-03 PROCEDURE — 70498 CT ANGIOGRAPHY NECK: CPT

## 2024-12-03 RX ORDER — IOPAMIDOL 755 MG/ML
75 INJECTION, SOLUTION INTRAVASCULAR
Status: COMPLETED | OUTPATIENT
Start: 2024-12-03 | End: 2024-12-03

## 2024-12-03 RX ADMIN — IOPAMIDOL 75 ML: 755 INJECTION, SOLUTION INTRAVENOUS at 17:26

## 2024-12-09 ENCOUNTER — TELEPHONE (OUTPATIENT)
Dept: FAMILY MEDICINE CLINIC | Age: 59
End: 2024-12-09

## 2024-12-09 NOTE — TELEPHONE ENCOUNTER
Patient wanted you to know that she had CT done but did not do MRI because of getting claustrophobic

## 2024-12-10 DIAGNOSIS — G43.709 CHRONIC MIGRAINE W/O AURA W/O STATUS MIGRAINOSUS, NOT INTRACTABLE: ICD-10-CM

## 2024-12-10 DIAGNOSIS — I10 ESSENTIAL HYPERTENSION: Chronic | ICD-10-CM

## 2024-12-10 DIAGNOSIS — I25.119 CORONARY ARTERY DISEASE INVOLVING NATIVE CORONARY ARTERY OF NATIVE HEART WITH ANGINA PECTORIS (HCC): ICD-10-CM

## 2024-12-10 DIAGNOSIS — G44.221 CHRONIC TENSION-TYPE HEADACHE, INTRACTABLE: ICD-10-CM

## 2024-12-10 DIAGNOSIS — G45.1 TIA INVOLVING LEFT INTERNAL CAROTID ARTERY: Chronic | ICD-10-CM

## 2024-12-10 DIAGNOSIS — I25.118 CORONARY ARTERY DISEASE OF NATIVE ARTERY OF NATIVE HEART WITH STABLE ANGINA PECTORIS (HCC): ICD-10-CM

## 2024-12-10 DIAGNOSIS — I67.1 CEREBRAL ANEURYSM, NONRUPTURED: Chronic | ICD-10-CM

## 2024-12-10 DIAGNOSIS — K21.9 GASTROESOPHAGEAL REFLUX DISEASE WITHOUT ESOPHAGITIS: ICD-10-CM

## 2024-12-11 RX ORDER — PANTOPRAZOLE SODIUM 40 MG/1
TABLET, DELAYED RELEASE ORAL
Qty: 60 TABLET | Refills: 11 | Status: SHIPPED | OUTPATIENT
Start: 2024-12-11

## 2024-12-11 RX ORDER — ATOGEPANT 30 MG/1
30 TABLET ORAL DAILY
Qty: 30 TABLET | Refills: 3 | Status: SHIPPED | OUTPATIENT
Start: 2024-12-11

## 2024-12-11 RX ORDER — ATORVASTATIN CALCIUM 40 MG/1
40 TABLET, FILM COATED ORAL DAILY
Qty: 30 TABLET | Refills: 0 | Status: SHIPPED | OUTPATIENT
Start: 2024-12-11

## 2024-12-11 RX ORDER — UBROGEPANT 50 MG/1
TABLET ORAL
Qty: 30 TABLET | Refills: 0 | Status: SHIPPED | OUTPATIENT
Start: 2024-12-11

## 2024-12-12 ENCOUNTER — TELEPHONE (OUTPATIENT)
Dept: ADMINISTRATIVE | Age: 59
End: 2024-12-12

## 2024-12-12 DIAGNOSIS — I67.1 CEREBRAL ANEURYSM, NONRUPTURED: Primary | ICD-10-CM

## 2024-12-12 NOTE — TELEPHONE ENCOUNTER
Submitted PA for Ubrelvy 50MG tablets   Via [facility] Key: [id number] STATUS: PENDING.    Follow up done daily; if no decision with in three days we will refax.  If another three days goes by with no decision will call the insurance for status.

## 2024-12-13 NOTE — TELEPHONE ENCOUNTER
The medication is APPROVED.    Outcome  Approved on December 12 by [place] [employer] 2017  Your PA request for [id number] was approved for 180 days. The PA# assigned is [id number].  Authorization Expiration Date: 6/8/2025    If this requires a response please respond to the pool ( P MHCX PSC MEDICATION PRE-AUTH).      Thank you please advise patient.

## 2024-12-19 ENCOUNTER — OFFICE VISIT (OUTPATIENT)
Dept: FAMILY MEDICINE CLINIC | Age: 59
End: 2024-12-19
Payer: MEDICAID

## 2024-12-19 VITALS
WEIGHT: 176 LBS | TEMPERATURE: 97.7 F | BODY MASS INDEX: 33.25 KG/M2 | HEART RATE: 78 BPM | DIASTOLIC BLOOD PRESSURE: 82 MMHG | SYSTOLIC BLOOD PRESSURE: 132 MMHG | OXYGEN SATURATION: 97 %

## 2024-12-19 DIAGNOSIS — G45.1 TIA INVOLVING LEFT INTERNAL CAROTID ARTERY: Chronic | ICD-10-CM

## 2024-12-19 DIAGNOSIS — R20.2 FACIAL PARESTHESIA: ICD-10-CM

## 2024-12-19 DIAGNOSIS — F33.2 SEVERE EPISODE OF RECURRENT MAJOR DEPRESSIVE DISORDER, WITHOUT PSYCHOTIC FEATURES (HCC): ICD-10-CM

## 2024-12-19 DIAGNOSIS — I25.119 CORONARY ARTERY DISEASE INVOLVING NATIVE CORONARY ARTERY OF NATIVE HEART WITH ANGINA PECTORIS (HCC): ICD-10-CM

## 2024-12-19 DIAGNOSIS — I67.1 CEREBRAL ANEURYSM, NONRUPTURED: Chronic | ICD-10-CM

## 2024-12-19 DIAGNOSIS — E11.59 TYPE 2 DIABETES MELLITUS WITH OTHER CIRCULATORY COMPLICATION, WITHOUT LONG-TERM CURRENT USE OF INSULIN (HCC): Primary | ICD-10-CM

## 2024-12-19 DIAGNOSIS — G44.221 CHRONIC TENSION-TYPE HEADACHE, INTRACTABLE: ICD-10-CM

## 2024-12-19 PROCEDURE — 3017F COLORECTAL CA SCREEN DOC REV: CPT | Performed by: NURSE PRACTITIONER

## 2024-12-19 PROCEDURE — 2022F DILAT RTA XM EVC RTNOPTHY: CPT | Performed by: NURSE PRACTITIONER

## 2024-12-19 PROCEDURE — G8417 CALC BMI ABV UP PARAM F/U: HCPCS | Performed by: NURSE PRACTITIONER

## 2024-12-19 PROCEDURE — 3079F DIAST BP 80-89 MM HG: CPT | Performed by: NURSE PRACTITIONER

## 2024-12-19 PROCEDURE — G8427 DOCREV CUR MEDS BY ELIG CLIN: HCPCS | Performed by: NURSE PRACTITIONER

## 2024-12-19 PROCEDURE — 1036F TOBACCO NON-USER: CPT | Performed by: NURSE PRACTITIONER

## 2024-12-19 PROCEDURE — G8484 FLU IMMUNIZE NO ADMIN: HCPCS | Performed by: NURSE PRACTITIONER

## 2024-12-19 PROCEDURE — 3046F HEMOGLOBIN A1C LEVEL >9.0%: CPT | Performed by: NURSE PRACTITIONER

## 2024-12-19 PROCEDURE — 3075F SYST BP GE 130 - 139MM HG: CPT | Performed by: NURSE PRACTITIONER

## 2024-12-19 PROCEDURE — 36415 COLL VENOUS BLD VENIPUNCTURE: CPT | Performed by: NURSE PRACTITIONER

## 2024-12-19 PROCEDURE — 99214 OFFICE O/P EST MOD 30 MIN: CPT | Performed by: NURSE PRACTITIONER

## 2024-12-19 RX ORDER — BUSPIRONE HYDROCHLORIDE 5 MG/1
5 TABLET ORAL 2 TIMES DAILY
Qty: 60 TABLET | Refills: 11 | Status: SHIPPED | OUTPATIENT
Start: 2024-12-19

## 2024-12-19 RX ORDER — UBROGEPANT 50 MG/1
50 TABLET ORAL PRN
Qty: 30 TABLET | Refills: 5 | Status: SHIPPED | OUTPATIENT
Start: 2024-12-19

## 2024-12-19 NOTE — PROGRESS NOTES
12/19/2024    Chief Complaint   Patient presents with    Diabetes     Does not check bs     Hypertension    Coronary Artery Disease     Fasting except coffee with cream and sugar     Gastroesophageal Reflux       Diane Ibarra is a 59 y.o. female, presents today for:      ASSESSMENT/PLAN:    1. Type 2 diabetes mellitus with other circulatory complication, without long-term current use of insulin (HCC)  Likely suboptimal due to lack of monitoring diet  Continue current medications- will likely need to change insulin pending labs  Encouraged annual eye exam  Goal A1C<7%, Goal BP <130/90  - Comprehensive Metabolic Panel  - Hemoglobin A1C  - semaglutide, 2 MG/DOSE, (OZEMPIC, 2 MG/DOSE,) 8 MG/3ML SOPN sc injection; Inject 2 mg into the skin every 7 days diabetes  Dispense: 2 mL; Refill: 2  - insulin glargine (LANTUS SOLOSTAR) 100 UNIT/ML injection pen; Inject 10 Units into the skin nightly diabetes  Dispense: 15 mL; Refill: 3    2. Facial paresthesia  Worsening right sided paraesthesias since cardiac cath.  Referral to Neurology for evaluation    3. Chronic tension-type headache, intractable  --Continues to have suboptimal control.  Will refer to Neuro for assistance  --Start Ubrelvy PRN and Quilipta daily for migraine prophylaxis   --Prior Meds: Aimovig (no help 2024), Nurtec (No help 2024), Amitripytline (no help), Quilipta (helped but she did not take consistently), Cymbalta (which is an SNRI and cause severe headaches), Nurtec (in samples from PCP office) which were not effective  --Meds which are contraindicated for migraines due to CAD with angina: Triptans, Ajovy, Amitriptyline at higher doses, Propranolol (contraindicated per Cardio due to bradycardia).  Pt has hx of CAD with angina with unclear etiology   Continue PRN Phenergan for migraines  --Prior referral to Neurology due to CV disease, cerebral aneurysm without intervention being indicated. CTA 2022 showing moderate- severe stenosis of V4 segment of

## 2024-12-19 NOTE — PATIENT INSTRUCTIONS
Annmarie Nance,    Thank you for coming in.  We appreciate your time and effort in helping us with your care.  Here are some follow up items following our discussion:    Make appt with Neurology for your headaches  Decreasing Metformin to 1/2 tablet (500 mg) twice a day  Non-medication Options for Headache:   Here are some non-medication options. If you have specific questions about any of these devices please ask your healthcare provider for more information    Nerivio  [email or web address]  Use within 60 minutes of increase in your headache. Set at a comfortable intensity level first few minutes and maintain that level for 45 minutes up to 12 times per month.     Allay lamp (Green light therpay)  https://[employer]  Can use the discount code \"[facility]10\" for 10% off (limited time discount)  Allay is covered by some FSA, HSA providers but not all  Cost is around $130.00    CEFALY  [email or web address].cefaly.us/en    eNeura  [email or web address]    [email or web address]    **Discuss above options with Cardiology pending what you decide to do**    Please compare this printed medication list with your medications at home to be sure they are the same.  If you have any medications that are different please contact us immediately at [phone].  Or you can send us a [employer] message.      If you need to schedule imaging or testing you can call [facility] Main Scheduling Line at [phone], option 2    Also review your allergies that we have listed, these may also include medications that you have not been able to tolerate, make sure everything listed is correct. If you have any allergies that are different please contact us immediately at [phone].    You may receive a survey in the mail or by email asking about your experience during your visit today. Please complete and return to us so we know how we are serving you.

## 2024-12-20 LAB
ALBUMIN SERPL-MCNC: 4.4 G/DL (ref 3.4–5)
ALBUMIN/GLOB SERPL: 1.8 {RATIO} (ref 1.1–2.2)
ALP SERPL-CCNC: 74 U/L (ref 40–129)
ALT SERPL-CCNC: 19 U/L (ref 10–40)
ANION GAP SERPL CALCULATED.3IONS-SCNC: 13 MMOL/L (ref 3–16)
AST SERPL-CCNC: 15 U/L (ref 15–37)
BILIRUB SERPL-MCNC: 0.9 MG/DL (ref 0–1)
BUN SERPL-MCNC: 14 MG/DL (ref 7–20)
CALCIUM SERPL-MCNC: 9.8 MG/DL (ref 8.3–10.6)
CHLORIDE SERPL-SCNC: 98 MMOL/L (ref 99–110)
CO2 SERPL-SCNC: 25 MMOL/L (ref 21–32)
CREAT SERPL-MCNC: 0.7 MG/DL (ref 0.6–1.2)
EST. AVERAGE GLUCOSE BLD GHB EST-MCNC: 240.3 MG/DL
GFR SERPLBLD CREATININE-BSD FMLA CKD-EPI: >90 ML/MIN/{1.73_M2}
GLUCOSE SERPL-MCNC: 222 MG/DL (ref 70–99)
HBA1C MFR BLD: 10 %
POTASSIUM SERPL-SCNC: 4.6 MMOL/L (ref 3.5–5.1)
PROT SERPL-MCNC: 6.8 G/DL (ref 6.4–8.2)
SODIUM SERPL-SCNC: 136 MMOL/L (ref 136–145)

## 2024-12-20 RX ORDER — INSULIN GLARGINE 100 [IU]/ML
10 INJECTION, SOLUTION SUBCUTANEOUS NIGHTLY
Qty: 15 ML | Refills: 3 | Status: SHIPPED | OUTPATIENT
Start: 2024-12-20

## 2024-12-20 RX ORDER — SEMAGLUTIDE 2.68 MG/ML
2 INJECTION, SOLUTION SUBCUTANEOUS
Qty: 2 ML | Refills: 2 | Status: SHIPPED | OUTPATIENT
Start: 2024-12-20

## 2024-12-20 NOTE — RESULT ENCOUNTER NOTE
Normal kidney/ liver function.      Diabetes with worse control.  Recommend to increase Ozempic to 2 mg and Lantus 10 units daily.  If we do this then we can offset decreasing the Metformin like we discussed yesterday.  Appt in 3 months please

## 2024-12-23 ENCOUNTER — TELEPHONE (OUTPATIENT)
Dept: ADMINISTRATIVE | Age: 59
End: 2024-12-23

## 2024-12-23 NOTE — TELEPHONE ENCOUNTER
Submitted PA for Ozempic (2 MG/DOSE) 8MG/3ML pen-injectors  Via [facility] Key: [id number] STATUS: PENDING.    Follow up done daily; if no decision with in three days we will refax.  If another three days goes by with no decision will call the insurance for status.

## 2024-12-24 NOTE — TELEPHONE ENCOUNTER
The medication was DENIED; DENIAL     Generic Denial: Auto response per portal. No letter generated. please see note below          Note :  Coverage is provided when the member meets all the followin. Member has a history of at least 120 days of therapy with THREE preferred medications [ONE of the 120 day trials must be Byetta (5 mcg and 10 mcg), Victoza (18 MG/3 ML PEN)(Brand name is preferred by your plan) or Trulicity (0.75 mg, 1.5 mg, 3 mg and 4.5 mg)], which include but are not limited to: Farxiga 5 and 10 mg (Brand name is preferred by your plan), Glimepiride, Glipizide, Invokana 100 mg and 300 mg, Januvia, Jardiance 10 and 25 mg and Metformin IR and ER (generic of Glucophage XR) and, 2. Member has had an inadequate clinical response (the inability to reach A1C goal (less than 7%) after at least 120 days of current regimen, with use of two or more drugs concomitantly per ADA (American Diabetes Association) guidelines and, 3. Member has documented adherence and appropriate dose escalation (must achieve maximum recommended dose or document that maximum recommended dose is not tolerated or is clinically inappropriate) and, 4. Documentation includes a patient specific A1C goal if less than 7%. Preferred trials metformin, Jardiance have been noted.        If you want an APPEAL; please note in this encounter what new information you would like to APPEAL with.  Once complete route back to PA POOL.    If this requires a response please respond to the pool ( P MHCX PSC MEDICATION PRE-AUTH).      Thank you please advise patient.

## 2024-12-25 ASSESSMENT — ENCOUNTER SYMPTOMS
SHORTNESS OF BREATH: 0
BLOOD IN STOOL: 0
COUGH: 0
DIARRHEA: 0
CONSTIPATION: 0
CHEST TIGHTNESS: 0

## 2024-12-26 NOTE — TELEPHONE ENCOUNTER
Called plan they faxed a appeal form over, patient needs to sign the appeal before submitting. Appeal form scanned into media. Please reach out to patent, Once signed upload in media and reply back to this encounter so the appeal can be submitted.     If this requires a response please respond to the pool ( P MHCX PSC MEDICATION PRE-AUTH).      Thank you please advise patient.

## 2024-12-26 NOTE — TELEPHONE ENCOUNTER
Patient attempted Trulicity from 6/2021 to 9/2022    Farxiga/ Invokana is in same class as Jardiance.     Pt currently using 2 medications concurrently (Metformin/ Jardianc/ Lantus)

## 2025-01-10 DIAGNOSIS — I25.118 CORONARY ARTERY DISEASE OF NATIVE ARTERY OF NATIVE HEART WITH STABLE ANGINA PECTORIS (HCC): ICD-10-CM

## 2025-01-10 DIAGNOSIS — I10 ESSENTIAL HYPERTENSION: Chronic | ICD-10-CM

## 2025-01-10 RX ORDER — ATORVASTATIN CALCIUM 40 MG/1
40 TABLET, FILM COATED ORAL DAILY
Qty: 30 TABLET | Refills: 10 | Status: SHIPPED | OUTPATIENT
Start: 2025-01-10

## 2025-01-21 RX ORDER — NITROGLYCERIN 0.4 MG/1
TABLET SUBLINGUAL
Qty: 25 TABLET | Refills: 3 | Status: SHIPPED | OUTPATIENT
Start: 2025-01-21

## 2025-01-28 ENCOUNTER — SCHEDULED TELEPHONE ENCOUNTER (OUTPATIENT)
Dept: FAMILY MEDICINE CLINIC | Age: 60
End: 2025-01-28
Payer: MEDICAID

## 2025-01-28 DIAGNOSIS — J06.9 VIRAL URI: Primary | ICD-10-CM

## 2025-01-28 PROCEDURE — 99213 OFFICE O/P EST LOW 20 MIN: CPT | Performed by: NURSE PRACTITIONER

## 2025-01-28 RX ORDER — METHYLPREDNISOLONE 4 MG/1
TABLET ORAL
Qty: 1 KIT | Refills: 0 | Status: SHIPPED | OUTPATIENT
Start: 2025-01-28 | End: 2025-02-03

## 2025-01-28 RX ORDER — OSELTAMIVIR PHOSPHATE 75 MG/1
75 CAPSULE ORAL 2 TIMES DAILY
Qty: 10 CAPSULE | Refills: 0 | Status: SHIPPED | OUTPATIENT
Start: 2025-01-28 | End: 2025-02-02

## 2025-01-28 SDOH — ECONOMIC STABILITY: FOOD INSECURITY: WITHIN THE PAST 12 MONTHS, THE FOOD YOU BOUGHT JUST DIDN'T LAST AND YOU DIDN'T HAVE MONEY TO GET MORE.: NEVER TRUE

## 2025-01-28 SDOH — ECONOMIC STABILITY: FOOD INSECURITY: WITHIN THE PAST 12 MONTHS, YOU WORRIED THAT YOUR FOOD WOULD RUN OUT BEFORE YOU GOT MONEY TO BUY MORE.: NEVER TRUE

## 2025-01-28 ASSESSMENT — PATIENT HEALTH QUESTIONNAIRE - PHQ9
10. IF YOU CHECKED OFF ANY PROBLEMS, HOW DIFFICULT HAVE THESE PROBLEMS MADE IT FOR YOU TO DO YOUR WORK, TAKE CARE OF THINGS AT HOME, OR GET ALONG WITH OTHER PEOPLE: NOT DIFFICULT AT ALL
1. LITTLE INTEREST OR PLEASURE IN DOING THINGS: NOT AT ALL
4. FEELING TIRED OR HAVING LITTLE ENERGY: NOT AT ALL
3. TROUBLE FALLING OR STAYING ASLEEP: NOT AT ALL
SUM OF ALL RESPONSES TO PHQ9 QUESTIONS 1 & 2: 0
5. POOR APPETITE OR OVEREATING: NOT AT ALL
SUM OF ALL RESPONSES TO PHQ QUESTIONS 1-9: 0
7. TROUBLE CONCENTRATING ON THINGS, SUCH AS READING THE NEWSPAPER OR WATCHING TELEVISION: NOT AT ALL
9. THOUGHTS THAT YOU WOULD BE BETTER OFF DEAD, OR OF HURTING YOURSELF: NOT AT ALL
2. FEELING DOWN, DEPRESSED OR HOPELESS: NOT AT ALL
SUM OF ALL RESPONSES TO PHQ QUESTIONS 1-9: 0
6. FEELING BAD ABOUT YOURSELF - OR THAT YOU ARE A FAILURE OR HAVE LET YOURSELF OR YOUR FAMILY DOWN: NOT AT ALL
SUM OF ALL RESPONSES TO PHQ QUESTIONS 1-9: 0
8. MOVING OR SPEAKING SO SLOWLY THAT OTHER PEOPLE COULD HAVE NOTICED. OR THE OPPOSITE, BEING SO FIGETY OR RESTLESS THAT YOU HAVE BEEN MOVING AROUND A LOT MORE THAN USUAL: NOT AT ALL
SUM OF ALL RESPONSES TO PHQ QUESTIONS 1-9: 0

## 2025-01-28 ASSESSMENT — ENCOUNTER SYMPTOMS
COUGH: 1
DIARRHEA: 1
NAUSEA: 1

## 2025-01-28 NOTE — PROGRESS NOTES
Total Time: minutes: 5-10 minutes     Diane Ibarra was evaluated through a synchronous (real-time) audio encounter. Patient identification was verified at the start of the visit. She (or guardian if applicable) is aware that this is a billable service, which includes applicable co-pays. This visit was conducted with the patient's (and/or legal guardian's) verbal consent. She has not had a related appointment within my department in the past 7 days or scheduled within the next 24 hours.   The patient was located at Home: [address] [place], [place] [id number].  The provider was located at Home (Appt Dept State): [place].    Note: not billable if this call serves to triage the patient into an appointment for the relevant concern  Yes, I confirm.   Diane Ibarra is a 59 y.o. female evaluated via telephone on 1/28/2025 for Nausea & Vomiting (Started last night ), Diarrhea (Started last night ), Headache (Started last night ), and Cough (Started today )  .      Assessment & Plan  Viral URI    Concern for possible Influenza due to negative COVID test and positive home exposures.    Start Tamiflu.  Will send Medrol dose mk per pt preference but discussed it may not help.   Reviewed OTC meds for symptoms relief  Discussed if no improvement in 10 days can consider antibiotic  If symptoms worsen encouraged ER evaluation  Orders:    oseltamivir (TAMIFLU) 75 MG capsule; Take 1 capsule by mouth 2 times daily for 5 days    methylPREDNISolone (MEDROL, MK,) 4 MG tablet; Take by mouth.    No follow-ups on file.      Subjective     Presenting today for URI x 1 days.  Developed fatigue yesterday.  Severe headache, ear pain, sore throat.  Then start vomiting/ nausea/ diarrhea, chills.  Unable to complete any tasks today due to severity of symptoms.  Temperature this AM was 100.1.  Multiple exposures to Flu over the past 2 weeks    Review of Systems   Respiratory:  Positive for cough.    Gastrointestinal:  Positive for diarrhea and nausea.

## 2025-02-05 DIAGNOSIS — E11.59 TYPE 2 DIABETES MELLITUS WITH OTHER CIRCULATORY COMPLICATION, WITHOUT LONG-TERM CURRENT USE OF INSULIN (HCC): ICD-10-CM

## 2025-02-05 RX ORDER — DULAGLUTIDE 0.75 MG/.5ML
0.75 INJECTION, SOLUTION SUBCUTANEOUS WEEKLY
Qty: 2 ML | Refills: 2 | Status: SHIPPED | OUTPATIENT
Start: 2025-02-05

## 2025-02-05 NOTE — TELEPHONE ENCOUNTER
Future appt scheduled 03/20/2025                        Last appt 01/28/2025      Last Written  01/08/2025    dulaglutide (TRULICITY) 0.75 MG/0.5ML SOAJ SC injection   2 mL  0 RF

## 2025-02-26 ENCOUNTER — TELEPHONE (OUTPATIENT)
Dept: FAMILY MEDICINE CLINIC | Age: 60
End: 2025-02-26

## 2025-02-26 DIAGNOSIS — E11.59 TYPE 2 DIABETES MELLITUS WITH OTHER CIRCULATORY COMPLICATION, WITHOUT LONG-TERM CURRENT USE OF INSULIN (HCC): Primary | ICD-10-CM

## 2025-02-26 NOTE — TELEPHONE ENCOUNTER
Patient is calling to see if she can be switched back to the ozempic.  She said the Trulicity is not helping.  She said her sugars have been running high, she reports readings in the 300 to 400 range

## 2025-02-27 NOTE — TELEPHONE ENCOUNTER
Insurance will not cover the Ozempic until she has reached maximum dose of Trulicity.  Her sugars are high because she is on the lowest dose of Trulicity.  I have sent in the next high dose to the pharmacy today

## 2025-03-03 DIAGNOSIS — J30.89 ALLERGIC RHINITIS DUE TO OTHER ALLERGIC TRIGGER, UNSPECIFIED SEASONALITY: ICD-10-CM

## 2025-03-03 RX ORDER — FLUTICASONE PROPIONATE 50 MCG
SPRAY, SUSPENSION (ML) NASAL
Qty: 1 EACH | Refills: 5 | Status: SHIPPED | OUTPATIENT
Start: 2025-03-03

## 2025-03-26 DIAGNOSIS — J30.89 ALLERGIC RHINITIS DUE TO OTHER ALLERGIC TRIGGER, UNSPECIFIED SEASONALITY: ICD-10-CM

## 2025-03-26 DIAGNOSIS — E11.59 TYPE 2 DIABETES MELLITUS WITH OTHER CIRCULATORY COMPLICATION, WITHOUT LONG-TERM CURRENT USE OF INSULIN: ICD-10-CM

## 2025-03-26 RX ORDER — DULAGLUTIDE 1.5 MG/.5ML
INJECTION, SOLUTION SUBCUTANEOUS
Qty: 2 ML | Refills: 2 | Status: SHIPPED | OUTPATIENT
Start: 2025-03-26

## 2025-03-26 RX ORDER — FLUTICASONE PROPIONATE 50 MCG
SPRAY, SUSPENSION (ML) NASAL
Qty: 16 ML | Refills: 5 | Status: SHIPPED | OUTPATIENT
Start: 2025-03-26

## 2025-04-11 DIAGNOSIS — G43.709 CHRONIC MIGRAINE W/O AURA W/O STATUS MIGRAINOSUS, NOT INTRACTABLE: ICD-10-CM

## 2025-04-13 RX ORDER — ATOGEPANT 30 MG/1
TABLET ORAL
Qty: 30 TABLET | Refills: 3 | Status: SHIPPED | OUTPATIENT
Start: 2025-04-13

## 2025-04-14 ENCOUNTER — TELEPHONE (OUTPATIENT)
Dept: FAMILY MEDICINE CLINIC | Age: 60
End: 2025-04-14

## 2025-04-16 NOTE — TELEPHONE ENCOUNTER
Submitted PA for Qulipta 30MG tablets   Via CMM Key: [id number] STATUS: not sent     Question from pa request     .Has the provider LISTED HERE the patient's clinical response to treatment including the severity, frequency, and number of headache days per month?*     Please advise on question for pa request.    Thank you     If this requires a response please respond to the pool ( P MHCX PSC MEDICATION PRE-AUTH).      Thank you please advise patient.

## 2025-04-18 ENCOUNTER — OFFICE VISIT (OUTPATIENT)
Dept: FAMILY MEDICINE CLINIC | Age: 60
End: 2025-04-18
Payer: MEDICAID

## 2025-04-18 VITALS
OXYGEN SATURATION: 98 % | WEIGHT: 177 LBS | SYSTOLIC BLOOD PRESSURE: 120 MMHG | HEART RATE: 57 BPM | BODY MASS INDEX: 33.42 KG/M2 | DIASTOLIC BLOOD PRESSURE: 77 MMHG | HEIGHT: 61 IN

## 2025-04-18 DIAGNOSIS — I67.1 CEREBRAL ANEURYSM, NONRUPTURED: ICD-10-CM

## 2025-04-18 DIAGNOSIS — F33.2 SEVERE EPISODE OF RECURRENT MAJOR DEPRESSIVE DISORDER, WITHOUT PSYCHOTIC FEATURES (HCC): ICD-10-CM

## 2025-04-18 DIAGNOSIS — I50.42 CHRONIC COMBINED SYSTOLIC AND DIASTOLIC CHF (CONGESTIVE HEART FAILURE) (HCC): ICD-10-CM

## 2025-04-18 DIAGNOSIS — E11.59 TYPE 2 DIABETES MELLITUS WITH OTHER CIRCULATORY COMPLICATION, WITHOUT LONG-TERM CURRENT USE OF INSULIN (HCC): Primary | ICD-10-CM

## 2025-04-18 DIAGNOSIS — G43.709 CHRONIC MIGRAINE W/O AURA W/O STATUS MIGRAINOSUS, NOT INTRACTABLE: ICD-10-CM

## 2025-04-18 DIAGNOSIS — J42 CHRONIC BRONCHITIS, UNSPECIFIED CHRONIC BRONCHITIS TYPE (HCC): ICD-10-CM

## 2025-04-18 PROCEDURE — 3074F SYST BP LT 130 MM HG: CPT | Performed by: NURSE PRACTITIONER

## 2025-04-18 PROCEDURE — 36415 COLL VENOUS BLD VENIPUNCTURE: CPT | Performed by: NURSE PRACTITIONER

## 2025-04-18 PROCEDURE — 1036F TOBACCO NON-USER: CPT | Performed by: NURSE PRACTITIONER

## 2025-04-18 PROCEDURE — 99214 OFFICE O/P EST MOD 30 MIN: CPT | Performed by: NURSE PRACTITIONER

## 2025-04-18 PROCEDURE — 2022F DILAT RTA XM EVC RTNOPTHY: CPT | Performed by: NURSE PRACTITIONER

## 2025-04-18 PROCEDURE — G8417 CALC BMI ABV UP PARAM F/U: HCPCS | Performed by: NURSE PRACTITIONER

## 2025-04-18 PROCEDURE — 3078F DIAST BP <80 MM HG: CPT | Performed by: NURSE PRACTITIONER

## 2025-04-18 PROCEDURE — 3017F COLORECTAL CA SCREEN DOC REV: CPT | Performed by: NURSE PRACTITIONER

## 2025-04-18 PROCEDURE — 3046F HEMOGLOBIN A1C LEVEL >9.0%: CPT | Performed by: NURSE PRACTITIONER

## 2025-04-18 PROCEDURE — G8427 DOCREV CUR MEDS BY ELIG CLIN: HCPCS | Performed by: NURSE PRACTITIONER

## 2025-04-18 PROCEDURE — 3023F SPIROM DOC REV: CPT | Performed by: NURSE PRACTITIONER

## 2025-04-18 RX ORDER — ALBUTEROL SULFATE 90 UG/1
2 INHALANT RESPIRATORY (INHALATION) EVERY 6 HOURS PRN
Qty: 1 EACH | Refills: 2 | Status: SHIPPED | OUTPATIENT
Start: 2025-04-18

## 2025-04-18 NOTE — PROGRESS NOTES
4/18/2025    Chief Complaint   Patient presents with    Follow-up       Diane Ibarra is a 59 y.o. female, presents today for:      ASSESSMENT/PLAN:     Diagnosis Orders   1. Type 2 diabetes mellitus with other circulatory complication, without long-term current use of insulin (HCC)  Dulaglutide 3 MG/0.5ML SOAJ    Albumin/Creatinine Ratio, Urine    Comprehensive Metabolic Panel    LIPID PANEL    Hemoglobin A1C    Vitamin B12      2. Chronic migraine w/o aura w/o status migrainosus, not intractable        3. Chronic combined systolic and diastolic CHF (congestive heart failure) ([facility])        4. Chronic bronchitis, unspecified chronic bronchitis type ([facility])  albuterol sulfate HFA (PROVENTIL HFA) 108 (90 Base) MCG/ACT inhaler      5. Cerebral aneurysm, nonruptured        6. Severe episode of recurrent major depressive disorder, without psychotic features ([facility])  CBC with Auto Differential    TSH reflex to FT4,FT3 ([place] Only)        Presenting today for chronic disease follow up.  Looks better than she has in the past several months and feels better than she has for several years.  Chronic diseases, besides diabetes, continues to be well controlled.  Blood sugars continue to be in the 200s.  Will obtain updated labs today to assess DM control.  As blood sugars are not within range will increase Trulicity to 3 mg.  Migraines are now controlled with migraines with one migraine occurring monthly and lasting for a shorter period of time than prior.  Did encouraged updated Neurovascular evaluation for the the non ruptured cerebral aneurysm given other chronic disease.  Pt will schedule this.  Continues to be symptomatic with depression but declines medication changes.  Continues to be euvolemic with CHF.  Encouraged annual visit with Dr. Vitale     Return in about 3 months (around 7/18/2025) for DM.    Presenting today for chronic disease follow up.    Having issues with son's court.  Weather slightly depressing.

## 2025-04-19 LAB
ALBUMIN SERPL-MCNC: 4.2 G/DL (ref 3.4–5)
ALBUMIN/GLOB SERPL: 2 {RATIO} (ref 1.1–2.2)
ALP SERPL-CCNC: 76 U/L (ref 40–129)
ALT SERPL-CCNC: 18 U/L (ref 10–40)
ANION GAP SERPL CALCULATED.3IONS-SCNC: 10 MMOL/L (ref 3–16)
AST SERPL-CCNC: 15 U/L (ref 15–37)
BASOPHILS # BLD: 0 K/UL (ref 0–0.2)
BASOPHILS NFR BLD: 0.3 %
BILIRUB SERPL-MCNC: 1 MG/DL (ref 0–1)
BUN SERPL-MCNC: 11 MG/DL (ref 7–20)
CALCIUM SERPL-MCNC: 9 MG/DL (ref 8.3–10.6)
CHLORIDE SERPL-SCNC: 101 MMOL/L (ref 99–110)
CHOLEST SERPL-MCNC: 113 MG/DL (ref 0–199)
CO2 SERPL-SCNC: 24 MMOL/L (ref 21–32)
CREAT SERPL-MCNC: 0.6 MG/DL (ref 0.6–1.2)
CREAT UR-MCNC: 63.1 MG/DL (ref 28–259)
DEPRECATED RDW RBC AUTO: 14.3 % (ref 12.4–15.4)
EOSINOPHIL # BLD: 0 K/UL (ref 0–0.6)
EOSINOPHIL NFR BLD: 0.8 %
EST. AVERAGE GLUCOSE BLD GHB EST-MCNC: 289.1 MG/DL
GFR SERPLBLD CREATININE-BSD FMLA CKD-EPI: >90 ML/MIN/{1.73_M2}
GLUCOSE SERPL-MCNC: 297 MG/DL (ref 70–99)
HBA1C MFR BLD: 11.7 %
HCT VFR BLD AUTO: 43.1 % (ref 36–48)
HDLC SERPL-MCNC: 48 MG/DL (ref 40–60)
HGB BLD-MCNC: 14.7 G/DL (ref 12–16)
LDLC SERPL CALC-MCNC: 46 MG/DL
LYMPHOCYTES # BLD: 1.5 K/UL (ref 1–5.1)
LYMPHOCYTES NFR BLD: 28.2 %
MCH RBC QN AUTO: 30.3 PG (ref 26–34)
MCHC RBC AUTO-ENTMCNC: 34 G/DL (ref 31–36)
MCV RBC AUTO: 88.9 FL (ref 80–100)
MICROALBUMIN UR DL<=1MG/L-MCNC: <1.2 MG/DL
MICROALBUMIN/CREAT UR: NORMAL MG/G (ref 0–30)
MONOCYTES # BLD: 0.3 K/UL (ref 0–1.3)
MONOCYTES NFR BLD: 5.5 %
NEUTROPHILS # BLD: 3.5 K/UL (ref 1.7–7.7)
NEUTROPHILS NFR BLD: 65.2 %
PLATELET # BLD AUTO: 164 K/UL (ref 135–450)
PMV BLD AUTO: 10.5 FL (ref 5–10.5)
POTASSIUM SERPL-SCNC: 4.4 MMOL/L (ref 3.5–5.1)
PROT SERPL-MCNC: 6.3 G/DL (ref 6.4–8.2)
RBC # BLD AUTO: 4.85 M/UL (ref 4–5.2)
SODIUM SERPL-SCNC: 135 MMOL/L (ref 136–145)
TRIGL SERPL-MCNC: 95 MG/DL (ref 0–150)
TSH SERPL DL<=0.005 MIU/L-ACNC: 0.68 UIU/ML (ref 0.27–4.2)
VIT B12 SERPL-MCNC: 354 PG/ML (ref 211–911)
VLDLC SERPL CALC-MCNC: 19 MG/DL
WBC # BLD AUTO: 5.4 K/UL (ref 4–11)

## 2025-04-21 ENCOUNTER — RESULTS FOLLOW-UP (OUTPATIENT)
Dept: FAMILY MEDICINE CLINIC | Age: 60
End: 2025-04-21

## 2025-04-22 NOTE — RESULT ENCOUNTER NOTE
No anemia/ infection.      Normal kidney/ liver function.      Diabetes slightly less controlled with A1C of 11.7.  Lets see if increasing the Trulicity as discussed will help    Cholesterol is normal    Thyroid level is normal    B12 level is normal     No protein in urine    Overall good report today besides the A1C.  Great job

## 2025-04-25 ASSESSMENT — ENCOUNTER SYMPTOMS
CONSTIPATION: 0
CHEST TIGHTNESS: 0
BLOOD IN STOOL: 0
COUGH: 0
SHORTNESS OF BREATH: 0
DIARRHEA: 0

## 2025-05-12 DIAGNOSIS — I10 ESSENTIAL HYPERTENSION: Chronic | ICD-10-CM

## 2025-05-12 DIAGNOSIS — I25.118 CORONARY ARTERY DISEASE OF NATIVE ARTERY OF NATIVE HEART WITH STABLE ANGINA PECTORIS: ICD-10-CM

## 2025-05-13 RX ORDER — CLOPIDOGREL BISULFATE 75 MG/1
75 TABLET ORAL DAILY
Qty: 30 TABLET | Refills: 11 | Status: SHIPPED | OUTPATIENT
Start: 2025-05-13

## 2025-05-13 RX ORDER — LISINOPRIL 20 MG/1
20 TABLET ORAL DAILY
Qty: 30 TABLET | Refills: 11 | Status: SHIPPED | OUTPATIENT
Start: 2025-05-13

## 2025-05-13 NOTE — TELEPHONE ENCOUNTER
Refill Request     CONFIRM preferrred pharmacy with the patient.    If Mail Order Rx - Pend for 90 day refill.      Last Seen: Last Seen Department: 4/18/2025  Last Seen by PCP: 4/18/2025    Last Written:     If no future appointment scheduled, route STAFF MESSAGE with patient name to the  Pool for scheduling.      Next Appointment:   Future Appointments   Date Time Provider Department Center   7/22/2025 11:00 AM Senia Dior, GERMAN - CNP [facility] DEP       Message sent to  to schedule appt with patient?  NO      Requested Prescriptions     Pending Prescriptions Disp Refills    clopidogrel (PLAVIX) 75 MG tablet [Pharmacy Med Name: CLOPIDOGREL 75MG TABLET] 30 tablet 11     Sig: TAKE ONE (1) TABLET BY MOUTH DAILY    lisinopril (PRINIVIL;ZESTRIL) 20 MG tablet [Pharmacy Med Name: LISINOPRIL 20MG TABLET] 30 tablet 11     Sig: TAKE ONE (1) TABLET BY MOUTH DAILY

## 2025-05-30 ENCOUNTER — TELEPHONE (OUTPATIENT)
Dept: ADMINISTRATIVE | Age: 60
End: 2025-05-30

## 2025-05-30 ENCOUNTER — TELEPHONE (OUTPATIENT)
Dept: FAMILY MEDICINE CLINIC | Age: 60
End: 2025-05-30

## 2025-05-30 NOTE — PROGRESS NOTES
[facility] [place]   [facility] Advanced Heart Failure Therapies     Patient: John Martínez Date: 2021     : 1942 Attending: Bradley Berman MD   79 year old male      Chief Complaint: Driveline drainage & shortness of breath   Reason for Admission: Concern for LVAD infection; severe hyponatremia and supra-therapeutic INR    History of Present Illness: John Martínez is a 79 year old male with a past medical history significant for ICMP s/p Heartmate 3 DT LVAD with TV repair and closure of the AV on 2021, chronic systolic heart failure, CKD stage 3, chronic anemia & prostate cancer who presented to the AHF clinic in f/u on 9/3/2021. Reported increased ANGELA, unchanged BLE swelling and increased pain in his abdomen/chest & right arm as well as increased drainage from his driveline insertion site. Labs revealed severe hyponatremia, hyperkalemia, rising renal indices, leukocytosis and supra-therapeutic INR. Directly admitted for further evaluation and care. Patient has been changing dressing daily at home by either his daughter / niece.  Reports compliance with medications at home.      Recent admission -->21 for sub therapeutic INR requiring heparin infusion bridge.     Interval Events:  : \"my bones hurt\", persistent abdominal pain, worsening leukocytosis, sternal incision dehiscence / drainage; CTS to see.   : Feeling about the same. INR 12 noted - will receive Vit K 1 mg in anticipation that he may need sternotomy wound cleaning in OR with Dr Trevino.  : Breathing feels harder today, currently on 8L via mask. INR down to 4.6. Creat increasing.    Review of Systems: Pertinent items are listed in HPI (history of present illness):  A comprehensive 10+ point review of systems is otherwise negative.    Medications/Infusions: Reviewed    Rhythm: SR 89; occ Vpacing    Arrythmias: None since VT on     Vital Last Value 24 Hour Range   Temperature 97 °F (36.1 °C)  Called patient about procedure. Told to be here at 1100 for procedure at 1230. Must be NPO after midnight but can take morning medication with sips of water. Patient stated they do not take blood thinners. Told to have a responsible adult with them to take them home and stay with them afterwards, if they do not get admitted to hospital. Also, to bring a current list of medications. No other questions or concerns.   Temp  Min: 96 °F (35.6 °C)  Max: 98.1 °F (36.7 °C)   Pulse 88 Pulse  Min: 86  Max: 94   Respiratory 14 Resp  Min: 10  Max: 42   Blood Pressure (!) 86/64 BP  Min: 70/51  Max: 116/55   Pulse Oximetry 98 % SpO2  Min: 81 %  Max: 98 %     Vital Today Admitted   Weight 59.7 kg Weight: 57.6 kg   Height N/A Height: 5' 5\" (165.1 cm)   BMI (body mass index) N/A BMI (Calculated): 21.13     Weight over the past 48 Hours:  Patient Vitals for the past 48 hrs:   Weight   09/06/21 0645 57.8 kg   09/07/21 0600 59.7 kg     Intake/Output:  I/O this shift:  In: 390 [P.O.:240; I.V.:50; IV Piggyback:100]  Out: 225 [Urine:225]  I/O last 3 completed shifts:  In: 2297.6 [P.O.:1000; I.V.:195.6; Blood:509; IV Piggyback:593]  Out: 900 [Urine:900]    Intake/Output Summary (Last 24 hours) at 9/7/2021 1150  Last data filed at 9/7/2021 1100  Gross per 24 hour   Intake 2527.59 ml   Output 1125 ml   Net 1402.59 ml      Physical Assessment:  General:    NAD; cachectic/frail appearance   Incision:    Distal sternal incision dressing saturated with purulent drainage; proximal sternal incision scab   EENT:    Deferred   Oral Mucosa:    Dry   Neck:   Trachea midline    Lungs:     Diminished throughout anterior - poor inspiratory effort   Cardiovascular:   Normal VAD sounds, 1+LE edema   Abdomen:     Mild distended, tender to touch t/o.  Driveline drsg CDI   Pulses:   Non pulsatile    Skin:   Warm core / cold periphery   Neurologic:   Alert and oriented to person, place and time    No focal deficits     Laboratory Results:  Lab Results   Component Value Date    WBC 32.3 (H) 09/07/2021    HGB 7.5 (L) 09/07/2021    HCT 24.4 (L) 09/07/2021     09/07/2021    BUN 71 (H) 09/07/2021    CREATININE 1.83 (H) 09/07/2021    SODIUM 129 (L) 09/07/2021    POTASSIUM 3.8 09/07/2021    CO2 27 09/07/2021    MG 2.3 09/07/2021    BILIRUBIN 1.8 (H) 09/06/2021    INR 4.6 09/07/2021    PTT 76 (H) 08/26/2021    LACTA 3.5 (HH) 09/05/2021    ALKPT 312 (H) 09/06/2021    AST  23 09/06/2021    GPT 17 09/06/2021    ALBUMIN 2.0 (L) 09/06/2021    GLUCOSE 113 (H) 09/07/2021    TSH 0.854 09/04/2021     ECHO 9/5/21  S/p HeartMate III LVAD. RPM 4900.  Moderately increased LV cavity size with severely reduced LV systolic function. Slight rightward shift of the ventricular septum.  Inflow cannula velocity 0.6 m/sec.  Outflow cannula velocity 2.7 m/sec.  S/p AV suture closure. Mild continuous aortic regurgitation.  Increased RV size with severely reduced RV systolic function.  Thickened mitral valve leaflets. No mitral stenosis. Moderate mitral regurgitation.  Thickened tricuspid valve leaflets with mild tricuspid regurgitation.    RHC 8/10/2021 (60.3kg)  BP= 117/75/92 mmHg    HR= 101  RA= 18 mmHg, V-waves = 16 mmHg  RV= 64/0 mmHg, EDP 12 mmHg  PAP= 52/24/33 mmHg  PCWP= 23 mmHg, V-waves = 37 mmHg  TPG = 10 mmHg  PVR = 2.30 CASTANEDA (Bill), 2.28 CASTANEDA (TD)  SVR = 1,354 dynes/cm/sec-5  Bill Cardiac output (L/min)/cardiac index (L/min/m2) 4.34/2.64     L. ECHO 8/3/21  HeartMate III LVAD, deVega annuloplasty repair, AV suture repair (7/27/2021).  Moderate LV cavity enlargement. Severe LV systolic dysfunction s/p LVAD.  Inflow Cannula = .55 m/sec. Outflow Cannula = 1.59 m/sec.  Significant shadowing of LV from LVAD inflow cannula precludes assessment of LV cavity and/or thrombus.  Mild RV cavity enlargement. Severe RV systolic dysfunction.  Aortic valve not well visualized. Mild aortic valve regurgitation.  No pericardial effusion.  Compared to prior study, patient is s/p LVAD.      Diagnosis/Plan:   S/P Heartmate 3 DT LVAD, TV Repair / Closure of AV (7/27/21) d/t ICMP & Valvular Heart Disease  Acute on Chronic Systolic BiV Heart Failure, ACC/AHA Stage D, NYHA FC III  Chronic Hypertension  Supra-therapeutic INR (improved) S/P Vit K & FFP  Volume Status: elevated Perfusion Status: Warm with LVAD   -Continue VAD speed 4900 RPM (order placed in EPIC 9/3/21)   -Recent Alarms: None   -HOLD Hydralazine 75 mg PO TID  w/labile BP   -Volume management per neph: IV Lasix 40 mg x1 ordered (PTA: Torsemide 80 mg daily)   -HOLD Aldactone 100 mg daily 2/2 labile BP/renal dysfunction    -HOLD ASA 81 mg daily d/t supratherapeutic INR   -HOLD warfarin d/t supra-therapeutic level; goal INR 2-3    --No Vitamin K, PCC or FFP without approval from AHF Team   -Goal MAP 60-85 mmHg; PRN Hydralazine for MAP >90 mmHg   -Driveline dressing changes per protocol   -Daily standing weights and strict I&O   -Low sodium & fluid restricted diet   -RV lead dislodged/cut in OR; RA lead remains only functional lead; NO FUNCTIONING ICD LEAD    MSSA Bacteremia (LVAD Infection + Sternal Wound Infection)   -Repeat Blood cultures 9/6 NDGT   -ID following: IV Oxacillin   -CT findings reviewed   -CTS following - considering surgical intervention    -ECHO 9/5 as above - will need ARCHANA once hemodynamically stable    Ventricular Tachycardia (9/5/21)  Paroxsymal Atrial Fibrillation   -ICD not functional as above   -EP following: Amiodarone 200 mg daily   -Warfarin as above     Acute Kidney Injury on Chronic Kidney Disease Stage 3 ([facility])   Severe Acute on Chronic Hyponatremia              -Nephrology following, prior baseline Cr ~1.1    Acute Hypoxemic Respiratory Failure- Wean O2 as able, consider intubation if worsens - critical care following     Hyperuricemia- Allopurinol 100 mg daily     Chronic Anemia- Goal Hgb > 7    GI Prophylaxis: Protonix   DVT Prophylaxis: SCD / TEDs / No pharm 2/2 supra-therapeutic INR  Expected DC Date: TBD    The above was discussed with Dr. Abebe. Please call with questions or concerns.      Michelle Barron DNP, APNP, AGACNP-BC  Advanced Heart Failure, MCSD, Heart Transplant, and Pulmonary Hypertension  Pager: [phone]  Please call on-call MD between the hours of [phone]    Heart Failure-Transplant Cardiology Attending Note    I saw and evaluated the patient. I discussed the case with TREVIN Medina and agree with the findings  and plan as documented. More than 50% of my time was spent in counseling and/or coordination of care including: Counseling patient regarding diagnosis, prognosis, and treatment plan (including risks and benefits) and discussing case with care team members.     Agree with blood transfusion, 1 unit FFP to correct rising INR, continue antibiotics and start low dose Lasix drip. ARCHANA in the near future. Patient is full code and may need to be intubated with increasing work of breathing. We will continue to follow closely.    Other issues per the resident/fellow/NP note, which I have cosigned.    Ronny Abebe M.D.  Advanced HF/ Heart Transplant/ Mechanical Circulatory Support  [facility]

## 2025-05-30 NOTE — TELEPHONE ENCOUNTER
Patient said she is having side effects from the Trulicity.  She said 1 to 2 days after she takes it, she has bad stomach cramps and nausea and it last about 4 to 5 days

## 2025-05-30 NOTE — TELEPHONE ENCOUNTER
Submitted PA for Ubrelvy 50MG tablets   Via [facility] Key: [id number]  STATUS: PENDING.    Follow up done daily; if no decision with in three days we will refax.  If another three days goes by with no decision will call the insurance for status.

## 2025-06-02 ENCOUNTER — HOSPITAL ENCOUNTER (OUTPATIENT)
Age: 60
Setting detail: OUTPATIENT SURGERY
Discharge: HOME | End: 2025-06-02
Attending: NEUROLOGICAL SURGERY | Admitting: NEUROLOGICAL SURGERY
Payer: MEDICAID

## 2025-06-02 VITALS
DIASTOLIC BLOOD PRESSURE: 85 MMHG | TEMPERATURE: 97.9 F | RESPIRATION RATE: 17 BRPM | HEIGHT: 61 IN | OXYGEN SATURATION: 99 % | WEIGHT: 175 LBS | HEART RATE: 78 BPM | BODY MASS INDEX: 33.04 KG/M2 | SYSTOLIC BLOOD PRESSURE: 137 MMHG

## 2025-06-02 DIAGNOSIS — I67.1 CEREBRAL ANEURYSM, NONRUPTURED: ICD-10-CM

## 2025-06-02 LAB
ALBUMIN SERPL-MCNC: 4.3 G/DL (ref 3.4–5)
ALBUMIN/GLOB SERPL: 1.5 {RATIO} (ref 1.1–2.2)
ALP SERPL-CCNC: 81 U/L (ref 40–129)
ALT SERPL-CCNC: 23 U/L (ref 10–40)
ANION GAP SERPL CALCULATED.3IONS-SCNC: 10 MMOL/L (ref 3–16)
AST SERPL-CCNC: 19 U/L (ref 15–37)
BILIRUB SERPL-MCNC: 1.2 MG/DL (ref 0–1)
BUN SERPL-MCNC: 13 MG/DL (ref 7–20)
CALCIUM SERPL-MCNC: 9.6 MG/DL (ref 8.3–10.6)
CHLORIDE SERPL-SCNC: 100 MMOL/L (ref 99–110)
CO2 SERPL-SCNC: 27 MMOL/L (ref 21–32)
CREAT SERPL-MCNC: 0.7 MG/DL (ref 0.6–1.2)
DEPRECATED RDW RBC AUTO: 12.9 % (ref 12.4–15.4)
GFR SERPLBLD CREATININE-BSD FMLA CKD-EPI: >90 ML/MIN/{1.73_M2}
GLUCOSE BLD-MCNC: 138 MG/DL (ref 70–99)
GLUCOSE SERPL-MCNC: 185 MG/DL (ref 70–99)
HCT VFR BLD AUTO: 45.2 % (ref 36–48)
HGB BLD-MCNC: 15.8 G/DL (ref 12–16)
INR PPP: 0.99 (ref 0.85–1.15)
MCH RBC QN AUTO: 29.9 PG (ref 26–34)
MCHC RBC AUTO-ENTMCNC: 34.9 G/DL (ref 31–36)
MCV RBC AUTO: 85.6 FL (ref 80–100)
PERFORMED ON: ABNORMAL
PLATELET # BLD AUTO: 186 K/UL (ref 135–450)
PMV BLD AUTO: 9.1 FL (ref 5–10.5)
POTASSIUM SERPL-SCNC: 4.1 MMOL/L (ref 3.5–5.1)
PROT SERPL-MCNC: 7.2 G/DL (ref 6.4–8.2)
PROTHROMBIN TIME: 13.3 SEC (ref 11.9–14.9)
RBC # BLD AUTO: 5.28 M/UL (ref 4–5.2)
SODIUM SERPL-SCNC: 137 MMOL/L (ref 136–145)
WBC # BLD AUTO: 5.8 K/UL (ref 4–11)

## 2025-06-02 PROCEDURE — 85027 COMPLETE CBC AUTOMATED: CPT

## 2025-06-02 PROCEDURE — 2709999900 HC NON-CHARGEABLE SUPPLY: Performed by: NEUROLOGICAL SURGERY

## 2025-06-02 PROCEDURE — 80053 COMPREHEN METABOLIC PANEL: CPT

## 2025-06-02 PROCEDURE — 6360000002 HC RX W HCPCS: Performed by: NEUROLOGICAL SURGERY

## 2025-06-02 PROCEDURE — 85610 PROTHROMBIN TIME: CPT

## 2025-06-02 RX ORDER — ONDANSETRON 2 MG/ML
4 INJECTION INTRAMUSCULAR; INTRAVENOUS ONCE
Status: COMPLETED | OUTPATIENT
Start: 2025-06-02 | End: 2025-06-02

## 2025-06-02 RX ADMIN — ONDANSETRON 4 MG: 2 INJECTION, SOLUTION INTRAMUSCULAR; INTRAVENOUS at 15:14

## 2025-06-02 ASSESSMENT — PAIN - FUNCTIONAL ASSESSMENT: PAIN_FUNCTIONAL_ASSESSMENT: NONE - DENIES PAIN

## 2025-06-02 NOTE — PROGRESS NOTES
Cath Lab Pre Procedure Flowsheet    Plan of Care:     Hemodynamics and cardiac rhythm will remain stable.   Comfort level will be maintained.   Respiratory function will remain adequate.   Pt/family will verbalize understanding of the procedure.   Procedure will be tolerated without complications.   Patient will recover from procedure without complications.   ID armband on patient and identification verified.   Informed consent obtained.   Non invasive blood pressure cuff applied, monitoring initiated.   EKG pads and pulse oximeter applied, monitoring initiated.   Instructions given. Patient and / or family verbalize understanding.   H&P will be documented by physician in [facility].     Pre-procedure:    NPO Status: Pt has been NPO since midnight. .    Contrast / IV Dye Allergy:  None    Pregnancy Test: No.    Prep Sites: Groin(s)    Bunny's Test:    Pulses:  Bilat Radial 2, Right Femoral 2, Bilat DP/PT 2/1     Anticoagulants: None.     Antiplatelets: Aspirin. Last Dose: 6/1/2025.  Any missed doses:  No. and Plavix. Last Dose: 6/1/2025.  Any missed doses:  No..     Chief Complaint:   Aneurysm - non-ruptured    Diabetic: Yes, Oral and Insulin Combination Treatment    Pre EKG Rhythm: Sinus Rhythm    Pre SBP: 137    IV access: #20 RAC    Pre-procedure blood work collected by: BROOK Chavarria RN    NIH Scale: 0

## 2025-06-02 NOTE — PROGRESS NOTES
Patient requested to reschedule appointment at this time due to the fact that she is diabetic - hasn't eaten since 2100, and has complaints of nausea (IV Zofran given per MD order) and a migraine developing. MD made aware and patient informed to call and reschedule with Jose in MD's office. Patient verbalized understanding.     Patient discharged ambulatory with family.

## 2025-06-02 NOTE — TELEPHONE ENCOUNTER
The medication Ubrelvy 50MG tablets  is APPROVED from 5-30-25 to 11/25/25.    Please notify the patient.    If this requires a response please respond to the pool ( P MHCX PSC MEDICATION PRE-AUTH).

## 2025-06-02 NOTE — TELEPHONE ENCOUNTER
Did she want to try a different GLP1?  If so best to schedule appointment for documentation for insurance.

## 2025-06-02 NOTE — H&P
IV Fentanyl and Versed    Patient is an appropriate candidate for plan of IV moderate sedation: yes    Travis Cervantes MD  Attending Neurosurgeon  Neurovascular and Stroke  [facility]      Electronically signed by Travis Cervantes IV, MD on 6/2/2025 at 1:42 PM

## 2025-06-04 ENCOUNTER — OFFICE VISIT (OUTPATIENT)
Dept: FAMILY MEDICINE CLINIC | Age: 60
End: 2025-06-04
Payer: MEDICAID

## 2025-06-04 VITALS
OXYGEN SATURATION: 96 % | WEIGHT: 175 LBS | HEART RATE: 76 BPM | SYSTOLIC BLOOD PRESSURE: 122 MMHG | BODY MASS INDEX: 33.07 KG/M2 | DIASTOLIC BLOOD PRESSURE: 72 MMHG | TEMPERATURE: 97.6 F

## 2025-06-04 DIAGNOSIS — E11.59 TYPE 2 DIABETES MELLITUS WITH OTHER CIRCULATORY COMPLICATION, WITHOUT LONG-TERM CURRENT USE OF INSULIN (HCC): Primary | ICD-10-CM

## 2025-06-04 LAB — ECHO BSA: 1.85 M2

## 2025-06-04 PROCEDURE — 3078F DIAST BP <80 MM HG: CPT | Performed by: NURSE PRACTITIONER

## 2025-06-04 PROCEDURE — G8417 CALC BMI ABV UP PARAM F/U: HCPCS | Performed by: NURSE PRACTITIONER

## 2025-06-04 PROCEDURE — 2022F DILAT RTA XM EVC RTNOPTHY: CPT | Performed by: NURSE PRACTITIONER

## 2025-06-04 PROCEDURE — 3017F COLORECTAL CA SCREEN DOC REV: CPT | Performed by: NURSE PRACTITIONER

## 2025-06-04 PROCEDURE — 1036F TOBACCO NON-USER: CPT | Performed by: NURSE PRACTITIONER

## 2025-06-04 PROCEDURE — G8427 DOCREV CUR MEDS BY ELIG CLIN: HCPCS | Performed by: NURSE PRACTITIONER

## 2025-06-04 PROCEDURE — 99214 OFFICE O/P EST MOD 30 MIN: CPT | Performed by: NURSE PRACTITIONER

## 2025-06-04 PROCEDURE — 3046F HEMOGLOBIN A1C LEVEL >9.0%: CPT | Performed by: NURSE PRACTITIONER

## 2025-06-04 PROCEDURE — 3074F SYST BP LT 130 MM HG: CPT | Performed by: NURSE PRACTITIONER

## 2025-06-04 NOTE — PROGRESS NOTES
patient (or guardian, if applicable) and other individuals in attendance at the appointment consented to the use of AI, including the recording.      Note: Portions of this note have been templated and/or copied from initial evaluation, reassessments and prior notes for documentation efficiency.       Electronically signed by GERMAN Velazquez CNP on 6/11/2025 at 12:56 PM

## 2025-06-06 ENCOUNTER — TELEPHONE (OUTPATIENT)
Dept: FAMILY MEDICINE CLINIC | Age: 60
End: 2025-06-06

## 2025-06-06 NOTE — TELEPHONE ENCOUNTER
Submitted PA for Mounjaro 2.5MG/0.5ML auto-injectors  Via CMM Key: [id number] STATUS: not sent     Please complete last ov note to submit pa request.     Thanks .    Follow up done daily; if no decision with in three days we will refax.  If another three days goes by with no decision will call the insurance for status.

## 2025-06-10 NOTE — TELEPHONE ENCOUNTER
Second request     Please complete last ov note to submit pa request.        If this requires a response please respond to the pool ( P MHCX PSC MEDICATION PRE-AUTH).      Thank you please advise patient.   Thanks .

## 2025-06-11 NOTE — TELEPHONE ENCOUNTER
Submitted PA for Mounjaro 2.5MG/0.5ML auto-injectors  Via CMM Key: [id number] STATUS: PENDING.    Follow up done daily; if no decision with in three days we will refax.  If another three days goes by with no decision will call the insurance for status.

## 2025-06-12 ENCOUNTER — HOSPITAL ENCOUNTER (OUTPATIENT)
Age: 60
Setting detail: OUTPATIENT SURGERY
Discharge: HOME | End: 2025-06-12
Attending: NEUROLOGICAL SURGERY | Admitting: NEUROLOGICAL SURGERY
Payer: MEDICAID

## 2025-06-12 VITALS
HEART RATE: 68 BPM | TEMPERATURE: 97.8 F | HEIGHT: 61 IN | DIASTOLIC BLOOD PRESSURE: 63 MMHG | OXYGEN SATURATION: 99 % | WEIGHT: 173 LBS | RESPIRATION RATE: 16 BRPM | SYSTOLIC BLOOD PRESSURE: 106 MMHG | BODY MASS INDEX: 32.66 KG/M2

## 2025-06-12 DIAGNOSIS — I67.1 CEREBRAL ANEURYSM, NONRUPTURED: ICD-10-CM

## 2025-06-12 LAB — ECHO BSA: 1.84 M2

## 2025-06-12 PROCEDURE — 7100000011 HC PHASE II RECOVERY - ADDTL 15 MIN: Performed by: NEUROLOGICAL SURGERY

## 2025-06-12 PROCEDURE — C1894 INTRO/SHEATH, NON-LASER: HCPCS | Performed by: NEUROLOGICAL SURGERY

## 2025-06-12 PROCEDURE — 2709999900 HC NON-CHARGEABLE SUPPLY: Performed by: NEUROLOGICAL SURGERY

## 2025-06-12 PROCEDURE — 36224 PLACE CATH CAROTD ART: CPT | Performed by: NEUROLOGICAL SURGERY

## 2025-06-12 PROCEDURE — 99152 MOD SED SAME PHYS/QHP 5/>YRS: CPT | Performed by: NEUROLOGICAL SURGERY

## 2025-06-12 PROCEDURE — C1769 GUIDE WIRE: HCPCS | Performed by: NEUROLOGICAL SURGERY

## 2025-06-12 PROCEDURE — 99153 MOD SED SAME PHYS/QHP EA: CPT | Performed by: NEUROLOGICAL SURGERY

## 2025-06-12 PROCEDURE — 6370000000 HC RX 637 (ALT 250 FOR IP): Performed by: NEUROLOGICAL SURGERY

## 2025-06-12 PROCEDURE — C1760 CLOSURE DEV, VASC: HCPCS | Performed by: NEUROLOGICAL SURGERY

## 2025-06-12 PROCEDURE — 6360000002 HC RX W HCPCS: Performed by: NEUROLOGICAL SURGERY

## 2025-06-12 PROCEDURE — 7100000010 HC PHASE II RECOVERY - FIRST 15 MIN: Performed by: NEUROLOGICAL SURGERY

## 2025-06-12 PROCEDURE — 36226 PLACE CATH VERTEBRAL ART: CPT | Performed by: NEUROLOGICAL SURGERY

## 2025-06-12 PROCEDURE — 76377 3D RENDER W/INTRP POSTPROCES: CPT | Performed by: NEUROLOGICAL SURGERY

## 2025-06-12 RX ORDER — SODIUM CHLORIDE 0.9 % (FLUSH) 0.9 %
5-40 SYRINGE (ML) INJECTION EVERY 12 HOURS SCHEDULED
Status: DISCONTINUED | OUTPATIENT
Start: 2025-06-12 | End: 2025-06-12 | Stop reason: HOSPADM

## 2025-06-12 RX ORDER — LIDOCAINE HYDROCHLORIDE 10 MG/ML
INJECTION, SOLUTION EPIDURAL; INFILTRATION; INTRACAUDAL; PERINEURAL PRN
Status: DISCONTINUED | OUTPATIENT
Start: 2025-06-12 | End: 2025-06-12 | Stop reason: HOSPADM

## 2025-06-12 RX ORDER — FENTANYL CITRATE 50 UG/ML
INJECTION, SOLUTION INTRAMUSCULAR; INTRAVENOUS PRN
Status: DISCONTINUED | OUTPATIENT
Start: 2025-06-12 | End: 2025-06-12 | Stop reason: HOSPADM

## 2025-06-12 RX ORDER — IOPAMIDOL 510 MG/ML
INJECTION, SOLUTION INTRAVASCULAR PRN
Status: DISCONTINUED | OUTPATIENT
Start: 2025-06-12 | End: 2025-06-12 | Stop reason: HOSPADM

## 2025-06-12 RX ORDER — ONDANSETRON 2 MG/ML
4 INJECTION INTRAMUSCULAR; INTRAVENOUS EVERY 6 HOURS PRN
Status: DISCONTINUED | OUTPATIENT
Start: 2025-06-12 | End: 2025-06-12 | Stop reason: HOSPADM

## 2025-06-12 RX ORDER — ACETAMINOPHEN 325 MG/1
650 TABLET ORAL ONCE
Status: COMPLETED | OUTPATIENT
Start: 2025-06-12 | End: 2025-06-12

## 2025-06-12 RX ORDER — SODIUM CHLORIDE 9 MG/ML
INJECTION, SOLUTION INTRAVENOUS CONTINUOUS
Status: DISCONTINUED | OUTPATIENT
Start: 2025-06-12 | End: 2025-06-12 | Stop reason: HOSPADM

## 2025-06-12 RX ORDER — MIDAZOLAM HYDROCHLORIDE 1 MG/ML
INJECTION, SOLUTION INTRAMUSCULAR; INTRAVENOUS PRN
Status: DISCONTINUED | OUTPATIENT
Start: 2025-06-12 | End: 2025-06-12 | Stop reason: HOSPADM

## 2025-06-12 RX ORDER — HEPARIN SODIUM 1000 [USP'U]/ML
INJECTION, SOLUTION INTRAVENOUS; SUBCUTANEOUS PRN
Status: DISCONTINUED | OUTPATIENT
Start: 2025-06-12 | End: 2025-06-12 | Stop reason: HOSPADM

## 2025-06-12 RX ORDER — SODIUM CHLORIDE 9 MG/ML
INJECTION, SOLUTION INTRAVENOUS PRN
Status: DISCONTINUED | OUTPATIENT
Start: 2025-06-12 | End: 2025-06-12 | Stop reason: HOSPADM

## 2025-06-12 RX ORDER — SODIUM CHLORIDE 0.9 % (FLUSH) 0.9 %
5-40 SYRINGE (ML) INJECTION PRN
Status: DISCONTINUED | OUTPATIENT
Start: 2025-06-12 | End: 2025-06-12 | Stop reason: HOSPADM

## 2025-06-12 RX ORDER — ONDANSETRON 4 MG/1
4 TABLET, ORALLY DISINTEGRATING ORAL EVERY 8 HOURS PRN
Status: DISCONTINUED | OUTPATIENT
Start: 2025-06-12 | End: 2025-06-12 | Stop reason: HOSPADM

## 2025-06-12 RX ADMIN — ACETAMINOPHEN 650 MG: 325 TABLET ORAL at 12:22

## 2025-06-12 ASSESSMENT — PAIN DESCRIPTION - LOCATION: LOCATION: HEAD

## 2025-06-12 ASSESSMENT — PAIN SCALES - GENERAL: PAINLEVEL_OUTOF10: 8

## 2025-06-12 NOTE — TELEPHONE ENCOUNTER
The medication Mounjaro 2.5MG/0.5ML auto-injectors is APPROVED thru  6-5-26.    Please notify the patient.    If this requires a response please respond to the pool ( P MHCX PSC MEDICATION PRE-AUTH).

## 2025-06-12 NOTE — H&P
NEUROSURGERY PRE-PROCEDURE HISTORY & PHYSICAL NOTE  AND MODERATE IV SEDATION PRE-PROCEDURE NOTE  THE [facility]          ____________________________________________________  Sedation Pre-Procedure Assessment    Patient Name: Diane Ibarra   YOB: 1965  Room/Bed: [facility]/  Medical Record Number: [id number]  Date: 6/12/2025   Time: 11:15 AM       Indication:    58 y/o female with prior history of heart disease and stroke, atherosclerotic disease, HTN, HLD, who takes ASA and Plavix as home meds. 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 New CTA from 12/6/2024 shows stability of that aneurysm with a possible second aneurysm of the right ICA near the PCOM region that is 1 mm. This might just be an infundibulum vs true aneurysm. Regardless she has never had catheter-based cerebral angiography to confirm either aneurysm diagnosis. Additionally her CTA reports stenosis of her proximal right subclavian artery, right vertebral artery and origin of her right ICA (50%, 50%, and 25% respectively). She also has approximately 50% stenosis of her bilateral ICA s at the carotid siphon.      Prior CABG and PCI.     Consent: I have discussed with the patient and/or the patient representative the indication, alternatives, and the possible risks and/or complications of the planned procedure and the anesthesia methods. The patient and/or patient representative appear to understand and agree to proceed.    Vital Signs:   Vitals:    06/12/25 0932   BP: 139/74   Pulse: 79   Resp: 18   Temp: 97.8 °F (36.6 °C)   SpO2: 98%       Past Medical History:   has a past medical history of Acute blood loss anemia, Asthma, CAD (coronary artery disease), Chest pain, COPD (chronic obstructive pulmonary disease) (HCC), Coronary artery disease, Depression, Diabetes mellitus (HCC), Enlarged heart, Fatigue, Generalized headaches, Hyperlipidemia, Hypertension, Migraine,  1/10/25  Yes Senia Dior APRN - CNP   insulin glargine (LANTUS SOLOSTAR) 100 UNIT/ML injection pen Inject 10 Units into the skin nightly diabetes 12/20/24  Yes Senia Dior APRN - CNP   metFORMIN (GLUCOPHAGE) 1000 MG tablet Take 0.5 tablets by mouth 2 times daily (with meals) 12/20/24  Yes Senia Dior APRN - CNP   diclofenac sodium (VOLTAREN) 1 % GEL Apply 2 g topically 4 times daily as needed for Pain 11/1/24  Yes ProviderKolby MD   Ubrogepant (UBRELVY) 50 MG TABS Take 50 mg by mouth as needed (for migraine) 12/19/24  Yes Senia Dior APRN - CNP   busPIRone (BUSPAR) 5 MG tablet Take 1 tablet by mouth 2 times daily 12/19/24  Yes Senia Dior APRN - CNP   pantoprazole (PROTONIX) 40 MG tablet TAKE ONE TABLET BY MOUTH TWO (2) TIMES A DAY 12/11/24  Yes Senia Dior APRN - CNP   Insulin Pen Needle (UNIFINE PENTIPS) 32G X 4 MM MISC USE AS DIRECTED FOR LANTUS 9/26/24  Yes Ronnie Villa APRN - CNP   JARDIANCE 25 MG tablet TAKE ONE (1) TABLET BY MOUTH DAILY DIABETES 8/12/24  Yes Senia Dior APRN - CNP   dilTIAZem (CARDIZEM CD) 120 MG extended release capsule Take 1 capsule by mouth daily 8/2/24  Yes ProviderKolby MD   promethazine (PHENERGAN) 12.5 MG tablet Take 1 tablet by mouth every 6 hours as needed for Nausea 4/23/24  Yes Senia Dior APRN - CNP   aspirin (ASPIRIN LOW DOSE) 81 MG chewable tablet Take 1 tablet by mouth daily 4/23/24  Yes Senia Dior APRN - CNP   Tirzepatide (MOUNJARO) 2.5 MG/0.5ML SOAJ pen Inject 2.5 mg into the skin every 7 days  Patient not taking: Reported on 6/12/2025 6/6/25   Senia Dior APRN - CNP       Pre-Sedation Documentation and Exam:   I have personally completed a history, physical exam & review of systems for this patient (see notes).    Mallampati Airway Assessment:  Mallampati Class III - (soft palate & base of uvula are visible)    Prior History of Anesthesia Complications:   none    ASA Classification:  Class 2 - A

## 2025-06-12 NOTE — PROGRESS NOTES
Patient ambulated independently to BR. Upon return, right groin site and pulses remain unchanged. Patient/family given discharge instructions. Patient/family verbalize understanding of discharge instructions, all questions addressed, copy given to patient/family. Pt transferred to vehicle via wheelchair to be discharged home with family.  PIV removed.

## 2025-06-12 NOTE — PROCEDURES
NEUROVASCULAR PROCEDURE NOTE    DATE OF THE PROCEDURE: 06/12/2025    HISTORY OF PRESENT ILLNESS:   60 y/o female with prior history of heart disease and stroke, atherosclerotic disease, HTN, HLD, who takes ASA and Plavix as home meds. 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 New CTA from 12/6/2024 shows stability of that aneurysm with a possible second aneurysm of the right ICA near the PCOM region that is 1 mm. This might just be an infundibulum vs true aneurysm. Regardless she has never had catheter-based cerebral angiography to confirm either aneurysm diagnosis. Additionally her CTA reports stenosis of her proximal right subclavian artery, right vertebral artery and origin of her right ICA (50%, 50%, and 25% respectively). She also has approximately 50% stenosis of her bilateral ICA s at the carotid siphon. Prior CABG and PCI.     OPERATORS:   Primary: Travis Cervantes M.D., attending.   Assistant: None.    SUPERVISION AND INTERPRETATION: Dr. Travis Cervantes personally performed the technical portions of the procedure.  Following completion of the technical portions of the procedure, the angiographic images were reviewed at the neurography PACS work station by Dr. Cervantes.    PROCEDURE:  Six-Vessel Diagnostic Cerebral Angiogram.  Ultrasound-guided arterial assessment and access of the right common femoral artery  Moderate IV Sedation supervised by Dr. Travis Cervantes with the assistance of an independent trained observer (IR Nurse) who performed patient assessments and monitored patient vital signs, adult, > 15 minutes.  Rotational angiography of the right internal carotid artery with volumetric imaging data processing, 3-D model reconstruction, and interpretation by Dr. Cervantes on a separate computer workstation.  Rotational angiography of the left internal carotid artery with volumetric imaging data processing, 3-D model reconstruction, and

## 2025-06-12 NOTE — DISCHARGE INSTRUCTIONS
[facility]  Cardiovascular Special Procedures  Angiogram/Cardiac Catheterization  Patient Discharge Instructions           Day 1 (Procedure Day):  Rest for the remainder of the day.  Minimal stair climbing, if you must use stairs, lead with your unaffected leg.  Do not drive a car.  Do not be alone.  Avoid prolonged sitting, walk around occasionally until bedtime tonight.  Leave dressing intact.    Day 2:  You may climb stairs, begin slowly  You may drive a car, unless otherwise directed by physician.  Remove dressing.  You may bathe/shower, but wash gently around the puncture site and pat dry.  Place new band-aid on site daily until skin heals.    Things to Avoid:  You may not do any strenuous exercises for one week. (ex: golfing, bowling, tennis, vacuum, snow removal, jogging, and aerobic exercises).  No hot tubs, baths, or pools for one week.  Do not lift anything over 10 pounds for 10 days.  Avoid positions that would put pressure on your groin. Like sitting upright in a straight back chair.  No lotions, powders, or ointments near site for 5 days.    Things to watch for:  You may have a small lump or a bruise.  This is common and will go away.  If the lump increases and site becomes painful, call physician immediately.  If mild discomfort occurs at the puncture site you may place an ice pack on the site at 15 minute intervals.  If the puncture site starts to bleed, immediately lie on a hard flat surface and apply pressure just above the puncture site.  Have someone call 911 and maintain pressure until the EMS arrives.  If you have loss of color, extreme coldness or numbness of the leg, call 911 immediately.  Excessive pain of the groin, thigh or calf, call your physician immediately.   Watch for signs and symptoms of an infection at the groin site (fever, local pain, redness, warmth or discharge from the puncture site), call your physician immediately if any develop.      Any Questions please call us  directed.    Belongings returned to patient and/or family: Yes.    The Discharge Instructions have been explained to me.  I understand and can verbalize these instructions.      DIAGNOSTIC CEREBRAL ANGIOGRAM DISCHARGE INSTRUCTIONS    Keep incision covered with the dressing x 48 hours.  May remove dressing after 48 hours and leave open to air.     No heavy lifting (anything more than 10 lbs (a gallon of milk is 8 lbs to use as an estimation) x 48 hours.     Do not soak the incision for 48 hours (no pool, bathtub, spa)     OK to take a shower with the dressing on.     It is common to have some bruising and discomfort.     It is common to have a small pea sized area of firmness that is often sore or uncomfortable. That should feel better over the next 2 weeks.     If you develop any significant bleeding from the site, please hold direct pressure and go to the nearest emergency department (ED) to be evaluated.     Any questions, please call my office at [phone] during normal business hours and ask to speak to Jose.      Travis Cervantes MD  Attending Neurosurgeon  Neurovascular and Stroke  [facility]

## 2025-06-21 PROCEDURE — 93298 REM INTERROG DEV EVAL SCRMS: CPT | Performed by: INTERNAL MEDICINE

## 2025-07-08 DIAGNOSIS — E11.59 TYPE 2 DIABETES MELLITUS WITH OTHER CIRCULATORY COMPLICATION, WITHOUT LONG-TERM CURRENT USE OF INSULIN (HCC): ICD-10-CM

## 2025-07-08 RX ORDER — TIRZEPATIDE 2.5 MG/.5ML
INJECTION, SOLUTION SUBCUTANEOUS
Qty: 2 ML | Refills: 0 | Status: SHIPPED | OUTPATIENT
Start: 2025-07-08

## 2025-07-08 NOTE — TELEPHONE ENCOUNTER
Refill Request     CONFIRM preferrred pharmacy with the patient.    If Mail Order Rx - Pend for 90 day refill.      Last Seen: Last Seen Department: 6/4/2025  Last Seen by PCP: 6/4/2025    Last Written: 6-6-2025    If no future appointment scheduled, route STAFF MESSAGE with patient name to the  Pool for scheduling.      Next Appointment:   Future Appointments   Date Time Provider Department Center   7/22/2025 11:00 AM Senia Dior, GERMAN - CNP [place] DEP       Message sent to  to schedule appt with patient?  N/A      Requested Prescriptions     Pending Prescriptions Disp Refills    MOUNJARO 2.5 MG/0.5ML SOAJ pen [Pharmacy Med Name: MOUNJARO 2.5/0.5 SOLN AUTO-INJ] 2 mL 0     Sig: INJECT TWO AND A HALF (2 & 1/2) MG INTO THE SKIN EVERY 7 DAYS

## 2025-07-22 ENCOUNTER — OFFICE VISIT (OUTPATIENT)
Dept: FAMILY MEDICINE CLINIC | Age: 60
End: 2025-07-22
Payer: MEDICAID

## 2025-07-22 ENCOUNTER — HOSPITAL ENCOUNTER (OUTPATIENT)
Dept: GENERAL RADIOLOGY | Age: 60
Discharge: HOME | End: 2025-07-22
Payer: MEDICAID

## 2025-07-22 ENCOUNTER — TELEPHONE (OUTPATIENT)
Dept: FAMILY MEDICINE CLINIC | Age: 60
End: 2025-07-22

## 2025-07-22 VITALS
DIASTOLIC BLOOD PRESSURE: 66 MMHG | HEART RATE: 79 BPM | BODY MASS INDEX: 33.07 KG/M2 | SYSTOLIC BLOOD PRESSURE: 122 MMHG | WEIGHT: 175 LBS | OXYGEN SATURATION: 97 %

## 2025-07-22 DIAGNOSIS — M79.641 BILATERAL HAND PAIN: ICD-10-CM

## 2025-07-22 DIAGNOSIS — E11.59 TYPE 2 DIABETES MELLITUS WITH OTHER CIRCULATORY COMPLICATION, WITHOUT LONG-TERM CURRENT USE OF INSULIN (HCC): Primary | ICD-10-CM

## 2025-07-22 DIAGNOSIS — M79.642 BILATERAL HAND PAIN: ICD-10-CM

## 2025-07-22 DIAGNOSIS — M25.50 POLYARTHRALGIA: ICD-10-CM

## 2025-07-22 LAB — HBA1C MFR BLD: 10.1 %

## 2025-07-22 PROCEDURE — 83036 HEMOGLOBIN GLYCOSYLATED A1C: CPT | Performed by: NURSE PRACTITIONER

## 2025-07-22 PROCEDURE — 99214 OFFICE O/P EST MOD 30 MIN: CPT | Performed by: NURSE PRACTITIONER

## 2025-07-22 PROCEDURE — 73130 X-RAY EXAM OF HAND: CPT

## 2025-07-22 PROCEDURE — 3074F SYST BP LT 130 MM HG: CPT | Performed by: NURSE PRACTITIONER

## 2025-07-22 PROCEDURE — G8427 DOCREV CUR MEDS BY ELIG CLIN: HCPCS | Performed by: NURSE PRACTITIONER

## 2025-07-22 PROCEDURE — 3017F COLORECTAL CA SCREEN DOC REV: CPT | Performed by: NURSE PRACTITIONER

## 2025-07-22 PROCEDURE — 1036F TOBACCO NON-USER: CPT | Performed by: NURSE PRACTITIONER

## 2025-07-22 PROCEDURE — 3046F HEMOGLOBIN A1C LEVEL >9.0%: CPT | Performed by: NURSE PRACTITIONER

## 2025-07-22 PROCEDURE — 2022F DILAT RTA XM EVC RTNOPTHY: CPT | Performed by: NURSE PRACTITIONER

## 2025-07-22 PROCEDURE — G8417 CALC BMI ABV UP PARAM F/U: HCPCS | Performed by: NURSE PRACTITIONER

## 2025-07-22 PROCEDURE — 3078F DIAST BP <80 MM HG: CPT | Performed by: NURSE PRACTITIONER

## 2025-07-22 PROCEDURE — 36415 COLL VENOUS BLD VENIPUNCTURE: CPT | Performed by: NURSE PRACTITIONER

## 2025-07-22 RX ORDER — PREDNISONE 10 MG/1
TABLET ORAL
Qty: 31 TABLET | Refills: 0 | Status: SHIPPED | OUTPATIENT
Start: 2025-07-22

## 2025-07-22 ASSESSMENT — ENCOUNTER SYMPTOMS
CHEST TIGHTNESS: 0
SHORTNESS OF BREATH: 0
CONSTIPATION: 0
COUGH: 0
BLOOD IN STOOL: 0
DIARRHEA: 0

## 2025-07-22 NOTE — PATIENT INSTRUCTIONS
Dear Diane,    Thank you for coming in.  We appreciate your time and effort in helping us with your care.  Here are some follow up items following our discussion:    Increase Mounjaro to 5 mg.    Make appt with Ortho Hand  Please go to [facility] for Xrays.      Please compare this printed medication list with your medications at home to be sure they are the same.  If you have any medications that are different please contact us immediately at [phone].  Or you can send us a [facility] message.      If you need to schedule imaging or testing you can call [facility] Main Scheduling Line at [phone], option 2    Also review your allergies that we have listed, these may also include medications that you have not been able to tolerate, make sure everything listed is correct. If you have any allergies that are different please contact us immediately at [phone].    You may receive a survey in the mail or by email asking about your experience during your visit today. Please complete and return to us so we know how we are serving you.

## 2025-07-22 NOTE — PROGRESS NOTES
VIEWS); Future  - CBC with Auto Differential  - RHEUMATOID FACTOR  - Uric Acid  - Sedimentation Rate  - C-Reactive Protein  - CYCLIC CITRUL PEPTIDE ANTIBODY, IGG  - NAHOMY  - Comprehensive Metabolic Panel  - XR HAND LEFT (2 VIEWS); Future  - predniSONE (DELTASONE) 10 MG tablet; 4 tabs x 3 days, 3 tabs x 3 days, 2 tabs x 3 days, 1 tab x 3 days, 0.5 tab x 2 days  Dispense: 31 tablet; Refill: 0  - Tatyana Escobar MD, Orthopedic Surgery (Hand, Wrist), [facility]      Return in about 3 months (around 10/22/2025).    History of Present Illness  The patient is a 59-year-old female who presents today for a diabetes follow-up and bilateral hand pain.    She reports that her blood sugar levels have been fluctuating at home, with a recent spike to 256. She is currently on Mounjaro, which she tolerates well, although she experiences some cramping. She administers 10 units of insulin in the morning, typically after breakfast, and finds this regimen helpful. She has not experienced any fever or decreased appetite. Her fatigue level remains consistent.    She also reports swelling in her hands, which prevents her from making a fist. Upon waking, her hands are not stiff but painful, and she has noticed a bump on her hand. The pain is severe, and she has difficulty standing due to knee pain. She has been using Voltaren for relief. The pain in her hands has been intermittent for the past month. She continues her daily activities, including house cleaning. She recently injured her hand while fastening her seatbelt, exacerbating the pain. She has been applying a friend's psoriasis medication to her hands, which turns them red. She also experiences shoulder and elbow pain, knee instability, and foot aches. Her arm mobility is limited due to soreness. These symptoms began a few months ago. She has tried various treatments, including ice, heat, Bengay, and other muscle rubs, but nothing has provided relief. She avoids ibuprofen

## 2025-07-23 LAB
ALBUMIN SERPL-MCNC: 4.3 G/DL (ref 3.4–5)
ALBUMIN/GLOB SERPL: 1.7 {RATIO} (ref 1.1–2.2)
ALP SERPL-CCNC: 76 U/L (ref 40–129)
ALT SERPL-CCNC: 21 U/L (ref 10–40)
ANA SER QL IA: NEGATIVE
ANION GAP SERPL CALCULATED.3IONS-SCNC: 10 MMOL/L (ref 3–16)
AST SERPL-CCNC: 18 U/L (ref 15–37)
BASOPHILS # BLD: 0 K/UL (ref 0–0.2)
BASOPHILS NFR BLD: 0.4 %
BILIRUB SERPL-MCNC: 1.2 MG/DL (ref 0–1)
BUN SERPL-MCNC: 16 MG/DL (ref 7–20)
CALCIUM SERPL-MCNC: 10.1 MG/DL (ref 8.3–10.6)
CCP IGG SERPL-ACNC: <0.5 U/ML (ref 0–2.9)
CHLORIDE SERPL-SCNC: 100 MMOL/L (ref 99–110)
CO2 SERPL-SCNC: 28 MMOL/L (ref 21–32)
CREAT SERPL-MCNC: 0.7 MG/DL (ref 0.6–1.2)
CRP SERPL-MCNC: <3 MG/L (ref 0–5.1)
DEPRECATED RDW RBC AUTO: 13.2 % (ref 12.4–15.4)
EOSINOPHIL # BLD: 0 K/UL (ref 0–0.6)
EOSINOPHIL NFR BLD: 0.8 %
ERYTHROCYTE [SEDIMENTATION RATE] IN BLOOD BY WESTERGREN METHOD: 15 MM/HR (ref 0–30)
GFR SERPLBLD CREATININE-BSD FMLA CKD-EPI: >90 ML/MIN/{1.73_M2}
GLUCOSE SERPL-MCNC: 323 MG/DL (ref 70–99)
HCT VFR BLD AUTO: 42.5 % (ref 36–48)
HGB BLD-MCNC: 15 G/DL (ref 12–16)
LYMPHOCYTES # BLD: 1.2 K/UL (ref 1–5.1)
LYMPHOCYTES NFR BLD: 23 %
MCH RBC QN AUTO: 30.8 PG (ref 26–34)
MCHC RBC AUTO-ENTMCNC: 35.3 G/DL (ref 31–36)
MCV RBC AUTO: 87.3 FL (ref 80–100)
MONOCYTES # BLD: 0.3 K/UL (ref 0–1.3)
MONOCYTES NFR BLD: 5 %
NEUTROPHILS # BLD: 3.8 K/UL (ref 1.7–7.7)
NEUTROPHILS NFR BLD: 70.8 %
PLATELET # BLD AUTO: 191 K/UL (ref 135–450)
PMV BLD AUTO: 10.1 FL (ref 5–10.5)
POTASSIUM SERPL-SCNC: 4.7 MMOL/L (ref 3.5–5.1)
PROT SERPL-MCNC: 6.8 G/DL (ref 6.4–8.2)
RBC # BLD AUTO: 4.87 M/UL (ref 4–5.2)
RHEUMATOID FACT SER IA-ACNC: <10 IU/ML
SODIUM SERPL-SCNC: 138 MMOL/L (ref 136–145)
URATE SERPL-MCNC: 4.2 MG/DL (ref 2.6–6)
WBC # BLD AUTO: 5.4 K/UL (ref 4–11)

## 2025-07-24 NOTE — TELEPHONE ENCOUNTER
Submitted PA for Mounjaro 5MG/0.5ML auto-injectors  Via [facility]: [id number] STATUS: PENDING.    Follow up done daily; if no decision with in three days we will refax.  If another three days goes by with no decision will call the insurance for status.

## 2025-07-25 NOTE — TELEPHONE ENCOUNTER
Pharmacy notified they said it will be Monday before they have it.  Left detailed msg on patients vm with these details

## 2025-07-25 NOTE — TELEPHONE ENCOUNTER
The medication Mounjaro 5MG/0.5ML auto-injectors is APPROVED fExpiration Date: June 5, 2026.     Auth / Case # [id number]     Please notify the patient.    If this requires a response please respond to the pool ( P [facility] MEDICATION PRE-AUTH).

## 2025-08-14 ENCOUNTER — OFFICE VISIT (OUTPATIENT)
Dept: FAMILY MEDICINE CLINIC | Age: 60
End: 2025-08-14
Payer: MEDICAID

## 2025-08-14 VITALS
HEART RATE: 92 BPM | SYSTOLIC BLOOD PRESSURE: 125 MMHG | WEIGHT: 177 LBS | DIASTOLIC BLOOD PRESSURE: 75 MMHG | BODY MASS INDEX: 33.44 KG/M2 | OXYGEN SATURATION: 97 %

## 2025-08-14 DIAGNOSIS — M79.662 PAIN AND SWELLING OF LEFT LOWER LEG: ICD-10-CM
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
## 2025-08-14 DIAGNOSIS — T14.8XXA HEMATOMA: ICD-10-CM

## 2025-08-14 DIAGNOSIS — M79.89 PAIN AND SWELLING OF LEFT LOWER LEG: ICD-10-CM

## 2025-08-14 PROCEDURE — 99213 OFFICE O/P EST LOW 20 MIN: CPT | Performed by: NURSE PRACTITIONER

## 2025-08-14 PROCEDURE — 1036F TOBACCO NON-USER: CPT | Performed by: NURSE PRACTITIONER

## 2025-08-14 PROCEDURE — 3078F DIAST BP <80 MM HG: CPT | Performed by: NURSE PRACTITIONER

## 2025-08-14 PROCEDURE — G8417 CALC BMI ABV UP PARAM F/U: HCPCS | Performed by: NURSE PRACTITIONER

## 2025-08-14 PROCEDURE — G8427 DOCREV CUR MEDS BY ELIG CLIN: HCPCS | Performed by: NURSE PRACTITIONER

## 2025-08-14 PROCEDURE — 3017F COLORECTAL CA SCREEN DOC REV: CPT | Performed by: NURSE PRACTITIONER

## 2025-08-14 PROCEDURE — 3074F SYST BP LT 130 MM HG: CPT | Performed by: NURSE PRACTITIONER

## 2025-08-14 RX ORDER — HYDROCODONE BITARTRATE AND ACETAMINOPHEN 5; 325 MG/1; MG/1
1 TABLET ORAL EVERY 6 HOURS PRN
COMMUNITY
Start: 2025-08-11 | End: 2025-08-14

## 2025-08-14 RX ORDER — OXYCODONE AND ACETAMINOPHEN 5; 325 MG/1; MG/1
1 TABLET ORAL EVERY 8 HOURS PRN
Qty: 9 TABLET | Refills: 0 | Status: SHIPPED | OUTPATIENT
Start: 2025-08-14 | End: 2025-08-17

## 2025-08-21 DIAGNOSIS — E11.59 TYPE 2 DIABETES MELLITUS WITH OTHER CIRCULATORY COMPLICATION, WITHOUT LONG-TERM CURRENT USE OF INSULIN (HCC): ICD-10-CM

## 2025-08-21 RX ORDER — TIRZEPATIDE 5 MG/.5ML
INJECTION, SOLUTION SUBCUTANEOUS
Qty: 2 ML | Refills: 0 | Status: SHIPPED | OUTPATIENT
Start: 2025-08-21

## 2025-08-25 ENCOUNTER — TELEPHONE (OUTPATIENT)
Dept: FAMILY MEDICINE CLINIC | Age: 60
End: 2025-08-25
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
## 2025-08-25 DIAGNOSIS — M25.572 ACUTE LEFT ANKLE PAIN: ICD-10-CM

## 2025-08-26 ENCOUNTER — HOSPITAL ENCOUNTER (OUTPATIENT)
Age: 60
Discharge: HOME | End: 2025-08-26
Payer: MEDICAID

## 2025-08-26 ENCOUNTER — HOSPITAL ENCOUNTER (OUTPATIENT)
Dept: GENERAL RADIOLOGY | Age: 60
Discharge: HOME | End: 2025-08-26
Payer: MEDICAID
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
## 2025-08-26 DIAGNOSIS — M25.572 ACUTE LEFT ANKLE PAIN: ICD-10-CM

## 2025-08-26 PROCEDURE — 73610 X-RAY EXAM OF ANKLE: CPT

## 2025-08-29 ENCOUNTER — OFFICE VISIT (OUTPATIENT)
Dept: ORTHOPEDIC SURGERY | Age: 60
End: 2025-08-29
Payer: MEDICAID

## 2025-08-29 DIAGNOSIS — S93.402A MODERATE ANKLE SPRAIN, LEFT, INITIAL ENCOUNTER: Primary | ICD-10-CM

## 2025-08-29 PROCEDURE — 1036F TOBACCO NON-USER: CPT | Performed by: ORTHOPAEDIC SURGERY

## 2025-08-29 PROCEDURE — G8417 CALC BMI ABV UP PARAM F/U: HCPCS | Performed by: ORTHOPAEDIC SURGERY

## 2025-08-29 PROCEDURE — 99203 OFFICE O/P NEW LOW 30 MIN: CPT | Performed by: ORTHOPAEDIC SURGERY

## 2025-08-29 PROCEDURE — G8428 CUR MEDS NOT DOCUMENT: HCPCS | Performed by: ORTHOPAEDIC SURGERY

## 2025-08-29 PROCEDURE — 3017F COLORECTAL CA SCREEN DOC REV: CPT | Performed by: ORTHOPAEDIC SURGERY

## 2025-08-30 PROCEDURE — 93298 REM INTERROG DEV EVAL SCRMS: CPT | Performed by: INTERNAL MEDICINE

## (undated) DEVICE — DEVICE CLSR 5FR BIOABSRB FULL INTEGR RAP HEMSTAS FOR FEM

## (undated) DEVICE — PINNACLE INTRODUCER SHEATH: Brand: PINNACLE

## (undated) DEVICE — CATHETER ANGIO AD 5 FRX100 CM VERT TAPR TIP IMPRESS

## (undated) DEVICE — PAD, DEFIB, ADULT, RADIOTRANS, PHYSIO: Brand: MEDLINE

## (undated) DEVICE — CATH LAB PACK: Brand: MEDLINE INDUSTRIES, INC.

## (undated) DEVICE — RADIFOCUS GLIDEWIRE: Brand: GLIDEWIRE